# Patient Record
Sex: MALE | Race: WHITE | NOT HISPANIC OR LATINO | Employment: OTHER | URBAN - METROPOLITAN AREA
[De-identification: names, ages, dates, MRNs, and addresses within clinical notes are randomized per-mention and may not be internally consistent; named-entity substitution may affect disease eponyms.]

---

## 2023-01-28 PROBLEM — I49.9 IRREGULAR HEARTBEAT: Status: ACTIVE | Noted: 2023-01-28

## 2023-01-28 PROBLEM — Z86.0100 HISTORY OF COLONIC POLYPS: Status: ACTIVE | Noted: 2023-01-28

## 2023-01-28 PROBLEM — K59.09 CHRONIC CONSTIPATION: Status: ACTIVE | Noted: 2023-01-28

## 2023-01-28 PROBLEM — J18.9 LEFT LOWER LOBE PNEUMONIA: Status: ACTIVE | Noted: 2023-01-28

## 2023-01-28 PROBLEM — I10 ESSENTIAL HYPERTENSION: Status: ACTIVE | Noted: 2023-01-28

## 2023-01-28 PROBLEM — R53.1 WEAKNESS: Status: ACTIVE | Noted: 2023-01-28

## 2023-01-28 PROBLEM — M79.18 MYOFASCIAL PAIN: Status: ACTIVE | Noted: 2023-01-28

## 2023-01-28 PROBLEM — G47.33 OBSTRUCTIVE SLEEP APNEA: Status: ACTIVE | Noted: 2023-01-28

## 2023-01-28 PROBLEM — G89.29 CHRONIC BACK PAIN: Status: ACTIVE | Noted: 2023-01-28

## 2023-01-28 PROBLEM — R91.1 LUNG NODULE: Status: ACTIVE | Noted: 2023-01-28

## 2023-01-28 PROBLEM — M54.14 THORACIC NEURITIS: Status: ACTIVE | Noted: 2023-01-28

## 2023-01-28 PROBLEM — M54.9 CHRONIC BACK PAIN: Status: ACTIVE | Noted: 2023-01-28

## 2023-01-28 PROBLEM — J30.2 SEASONAL ALLERGIES: Status: ACTIVE | Noted: 2023-01-28

## 2023-01-28 PROBLEM — J45.909 ASTHMA (HHS-HCC): Status: ACTIVE | Noted: 2023-01-28

## 2023-01-28 PROBLEM — T17.500A MUCOID IMPACTION OF BRONCHI: Status: ACTIVE | Noted: 2023-01-28

## 2023-01-28 PROBLEM — R73.03 PREDIABETES: Status: ACTIVE | Noted: 2023-01-28

## 2023-01-28 PROBLEM — M47.814 THORACIC SPONDYLOSIS: Status: ACTIVE | Noted: 2023-01-28

## 2023-01-28 PROBLEM — R32 URINARY LEAKAGE: Status: ACTIVE | Noted: 2023-01-28

## 2023-01-28 PROBLEM — M47.814 SPONDYLOSIS, THORACIC: Status: ACTIVE | Noted: 2023-01-28

## 2023-01-28 PROBLEM — J43.2 CENTRILOBULAR EMPHYSEMA (MULTI): Status: ACTIVE | Noted: 2023-01-28

## 2023-01-28 PROBLEM — J43.8 PARASEPTAL EMPHYSEMA (MULTI): Status: ACTIVE | Noted: 2023-01-28

## 2023-01-28 PROBLEM — J44.9 COPD (CHRONIC OBSTRUCTIVE PULMONARY DISEASE) (MULTI): Status: ACTIVE | Noted: 2023-01-28

## 2023-01-28 PROBLEM — M51.24 THORACIC DISC HERNIATION: Status: ACTIVE | Noted: 2023-01-28

## 2023-01-28 PROBLEM — K57.32 DIVERTICULITIS, COLON: Status: ACTIVE | Noted: 2023-01-28

## 2023-01-28 PROBLEM — E66.3 OVERWEIGHT (BMI 25.0-29.9): Status: ACTIVE | Noted: 2023-01-28

## 2023-01-28 PROBLEM — F17.200 CURRENT SMOKER: Status: ACTIVE | Noted: 2023-01-28

## 2023-01-28 PROBLEM — Z85.46 HISTORY OF PROSTATE CANCER: Status: ACTIVE | Noted: 2023-01-28

## 2023-01-28 PROBLEM — I51.7 LEFT VENTRICULAR HYPERTROPHY: Status: ACTIVE | Noted: 2023-01-28

## 2023-01-28 PROBLEM — K42.9 UMBILICAL HERNIA: Status: ACTIVE | Noted: 2023-01-28

## 2023-01-28 PROBLEM — R91.8 MULTIPLE LUNG NODULES ON CT: Status: ACTIVE | Noted: 2023-01-28

## 2023-01-28 PROBLEM — K63.5 SESSILE COLONIC POLYP: Status: ACTIVE | Noted: 2023-01-28

## 2023-01-28 PROBLEM — M25.562 LEFT KNEE PAIN: Status: ACTIVE | Noted: 2023-01-28

## 2023-01-28 PROBLEM — H93.19 RINGING IN EAR: Status: ACTIVE | Noted: 2023-01-28

## 2023-01-28 PROBLEM — E55.9 VITAMIN D DEFICIENCY: Status: ACTIVE | Noted: 2023-01-28

## 2023-01-28 PROBLEM — M62.830 MUSCLE SPASM OF BACK: Status: ACTIVE | Noted: 2023-01-28

## 2023-01-28 PROBLEM — F17.210 CIGARETTE NICOTINE DEPENDENCE WITHOUT COMPLICATION: Status: ACTIVE | Noted: 2023-01-28

## 2023-01-28 PROBLEM — D17.1 LIPOMA OF BACK: Status: ACTIVE | Noted: 2023-01-28

## 2023-01-28 PROBLEM — H91.93 BILATERAL HEARING LOSS: Status: ACTIVE | Noted: 2023-01-28

## 2023-01-28 PROBLEM — K64.8 INTERNAL HEMORRHOID: Status: ACTIVE | Noted: 2023-01-28

## 2023-01-28 PROBLEM — Z86.010 HISTORY OF COLONIC POLYPS: Status: ACTIVE | Noted: 2023-01-28

## 2023-01-28 PROBLEM — E78.5 HYPERLIPIDEMIA: Status: ACTIVE | Noted: 2023-01-28

## 2023-01-28 PROBLEM — M75.50 SCAPULOTHORACIC BURSITIS: Status: ACTIVE | Noted: 2023-01-28

## 2023-01-28 PROBLEM — J40 BRONCHITIS: Status: ACTIVE | Noted: 2023-01-28

## 2023-01-28 PROBLEM — I77.810 ASCENDING AORTA DILATION (CMS-HCC): Status: ACTIVE | Noted: 2023-01-28

## 2023-01-28 PROBLEM — M54.6 THORACIC BACK PAIN: Status: ACTIVE | Noted: 2023-01-28

## 2023-01-28 PROBLEM — B37.0 ORAL THRUSH: Status: ACTIVE | Noted: 2023-01-28

## 2023-01-28 PROBLEM — R31.9 HEMATURIA: Status: ACTIVE | Noted: 2023-01-28

## 2023-01-28 PROBLEM — R32 ABSENCE OF BLADDER CONTINENCE: Status: ACTIVE | Noted: 2023-01-28

## 2023-01-28 RX ORDER — BUDESONIDE AND FORMOTEROL FUMARATE DIHYDRATE 160; 4.5 UG/1; UG/1
2 AEROSOL RESPIRATORY (INHALATION)
COMMUNITY
Start: 2015-11-03 | End: 2024-04-09 | Stop reason: SDUPTHER

## 2023-01-28 RX ORDER — DOCUSATE SODIUM 100 MG/1
100 CAPSULE, LIQUID FILLED ORAL 2 TIMES DAILY
COMMUNITY
Start: 2021-01-25 | End: 2023-04-14 | Stop reason: SDUPTHER

## 2023-01-28 RX ORDER — FLUTICASONE PROPIONATE 50 MCG
2 SPRAY, SUSPENSION (ML) NASAL DAILY
COMMUNITY
Start: 2022-03-07 | End: 2023-03-13 | Stop reason: SDUPTHER

## 2023-01-28 RX ORDER — ASPIRIN 81 MG/1
1 TABLET ORAL DAILY
Status: ON HOLD | COMMUNITY
Start: 2018-10-03 | End: 2023-10-06 | Stop reason: ALTCHOICE

## 2023-01-28 RX ORDER — HYDROCODONE BITARTRATE AND ACETAMINOPHEN 5; 325 MG/1; MG/1
1 TABLET ORAL EVERY 8 HOURS
COMMUNITY
Start: 2021-07-02 | End: 2023-03-13 | Stop reason: SDUPTHER

## 2023-01-28 RX ORDER — ATORVASTATIN CALCIUM 20 MG/1
1 TABLET, FILM COATED ORAL NIGHTLY
COMMUNITY
Start: 2018-10-03 | End: 2023-08-02 | Stop reason: SDUPTHER

## 2023-01-28 RX ORDER — OXYBUTYNIN CHLORIDE 5 MG/1
1 TABLET, EXTENDED RELEASE ORAL DAILY
Status: ON HOLD | COMMUNITY
Start: 2022-03-11 | End: 2023-11-09 | Stop reason: ENTERED-IN-ERROR

## 2023-01-28 RX ORDER — LORATADINE 10 MG/1
1 TABLET ORAL DAILY
COMMUNITY
Start: 2022-05-25 | End: 2023-11-09 | Stop reason: ENTERED-IN-ERROR

## 2023-01-28 RX ORDER — PREGABALIN 50 MG/1
CAPSULE ORAL
COMMUNITY
Start: 2022-03-22 | End: 2023-05-22 | Stop reason: WASHOUT

## 2023-01-28 RX ORDER — LOSARTAN POTASSIUM 100 MG/1
1 TABLET ORAL DAILY
COMMUNITY
Start: 2015-12-31 | End: 2023-03-13 | Stop reason: SDUPTHER

## 2023-01-28 RX ORDER — ALBUTEROL SULFATE 90 UG/1
2 AEROSOL, METERED RESPIRATORY (INHALATION) EVERY 4 HOURS PRN
Status: ON HOLD | COMMUNITY
Start: 2018-04-01 | End: 2023-10-06 | Stop reason: ALTCHOICE

## 2023-03-13 ENCOUNTER — OFFICE VISIT (OUTPATIENT)
Dept: PRIMARY CARE | Facility: CLINIC | Age: 83
End: 2023-03-13
Payer: MEDICARE

## 2023-03-13 VITALS
SYSTOLIC BLOOD PRESSURE: 144 MMHG | WEIGHT: 185.4 LBS | HEART RATE: 80 BPM | DIASTOLIC BLOOD PRESSURE: 69 MMHG | OXYGEN SATURATION: 93 % | TEMPERATURE: 97.9 F | RESPIRATION RATE: 14 BRPM | HEIGHT: 69 IN | BODY MASS INDEX: 27.46 KG/M2

## 2023-03-13 DIAGNOSIS — I10 ESSENTIAL HYPERTENSION: ICD-10-CM

## 2023-03-13 DIAGNOSIS — J30.2 SEASONAL ALLERGIES: ICD-10-CM

## 2023-03-13 DIAGNOSIS — G47.33 OBSTRUCTIVE SLEEP APNEA: ICD-10-CM

## 2023-03-13 DIAGNOSIS — F17.210 CIGARETTE NICOTINE DEPENDENCE WITHOUT COMPLICATION: ICD-10-CM

## 2023-03-13 DIAGNOSIS — E78.5 HYPERLIPIDEMIA, UNSPECIFIED HYPERLIPIDEMIA TYPE: ICD-10-CM

## 2023-03-13 DIAGNOSIS — M54.9 CHRONIC BACK PAIN, UNSPECIFIED BACK LOCATION, UNSPECIFIED BACK PAIN LATERALITY: Primary | ICD-10-CM

## 2023-03-13 DIAGNOSIS — G89.29 CHRONIC BACK PAIN, UNSPECIFIED BACK LOCATION, UNSPECIFIED BACK PAIN LATERALITY: Primary | ICD-10-CM

## 2023-03-13 DIAGNOSIS — J44.9 CHRONIC OBSTRUCTIVE PULMONARY DISEASE, UNSPECIFIED COPD TYPE (MULTI): ICD-10-CM

## 2023-03-13 DIAGNOSIS — R91.8 MULTIPLE LUNG NODULES ON CT: ICD-10-CM

## 2023-03-13 DIAGNOSIS — E66.3 OVERWEIGHT (BMI 25.0-29.9): ICD-10-CM

## 2023-03-13 DIAGNOSIS — Z00.00 MEDICARE ANNUAL WELLNESS VISIT, SUBSEQUENT: ICD-10-CM

## 2023-03-13 PROBLEM — D09.9 SQUAMOUS CELL CARCINOMA IN SITU: Status: ACTIVE | Noted: 2023-03-13

## 2023-03-13 PROBLEM — R09.89 RESPIRATORY CRACKLES: Status: RESOLVED | Noted: 2023-03-13 | Resolved: 2023-03-13

## 2023-03-13 PROCEDURE — 3077F SYST BP >= 140 MM HG: CPT | Performed by: NURSE PRACTITIONER

## 2023-03-13 PROCEDURE — 3078F DIAST BP <80 MM HG: CPT | Performed by: NURSE PRACTITIONER

## 2023-03-13 PROCEDURE — 99214 OFFICE O/P EST MOD 30 MIN: CPT | Performed by: NURSE PRACTITIONER

## 2023-03-13 PROCEDURE — 1159F MED LIST DOCD IN RCRD: CPT | Performed by: NURSE PRACTITIONER

## 2023-03-13 PROCEDURE — 1170F FXNL STATUS ASSESSED: CPT | Performed by: NURSE PRACTITIONER

## 2023-03-13 PROCEDURE — 1160F RVW MEDS BY RX/DR IN RCRD: CPT | Performed by: NURSE PRACTITIONER

## 2023-03-13 PROCEDURE — G0439 PPPS, SUBSEQ VISIT: HCPCS | Performed by: NURSE PRACTITIONER

## 2023-03-13 RX ORDER — FLUTICASONE PROPIONATE 50 MCG
2 SPRAY, SUSPENSION (ML) NASAL DAILY
Qty: 16 G | Refills: 11 | Status: SHIPPED | OUTPATIENT
Start: 2023-03-13 | End: 2023-04-06

## 2023-03-13 RX ORDER — LOSARTAN POTASSIUM 100 MG/1
100 TABLET ORAL DAILY
Qty: 90 TABLET | Refills: 3 | Status: SHIPPED | OUTPATIENT
Start: 2023-03-13 | End: 2023-09-11 | Stop reason: SDUPTHER

## 2023-03-13 RX ORDER — HYDROCODONE BITARTRATE AND ACETAMINOPHEN 5; 300 MG/1; MG/1
1 TABLET ORAL EVERY 6 HOURS PRN
Qty: 90 TABLET | Refills: 0 | Status: SHIPPED | OUTPATIENT
Start: 2023-03-13 | End: 2023-03-20 | Stop reason: WASHOUT

## 2023-03-13 RX ORDER — HYDROCODONE BITARTRATE AND ACETAMINOPHEN 5; 325 MG/1; MG/1
1 TABLET ORAL EVERY 8 HOURS
Qty: 90 TABLET | Refills: 0 | Status: SHIPPED | OUTPATIENT
Start: 2023-03-13 | End: 2023-03-13 | Stop reason: CLARIF

## 2023-03-13 ASSESSMENT — ENCOUNTER SYMPTOMS
CHILLS: 0
POLYDIPSIA: 0
BACK PAIN: 1
HEMATURIA: 0
FATIGUE: 0
DEPRESSION: 0
EYE REDNESS: 0
EYE PAIN: 0
DYSURIA: 0
ABDOMINAL PAIN: 0
LOSS OF SENSATION IN FEET: 1
OCCASIONAL FEELINGS OF UNSTEADINESS: 0
SORE THROAT: 0
BLOOD IN STOOL: 0
BRUISES/BLEEDS EASILY: 0
ADENOPATHY: 0
FEVER: 0
RHINORRHEA: 0
COUGH: 0
HEADACHES: 0
WEAKNESS: 0
PALPITATIONS: 0
HALLUCINATIONS: 0
SHORTNESS OF BREATH: 0
NECK STIFFNESS: 0
WOUND: 0

## 2023-03-13 ASSESSMENT — ACTIVITIES OF DAILY LIVING (ADL)
GROCERY_SHOPPING: INDEPENDENT
DRESSING: INDEPENDENT
TAKING_MEDICATION: INDEPENDENT
DOING_HOUSEWORK: INDEPENDENT
BATHING: INDEPENDENT
MANAGING_FINANCES: INDEPENDENT

## 2023-03-13 ASSESSMENT — PATIENT HEALTH QUESTIONNAIRE - PHQ9
2. FEELING DOWN, DEPRESSED OR HOPELESS: NOT AT ALL
SUM OF ALL RESPONSES TO PHQ9 QUESTIONS 1 AND 2: 0
1. LITTLE INTEREST OR PLEASURE IN DOING THINGS: NOT AT ALL

## 2023-03-13 NOTE — ASSESSMENT & PLAN NOTE
OARRS report reviewed on this date, UDS/contract up-to-date, continue current dose of Norco --refill Rx provided

## 2023-03-13 NOTE — PROGRESS NOTES
Subjective   Reason for Visit: Shiva Hardy is an 82 y.o. male here for a Medicare Wellness visit.     Past Medical, Surgical, and Family History reviewed and updated in chart.    Reviewed all medications by prescribing practitioner or clinical pharmacist (such as prescriptions, OTCs, herbal therapies and supplements) and documented in the medical record.    HPI 82-year-old male here for Medicare wellness visit.  He reports that he has been out of his blood pressure medication for 3 days.  He denies any acute issues or concerns at this time.      OARRS report reviewed.  Patient is up-to-date on UDS/contract.  He reports at its worst his chronic pain is a 7 out of 10, improved to 2 out of 10 with medications.  He reports he is taking 3 Norco daily, this controls approximately 75% of his pain.  No concerns for abuse.    Patient Self Assessment of Health Status  Patient Self Assessment: Good    Nutrition and Exercise  Current Diet: Unhealthy Diet  Adequate Fluid Intake: Yes  Caffeine: Yes  Exercise Frequency: No Exercise    Functional Ability/Level of Safety  Cognitive Impairment Observed: Cognitive impairment observed  Cognitive Impairment Reported: No cognitive impairment reported by patient or family    Home Safety Risk Factors: None    Patient Care Team:  CHILANGO Keane-CNP as PCP - General     Review of Systems   Constitutional:  Negative for chills, fatigue and fever.   HENT:  Negative for rhinorrhea and sore throat.    Eyes:  Negative for pain and redness.   Respiratory:  Negative for cough and shortness of breath.    Cardiovascular:  Negative for chest pain and palpitations.   Gastrointestinal:  Negative for abdominal pain and blood in stool.   Endocrine: Negative for polydipsia and polyuria.   Genitourinary:  Negative for dysuria and hematuria.   Musculoskeletal:  Positive for back pain. Negative for neck stiffness.   Skin:  Negative for rash and wound.   Allergic/Immunologic: Negative for environmental  "allergies and food allergies.   Neurological:  Negative for weakness and headaches.   Hematological:  Negative for adenopathy. Does not bruise/bleed easily.   Psychiatric/Behavioral:  Negative for hallucinations and suicidal ideas.        Objective   Vitals:  /69 (BP Location: Left arm, Patient Position: Sitting, BP Cuff Size: Large adult)   Pulse 80   Temp 36.6 °C (97.9 °F) (Temporal)   Resp 14   Ht 1.753 m (5' 9\")   Wt 84.1 kg (185 lb 6.4 oz)   SpO2 93%   BMI 27.38 kg/m²       Physical Exam  Vitals reviewed.   Constitutional:       General: He is not in acute distress.     Appearance: He is not ill-appearing.   HENT:      Head: Normocephalic and atraumatic.      Right Ear: Tympanic membrane normal.      Left Ear: Tympanic membrane normal.      Nose: No congestion or rhinorrhea.      Mouth/Throat:      Pharynx: No oropharyngeal exudate or posterior oropharyngeal erythema.   Eyes:      Extraocular Movements: Extraocular movements intact.      Conjunctiva/sclera: Conjunctivae normal.      Pupils: Pupils are equal, round, and reactive to light.   Cardiovascular:      Rate and Rhythm: Normal rate and regular rhythm.      Heart sounds: No murmur heard.     No friction rub. No gallop.   Pulmonary:      Effort: Pulmonary effort is normal.      Breath sounds: Normal breath sounds. No wheezing, rhonchi or rales.   Abdominal:      General: There is no distension.      Palpations: Abdomen is soft.      Tenderness: There is no abdominal tenderness. There is no guarding or rebound.   Musculoskeletal:         General: No swelling or deformity.      Cervical back: Normal range of motion and neck supple.      Right lower leg: No edema.      Left lower leg: No edema.   Skin:     Capillary Refill: Capillary refill takes less than 2 seconds.      Coloration: Skin is not jaundiced.      Findings: No rash.   Neurological:      General: No focal deficit present.      Mental Status: He is alert.      Motor: No weakness. "   Psychiatric:         Mood and Affect: Mood normal.         Behavior: Behavior normal.         Assessment/Plan   Problem List Items Addressed This Visit          Nervous    Obstructive sleep apnea       Respiratory    COPD (chronic obstructive pulmonary disease) (CMS/[facility]ly [facility])    Current Assessment & Plan     Respiratory status stable on Symbicort and as needed albuterol            Circulatory    Essential hypertension    Current Assessment & Plan     Pressure elevated in the office today, patient asymptomatic, patient has been out of his medication for 3 days--resume losartan monitor BP at home twice daily for the next week and provide log to the office            Endocrine/Metabolic    Overweight (BMI 25.0-29.9)    Current Assessment & Plan     Discussed the importance of a healthy and well-rounded diet, encouraged patient to implement regular activity with a goal of 30 minutes daily x5 days a week            Other    Cigarette nicotine dependence without complication    Current Assessment & Plan     Patient reports he has been cutting back on smoking, down to 1/2 PPD, declines any intervention from this provider at this time         Hyperlipidemia    Seasonal allergies    Chronic back pain - Primary    Current Assessment & Plan     OARRS report reviewed on this date, UDS/contract up-to-date, continue current dose of Norco --refill Rx provided         Medicare annual wellness visit, subsequent    Current Assessment & Plan     Completed 3/13/2023, exam benign with BMI 27, up-to-date on vaccinations, lives with his significant other, no recent falls, no loss of ADLs, no depression, patient given requisition for blood work

## 2023-03-13 NOTE — ASSESSMENT & PLAN NOTE
Chart review indicates no changes in >2 years, considered benign, follow-up with pulmonology as scheduled

## 2023-03-13 NOTE — ASSESSMENT & PLAN NOTE
Pressure elevated in the office today, patient asymptomatic, patient has been out of his medication for 3 days--resume losartan monitor BP at home twice daily for the next week and provide log to the office

## 2023-03-13 NOTE — ASSESSMENT & PLAN NOTE
Respiratory status stable on Symbicort and as needed albuterol, follow-up with pulmonology as scheduled   25-May-2021 13:14

## 2023-03-13 NOTE — ASSESSMENT & PLAN NOTE
Patient reports he has been cutting back on smoking, down to 1/2 PPD, declines any intervention from this provider at this time

## 2023-03-13 NOTE — ASSESSMENT & PLAN NOTE
Discussed the importance of a healthy and well-rounded diet, encouraged patient to implement regular activity with a goal of 30 minutes daily x5 days a week

## 2023-03-13 NOTE — ASSESSMENT & PLAN NOTE
Completed 3/13/2023, exam benign with BMI 27, up-to-date on vaccinations, lives with his significant other, no recent falls, no loss of ADLs, no depression, reviewed blood work from October

## 2023-04-04 DIAGNOSIS — J30.2 SEASONAL ALLERGIES: ICD-10-CM

## 2023-04-06 RX ORDER — FLUTICASONE PROPIONATE 50 MCG
2 SPRAY, SUSPENSION (ML) NASAL DAILY
Qty: 16 ML | Refills: 11 | Status: SHIPPED | OUTPATIENT
Start: 2023-04-06 | End: 2023-11-13 | Stop reason: HOSPADM

## 2023-04-14 DIAGNOSIS — G89.29 CHRONIC BILATERAL BACK PAIN, UNSPECIFIED BACK LOCATION: ICD-10-CM

## 2023-04-14 DIAGNOSIS — K59.09 CHRONIC CONSTIPATION: ICD-10-CM

## 2023-04-14 DIAGNOSIS — M54.9 CHRONIC BILATERAL BACK PAIN, UNSPECIFIED BACK LOCATION: ICD-10-CM

## 2023-04-14 RX ORDER — AMLODIPINE BESYLATE 5 MG/1
1 TABLET ORAL DAILY
COMMUNITY
Start: 2023-03-30 | End: 2023-05-22 | Stop reason: SDUPTHER

## 2023-04-14 RX ORDER — DOCUSATE SODIUM 100 MG/1
100 CAPSULE, LIQUID FILLED ORAL 2 TIMES DAILY
Qty: 60 CAPSULE | Refills: 2 | Status: SHIPPED | OUTPATIENT
Start: 2023-04-14 | End: 2024-01-02 | Stop reason: ALTCHOICE

## 2023-04-14 RX ORDER — HYDROCODONE BITARTRATE AND ACETAMINOPHEN 5; 325 MG/1; MG/1
1 TABLET ORAL EVERY 8 HOURS
Qty: 90 TABLET | Refills: 0 | Status: SHIPPED | OUTPATIENT
Start: 2023-04-14 | End: 2023-05-11 | Stop reason: SDUPTHER

## 2023-04-14 RX ORDER — HYDROCODONE BITARTRATE AND ACETAMINOPHEN 5; 325 MG/1; MG/1
1 TABLET ORAL EVERY 8 HOURS
COMMUNITY
End: 2023-04-14 | Stop reason: SDUPTHER

## 2023-05-11 DIAGNOSIS — G89.29 CHRONIC BILATERAL BACK PAIN, UNSPECIFIED BACK LOCATION: ICD-10-CM

## 2023-05-11 DIAGNOSIS — M54.9 CHRONIC BILATERAL BACK PAIN, UNSPECIFIED BACK LOCATION: ICD-10-CM

## 2023-05-11 RX ORDER — HYDROCODONE BITARTRATE AND ACETAMINOPHEN 5; 325 MG/1; MG/1
1 TABLET ORAL EVERY 8 HOURS
Qty: 90 TABLET | Refills: 0 | Status: SHIPPED | OUTPATIENT
Start: 2023-05-11 | End: 2023-06-22 | Stop reason: SDUPTHER

## 2023-05-11 NOTE — TELEPHONE ENCOUNTER
pt.    LOV: 03/13/2023  NOV: 06/08/2023- After ST leaves so will need new PCP appt. For next fill.

## 2023-05-15 DIAGNOSIS — G89.29 CHRONIC BILATERAL BACK PAIN, UNSPECIFIED BACK LOCATION: ICD-10-CM

## 2023-05-15 DIAGNOSIS — M54.9 CHRONIC BILATERAL BACK PAIN, UNSPECIFIED BACK LOCATION: ICD-10-CM

## 2023-05-19 RX ORDER — HYDROCODONE BITARTRATE AND ACETAMINOPHEN 5; 325 MG/1; MG/1
1 TABLET ORAL EVERY 8 HOURS
Qty: 90 TABLET | Refills: 0 | OUTPATIENT
Start: 2023-05-19 | End: 2023-06-18

## 2023-05-22 ENCOUNTER — LAB (OUTPATIENT)
Dept: LAB | Facility: LAB | Age: 83
End: 2023-05-22
Payer: MEDICARE

## 2023-05-22 ENCOUNTER — OFFICE VISIT (OUTPATIENT)
Dept: PRIMARY CARE | Facility: CLINIC | Age: 83
End: 2023-05-22
Payer: MEDICARE

## 2023-05-22 VITALS
TEMPERATURE: 97.3 F | DIASTOLIC BLOOD PRESSURE: 89 MMHG | BODY MASS INDEX: 27.25 KG/M2 | WEIGHT: 184 LBS | OXYGEN SATURATION: 95 % | SYSTOLIC BLOOD PRESSURE: 158 MMHG | HEIGHT: 69 IN | HEART RATE: 73 BPM

## 2023-05-22 DIAGNOSIS — R32 URINARY INCONTINENCE, UNSPECIFIED TYPE: ICD-10-CM

## 2023-05-22 DIAGNOSIS — I25.118 ATHEROSCLEROSIS OF NATIVE CORONARY ARTERY OF NATIVE HEART WITH OTHER FORM OF ANGINA PECTORIS (CMS-HCC): ICD-10-CM

## 2023-05-22 DIAGNOSIS — I10 ESSENTIAL HYPERTENSION: Primary | ICD-10-CM

## 2023-05-22 DIAGNOSIS — J43.9 PULMONARY EMPHYSEMA, UNSPECIFIED EMPHYSEMA TYPE (MULTI): ICD-10-CM

## 2023-05-22 DIAGNOSIS — G47.33 OBSTRUCTIVE SLEEP APNEA: ICD-10-CM

## 2023-05-22 DIAGNOSIS — E55.9 VITAMIN D DEFICIENCY: ICD-10-CM

## 2023-05-22 DIAGNOSIS — I77.810 ASCENDING AORTA DILATION (CMS-HCC): ICD-10-CM

## 2023-05-22 DIAGNOSIS — J43.2 CENTRILOBULAR EMPHYSEMA (MULTI): ICD-10-CM

## 2023-05-22 DIAGNOSIS — M62.830 MUSCLE SPASM OF BACK: ICD-10-CM

## 2023-05-22 DIAGNOSIS — Z86.010 HISTORY OF COLONIC POLYPS: ICD-10-CM

## 2023-05-22 DIAGNOSIS — R91.1 LUNG NODULE: ICD-10-CM

## 2023-05-22 DIAGNOSIS — M47.814 SPONDYLOSIS, THORACIC: ICD-10-CM

## 2023-05-22 DIAGNOSIS — R73.03 PREDIABETES: ICD-10-CM

## 2023-05-22 DIAGNOSIS — K59.09 CHRONIC CONSTIPATION: ICD-10-CM

## 2023-05-22 LAB
APPEARANCE, URINE: ABNORMAL
BILIRUBIN, URINE: NEGATIVE
BLOOD, URINE: ABNORMAL
BUDDING YEAST, URINE: PRESENT /HPF
COLOR, URINE: YELLOW
GLUCOSE, URINE: NEGATIVE MG/DL
KETONES, URINE: NEGATIVE MG/DL
LEUKOCYTE ESTERASE, URINE: NEGATIVE
MUCUS, URINE: ABNORMAL /LPF
NITRITE, URINE: NEGATIVE
PH, URINE: 6 (ref 5–8)
PROTEIN, URINE: ABNORMAL MG/DL
RBC, URINE: >182 /HPF (ref 0–5)
SPECIFIC GRAVITY, URINE: 1.02 (ref 1–1.03)
SQUAMOUS EPITHELIAL CELLS, URINE: <1 /HPF
UROBILINOGEN, URINE: <2 MG/DL (ref 0–1.9)
WBC, URINE: 7 /HPF (ref 0–5)

## 2023-05-22 PROCEDURE — 3079F DIAST BP 80-89 MM HG: CPT | Performed by: INTERNAL MEDICINE

## 2023-05-22 PROCEDURE — 99214 OFFICE O/P EST MOD 30 MIN: CPT | Performed by: INTERNAL MEDICINE

## 2023-05-22 PROCEDURE — 1159F MED LIST DOCD IN RCRD: CPT | Performed by: INTERNAL MEDICINE

## 2023-05-22 PROCEDURE — 1160F RVW MEDS BY RX/DR IN RCRD: CPT | Performed by: INTERNAL MEDICINE

## 2023-05-22 PROCEDURE — 81001 URINALYSIS AUTO W/SCOPE: CPT

## 2023-05-22 PROCEDURE — 3077F SYST BP >= 140 MM HG: CPT | Performed by: INTERNAL MEDICINE

## 2023-05-22 RX ORDER — GABAPENTIN 300 MG/1
300 CAPSULE ORAL 3 TIMES DAILY
Qty: 45 CAPSULE | Refills: 0 | Status: SHIPPED | OUTPATIENT
Start: 2023-05-22 | End: 2023-05-30 | Stop reason: SDUPTHER

## 2023-05-22 RX ORDER — POLYETHYLENE GLYCOL 3350 17 G/17G
17 POWDER, FOR SOLUTION ORAL DAILY
Qty: 30 PACKET | Refills: 1
Start: 2023-05-22 | End: 2023-06-21

## 2023-05-22 RX ORDER — AMLODIPINE BESYLATE 5 MG/1
5 TABLET ORAL DAILY
Qty: 30 TABLET | Refills: 1 | Status: SHIPPED | OUTPATIENT
Start: 2023-05-22 | End: 2023-08-25 | Stop reason: SDUPTHER

## 2023-05-22 RX ORDER — MONTELUKAST SODIUM 10 MG/1
10 TABLET ORAL NIGHTLY
Qty: 30 TABLET | Refills: 5 | Status: SHIPPED | OUTPATIENT
Start: 2023-05-22 | End: 2023-10-09 | Stop reason: SDUPTHER

## 2023-05-22 RX ORDER — METHOCARBAMOL 500 MG/1
500 TABLET, FILM COATED ORAL 2 TIMES DAILY
Qty: 30 TABLET | Refills: 1 | Status: SHIPPED | OUTPATIENT
Start: 2023-05-22 | End: 2023-05-30 | Stop reason: SDUPTHER

## 2023-05-22 ASSESSMENT — PATIENT HEALTH QUESTIONNAIRE - PHQ9
SUM OF ALL RESPONSES TO PHQ9 QUESTIONS 1 AND 2: 0
1. LITTLE INTEREST OR PLEASURE IN DOING THINGS: NOT AT ALL
2. FEELING DOWN, DEPRESSED OR HOPELESS: NOT AT ALL

## 2023-05-22 ASSESSMENT — LIFESTYLE VARIABLES
AUDIT-C TOTAL SCORE: 0
HOW OFTEN DO YOU HAVE SIX OR MORE DRINKS ON ONE OCCASION: NEVER
HOW OFTEN DO YOU HAVE A DRINK CONTAINING ALCOHOL: NEVER
SKIP TO QUESTIONS 9-10: 1
HOW MANY STANDARD DRINKS CONTAINING ALCOHOL DO YOU HAVE ON A TYPICAL DAY: PATIENT DOES NOT DRINK

## 2023-05-22 ASSESSMENT — COLUMBIA-SUICIDE SEVERITY RATING SCALE - C-SSRS: 1. IN THE PAST MONTH, HAVE YOU WISHED YOU WERE DEAD OR WISHED YOU COULD GO TO SLEEP AND NOT WAKE UP?: NO

## 2023-05-22 NOTE — PROGRESS NOTES
"Subjective   Patient ID: Shiva Hardy is a 82 y.o. male who presents for New Patient Visit.    HPI patient presents to clinic complaining of urinary frequency and chronic upper back pain for the past several months.  He also wants to get established with a office.  He had been a former patient of Dr. Angel .he denies any dysuria, nausea vomiting fever chills swelling of legs and diaphoresis.  EKG done in the office shows sinus rhythm at 59 bpm with normal axis, normal interval poor R wave progression without any new ischemic changes.  c/ourinary frequency,upper back pain  Past medical history significant for ascending dilated aorta, COPD, severe obstructive sleep apnea, nicotine dependence, multiple lung nodules, status post diverticulitis,  hard of hearing bilaterally, hyperlipidemia, hypertension, coronary atherosclerosis, sessile colonic polyp, spondylosis of thoracic pain spine, thoracic disc herniation, vitamin D deficiency, prediabetes, prostate cancer, colonic polyp and s/p radiation treatment for prostate cancer  Past surgical history notable for cataract surgery, umbilical hernia repair, back  surgery prostate surgery squamous cell skin cancer removal, umbilical hernia repair, colonoscopy in 2017 revealed diverticulosis, polyp removed from transverse colon and skin biopsy.  Review of Systems   Constitutional: Negative.    HENT: Negative.     Eyes: Negative.    Respiratory: Negative.     Cardiovascular: Negative.    Gastrointestinal: Negative.    Endocrine: Negative.    Genitourinary: Negative.    Musculoskeletal: Negative.    Skin: Negative.    Allergic/Immunologic: Negative.    Neurological: Negative.    Hematological: Negative.    Psychiatric/Behavioral: Negative.         Objective   /89   Pulse 73   Temp 36.3 °C (97.3 °F)   Ht 1.753 m (5' 9\")   Wt 83.5 kg (184 lb)   SpO2 95%   BMI 27.17 kg/m²     Physical Exam  Constitutional:       Appearance: Normal appearance. He is normal weight. "   HENT:      Right Ear: Tympanic membrane normal.      Left Ear: Tympanic membrane and ear canal normal.      Nose: Nose normal.   Neck:      Vascular: No carotid bruit.   Cardiovascular:      Rate and Rhythm: Normal rate.   Pulmonary:      Effort: No respiratory distress.      Breath sounds: No stridor. No wheezing.   Abdominal:      General: There is no distension.      Palpations: Abdomen is soft.      Tenderness: There is no guarding or rebound.   Skin:     Coloration: Skin is not jaundiced.   Neurological:      General: No focal deficit present.      Mental Status: He is alert and oriented to person, place, and time.   Psychiatric:         Mood and Affect: Mood normal.         Assessment/Plan    patient will be scheduled for routine blood work, he is given trial with gabapentin and methocarbamol for chronic back pain.  He is advised to taper hydrocodone.  He will be scheduled for CT cardiac scoring regarding history of coronary atherosclerosis.  He is encouraged to cut down on tobacco use.  He will be started on loaded pain 5 mg daily for uncontrolled blood pressure.  He will be referred to pain management regarding history of chronic back pain.  He will return to clinic in 1 week for follow-up visit.

## 2023-05-22 NOTE — PROGRESS NOTES
"Subjective   Patient ID: Shiva Hardy is a 82 y.o. male who presents for New Patient Visit.    HPI     Review of Systems    Objective   /89   Pulse 73   Temp 36.3 °C (97.3 °F)   Ht 1.753 m (5' 9\")   Wt 83.5 kg (184 lb)   SpO2 95%   BMI 27.17 kg/m²     Physical Exam    Assessment/Plan          "

## 2023-05-24 ENCOUNTER — APPOINTMENT (OUTPATIENT)
Dept: PRIMARY CARE | Facility: CLINIC | Age: 83
End: 2023-05-24
Payer: MEDICARE

## 2023-05-24 DIAGNOSIS — R31.9 HEMATURIA, UNSPECIFIED TYPE: Primary | ICD-10-CM

## 2023-05-25 ASSESSMENT — ENCOUNTER SYMPTOMS
RESPIRATORY NEGATIVE: 1
CARDIOVASCULAR NEGATIVE: 1
MUSCULOSKELETAL NEGATIVE: 1
NEUROLOGICAL NEGATIVE: 1
EYES NEGATIVE: 1
CONSTITUTIONAL NEGATIVE: 1
HEMATOLOGIC/LYMPHATIC NEGATIVE: 1
ALLERGIC/IMMUNOLOGIC NEGATIVE: 1
ENDOCRINE NEGATIVE: 1
PSYCHIATRIC NEGATIVE: 1
GASTROINTESTINAL NEGATIVE: 1

## 2023-05-30 ENCOUNTER — OFFICE VISIT (OUTPATIENT)
Dept: PRIMARY CARE | Facility: CLINIC | Age: 83
End: 2023-05-30
Payer: MEDICARE

## 2023-05-30 VITALS
WEIGHT: 184 LBS | HEIGHT: 69 IN | OXYGEN SATURATION: 91 % | DIASTOLIC BLOOD PRESSURE: 80 MMHG | BODY MASS INDEX: 27.25 KG/M2 | HEART RATE: 73 BPM | TEMPERATURE: 98.2 F | SYSTOLIC BLOOD PRESSURE: 160 MMHG

## 2023-05-30 DIAGNOSIS — N30.90 CYSTITIS: ICD-10-CM

## 2023-05-30 DIAGNOSIS — M54.9 CHRONIC BILATERAL BACK PAIN, UNSPECIFIED BACK LOCATION: ICD-10-CM

## 2023-05-30 DIAGNOSIS — R31.9 HEMATURIA, UNSPECIFIED TYPE: Primary | ICD-10-CM

## 2023-05-30 DIAGNOSIS — G89.29 CHRONIC BILATERAL BACK PAIN, UNSPECIFIED BACK LOCATION: ICD-10-CM

## 2023-05-30 DIAGNOSIS — M47.814 SPONDYLOSIS, THORACIC: ICD-10-CM

## 2023-05-30 PROCEDURE — 1160F RVW MEDS BY RX/DR IN RCRD: CPT | Performed by: INTERNAL MEDICINE

## 2023-05-30 PROCEDURE — 3077F SYST BP >= 140 MM HG: CPT | Performed by: INTERNAL MEDICINE

## 2023-05-30 PROCEDURE — 1159F MED LIST DOCD IN RCRD: CPT | Performed by: INTERNAL MEDICINE

## 2023-05-30 PROCEDURE — 99213 OFFICE O/P EST LOW 20 MIN: CPT | Performed by: INTERNAL MEDICINE

## 2023-05-30 PROCEDURE — 3079F DIAST BP 80-89 MM HG: CPT | Performed by: INTERNAL MEDICINE

## 2023-05-30 RX ORDER — GABAPENTIN 300 MG/1
300 CAPSULE ORAL 3 TIMES DAILY
Qty: 90 CAPSULE | Refills: 1 | Status: SHIPPED | OUTPATIENT
Start: 2023-05-30 | End: 2023-08-17 | Stop reason: SDUPTHER

## 2023-05-30 RX ORDER — ERGOCALCIFEROL 1.25 MG/1
1.25 CAPSULE ORAL
COMMUNITY
End: 2023-08-02 | Stop reason: SDUPTHER

## 2023-05-30 RX ORDER — CIPROFLOXACIN 500 MG/1
500 TABLET ORAL 2 TIMES DAILY
Qty: 14 TABLET | Refills: 0 | Status: SHIPPED | OUTPATIENT
Start: 2023-05-30 | End: 2023-06-06

## 2023-05-30 RX ORDER — METHOCARBAMOL 500 MG/1
500 TABLET, FILM COATED ORAL 2 TIMES DAILY
Qty: 60 TABLET | Refills: 1 | Status: SHIPPED | OUTPATIENT
Start: 2023-05-30 | End: 2023-09-02 | Stop reason: SDUPTHER

## 2023-05-30 ASSESSMENT — PATIENT HEALTH QUESTIONNAIRE - PHQ9
1. LITTLE INTEREST OR PLEASURE IN DOING THINGS: NOT AT ALL
2. FEELING DOWN, DEPRESSED OR HOPELESS: NOT AT ALL
SUM OF ALL RESPONSES TO PHQ9 QUESTIONS 1 AND 2: 0

## 2023-05-30 ASSESSMENT — PAIN SCALES - GENERAL: PAINLEVEL: 0-NO PAIN

## 2023-05-30 ASSESSMENT — COLUMBIA-SUICIDE SEVERITY RATING SCALE - C-SSRS: 1. IN THE PAST MONTH, HAVE YOU WISHED YOU WERE DEAD OR WISHED YOU COULD GO TO SLEEP AND NOT WAKE UP?: NO

## 2023-05-30 NOTE — PROGRESS NOTES
"Subjective   Patient ID: Shiva Hardy is a 82 y.o. male who presents for Follow-up.    HPI patient presents to clinic to review his laboratory studies.  He continues to have burning on urination.  Urinalysis done shows large amount of blood, 7 WBCs more than 182 RBCs some yeast and CT of the abdomen pelvis done without contrast revealed nonobstructing 3 mm stone in the midpole of the right kidney without any evidence of hydronephrosis, bladder showed some mild surrounding inflammatory stranding, questionable for cystitis and also showed some scattered colonic diverticulosis.  He has history of  ascending dilated aorta, COPD, severe obstructive sleep apnea, nicotine dependence, multiple lung nodules, status post diverticulitis, hard of hearing bilaterally, hyperlipidemia, hypertension, coronary atherosclerosis, sessile colonic polyp, spondylosis of thoracic pain spine, thoracic disc herniation, vitamin D deficiency, prediabetes, prostate cancer, colonic polyp and s/p radiation treatment for prostate cancer     Review of Systems   Constitutional: Negative.    HENT: Negative.     Eyes: Negative.    Respiratory: Negative.     Cardiovascular: Negative.    Gastrointestinal: Negative.    Endocrine: Negative.    Genitourinary:  Positive for dysuria.   Musculoskeletal: Negative.    Skin: Negative.    Allergic/Immunologic: Negative.    Neurological: Negative.    Hematological: Negative.    Psychiatric/Behavioral: Negative.         Objective   /80   Pulse 73   Temp 36.8 °C (98.2 °F)   Ht 1.753 m (5' 9\")   Wt 83.5 kg (184 lb)   SpO2 91%   BMI 27.17 kg/m²     Physical Exam  Constitutional:       Appearance: Normal appearance. He is normal weight.   HENT:      Right Ear: Tympanic membrane normal.      Left Ear: Tympanic membrane and ear canal normal.      Nose: Nose normal.   Neck:      Vascular: No carotid bruit.   Cardiovascular:      Rate and Rhythm: Normal rate.   Pulmonary:      Effort: No respiratory " distress.      Breath sounds: No stridor. No wheezing.   Abdominal:      Palpations: Abdomen is soft.      Tenderness: There is no guarding or rebound.   Skin:     Coloration: Skin is not jaundiced.   Neurological:      General: No focal deficit present.      Mental Status: He is alert and oriented to person, place, and time.   Psychiatric:         Mood and Affect: Mood normal.         Assessment/Plan    patient is advised to increase amlodipine to 10 mg daily.  He is prophylactically started on Cipro for possible cystitis in view of his symptoms of lower abdominal discomfort and burning on urination.  He is advised to wean from hydrocodone and take gabapentin from back pain.He will be also referred to urology clinic regarding hematuria.  He will continue other medications and will return to clinic in 3 months for follow-up visit.  Increase amolodipine to 10mg

## 2023-05-30 NOTE — PROGRESS NOTES
"Subjective   Patient ID: Shiva Hardy is a 82 y.o. male who presents for Follow-up.    HPI     Review of Systems    Objective   /80   Pulse 73   Temp 36.8 °C (98.2 °F)   Ht 1.753 m (5' 9\")   Wt 83.5 kg (184 lb)   SpO2 91%   BMI 27.17 kg/m²     Physical Exam    Assessment/Plan          "

## 2023-05-31 ASSESSMENT — ENCOUNTER SYMPTOMS
CARDIOVASCULAR NEGATIVE: 1
GASTROINTESTINAL NEGATIVE: 1
PSYCHIATRIC NEGATIVE: 1
NEUROLOGICAL NEGATIVE: 1
CONSTITUTIONAL NEGATIVE: 1
DYSURIA: 1
EYES NEGATIVE: 1
MUSCULOSKELETAL NEGATIVE: 1
RESPIRATORY NEGATIVE: 1
ALLERGIC/IMMUNOLOGIC NEGATIVE: 1
ENDOCRINE NEGATIVE: 1
HEMATOLOGIC/LYMPHATIC NEGATIVE: 1

## 2023-06-22 ENCOUNTER — TELEMEDICINE (OUTPATIENT)
Dept: PRIMARY CARE | Facility: CLINIC | Age: 83
End: 2023-06-22
Payer: MEDICARE

## 2023-06-22 DIAGNOSIS — M54.9 CHRONIC BILATERAL BACK PAIN, UNSPECIFIED BACK LOCATION: ICD-10-CM

## 2023-06-22 DIAGNOSIS — G89.29 CHRONIC BILATERAL BACK PAIN, UNSPECIFIED BACK LOCATION: ICD-10-CM

## 2023-06-22 PROCEDURE — 99442 PR PHYS/QHP TELEPHONE EVALUATION 11-20 MIN: CPT | Performed by: INTERNAL MEDICINE

## 2023-06-22 RX ORDER — HYDROCODONE BITARTRATE AND ACETAMINOPHEN 5; 325 MG/1; MG/1
1 TABLET ORAL EVERY 6 HOURS PRN
Qty: 12 TABLET | Refills: 0 | Status: SHIPPED | OUTPATIENT
Start: 2023-06-22 | End: 2023-07-08 | Stop reason: SDUPTHER

## 2023-06-22 NOTE — PROGRESS NOTES
Subjective   Patient ID: Shiva Hardy is a 82 y.o. male who presents for No chief complaint on file..    HPI patient is attended by phone visit because he is having intermittent chronic low back pain and is requesting some hydrocodone regarding his back pain.  He is awaiting to be seen by urology regarding hematuria and abnormal CT of the abdomen.  He denies any fever, chills, radiation of pain to lower extremity and bladder bowel incontinence.  He has history of ascending dilated aorta, COPD, severe obstructive sleep apnea, nicotine dependence, multiple lung nodules, status post diverticulitis, hard of hearing bilaterally, hyperlipidemia, hypertension, coronary atherosclerosis, sessile colonic polyp, hematuria,spondylosis of thoracic pain spine, thoracic disc herniation, vitamin D deficiency, prediabetes, prostate cancer, colonic polyp and s/p radiation treatment for prostate cancer     Review of Systems   Constitutional: Negative.    HENT: Negative.     Eyes: Negative.    Respiratory: Negative.     Cardiovascular: Negative.    Gastrointestinal: Negative.    Endocrine: Negative.    Genitourinary: Negative.    Musculoskeletal:  Positive for back pain.   Skin: Negative.    Allergic/Immunologic: Negative.    Neurological: Negative.    Hematological: Negative.    Psychiatric/Behavioral: Negative.         Objective   There were no vitals taken for this visit.    Physical Examnot done    Assessment/Plan    patient is given prescription for hydrocodone for 3 days regarding persistent low back pain.  He is encouraged to follow-up with neurology clinic regarding abnormal CT of the abdomen and hematuria.  He will continue other medications and will return to his scheduled appointment.

## 2023-06-24 ASSESSMENT — ENCOUNTER SYMPTOMS
GASTROINTESTINAL NEGATIVE: 1
ALLERGIC/IMMUNOLOGIC NEGATIVE: 1
RESPIRATORY NEGATIVE: 1
ENDOCRINE NEGATIVE: 1
EYES NEGATIVE: 1
CARDIOVASCULAR NEGATIVE: 1
NEUROLOGICAL NEGATIVE: 1
HEMATOLOGIC/LYMPHATIC NEGATIVE: 1
BACK PAIN: 1
PSYCHIATRIC NEGATIVE: 1
CONSTITUTIONAL NEGATIVE: 1

## 2023-06-29 LAB
RBC, URINE: <1 /HPF (ref 0–5)
WBC, URINE: NORMAL /HPF (ref 0–5)

## 2023-06-30 LAB — URINE CULTURE: NO GROWTH

## 2023-07-08 DIAGNOSIS — M54.9 CHRONIC BILATERAL BACK PAIN, UNSPECIFIED BACK LOCATION: ICD-10-CM

## 2023-07-08 DIAGNOSIS — G89.29 CHRONIC BILATERAL BACK PAIN, UNSPECIFIED BACK LOCATION: ICD-10-CM

## 2023-07-08 RX ORDER — HYDROCODONE BITARTRATE AND ACETAMINOPHEN 5; 325 MG/1; MG/1
1 TABLET ORAL EVERY 6 HOURS PRN
Qty: 12 TABLET | Refills: 0 | Status: SHIPPED | OUTPATIENT
Start: 2023-07-08 | End: 2023-11-13 | Stop reason: HOSPADM

## 2023-08-02 ENCOUNTER — TELEPHONE (OUTPATIENT)
Dept: PRIMARY CARE | Facility: CLINIC | Age: 83
End: 2023-08-02
Payer: MEDICARE

## 2023-08-02 DIAGNOSIS — E78.5 HYPERLIPIDEMIA, UNSPECIFIED HYPERLIPIDEMIA TYPE: ICD-10-CM

## 2023-08-02 DIAGNOSIS — E55.9 VITAMIN D DEFICIENCY: Primary | ICD-10-CM

## 2023-08-02 RX ORDER — ERGOCALCIFEROL 1.25 MG/1
1.25 CAPSULE ORAL
Qty: 90 CAPSULE | Refills: 1 | Status: SHIPPED | OUTPATIENT
Start: 2023-08-02 | End: 2024-04-09 | Stop reason: SDUPTHER

## 2023-08-02 RX ORDER — ATORVASTATIN CALCIUM 20 MG/1
20 TABLET, FILM COATED ORAL NIGHTLY
Qty: 90 TABLET | Refills: 1 | Status: SHIPPED | OUTPATIENT
Start: 2023-08-02 | End: 2024-02-15

## 2023-08-17 DIAGNOSIS — M47.814 SPONDYLOSIS, THORACIC: ICD-10-CM

## 2023-08-17 RX ORDER — GABAPENTIN 300 MG/1
300 CAPSULE ORAL 3 TIMES DAILY
Qty: 90 CAPSULE | Refills: 0 | Status: SHIPPED | OUTPATIENT
Start: 2023-08-17 | End: 2023-10-24

## 2023-08-25 DIAGNOSIS — I10 ESSENTIAL HYPERTENSION: ICD-10-CM

## 2023-08-25 RX ORDER — AMLODIPINE BESYLATE 5 MG/1
10 TABLET ORAL DAILY
Qty: 30 TABLET | Refills: 1 | Status: ON HOLD | OUTPATIENT
Start: 2023-08-25 | End: 2023-10-07

## 2023-09-01 DIAGNOSIS — M47.814 SPONDYLOSIS, THORACIC: ICD-10-CM

## 2023-09-02 RX ORDER — METHOCARBAMOL 500 MG/1
500 TABLET, FILM COATED ORAL 2 TIMES DAILY
Qty: 120 TABLET | Refills: 0 | Status: SHIPPED | OUTPATIENT
Start: 2023-09-02 | End: 2023-09-05 | Stop reason: SDUPTHER

## 2023-09-05 DIAGNOSIS — M47.814 SPONDYLOSIS, THORACIC: ICD-10-CM

## 2023-09-05 RX ORDER — METHOCARBAMOL 500 MG/1
500 TABLET, FILM COATED ORAL 2 TIMES DAILY
Qty: 120 TABLET | Refills: 0 | Status: ON HOLD | OUTPATIENT
Start: 2023-09-05 | End: 2023-10-06 | Stop reason: ALTCHOICE

## 2023-09-07 ENCOUNTER — TELEPHONE (OUTPATIENT)
Dept: PRIMARY CARE | Facility: CLINIC | Age: 83
End: 2023-09-07
Payer: MEDICARE

## 2023-09-07 NOTE — TELEPHONE ENCOUNTER
Wife called and said  lesvia garcia had surgery on 8/29 with dr. Farr. Pt having extreme burning.  Wife stated that she contacted urology office and was told that dr farr was out of the clinic and there was not a covering physician.  I contacted dr cohen nurse and gave her the patients info and was told she would contact mrs Garcia to evaluate the patient.

## 2023-09-11 DIAGNOSIS — I10 ESSENTIAL HYPERTENSION: ICD-10-CM

## 2023-09-11 RX ORDER — LOSARTAN POTASSIUM 100 MG/1
100 TABLET ORAL DAILY
Qty: 90 TABLET | Refills: 2 | Status: ON HOLD | OUTPATIENT
Start: 2023-09-11 | End: 2023-10-07 | Stop reason: SDUPTHER

## 2023-09-18 LAB
BUDDING YEAST, URINE: PRESENT /HPF
HYALINE CASTS, URINE: ABNORMAL /LPF
MUCUS, URINE: ABNORMAL /LPF
RBC, URINE: 66 /HPF (ref 0–5)
TRANSITIONAL EPITHELIAL CELLS, URINE: <1 /HPF
WAXY CASTS, URINE: ABNORMAL /LPF
WBC, URINE: 47 /HPF (ref 0–5)

## 2023-09-19 LAB — URINE CULTURE: NORMAL

## 2023-10-01 RX ORDER — CEFAZOLIN SODIUM 2 G/50ML
2 SOLUTION INTRAVENOUS EVERY 8 HOURS
Status: CANCELLED | OUTPATIENT
Start: 2023-10-01

## 2023-10-02 ENCOUNTER — PRE-ADMISSION TESTING (OUTPATIENT)
Dept: PREADMISSION TESTING | Facility: HOSPITAL | Age: 83
End: 2023-10-02
Payer: MEDICARE

## 2023-10-02 ENCOUNTER — APPOINTMENT (OUTPATIENT)
Dept: LAB | Facility: LAB | Age: 83
End: 2023-10-02
Payer: MEDICARE

## 2023-10-02 ENCOUNTER — TELEPHONE (OUTPATIENT)
Dept: PRIMARY CARE | Facility: CLINIC | Age: 83
End: 2023-10-02

## 2023-10-02 ENCOUNTER — APPOINTMENT (OUTPATIENT)
Dept: PRIMARY CARE | Facility: CLINIC | Age: 83
End: 2023-10-02
Payer: MEDICARE

## 2023-10-02 VITALS
BODY MASS INDEX: 25.88 KG/M2 | WEIGHT: 180.78 LBS | TEMPERATURE: 97.2 F | HEART RATE: 82 BPM | RESPIRATION RATE: 18 BRPM | OXYGEN SATURATION: 95 % | HEIGHT: 70 IN

## 2023-10-02 PROBLEM — I71.9 AORTIC ANEURYSM (CMS-HCC): Status: ACTIVE | Noted: 2023-10-02

## 2023-10-02 LAB
ANION GAP SERPL CALC-SCNC: 9 MMOL/L (ref 10–20)
APPEARANCE UR: ABNORMAL
BACTERIA #/AREA URNS AUTO: ABNORMAL /HPF
BILIRUB UR STRIP.AUTO-MCNC: NEGATIVE MG/DL
BUN SERPL-MCNC: 26 MG/DL (ref 6–23)
CALCIUM SERPL-MCNC: 9.8 MG/DL (ref 8.6–10.3)
CHLORIDE SERPL-SCNC: 105 MMOL/L (ref 98–107)
CO2 SERPL-SCNC: 25 MMOL/L (ref 21–32)
COLOR UR: ABNORMAL
CREAT SERPL-MCNC: 0.89 MG/DL (ref 0.5–1.3)
ERYTHROCYTE [DISTWIDTH] IN BLOOD BY AUTOMATED COUNT: 12.7 % (ref 11.5–14.5)
GFR SERPL CREATININE-BSD FRML MDRD: 84 ML/MIN/1.73M*2
GLUCOSE SERPL-MCNC: 104 MG/DL (ref 74–99)
GLUCOSE UR STRIP.AUTO-MCNC: NEGATIVE MG/DL
HCT VFR BLD AUTO: 37.6 % (ref 41–52)
HGB BLD-MCNC: 12.2 G/DL (ref 13.5–17.5)
INR PPP: 1 (ref 0.9–1.1)
KETONES UR STRIP.AUTO-MCNC: NEGATIVE MG/DL
LEUKOCYTE ESTERASE UR QL STRIP.AUTO: ABNORMAL
MCH RBC QN AUTO: 32 PG (ref 26–34)
MCHC RBC AUTO-ENTMCNC: 32.4 G/DL (ref 32–36)
MCV RBC AUTO: 99 FL (ref 80–100)
NITRITE UR QL STRIP.AUTO: NEGATIVE
NRBC BLD-RTO: 0 /100 WBCS (ref 0–0)
PH UR STRIP.AUTO: 5 [PH]
PLATELET # BLD AUTO: 325 X10*3/UL (ref 150–450)
PMV BLD AUTO: 9.1 FL (ref 7.5–11.5)
POTASSIUM SERPL-SCNC: 4.3 MMOL/L (ref 3.5–5.3)
PROT UR STRIP.AUTO-MCNC: ABNORMAL MG/DL
PROTHROMBIN TIME: 11.5 SECONDS (ref 9.8–12.8)
RBC # BLD AUTO: 3.81 X10*6/UL (ref 4.5–5.9)
RBC # UR STRIP.AUTO: ABNORMAL /UL
RBC #/AREA URNS AUTO: >20 /HPF
SODIUM SERPL-SCNC: 135 MMOL/L (ref 136–145)
SP GR UR STRIP.AUTO: 1.02
UROBILINOGEN UR STRIP.AUTO-MCNC: <2 MG/DL
WBC # BLD AUTO: 9.3 X10*3/UL (ref 4.4–11.3)
WBC #/AREA URNS AUTO: >50 /HPF
WBC CLUMPS #/AREA URNS AUTO: ABNORMAL /HPF

## 2023-10-02 PROCEDURE — 85610 PROTHROMBIN TIME: CPT

## 2023-10-02 PROCEDURE — 80048 BASIC METABOLIC PNL TOTAL CA: CPT

## 2023-10-02 PROCEDURE — 36415 COLL VENOUS BLD VENIPUNCTURE: CPT

## 2023-10-02 PROCEDURE — 81001 URINALYSIS AUTO W/SCOPE: CPT | Performed by: PHYSICIAN ASSISTANT

## 2023-10-02 PROCEDURE — 85027 COMPLETE CBC AUTOMATED: CPT

## 2023-10-02 ASSESSMENT — DUKE ACTIVITY SCORE INDEX (DASI)
CAN YOU DO HEAVY WORK AROUND THE HOUSE LIKE SCRUBBING FLOORS OR LIFTING AND MOVING HEAVY FURNITURE: NO
CAN YOU RUN A SHORT DISTANCE: NO
TOTAL_SCORE: 12.75
CAN YOU DO YARD WORK LIKE RAKING LEAVES, WEEDING OR PUSHING A MOWER: NO
CAN YOU TAKE CARE OF YOURSELF (EAT, DRESS, BATHE, OR USE TOILET): YES
CAN YOU DO LIGHT WORK AROUND THE HOUSE LIKE DUSTING OR WASHING DISHES: NO
CAN YOU WALK A BLOCK OR TWO ON LEVEL GROUND: YES
CAN YOU CLIMB A FLIGHT OF STAIRS OR WALK UP A HILL: YES
CAN YOU DO MODERATE WORK AROUND THE HOUSE LIKE VACUUMING, SWEEPING FLOORS OR CARRYING GROCERIES: NO
DASI METS SCORE: 4.3
CAN YOU PARTICIPATE IN MODERATE RECREATIONAL ACTIVITIES LIKE GOLF, BOWLING, DANCING, DOUBLES TENNIS OR THROWING A BASEBALL OR FOOTBALL: NO
CAN YOU HAVE SEXUAL RELATIONS: NO
CAN YOU WALK INDOORS, SUCH AS AROUND YOUR HOUSE: YES
CAN YOU PARTICIPATE IN STRENOUS SPORTS LIKE SWIMMING, SINGLES TENNIS, FOOTBALL, BASKETBALL, OR SKIING: NO

## 2023-10-02 NOTE — PREPROCEDURE INSTRUCTIONS
Medication List            Accurate as of October 2, 2023 10:35 AM. Always use your most recent med list.                albuterol 90 mcg/actuation inhaler     amLODIPine 5 mg tablet  Commonly known as: Norvasc  Take 2 tablets (10 mg) by mouth once daily.     aspirin 81 mg EC tablet     atorvastatin 20 mg tablet  Commonly known as: Lipitor  Take 1 tablet (20 mg) by mouth once daily at bedtime.     budesonide-formoteroL 160-4.5 mcg/actuation inhaler  Commonly known as: Symbicort     docusate sodium 100 mg capsule  Commonly known as: Colace  Take 1 capsule (100 mg) by mouth in the morning and 1 capsule (100 mg) before bedtime.     ergocalciferol 1.25 MG (52342 UT) capsule  Commonly known as: Vitamin D-2  Take 1 capsule (1,250 mcg) by mouth 1 (one) time per week.     fluticasone 50 mcg/actuation nasal spray  Commonly known as: Flonase  ADMINISTER 2 SPRAYS INTO EACH NOSTRIL ONCE DAILY.     gabapentin 300 mg capsule  Commonly known as: Neurontin  Take 1 capsule (300 mg) by mouth 3 times a day.     HYDROcodone-acetaminophen 5-325 mg tablet  Commonly known as: Norco  Take 1 tablet by mouth every 6 hours if needed for severe pain (7 - 10) for up to 3 days.     loratadine 10 mg tablet  Commonly known as: Claritin     losartan 100 mg tablet  Commonly known as: Cozaar  Take 1 tablet (100 mg) by mouth once daily.     methocarbamol 500 mg tablet  Commonly known as: Robaxin  Take 1 tablet (500 mg) by mouth 2 times a day.     montelukast 10 mg tablet  Commonly known as: Singulair  Take 1 tablet (10 mg) by mouth once daily at bedtime.     oxybutynin XL 5 mg 24 hr tablet  Commonly known as: Ditropan-XL                              NPO Instructions:    You may have clear liquids until TWO hours before surgery/procedure. This includes water, black tea/coffee, (no milk or cream) apple juice and electrolyte drinks (Gatorade).    Additional Instructions:     Review your medication instructions, take indicated medications  You may have  clear liquids until TWO hours before surgery/procedure.  This includes water, black tea/coffee, (no milk or cream) apple juice and electrolyte drinks (Gatorade)  Wear  comfortable loose fitting clothing  All jewelry and valuables should be left at home    Park in back by ER day of procedure. Go to Outpt dept on second floor to check in. No food after midnight. May have water , black coffee until 3 hours preop!  Call [phone] on Thurs Oct 5 for time.

## 2023-10-03 ENCOUNTER — HOSPITAL ENCOUNTER (OUTPATIENT)
Dept: CARDIOLOGY | Facility: HOSPITAL | Age: 83
Discharge: HOME | DRG: 670 | End: 2023-10-03
Payer: MEDICARE

## 2023-10-03 LAB
ATRIAL RATE: 78 BPM
P AXIS: -19 DEGREES
P OFFSET: 189 MS
P ONSET: 133 MS
PR INTERVAL: 174 MS
Q ONSET: 220 MS
QRS COUNT: 13 BEATS
QRS DURATION: 80 MS
QT INTERVAL: 370 MS
QTC CALCULATION(BAZETT): 421 MS
QTC FREDERICIA: 403 MS
R AXIS: -8 DEGREES
T AXIS: 86 DEGREES
T OFFSET: 405 MS
VENTRICULAR RATE: 78 BPM

## 2023-10-03 PROCEDURE — 93005 ELECTROCARDIOGRAM TRACING: CPT

## 2023-10-03 PROCEDURE — 93010 ELECTROCARDIOGRAM REPORT: CPT | Performed by: INTERNAL MEDICINE

## 2023-10-04 NOTE — H&P (VIEW-ONLY)
Diagnoses/Problems  Assessed    · Bladder cancer (188.9) (C67.9)    Patient Discussion/Summary    83-year-old male s/p TURBT 8/29/23. Recovering well overall. Mild burning with urination.     I discussed the patient pathology, reviewed the guidelines of low-grade versus high-grade, and explained that due to high grade Ta, without muscle in the specimen, we have to proceed with another TURBT for proper staging and to reduce the chance of under staging, as which is known in the literature following a single TURBT to be 30-40%.   I explained that the procedure is exactly the same as the first 1, including obtaining deeper tissue. This might need a longer catheter.    I briefly reviewed that if the repeat TURBT confirms pathology or shows no residual tumor, the treatment of choice would be intravesical BCG. We will discuss BCG treatment with risks and benefits in detail    Patient agreed to proceed with procedure  After procedure.     Plan:  UA, U culture.   Re-TURBT in 4-6 weeks from initial TURBT.     Scribed for Dr. Irwin Kelsey by Jacquie Fields, medical scribe, on 9/18/23 at 9:15 am EST. I, Dr. Irwin Kelsey, have personally reviewed and agree with the information entered by the scribe.      Chief Complaint    s/p TURBT 8/29/23.      History of Present [id number] y/o male s/p TURBT 8/29/23.     He is recovering well overall. Still has burning with urination. He stopped Pyridium since he thought this was causing the burning.     FINAL DIAGNOSIS  A. URINARY BLADDER, LESION, BIOPSY:  -- NON-INVASIVE, PAPILLARY UROTHELIAL CARCINOMA, HIGH-GRADE  -- MUSCULARIS PROPRIA (DETRUSOR MUSCLE) IS NOT PRESENT FOR EVALUATION       Active Problems  Problems    · Ascending aorta dilation (447.71) (I77.810)   · Asthma (493.90) (J45.909)   · Atherosclerotic cardiovascular disease 10 year risk greater than 30% using 2013  ACC/AHA calculator (V49.89) (Z91.89)   · Body mass index 27.0-27.9, adult (V85.23) (Z68.27)   · Bronchitis  (490) (J40)   · Centrilobular emphysema (492.8) (J43.2)   · Chronic back pain (724.5,338.29) (M54.9,G89.29)   · Chronic constipation (564.00) (K59.09)   · Cigarette nicotine dependence without complication (305.1) (F17.210)   · COPD (chronic obstructive pulmonary disease) (496) (J44.9)   · Current smoker (305.1) (F17.200)   · Declined smoking cessation (V69.8) (Z72.0)   · Diverticulitis, colon (562.11) (K57.32)   · Dysuria (788.1) (R30.0)   · Encounter for annual wellness exam in Medicare patient (V70.0) (Z00.00)   · Encounter for medication management (V58.69) (Z79.899)   · Encounter for prostate cancer screening (V76.44) (Z12.5)   · Encounter for tobacco use screening (V72.85) (Z01.89)   · Essential hypertension (401.9) (I10)   · Hard of hearing, bilateral (389.9) (H91.93)   · Hematuria (599.70) (R31.9)   · History of colonic polyps (V12.72) (Z86.010)   · History of prostate cancer (V10.46) (Z85.46)   · Hyperlipidemia (272.4) (E78.5)   · Internal hemorrhoid (455.0) (K64.8)   · Irregular heartbeat (427.9) (I49.9)   · Kidney stone on left side (592.0) (N20.0)   · Left knee pain (719.46) (M25.562)   · Left lower lobe pneumonia (486) (J18.9)   · Left ventricular hypertrophy (429.3) (I51.7)   · Lipoma of back (214.8) (D17.1)   · Lung crackles (786.7) (R09.89)   · Lung nodule (793.11) (R91.1)   · Medicare annual wellness visit, subsequent (V70.0) (Z00.00)   · Mucoid impaction of bronchi (519.19) (T17.500A)   · Multiple lung nodules on CT (793.19) (R91.8)   · Muscle spasm of back (724.8) (M62.830)   · Myofascial pain (729.1) (M79.18)   · Need for tetanus booster (V03.7) (Z23)   · Obstructive sleep apnea (327.23) (G47.33)   · Oral thrush (112.0) (B37.0)   · Other urinary incontinence (788.39) (N39.498)   · Overweight (BMI 25.0-29.9) (278.02) (E66.3)   · Paraseptal emphysema (492.8) (J43.8)   · Prediabetes (790.29) (R73.03)   · Pre-operative exam (V72.84) (Z01.818)   · Respiratory crackles at right lung base (786.7)  (R09.89)   · Right kidney stone (592.0) (N20.0)   · Ringing in ear (388.30) (H93.19)   · Scapulothoracic bursitis (726.10) (M75.50)   · Screening for abdominal aortic aneurysm (V81.2) (Z13.6)   · Seasonal allergies (477.9) (J30.2)   · Sessile colonic polyp (211.3) (K63.5)   · Spondylosis, thoracic (721.2) (M47.814)   · Squamous cell carcinoma   · Thoracic back pain (724.1) (M54.6)   · Thoracic disc herniation (722.11) (M51.24)   · Thoracic neuritis (724.4) (M54.14)   · Thoracic spondylosis (721.2) (M47.814)   · Umbilical hernia (553.1) (K42.9)   · Urinary leakage (788.30) (R32)   · Vitamin D deficiency (268.9) (E55.9)   · Weakness (780.79) (R53.1)    Past Medical History  Problems    · History of Acute exacerbation of chronic low back pain (724.2,338.19,338.29)  (M54.50,G89.29)   · Resolved Date: 26 Aug 2019   · History of Acute exacerbation of chronic obstructive pulmonary disease (COPD) (491.21)  (J44.1)   · Resolved Date: 27 Jan 2020   · History of Acute maxillary sinusitis (461.0) (J01.00)   · Resolved Date: 04 Oct 2021   · History of Acute recurrent maxillary sinusitis (461.0) (J01.01)   · Resolved Date: 02 May 2017   · History of Acute URI (465.9) (J06.9)   · Resolved Date: 07 Nov 2018   · History of Bilateral inguinal hernia (550.92) (K40.20)   · Resolved Date: 16 Dec 2016   · History of Excessive cerumen in right ear canal (380.4) (H61.21)   · Resolved Date: 02 May 2017   · History of hyperkalemia (V12.29) (Z86.39)   · Resolved Date: 02 May 2017   · History of malignant neoplasm of prostate (V10.46) (Z85.46)   · History of parotitis (V12.79) (Z87.19)   · Resolved Date: 02 Jul 2021   · History of pulmonary emphysema (492.8) (J43.9)   · History of Intermittent chest pain (786.50) (R07.9)   · Resolved Date: 02 May 2017   · History of Parotid swelling (782.3) (R60.9)   · Resolved Date: 02 Jul 2021   · Personal history of malignant neoplasm (V10.90) (Z85.9)   · History of Skin cancer of face (173.30) (C44.300)    · History of UTI symptoms (788.99) (R39.9)   · Resolved Date: 11 Mar 2022   · History of Viral URI with cough (465.9) (J06.9)   · Resolved Date: 11 Mar 2022    Surgical History  Problems    · History of Cataract Surgery   · History of Destruction Of Malignant Lesion Face   · History of Hernia Repair   · History of Lower back surgery   · History of Prostate Surgery   · History of Skin biopsy   · History of Spinal surgery   · History of Umbilical Hernia Repair    Family History  Mother    · Family history of arthritis (V17.7) (Z82.61)   · Family history of hypertension (V17.49) (Z82.49)   · Family history of Vision loss  Father    · Family history of arthritis (V17.7) (Z82.61)   · Family history of cardiac disorder (V17.49) (Z82.49)   · Family history of malignant neoplasm of prostate (V16.42) (Z80.42)  Brother    · Family history of Colon cancer   · Family history of arthritis (V17.7) (Z82.61)   · Family history of hypertension (V17.49) (Z82.49)   · Family history of liver cancer (V16.0) (Z80.0)   · Family history of lung cancer (V16.1) (Z80.1)   · Family history of Vision loss  Multiple Family Members    · Family history of malignant neoplasm (V16.9) (Z80.9)    Social History  Problems    · Caffeine use (V49.89) (Z78.9)   · Current smoker (305.1) (F17.200)   · No alcohol use    Allergies  Medication    · Percocet TABS   Nausea; Updated By: Krupa Lauren; 12/16/2016 11:00:29 AM    Current Meds    Medication Name Instruction   Aspirin 81 MG Oral Tablet Delayed Release take 1 tablet by mouth once daily   Atorvastatin Calcium 20 MG Oral Tablet TAKE 1 TABLET BY MOUTH EVERYDAY AT BEDTIME   Chlorhexidine Gluconate 0.12 % Mouth/Throat Solution RINSE MOUTH WITH 15ML (1 CAPFUL) FOR 30 SECONDS AM AND PM AFTER TOOTHBRUSHING. EXPECTORATE AFTER RINSING, DO NOT SWALLOW. Use night before surgery and morning of surgery   [employer] Sodium Chloride 5 % Ophthalmic Solution INSTILL 1 DROP INTO BOTH EYES TWICE A DAY   [employer] Stool Softener 100 MG  Oral Capsule TAKE 1 CAPSULE TWICE DAILY AS NEEDED.   Doxycycline Hyclate 100 MG Oral Tablet TAKE 1 TABLET TWICE DAILY UNTIL GONE.   EQ Loratadine 10 MG Oral Tablet TAKE 1 TABLET DAILY.   HYDROcodone-Acetaminophen 5-300 MG Oral Tablet TAKE 1 TABLET BY MOUTH EVERY 6 HOURS IF NEEDED FOR SEVERE PAIN (7 - 10) FOR UP TO 7 DAYS.   HYDROcodone-Acetaminophen 5-325 MG Oral Tablet Take 1 tablet every 8 hours as needed for chronic back pain   Losartan Potassium 100 MG Oral Tablet TAKE 1 TABLET BY MOUTH EVERY DAY   Methocarbamol 500 MG Oral Tablet TAKE 1 TABLET BY MOUTH TWICE DAILY   Montelukast Sodium 10 MG Oral Tablet    Myrbetriq 50 MG Oral Tablet Extended Release 24 Hour Take 1 tablet daily   oxyBUTYnin Chloride ER 5 MG Oral Tablet Extended Release 24 Hour Take 1 tablet daily   Pyridium 200 MG Oral Tablet TAKE 1 TABLET 3 times daily   Spacer for Metered Dose Inhaler Please dispense spacer device with MDI. Please instruct patient in use of MDI with spacer device   Symbicort 160-4.5 MCG/ACT Inhalation Aerosol INHALE 2 PUFFS TWICE DAILY. RINSE MOUTH AFTER USE.   Ventolin  (90 Base) MCG/ACT Inhalation Aerosol Solution TAKE 2 PUFFS BY MOUTH EVERY 4 HOURS AS NEEDED   Vitamin D2 10 MCG (400 UNIT) Oral Tablet      *Results/Data   29 Aug 2023 4:55 PM    Surgical Pathology        Case Surgical Pathology     Signatures   Electronically signed by : Irwin Kelsey MD; Sep 19 2023  7:43AM EST (Author)

## 2023-10-05 ENCOUNTER — ANESTHESIA EVENT (OUTPATIENT)
Dept: OPERATING ROOM | Facility: HOSPITAL | Age: 83
DRG: 670 | End: 2023-10-05
Payer: MEDICARE

## 2023-10-06 ENCOUNTER — HOSPITAL ENCOUNTER (INPATIENT)
Facility: HOSPITAL | Age: 83
LOS: 1 days | Discharge: HOME | DRG: 670 | End: 2023-10-07
Attending: STUDENT IN AN ORGANIZED HEALTH CARE EDUCATION/TRAINING PROGRAM | Admitting: INTERNAL MEDICINE
Payer: MEDICARE

## 2023-10-06 ENCOUNTER — ANESTHESIA (OUTPATIENT)
Dept: OPERATING ROOM | Facility: HOSPITAL | Age: 83
DRG: 670 | End: 2023-10-06
Payer: MEDICARE

## 2023-10-06 DIAGNOSIS — I10 ESSENTIAL HYPERTENSION: ICD-10-CM

## 2023-10-06 DIAGNOSIS — N32.9 LESION OF BLADDER: Primary | ICD-10-CM

## 2023-10-06 DIAGNOSIS — C67.9 MALIGNANT NEOPLASM OF BLADDER, UNSPECIFIED (MULTI): ICD-10-CM

## 2023-10-06 PROCEDURE — 2500000001 HC RX 250 WO HCPCS SELF ADMINISTERED DRUGS (ALT 637 FOR MEDICARE OP): Performed by: REGISTERED NURSE

## 2023-10-06 PROCEDURE — 51702 INSERT TEMP BLADDER CATH: CPT

## 2023-10-06 PROCEDURE — 2500000004 HC RX 250 GENERAL PHARMACY W/ HCPCS (ALT 636 FOR OP/ED): Performed by: STUDENT IN AN ORGANIZED HEALTH CARE EDUCATION/TRAINING PROGRAM

## 2023-10-06 PROCEDURE — 3600000008 HC OR TIME - EACH INCREMENTAL 1 MINUTE - PROCEDURE LEVEL THREE: Performed by: STUDENT IN AN ORGANIZED HEALTH CARE EDUCATION/TRAINING PROGRAM

## 2023-10-06 PROCEDURE — 7100000002 HC RECOVERY ROOM TIME - EACH INCREMENTAL 1 MINUTE: Performed by: STUDENT IN AN ORGANIZED HEALTH CARE EDUCATION/TRAINING PROGRAM

## 2023-10-06 PROCEDURE — 99223 1ST HOSP IP/OBS HIGH 75: CPT

## 2023-10-06 PROCEDURE — 88307 TISSUE EXAM BY PATHOLOGIST: CPT | Mod: TC,SUR,GENLAB | Performed by: STUDENT IN AN ORGANIZED HEALTH CARE EDUCATION/TRAINING PROGRAM

## 2023-10-06 PROCEDURE — 88307 TISSUE EXAM BY PATHOLOGIST: CPT | Performed by: STUDENT IN AN ORGANIZED HEALTH CARE EDUCATION/TRAINING PROGRAM

## 2023-10-06 PROCEDURE — 3600000003 HC OR TIME - INITIAL BASE CHARGE - PROCEDURE LEVEL THREE: Performed by: STUDENT IN AN ORGANIZED HEALTH CARE EDUCATION/TRAINING PROGRAM

## 2023-10-06 PROCEDURE — 96372 THER/PROPH/DIAG INJ SC/IM: CPT | Performed by: REGISTERED NURSE

## 2023-10-06 PROCEDURE — 1200000002 HC GENERAL ROOM WITH TELEMETRY DAILY

## 2023-10-06 PROCEDURE — 3700000002 HC GENERAL ANESTHESIA TIME - EACH INCREMENTAL 1 MINUTE: Performed by: STUDENT IN AN ORGANIZED HEALTH CARE EDUCATION/TRAINING PROGRAM

## 2023-10-06 PROCEDURE — 7100000001 HC RECOVERY ROOM TIME - INITIAL BASE CHARGE: Performed by: STUDENT IN AN ORGANIZED HEALTH CARE EDUCATION/TRAINING PROGRAM

## 2023-10-06 PROCEDURE — 3700000001 HC GENERAL ANESTHESIA TIME - INITIAL BASE CHARGE: Performed by: STUDENT IN AN ORGANIZED HEALTH CARE EDUCATION/TRAINING PROGRAM

## 2023-10-06 PROCEDURE — 2720000007 HC OR 272 NO HCPCS: Performed by: STUDENT IN AN ORGANIZED HEALTH CARE EDUCATION/TRAINING PROGRAM

## 2023-10-06 PROCEDURE — 2500000004 HC RX 250 GENERAL PHARMACY W/ HCPCS (ALT 636 FOR OP/ED): Performed by: PHYSICIAN ASSISTANT

## 2023-10-06 PROCEDURE — 0TBB8ZZ EXCISION OF BLADDER, VIA NATURAL OR ARTIFICIAL OPENING ENDOSCOPIC: ICD-10-PCS | Performed by: STUDENT IN AN ORGANIZED HEALTH CARE EDUCATION/TRAINING PROGRAM

## 2023-10-06 PROCEDURE — 2500000004 HC RX 250 GENERAL PHARMACY W/ HCPCS (ALT 636 FOR OP/ED): Performed by: REGISTERED NURSE

## 2023-10-06 PROCEDURE — 2500000001 HC RX 250 WO HCPCS SELF ADMINISTERED DRUGS (ALT 637 FOR MEDICARE OP)

## 2023-10-06 PROCEDURE — 2500000004 HC RX 250 GENERAL PHARMACY W/ HCPCS (ALT 636 FOR OP/ED): Performed by: NURSE ANESTHETIST, CERTIFIED REGISTERED

## 2023-10-06 PROCEDURE — 2580000001 HC RX 258 IV SOLUTIONS: Performed by: NURSE ANESTHETIST, CERTIFIED REGISTERED

## 2023-10-06 PROCEDURE — 2500000005 HC RX 250 GENERAL PHARMACY W/O HCPCS: Performed by: NURSE ANESTHETIST, CERTIFIED REGISTERED

## 2023-10-06 PROCEDURE — 52204 CYSTOSCOPY W/BIOPSY(S): CPT | Performed by: STUDENT IN AN ORGANIZED HEALTH CARE EDUCATION/TRAINING PROGRAM

## 2023-10-06 PROCEDURE — A4217 STERILE WATER/SALINE, 500 ML: HCPCS | Performed by: STUDENT IN AN ORGANIZED HEALTH CARE EDUCATION/TRAINING PROGRAM

## 2023-10-06 RX ORDER — CEFAZOLIN SODIUM 2 G/50ML
SOLUTION INTRAVENOUS
Status: COMPLETED
Start: 2023-10-06 | End: 2023-10-06

## 2023-10-06 RX ORDER — OXYCODONE HYDROCHLORIDE 5 MG/1
5 TABLET ORAL EVERY 4 HOURS PRN
Status: DISCONTINUED | OUTPATIENT
Start: 2023-10-06 | End: 2023-10-06

## 2023-10-06 RX ORDER — ENOXAPARIN SODIUM 100 MG/ML
40 INJECTION SUBCUTANEOUS EVERY 24 HOURS
Status: DISCONTINUED | OUTPATIENT
Start: 2023-10-06 | End: 2023-10-07 | Stop reason: HOSPADM

## 2023-10-06 RX ORDER — ASPIRIN 81 MG/1
81 TABLET ORAL DAILY
Status: DISCONTINUED | OUTPATIENT
Start: 2023-10-06 | End: 2023-10-07 | Stop reason: HOSPADM

## 2023-10-06 RX ORDER — CEFAZOLIN SODIUM 2 G/50ML
2 SOLUTION INTRAVENOUS EVERY 8 HOURS
Status: DISCONTINUED | OUTPATIENT
Start: 2023-10-06 | End: 2023-10-06 | Stop reason: HOSPADM

## 2023-10-06 RX ORDER — ASPIRIN 81 MG/1
81 TABLET ORAL EVERY MORNING
COMMUNITY

## 2023-10-06 RX ORDER — LOSARTAN POTASSIUM 50 MG/1
100 TABLET ORAL DAILY
Status: DISCONTINUED | OUTPATIENT
Start: 2023-10-06 | End: 2023-10-07 | Stop reason: HOSPADM

## 2023-10-06 RX ORDER — PHENAZOPYRIDINE HYDROCHLORIDE 200 MG/1
200 TABLET, FILM COATED ORAL 3 TIMES DAILY PRN
Qty: 21 TABLET | Refills: 0 | Status: SHIPPED | OUTPATIENT
Start: 2023-10-06 | End: 2023-10-13

## 2023-10-06 RX ORDER — PREGABALIN 100 MG/1
100 CAPSULE ORAL 3 TIMES DAILY
Status: DISCONTINUED | OUTPATIENT
Start: 2023-10-06 | End: 2023-10-07 | Stop reason: HOSPADM

## 2023-10-06 RX ORDER — MORPHINE SULFATE 4 MG/ML
4 INJECTION INTRAVENOUS EVERY 4 HOURS PRN
Status: DISCONTINUED | OUTPATIENT
Start: 2023-10-06 | End: 2023-10-06

## 2023-10-06 RX ORDER — LABETALOL HYDROCHLORIDE 5 MG/ML
INJECTION, SOLUTION INTRAVENOUS AS NEEDED
Status: DISCONTINUED | OUTPATIENT
Start: 2023-10-06 | End: 2023-10-06

## 2023-10-06 RX ORDER — DOCUSATE SODIUM 100 MG/1
100 CAPSULE, LIQUID FILLED ORAL 2 TIMES DAILY
Status: DISCONTINUED | OUTPATIENT
Start: 2023-10-06 | End: 2023-10-07 | Stop reason: HOSPADM

## 2023-10-06 RX ORDER — FLUTICASONE FUROATE AND VILANTEROL 200; 25 UG/1; UG/1
1 POWDER RESPIRATORY (INHALATION) DAILY
Status: DISCONTINUED | OUTPATIENT
Start: 2023-10-06 | End: 2023-10-07 | Stop reason: HOSPADM

## 2023-10-06 RX ORDER — LIDOCAINE HYDROCHLORIDE 20 MG/ML
INJECTION, SOLUTION EPIDURAL; INFILTRATION; INTRACAUDAL; PERINEURAL AS NEEDED
Status: DISCONTINUED | OUTPATIENT
Start: 2023-10-06 | End: 2023-10-06

## 2023-10-06 RX ORDER — PHENAZOPYRIDINE HYDROCHLORIDE 100 MG/1
95 TABLET, FILM COATED ORAL
Status: DISCONTINUED | OUTPATIENT
Start: 2023-10-06 | End: 2023-10-07 | Stop reason: HOSPADM

## 2023-10-06 RX ORDER — WATER 1 ML/ML
IRRIGANT IRRIGATION AS NEEDED
Status: DISCONTINUED | OUTPATIENT
Start: 2023-10-06 | End: 2023-10-06 | Stop reason: HOSPADM

## 2023-10-06 RX ORDER — ONDANSETRON HYDROCHLORIDE 2 MG/ML
4 INJECTION, SOLUTION INTRAVENOUS ONCE AS NEEDED
Status: COMPLETED | OUTPATIENT
Start: 2023-10-06 | End: 2023-10-06

## 2023-10-06 RX ORDER — SODIUM CHLORIDE 0.9 G/100ML
IRRIGANT IRRIGATION AS NEEDED
Status: DISCONTINUED | OUTPATIENT
Start: 2023-10-06 | End: 2023-10-06 | Stop reason: HOSPADM

## 2023-10-06 RX ORDER — PROPOFOL 10 MG/ML
INJECTION, EMULSION INTRAVENOUS AS NEEDED
Status: DISCONTINUED | OUTPATIENT
Start: 2023-10-06 | End: 2023-10-06

## 2023-10-06 RX ORDER — ATORVASTATIN CALCIUM 10 MG/1
20 TABLET, FILM COATED ORAL NIGHTLY
Status: DISCONTINUED | OUTPATIENT
Start: 2023-10-06 | End: 2023-10-07 | Stop reason: HOSPADM

## 2023-10-06 RX ORDER — ROCURONIUM BROMIDE 10 MG/ML
INJECTION, SOLUTION INTRAVENOUS AS NEEDED
Status: DISCONTINUED | OUTPATIENT
Start: 2023-10-06 | End: 2023-10-06

## 2023-10-06 RX ORDER — ONDANSETRON HYDROCHLORIDE 2 MG/ML
INJECTION, SOLUTION INTRAVENOUS AS NEEDED
Status: DISCONTINUED | OUTPATIENT
Start: 2023-10-06 | End: 2023-10-06

## 2023-10-06 RX ORDER — ACETAMINOPHEN 325 MG/1
975 TABLET ORAL EVERY 8 HOURS
Status: DISCONTINUED | OUTPATIENT
Start: 2023-10-06 | End: 2023-10-07 | Stop reason: HOSPADM

## 2023-10-06 RX ORDER — MORPHINE SULFATE 2 MG/ML
2 INJECTION, SOLUTION INTRAMUSCULAR; INTRAVENOUS EVERY 4 HOURS PRN
Status: DISCONTINUED | OUTPATIENT
Start: 2023-10-06 | End: 2023-10-07 | Stop reason: HOSPADM

## 2023-10-06 RX ORDER — OXYBUTYNIN CHLORIDE 5 MG/1
5 TABLET ORAL DAILY
Status: DISCONTINUED | OUTPATIENT
Start: 2023-10-06 | End: 2023-10-07 | Stop reason: HOSPADM

## 2023-10-06 RX ORDER — PREGABALIN 100 MG/1
100 CAPSULE ORAL 3 TIMES DAILY
COMMUNITY
End: 2023-11-09 | Stop reason: ENTERED-IN-ERROR

## 2023-10-06 RX ORDER — SODIUM CHLORIDE, SODIUM LACTATE, POTASSIUM CHLORIDE, CALCIUM CHLORIDE 600; 310; 30; 20 MG/100ML; MG/100ML; MG/100ML; MG/100ML
30 INJECTION, SOLUTION INTRAVENOUS CONTINUOUS
Status: DISCONTINUED | OUTPATIENT
Start: 2023-10-06 | End: 2023-10-06

## 2023-10-06 RX ORDER — ATROPINE SULFATE 0.1 MG/ML
0.4 INJECTION INTRAVENOUS ONCE
Status: COMPLETED | OUTPATIENT
Start: 2023-10-06 | End: 2023-10-06

## 2023-10-06 RX ORDER — FENTANYL CITRATE 50 UG/ML
25 INJECTION, SOLUTION INTRAMUSCULAR; INTRAVENOUS EVERY 5 MIN PRN
Status: DISCONTINUED | OUTPATIENT
Start: 2023-10-06 | End: 2023-10-06

## 2023-10-06 RX ORDER — KETOROLAC TROMETHAMINE 30 MG/ML
15 INJECTION, SOLUTION INTRAMUSCULAR; INTRAVENOUS ONCE
Status: COMPLETED | OUTPATIENT
Start: 2023-10-06 | End: 2023-10-06

## 2023-10-06 RX ORDER — AMLODIPINE BESYLATE 10 MG/1
10 TABLET ORAL DAILY
Status: DISCONTINUED | OUTPATIENT
Start: 2023-10-06 | End: 2023-10-07 | Stop reason: HOSPADM

## 2023-10-06 RX ORDER — FENTANYL CITRATE 50 UG/ML
INJECTION, SOLUTION INTRAMUSCULAR; INTRAVENOUS AS NEEDED
Status: DISCONTINUED | OUTPATIENT
Start: 2023-10-06 | End: 2023-10-06

## 2023-10-06 RX ORDER — MONTELUKAST SODIUM 10 MG/1
10 TABLET ORAL NIGHTLY
Status: DISCONTINUED | OUTPATIENT
Start: 2023-10-06 | End: 2023-10-07 | Stop reason: HOSPADM

## 2023-10-06 RX ORDER — LORATADINE 10 MG/1
10 TABLET ORAL DAILY
Status: DISCONTINUED | OUTPATIENT
Start: 2023-10-06 | End: 2023-10-07 | Stop reason: HOSPADM

## 2023-10-06 RX ORDER — HYDROCODONE BITARTRATE AND ACETAMINOPHEN 5; 325 MG/1; MG/1
1 TABLET ORAL EVERY 6 HOURS PRN
Status: DISCONTINUED | OUTPATIENT
Start: 2023-10-06 | End: 2023-10-07 | Stop reason: HOSPADM

## 2023-10-06 RX ADMIN — PROPOFOL 100 MG: 10 INJECTION, EMULSION INTRAVENOUS at 09:35

## 2023-10-06 RX ADMIN — LIDOCAINE HYDROCHLORIDE 40 MG: 20 INJECTION, SOLUTION EPIDURAL; INFILTRATION; INTRACAUDAL; PERINEURAL at 09:35

## 2023-10-06 RX ADMIN — FENTANYL CITRATE 25 MCG: 0.05 INJECTION, SOLUTION INTRAMUSCULAR; INTRAVENOUS at 10:41

## 2023-10-06 RX ADMIN — ONDANSETRON 4 MG: 2 INJECTION INTRAMUSCULAR; INTRAVENOUS at 10:05

## 2023-10-06 RX ADMIN — HYDROCODONE BITARTRATE AND ACETAMINOPHEN 1 TABLET: 5; 325 TABLET ORAL at 17:42

## 2023-10-06 RX ADMIN — PREGABALIN 100 MG: 100 CAPSULE ORAL at 20:41

## 2023-10-06 RX ADMIN — GENTAMICIN SULFATE 80 MG: 40 INJECTION, SOLUTION INTRAMUSCULAR; INTRAVENOUS at 12:39

## 2023-10-06 RX ADMIN — ACETAMINOPHEN 975 MG: 325 TABLET ORAL at 11:39

## 2023-10-06 RX ADMIN — ATROPINE SULFATE 0.4 MG: 0.1 INJECTION INTRAVENOUS at 11:56

## 2023-10-06 RX ADMIN — PREGABALIN 100 MG: 100 CAPSULE ORAL at 17:38

## 2023-10-06 RX ADMIN — CEFAZOLIN SODIUM 2 G: 2 SOLUTION INTRAVENOUS at 09:31

## 2023-10-06 RX ADMIN — ATORVASTATIN CALCIUM 20 MG: 10 TABLET, FILM COATED ORAL at 20:40

## 2023-10-06 RX ADMIN — ROCURONIUM BROMIDE 50 MG: 10 INJECTION, SOLUTION INTRAVENOUS at 09:35

## 2023-10-06 RX ADMIN — ACETAMINOPHEN 975 MG: 325 TABLET ORAL at 19:43

## 2023-10-06 RX ADMIN — SUGAMMADEX 200 MG: 100 INJECTION, SOLUTION INTRAVENOUS at 10:19

## 2023-10-06 RX ADMIN — LABETALOL HYDROCHLORIDE 5 MG: 5 INJECTION, SOLUTION INTRAVENOUS at 10:04

## 2023-10-06 RX ADMIN — MONTELUKAST SODIUM 10 MG: 10 TABLET, COATED ORAL at 20:41

## 2023-10-06 RX ADMIN — SODIUM CHLORIDE, POTASSIUM CHLORIDE, SODIUM LACTATE AND CALCIUM CHLORIDE 30 ML/HR: 600; 310; 30; 20 INJECTION, SOLUTION INTRAVENOUS at 08:19

## 2023-10-06 RX ADMIN — KETOROLAC TROMETHAMINE 15 MG: 30 INJECTION, SOLUTION INTRAMUSCULAR; INTRAVENOUS at 11:36

## 2023-10-06 RX ADMIN — PROPOFOL 20 MG: 10 INJECTION, EMULSION INTRAVENOUS at 10:16

## 2023-10-06 RX ADMIN — ONDANSETRON 4 MG: 2 INJECTION INTRAMUSCULAR; INTRAVENOUS at 11:48

## 2023-10-06 RX ADMIN — ENOXAPARIN SODIUM 40 MG: 40 INJECTION SUBCUTANEOUS at 17:37

## 2023-10-06 RX ADMIN — PHENAZOPYRIDINE 100 MG: 100 TABLET ORAL at 17:38

## 2023-10-06 RX ADMIN — OXYBUTYNIN CHLORIDE 5 MG: 5 TABLET ORAL at 17:38

## 2023-10-06 RX ADMIN — DOCUSATE SODIUM 100 MG: 100 CAPSULE, LIQUID FILLED ORAL at 20:41

## 2023-10-06 RX ADMIN — FENTANYL CITRATE 50 MCG: 50 INJECTION, SOLUTION INTRAMUSCULAR; INTRAVENOUS at 09:40

## 2023-10-06 RX ADMIN — ASPIRIN 81 MG: 81 TABLET, COATED ORAL at 17:50

## 2023-10-06 SDOH — HEALTH STABILITY: MENTAL HEALTH: HOW OFTEN DO YOU HAVE 6 OR MORE DRINKS ON ONE OCCASION?: NEVER

## 2023-10-06 SDOH — ECONOMIC STABILITY: INCOME INSECURITY: IN THE LAST 12 MONTHS, WAS THERE A TIME WHEN YOU WERE NOT ABLE TO PAY THE MORTGAGE OR RENT ON TIME?: NO

## 2023-10-06 SDOH — ECONOMIC STABILITY: INCOME INSECURITY: HOW HARD IS IT FOR YOU TO PAY FOR THE VERY BASICS LIKE FOOD, HOUSING, MEDICAL CARE, AND HEATING?: NOT HARD AT ALL

## 2023-10-06 SDOH — ECONOMIC STABILITY: TRANSPORTATION INSECURITY
IN THE PAST 12 MONTHS, HAS LACK OF TRANSPORTATION KEPT YOU FROM MEETINGS, WORK, OR FROM GETTING THINGS NEEDED FOR DAILY LIVING?: NO

## 2023-10-06 SDOH — HEALTH STABILITY: MENTAL HEALTH: HOW MANY STANDARD DRINKS CONTAINING ALCOHOL DO YOU HAVE ON A TYPICAL DAY?: PATIENT DOES NOT DRINK

## 2023-10-06 SDOH — SOCIAL STABILITY: SOCIAL INSECURITY: DO YOU FEEL UNSAFE GOING BACK TO THE PLACE WHERE YOU ARE LIVING?: NO

## 2023-10-06 SDOH — HEALTH STABILITY: MENTAL HEALTH: HOW OFTEN DO YOU HAVE A DRINK CONTAINING ALCOHOL?: NEVER

## 2023-10-06 SDOH — ECONOMIC STABILITY: HOUSING INSECURITY
IN THE LAST 12 MONTHS, WAS THERE A TIME WHEN YOU DID NOT HAVE A STEADY PLACE TO SLEEP OR SLEPT IN A SHELTER (INCLUDING NOW)?: NO

## 2023-10-06 SDOH — SOCIAL STABILITY: SOCIAL INSECURITY: DOES ANYONE TRY TO KEEP YOU FROM HAVING/CONTACTING OTHER FRIENDS OR DOING THINGS OUTSIDE YOUR HOME?: NO

## 2023-10-06 SDOH — ECONOMIC STABILITY: INCOME INSECURITY: IN THE PAST 12 MONTHS, HAS THE ELECTRIC, GAS, OIL, OR WATER COMPANY THREATENED TO SHUT OFF SERVICE IN YOUR HOME?: NO

## 2023-10-06 SDOH — ECONOMIC STABILITY: HOUSING INSECURITY: IN THE LAST 12 MONTHS, HOW MANY PLACES HAVE YOU LIVED?: 1

## 2023-10-06 SDOH — SOCIAL STABILITY: SOCIAL INSECURITY: HAS ANYONE EVER THREATENED TO HURT YOUR FAMILY OR YOUR PETS?: NO

## 2023-10-06 SDOH — SOCIAL STABILITY: SOCIAL INSECURITY: ARE YOU OR HAVE YOU BEEN THREATENED OR ABUSED PHYSICALLY, EMOTIONALLY, OR SEXUALLY BY ANYONE?: NO

## 2023-10-06 SDOH — SOCIAL STABILITY: SOCIAL INSECURITY: ARE THERE ANY APPARENT SIGNS OF INJURIES/BEHAVIORS THAT COULD BE RELATED TO ABUSE/NEGLECT?: NO

## 2023-10-06 SDOH — SOCIAL STABILITY: SOCIAL INSECURITY: WERE YOU ABLE TO COMPLETE ALL THE BEHAVIORAL HEALTH SCREENINGS?: YES

## 2023-10-06 SDOH — ECONOMIC STABILITY: TRANSPORTATION INSECURITY
IN THE PAST 12 MONTHS, HAS THE LACK OF TRANSPORTATION KEPT YOU FROM MEDICAL APPOINTMENTS OR FROM GETTING MEDICATIONS?: NO

## 2023-10-06 SDOH — SOCIAL STABILITY: SOCIAL INSECURITY: DO YOU FEEL ANYONE HAS EXPLOITED OR TAKEN ADVANTAGE OF YOU FINANCIALLY OR OF YOUR PERSONAL PROPERTY?: NO

## 2023-10-06 SDOH — SOCIAL STABILITY: SOCIAL INSECURITY: ABUSE: ADULT

## 2023-10-06 SDOH — SOCIAL STABILITY: SOCIAL INSECURITY: HAVE YOU HAD THOUGHTS OF HARMING ANYONE ELSE?: NO

## 2023-10-06 SDOH — HEALTH STABILITY: MENTAL HEALTH: CURRENT SMOKER: 0

## 2023-10-06 ASSESSMENT — COGNITIVE AND FUNCTIONAL STATUS - GENERAL
WALKING IN HOSPITAL ROOM: A LITTLE
TOILETING: A LITTLE
DAILY ACTIVITIY SCORE: 24
STANDING UP FROM CHAIR USING ARMS: A LITTLE
DRESSING REGULAR LOWER BODY CLOTHING: A LITTLE
PATIENT BASELINE BEDBOUND: NO
MOBILITY SCORE: 20
HELP NEEDED FOR BATHING: A LITTLE
PERSONAL GROOMING: A LITTLE
PATIENT BASELINE BEDBOUND: NO
MOVING TO AND FROM BED TO CHAIR: A LITTLE
MOBILITY SCORE: 24
DAILY ACTIVITIY SCORE: 20
CLIMB 3 TO 5 STEPS WITH RAILING: A LITTLE

## 2023-10-06 ASSESSMENT — PAIN SCALES - GENERAL
PAINLEVEL_OUTOF10: 10 - WORST POSSIBLE PAIN
PAINLEVEL_OUTOF10: 8
PAINLEVEL_OUTOF10: 10 - WORST POSSIBLE PAIN
PAIN_LEVEL: 2
PAINLEVEL_OUTOF10: 10 - WORST POSSIBLE PAIN
PAINLEVEL_OUTOF10: 10 - WORST POSSIBLE PAIN
PAINLEVEL_OUTOF10: 6
PAINLEVEL_OUTOF10: 10 - WORST POSSIBLE PAIN
PAINLEVEL_OUTOF10: 3
PAINLEVEL_OUTOF10: 10 - WORST POSSIBLE PAIN

## 2023-10-06 ASSESSMENT — LIFESTYLE VARIABLES
AUDIT-C TOTAL SCORE: 0
SKIP TO QUESTIONS 9-10: 1
HOW MANY STANDARD DRINKS CONTAINING ALCOHOL DO YOU HAVE ON A TYPICAL DAY: PATIENT DOES NOT DRINK
AUDIT-C TOTAL SCORE: 0
SKIP TO QUESTIONS 9-10: 1
AUDIT-C TOTAL SCORE: 0
HOW OFTEN DO YOU HAVE A DRINK CONTAINING ALCOHOL: NEVER
HOW OFTEN DO YOU HAVE 6 OR MORE DRINKS ON ONE OCCASION: NEVER

## 2023-10-06 ASSESSMENT — PAIN - FUNCTIONAL ASSESSMENT
PAIN_FUNCTIONAL_ASSESSMENT: 0-10
PAIN_FUNCTIONAL_ASSESSMENT: UNABLE TO SELF-REPORT
PAIN_FUNCTIONAL_ASSESSMENT: 0-10

## 2023-10-06 ASSESSMENT — ENCOUNTER SYMPTOMS
HEMATURIA: 1
FATIGUE: 1

## 2023-10-06 ASSESSMENT — ACTIVITIES OF DAILY LIVING (ADL)
PATIENT'S MEMORY ADEQUATE TO SAFELY COMPLETE DAILY ACTIVITIES?: YES
ASSISTIVE_DEVICE: EYEGLASSES;CANE
JUDGMENT_ADEQUATE_SAFELY_COMPLETE_DAILY_ACTIVITIES: YES
ADEQUATE_TO_COMPLETE_ADL: YES
FEEDING YOURSELF: INDEPENDENT
BATHING: NEEDS ASSISTANCE
DRESSING YOURSELF: NEEDS ASSISTANCE
TOILETING: NEEDS ASSISTANCE
WALKS IN HOME: INDEPENDENT
GROOMING: INDEPENDENT

## 2023-10-06 ASSESSMENT — COLUMBIA-SUICIDE SEVERITY RATING SCALE - C-SSRS
6. HAVE YOU EVER DONE ANYTHING, STARTED TO DO ANYTHING, OR PREPARED TO DO ANYTHING TO END YOUR LIFE?: NO
1. IN THE PAST MONTH, HAVE YOU WISHED YOU WERE DEAD OR WISHED YOU COULD GO TO SLEEP AND NOT WAKE UP?: NO
2. HAVE YOU ACTUALLY HAD ANY THOUGHTS OF KILLING YOURSELF?: NO

## 2023-10-06 ASSESSMENT — PATIENT HEALTH QUESTIONNAIRE - PHQ9
SUM OF ALL RESPONSES TO PHQ9 QUESTIONS 1 & 2: 0
2. FEELING DOWN, DEPRESSED OR HOPELESS: NOT AT ALL
1. LITTLE INTEREST OR PLEASURE IN DOING THINGS: NOT AT ALL

## 2023-10-06 NOTE — H&P
History Of Present Illness  Shiva Hardy is a 83 y.o. male presenting with hypotension and bradycardia post-operatively. Patient underwent TURBT with Dr. Kelsey today for history of bladder cancer. He tolerated the procedure well but while in the PACU he became hypotensive with BP 80s/50s and HR down to the 40s. He was given atropine 0.4 mg IVP x 1 dose with improvement in HR to 60s. Case was discussed with Tamir Browne PA-C who is working with Dr. Kelsey today. Patient will be admitted to med/surg telemetry for monitoring overnight with likely discharge home tomorrow if vitals remain stable. On arrival to the floor, patient is resting comfortably on 2L O2. He does have a history of YVETTE and wears a CPAP at bedtime per family at the bedside; no additional home O2. He denies any chest pain, shortness of breath, dizziness, or palpitations at this time. Talbert catheter is in place with bloody urine draining to gravity. Vitals have improved since PACU, /60 and HR 60 bpm. Plan of care discussed with patient and family, all questions answered at this time.      Past Medical History  Past Medical History:   Diagnosis Date    Acute maxillary sinusitis, unspecified 12/19/2019    Acute maxillary sinusitis    Acute recurrent maxillary sinusitis 12/28/2016    Acute recurrent maxillary sinusitis    Acute upper respiratory infection, unspecified 03/13/2017    Acute URI    Acute upper respiratory infection, unspecified 12/30/2020    Viral URI with cough    Arthritis     Bilateral inguinal hernia, without obstruction or gangrene, not specified as recurrent 03/04/2014    Bilateral inguinal hernia    BPH (benign prostatic hyperplasia)     Chest pain, unspecified 12/16/2016    Intermittent chest pain    Chronic obstructive pulmonary disease with (acute) exacerbation (CMS/[facility]) 12/19/2019    Acute exacerbation of chronic obstructive pulmonary disease (COPD)    Chronic pain disorder     back pain- chronic    Edema,  unspecified 05/27/2021    Parotid swelling    Hyperlipidemia     Hypertension     Impacted cerumen, right ear 01/06/2017    Excessive cerumen in right ear canal    Low back pain, unspecified 07/25/2019    Acute exacerbation of chronic low back pain    Personal history of malignant neoplasm of prostate     History of malignant neoplasm of prostate    Personal history of malignant neoplasm, unspecified     Personal history of malignant neoplasm    Personal history of other diseases of the digestive system 05/27/2021    History of parotitis    Personal history of other diseases of the respiratory system 02/22/2017    History of pulmonary emphysema    Personal history of other endocrine, nutritional and metabolic disease 04/20/2017    History of hyperkalemia    Prostate cancer (CMS/HCC)     Sleep apnea     uses CPAP    Unspecified malignant neoplasm of skin of unspecified part of face 12/16/2016    Skin cancer of face    Unspecified symptoms and signs involving the genitourinary system 12/23/2021    UTI symptoms       Surgical History  Past Surgical History:   Procedure Laterality Date    CATARACT EXTRACTION  10/01/2014    Cataract Surgery    HERNIA REPAIR  11/03/2015    Hernia Repair    OTHER SURGICAL HISTORY  01/25/2021    Spinal surgery    OTHER SURGICAL HISTORY  01/25/2021    Lower back surgery    OTHER SURGICAL HISTORY  12/16/2016    Destruction Of Malignant Lesion Face    OTHER SURGICAL HISTORY  05/08/2019    Skin biopsy    PROSTATE SURGERY  10/01/2014    Prostate Surgery    UMBILICAL HERNIA REPAIR  12/16/2016    Umbilical Hernia Repair        Social History  He reports that he has been smoking cigarettes. He has a 20.00 pack-year smoking history. He has never used smokeless tobacco. He reports that he does not currently use alcohol. He reports that he does not use drugs.    Family History  Family History   Problem Relation Name Age of Onset    Arthritis Mother      Hypertension Mother      Vision loss Mother       "Arthritis Father      Other (CARDIAC DISORDER) Father      Prostate cancer Father      Colon cancer Brother      Arthritis Brother      Hypertension Brother      Liver cancer Brother      Lung cancer Brother      Vision loss Brother      Cancer Other MULTIPLE FAMILY MEMBERS         Allergies  Percocet [oxycodone-acetaminophen]    Review of Systems   Constitutional:  Positive for fatigue.   Genitourinary:  Positive for hematuria.   All other systems reviewed and are negative.       Physical Exam     Constitutional: Elderly male lying in bed, in no acute distress. Family at the bedside.   Head/Neck: NC/AT   EENT: Clear sclera.   Respiratory/Thorax: Clear to auscultation bilaterally; no wheezing, rhonchi, or rales. No accessory muscle use or conversational dyspnea. SpO2 > 92% on 2L O2 via NC   CV: Bradycardic, regular rate and rhythm, no murmurs. Normal S1 and S2.   GI: Soft, non-distended, non-tender abdomen. No masses or organomegaly. + BS   : Talbert catheter in place with bloody urine draining to gravity   MSK/Extremities: Grossly normal extremities, no edema   Neuro: Alert and oriented x3  Derm:  Warm and dry, no lesions or rashes  Psych: Appropriate mood and behavior      Last Recorded Vitals  Blood pressure 126/63, pulse 57, temperature 36 °C (96.8 °F), resp. rate 16, height 1.778 m (5' 10\"), weight 82.6 kg (182 lb), SpO2 94 %.    Relevant Results      Scheduled medications  acetaminophen, 975 mg, oral, q8h  amLODIPine, 10 mg, oral, Daily  aspirin, 81 mg, oral, Daily  atorvastatin, 20 mg, oral, Nightly  docusate sodium, 100 mg, oral, BID  enoxaparin, 40 mg, subcutaneous, q24h  fluticasone furoate-vilanteroL, 1 puff, inhalation, Daily  loratadine, 10 mg, oral, Daily  losartan, 100 mg, oral, Daily  montelukast, 10 mg, oral, Nightly  oxybutynin XL, 5 mg, oral, Daily  phenazopyridine, 100 mg, oral, TID with meals  pregabalin, 100 mg, oral, TID      Continuous medications     PRN medications  PRN medications: " HYDROcodone-acetaminophen, morphine      Assessment/Plan   Principal Problem:    Lesion of bladder      #Post-operative bradycardia and hypotension (improving)  #H/o HTN   - BP 80s/50s, HR 40s post-op- likely due to anesthesia, BP meds, narcotics   - Patient received atropine 0.4 mg IVP x 1 dose with HR improved to 60s  - Continuous telemetry monitoring  - Hold home BP meds: amlodipine and losartan  - Monitor HR and BP; last recorded HR 60 bpm, /60- patient denies dizziness, chest pain, or palpitations at this time      #Bladder cancer s/p TURBT   - Talbert catheter in place, maintain x 3 days per urology   - Start pyridium three times a day per urology  - Continue home oxybutynin and pregabalin   - Pain control: Tylenol q8h scheduled, Norco q6h prn for moderate pain 4-6, morphine IVP q4h prn for severe pain 7-10   - Zofran prn for nausea/vomiting  - Colace bid to prevent constipation while taking narcotics   - Diet as tolerated  - Lovenox ordered for DVT ppx   - Received gentamicin IV pre-operatively   - Outpatient follow up with urology     #COPD, not in acute exacerbation  #YVETTE  #Allergic rhinitis   - Continue 2L O2 while asleep- patient wears CPAP at home; no daytime O2 per family   - Continue montelukast, loratadine, and Breo   - Monitor SpO2 continuously    #HLD  #CAD  - Continue atorvastatin and aspirin     DVT PPx: Lovenox   Diet: Regular as tolerated   Code Status: Full code     Disposition: Patient requires inpatient management at this time. Likely discharge home tomorrow morning if vitals remain stable.     Total accumulated time spent face to face and not face to face preparing to see the patient, obtaining and reviewing separately obtained history; performing a medically appropriate examination and/or evaluation; counseling and educating the patient, family; ordering medications, tests, or procedures; referring and communicating with other health care professionals; documenting clinical information  in the patient's medical record; independently interpreting results and communicating the results to the patient, family; and care coordination was 45 minutes.       Indu Dodd PA-C

## 2023-10-06 NOTE — INTERVAL H&P NOTE
H&P reviewed. The patient was examined and there are no changes to the H&P.  Review of Systems  A complete review of systems was conducted, pertinent only to the HPI noted above   Constitutional: None  Eyes: No additions to above history   Ears, Nose, Throat: No additions to above history  Cardiovascular: No additions to above history   Respiratory: No additions to above history  GI: No additions to above history  : No additions to above history  Skin/Neuro: No additions to above history  Endocrine/Heme/Lymph: No additions to above history  Immunologic: No additions to above history  Psychiatric: No additions to above history  Musculoskeletal: no changes    Exam  General: Well developed, awake/alert/oriented x3, no distress, alert and cooperative.  Skin: Warm and dry, no rashes  Eyes: EOMI  Head/neck: Full ROM with no apparent injury  Cardiac: Palpable pulses   Respiratory: BBS clear throughout.  Normal rise and fall of chest  Abdomen: soft, nondistended  GI: No obvious abdominal distension, no tenderness with palpation  Extremities: No edema or deformity  Neuro: AxOx3  Psych: Appropriate mood  Focused Musculoskeletal: EULOGIO

## 2023-10-06 NOTE — ANESTHESIA PROCEDURE NOTES
Airway  Date/Time: 10/6/2023 9:39 AM  Urgency: elective    Airway not difficult    Staffing  Performed: CRNA   Authorized by: MARIA C Grissom    Performed by: MARIA C Grissom  Patient location during procedure: OR    Indications and Patient Condition  Indications for airway management: anesthesia  Spontaneous Ventilation: absent  Sedation level: deep  Preoxygenated: yes  Patient position: sniffing  MILS maintained throughout  Mask difficulty assessment: 1 - vent by mask    Final Airway Details  Final airway type: endotracheal airway      Successful airway: ETT  Cuffed: yes   Successful intubation technique: video laryngoscopy  Facilitating devices/methods: intubating stylet  Blade: Dot  Blade size: #4  ETT size (mm): 7.5  Cormack-Lehane Classification: grade IIa - partial view of glottis  Placement verified by: chest auscultation, capnometry and palpation of cuff   Cuff volume (mL): 6  Measured from: gums  ETT to gums (cm): 23  Number of attempts at approach: 1

## 2023-10-06 NOTE — OP NOTE
TURBT Operative Note     Date: 10/6/2023  OR Location: [facility] OR    Name: Shiva Hardy, : 1940, Age: 83 y.o., MRN: [id number], Sex: male    Diagnosis  Pre-op Diagnosis     * Malignant neoplasm of bladder, unspecified (CMS/HCC) [C67.9] Post-op Diagnosis     * Malignant neoplasm of bladder, unspecified (CMS/HCC) [C67.9]     Procedures  TURBT  18616 - MS CYSTOURETHROSCOPY W/DEST &/RMVL MED BLADDER CARROLL      Surgeons      * Irwin Kelsey - Primary    Resident/Fellow/Other Assistant:  No surgical staff documented.    Procedure Summary  Anesthesia: General  ASA: III  Anesthesia Staff: CRNA: CHILANGO Grissom-CRNA  Estimated Blood Loss: 5mL  Intra-op Medications:   Medication Name Total Dose   sodium chloride 0.9 % irrigation solution 3,000 mL   sterile water irrigation solution 1,000 mL   ceFAZolin (Ancef) 2 g IV in dextrose 5% 50 mL 2 g   ceFAZolin (Ancef) 2 gram/50 mL IV in dextrose 5%  - Omnicell Override Pull Cannot be calculated              Anesthesia Record               Intraprocedure I/O Totals       None           Specimen: No specimens collected     Staff:   Circulator: Naty Cole RN; Breann Diop RN  Scrub Person: Meryl Ocasio MA; Odessa Maria         Drains and/or Catheters:   Urethral Catheter Other (Comment) 20 Fr. (Active)   Collection Container Standard drainage bag 10/06/23 1018         Findings: inflamed spots on left lateral wall, site of prior resection on posterior wall    Indications: Shiva Hardy is an 83 y.o. male who is having surgery for history of high grade superficial bladder tumor Ta    The patient was seen in the preoperative area. The risks, benefits, complications, treatment options, non-operative alternatives, expected recovery and outcomes were discussed with the patient. The possibilities of reaction to medication, pulmonary aspiration, injury to surrounding structures, bleeding, recurrent infection, the need for additional procedures, failure to diagnose a  condition, and creating a complication requiring transfusion or operation were discussed with the patient. The patient concurred with the proposed plan, giving informed consent.  The site of surgery was properly noted/marked if necessary per policy. The patient has been actively warmed in preoperative area. Preoperative antibiotics have been ordered and given within 1 hours of incision. Venous thrombosis prophylaxis are not indicated.    Procedure Details: Patient was consented and antibiotics were started on-call to the OR.  In the operating room, under general anesthesia, patient placed in dorsolithotomy position, genitalia was prepped and draped in usual manner.  No 26 resectoscope could not be inserted so serial dilatation with urethral sounds was performed, then the resectoscope was inserted down the urethra into the prostate and bladder, and cystoscopy revealed the area of prior resection on the left lateral wall and posterior wall, with significant bladder inflammation surrounding those spots.  Using the loop on the working element, the area of granulation with underlying erythema and inflammation was resected down to the muscle and fat layer.  This was done the whole surface of the prior resected tumor.  Extensive hemostasis was performed for the underlying base of the tumor as well as the surrounding mucosa.  All resected chips were removed and sent for pathological examination.   No 20 French 3-way Talbert catheter was inserted and irrigation port was plugged.  Patient was awakened and transferred to PACU in stable condition.    Complications:  None; patient tolerated the procedure well.    Disposition: PACU - hemodynamically stable.  Condition: stable         Additional Details: Talbert will be kept for 3 days, will return to ED if significant hematuria, fever or chills    Attending Attestation: I performed the procedure.    Irwin Kelsey  Phone Number: [phone]

## 2023-10-06 NOTE — ANESTHESIA PREPROCEDURE EVALUATION
Patient: Shiva Hardy    Procedure Information       Date/Time: 10/06/23 0930    Procedure: TURBT    Location: [facility] 01 / [facility]    Surgeons: Irwin Kelsey MD            Relevant Problems   Cardiovascular   (+) Aortic aneurysm (CMS/HCC)   (+) Essential hypertension   (+) Hyperlipidemia   (+) Thoracic back pain      Neuro/Psych   (+) Thoracic neuritis      Pulmonary   (+) Asthma   (+) COPD (chronic obstructive pulmonary disease) (CMS/HCC)   (+) Centrilobular emphysema (CMS/HCC)   (+) Left lower lobe pneumonia   (+) Multiple lung nodules on CT   (+) Obstructive sleep apnea   (+) Paraseptal emphysema (CMS/HCC)      Musculoskeletal   (+) Spondylosis, thoracic   (+) Thoracic spondylosis      Eyes, Ears, Nose, and Throat   (+) Bilateral hearing loss      Infectious Disease   (+) Oral thrush       Clinical information reviewed:    Allergies  Meds               NPO Detail:  NPO/Void Status  Date of Last Liquid: 10/06/23  Time of Last Liquid: 0530  Date of Last Solid: 10/05/23  Time of Last Solid: 2000  Time of Last Void: 0745         Physical Exam    Airway  Mallampati: II  TM distance: >3 FB  Neck ROM: full     Cardiovascular - normal exam     Dental    Pulmonary - normal exam     Abdominal - normal exam             Anesthesia Plan    ASA 3     general     The patient is not a current smoker.  Patient did not smoke on day of procedure.  Education provided regarding risk of obstructive sleep apnea.  intravenous induction   Postoperative administration of opioids is intended.  Anesthetic plan and risks discussed with patient.

## 2023-10-06 NOTE — ANESTHESIA POSTPROCEDURE EVALUATION
Patient: Shiva Hardy    Procedure Summary       Date: 10/06/23 Room / Location: [facility] 01 / [facility]    Anesthesia Start: 0931 Anesthesia Stop: 1034    Procedure: TURBT Diagnosis:       Malignant neoplasm of bladder, unspecified (CMS/HCC)      (c67.9)    Surgeons: Irwin Kelsey MD Responsible Provider: MARIA C Grissom    Anesthesia Type: general ASA Status: 3            Anesthesia Type: general    Vitals Value Taken Time   /85 10/06/23 1035   Temp 36 10/06/23 1035   Pulse 62 10/06/23 1035   Resp 18 10/06/23 1035   SpO2 97 10/06/23 1035       Anesthesia Post Evaluation    Patient location during evaluation: bedside  Patient participation: complete - patient participated  Level of consciousness: awake  Pain score: 2  Pain management: adequate  Multimodal analgesia pain management approach  Airway patency: patent  Two or more strategies used to mitigate risk of obstructive sleep apnea  Cardiovascular status: acceptable and stable  Respiratory status: face mask  Hydration status: acceptable        No notable events documented.

## 2023-10-07 VITALS
HEART RATE: 89 BPM | WEIGHT: 183.86 LBS | OXYGEN SATURATION: 92 % | BODY MASS INDEX: 26.32 KG/M2 | DIASTOLIC BLOOD PRESSURE: 60 MMHG | RESPIRATION RATE: 18 BRPM | TEMPERATURE: 97.5 F | HEIGHT: 70 IN | SYSTOLIC BLOOD PRESSURE: 98 MMHG

## 2023-10-07 PROBLEM — Z90.79 S/P TURP: Status: ACTIVE | Noted: 2023-10-07

## 2023-10-07 LAB
ANION GAP SERPL CALC-SCNC: 11 MMOL/L (ref 10–20)
BUN SERPL-MCNC: 19 MG/DL (ref 6–23)
CALCIUM SERPL-MCNC: 8.8 MG/DL (ref 8.6–10.3)
CHLORIDE SERPL-SCNC: 104 MMOL/L (ref 98–107)
CO2 SERPL-SCNC: 21 MMOL/L (ref 21–32)
CREAT SERPL-MCNC: 0.91 MG/DL (ref 0.5–1.3)
ERYTHROCYTE [DISTWIDTH] IN BLOOD BY AUTOMATED COUNT: 13 % (ref 11.5–14.5)
GFR SERPL CREATININE-BSD FRML MDRD: 83 ML/MIN/1.73M*2
GLUCOSE SERPL-MCNC: 98 MG/DL (ref 74–99)
HCT VFR BLD AUTO: 35.3 % (ref 41–52)
HGB BLD-MCNC: 10.8 G/DL (ref 13.5–17.5)
MCH RBC QN AUTO: 31.7 PG (ref 26–34)
MCHC RBC AUTO-ENTMCNC: 30.6 G/DL (ref 32–36)
MCV RBC AUTO: 104 FL (ref 80–100)
NRBC BLD-RTO: 0 /100 WBCS (ref 0–0)
PLATELET # BLD AUTO: 279 X10*3/UL (ref 150–450)
PMV BLD AUTO: 9 FL (ref 7.5–11.5)
POTASSIUM SERPL-SCNC: 4.4 MMOL/L (ref 3.5–5.3)
RBC # BLD AUTO: 3.41 X10*6/UL (ref 4.5–5.9)
SODIUM SERPL-SCNC: 132 MMOL/L (ref 136–145)
WBC # BLD AUTO: 11.1 X10*3/UL (ref 4.4–11.3)

## 2023-10-07 PROCEDURE — 94664 DEMO&/EVAL PT USE INHALER: CPT

## 2023-10-07 PROCEDURE — 2500000001 HC RX 250 WO HCPCS SELF ADMINISTERED DRUGS (ALT 637 FOR MEDICARE OP): Performed by: REGISTERED NURSE

## 2023-10-07 PROCEDURE — 2500000002 HC RX 250 W HCPCS SELF ADMINISTERED DRUGS (ALT 637 FOR MEDICARE OP, ALT 636 FOR OP/ED): Performed by: REGISTERED NURSE

## 2023-10-07 PROCEDURE — 94640 AIRWAY INHALATION TREATMENT: CPT

## 2023-10-07 PROCEDURE — 36415 COLL VENOUS BLD VENIPUNCTURE: CPT

## 2023-10-07 PROCEDURE — 82374 ASSAY BLOOD CARBON DIOXIDE: CPT

## 2023-10-07 PROCEDURE — 99239 HOSP IP/OBS DSCHRG MGMT >30: CPT | Performed by: NURSE PRACTITIONER

## 2023-10-07 PROCEDURE — 85027 COMPLETE CBC AUTOMATED: CPT

## 2023-10-07 PROCEDURE — 3490 HC RX 250 GENERAL PHARMACY W/ HCPCS (ALT 636 FOR OP/ED): Performed by: REGISTERED NURSE

## 2023-10-07 PROCEDURE — 2500000004 HC RX 250 GENERAL PHARMACY W/ HCPCS (ALT 636 FOR OP/ED): Performed by: REGISTERED NURSE

## 2023-10-07 PROCEDURE — 2500000001 HC RX 250 WO HCPCS SELF ADMINISTERED DRUGS (ALT 637 FOR MEDICARE OP)

## 2023-10-07 RX ORDER — LOSARTAN POTASSIUM 100 MG/1
100 TABLET ORAL DAILY
Start: 2023-10-07 | End: 2023-11-13 | Stop reason: HOSPADM

## 2023-10-07 RX ORDER — AMLODIPINE BESYLATE 5 MG/1
10 TABLET ORAL DAILY
Start: 2023-10-07 | End: 2023-11-08 | Stop reason: SDUPTHER

## 2023-10-07 RX ADMIN — OXYBUTYNIN CHLORIDE 5 MG: 5 TABLET ORAL at 09:10

## 2023-10-07 RX ADMIN — PHENAZOPYRIDINE 100 MG: 100 TABLET ORAL at 12:07

## 2023-10-07 RX ADMIN — FLUTICASONE FUROATE AND VILANTEROL TRIFENATATE 1 PUFF: 200; 25 POWDER RESPIRATORY (INHALATION) at 09:39

## 2023-10-07 RX ADMIN — ACETAMINOPHEN 975 MG: 325 TABLET ORAL at 12:07

## 2023-10-07 RX ADMIN — ASPIRIN 81 MG: 81 TABLET, COATED ORAL at 09:10

## 2023-10-07 RX ADMIN — PREGABALIN 100 MG: 100 CAPSULE ORAL at 09:10

## 2023-10-07 RX ADMIN — LORATADINE 10 MG: 10 TABLET ORAL at 09:10

## 2023-10-07 RX ADMIN — DOCUSATE SODIUM 100 MG: 100 CAPSULE, LIQUID FILLED ORAL at 09:10

## 2023-10-07 RX ADMIN — ACETAMINOPHEN 975 MG: 325 TABLET ORAL at 03:04

## 2023-10-07 RX ADMIN — PHENAZOPYRIDINE 100 MG: 100 TABLET ORAL at 09:10

## 2023-10-07 RX ADMIN — HYDROCODONE BITARTRATE AND ACETAMINOPHEN 1 TABLET: 5; 325 TABLET ORAL at 13:16

## 2023-10-07 ASSESSMENT — COGNITIVE AND FUNCTIONAL STATUS - GENERAL
MOBILITY SCORE: 22
DAILY ACTIVITIY SCORE: 24
WALKING IN HOSPITAL ROOM: A LITTLE
CLIMB 3 TO 5 STEPS WITH RAILING: A LITTLE

## 2023-10-07 ASSESSMENT — ENCOUNTER SYMPTOMS
CHEST TIGHTNESS: 0
STRIDOR: 0
PALPITATIONS: 0
JOINT SWELLING: 0
SHORTNESS OF BREATH: 0
BACK PAIN: 0
APPETITE CHANGE: 0
WHEEZING: 0
COUGH: 0
ABDOMINAL PAIN: 0
APNEA: 0
FATIGUE: 0
LIGHT-HEADEDNESS: 0
POLYPHAGIA: 0
FREQUENCY: 0
BLOOD IN STOOL: 0
DIZZINESS: 0
ARTHRALGIAS: 0
NAUSEA: 0
COLOR CHANGE: 0
NECK STIFFNESS: 0
NECK PAIN: 0
CHILLS: 0
DIARRHEA: 0
POLYDIPSIA: 0
ACTIVITY CHANGE: 0
FEVER: 0
VOMITING: 0
ABDOMINAL DISTENTION: 0
MYALGIAS: 0

## 2023-10-07 ASSESSMENT — ACTIVITIES OF DAILY LIVING (ADL)
ADEQUATE_TO_COMPLETE_ADL: YES
ASSISTIVE_DEVICE: EYEGLASSES;CANE
HEARING - LEFT EAR: HEARING AID
PATIENT'S MEMORY ADEQUATE TO SAFELY COMPLETE DAILY ACTIVITIES?: YES
WALKS IN HOME: INDEPENDENT
FEEDING YOURSELF: INDEPENDENT
GROOMING: INDEPENDENT
BATHING: NEEDS ASSISTANCE
DRESSING YOURSELF: NEEDS ASSISTANCE
JUDGMENT_ADEQUATE_SAFELY_COMPLETE_DAILY_ACTIVITIES: YES
HEARING - RIGHT EAR: HEARING AID
TOILETING: NEEDS ASSISTANCE

## 2023-10-07 ASSESSMENT — PAIN SCALES - GENERAL
PAINLEVEL_OUTOF10: 0 - NO PAIN
PAINLEVEL_OUTOF10: 8
PAINLEVEL_OUTOF10: 3
PAINLEVEL_OUTOF10: 1

## 2023-10-07 ASSESSMENT — PAIN - FUNCTIONAL ASSESSMENT
PAIN_FUNCTIONAL_ASSESSMENT: 0-10
PAIN_FUNCTIONAL_ASSESSMENT: 0-10

## 2023-10-07 NOTE — PROGRESS NOTES
Today:  pt is resting in his bed, states he is still having 4/10 pain to the penis, denies any pain to lower abdomen or flank area.  Pt had an uneventful night, I spoke with Amy Chao NP and she states pt was seen by Dr. Cloud, cardiology yesterday and he is holding his 2 antihypertensive medications and will receive a call on Monday from his office to set up a follow up appointment.  Gorman catheter is patent and draining light pink tinged urine, no blood clots noted.  Abdomen is soft and non-tender.  I spoke with pt concerning removing his gorman on Monday, went over the process of removal and to call doctors office or return to the ED if he develops severe pain, is not able to urinate, or develops a fever, pt verbalizes understanding and has no further questions at this time.  Pt is requesting pain medication Rx for home going.   Message sent to Dr. Kelsey concerning the pain meds.

## 2023-10-07 NOTE — CARE PLAN
The clinical goals for the shift include Talbert catheter s/p TURP to remain patent and drain urine freely throughout this shift  Talbert cathter patent, draining tea colored urine with small amount of bloody sediment.   Reports Tylenol controlled pain well overnight.   Made needs known.

## 2023-10-07 NOTE — DISCHARGE SUMMARY
Discharge Diagnosis  Lesion of bladder  S/P TURBT    Issues Requiring Follow-Up      Discharge Meds     Your medication list        START taking these medications        Instructions Last Dose Given Next Dose Due   phenazopyridine 200 mg tablet  Commonly known as: Pyridium      Take 1 tablet (200 mg) by mouth 3 times a day as needed for bladder spasms for up to 7 days.              CHANGE how you take these medications        Instructions Last Dose Given Next Dose Due   amLODIPine 5 mg tablet  Commonly known as: Norvasc  What changed: additional instructions      Take 2 tablets (10 mg) by mouth once daily. Do not take until you follow up with cardiology, Dr. Cloud.       losartan 100 mg tablet  Commonly known as: Cozaar  What changed: additional instructions      Take 1 tablet (100 mg) by mouth once daily. Do not take until you follow up with cardiology, Dr. Cloud              CONTINUE taking these medications        Instructions Last Dose Given Next Dose Due   aspirin 81 mg EC tablet           atorvastatin 20 mg tablet  Commonly known as: Lipitor      Take 1 tablet (20 mg) by mouth once daily at bedtime.       budesonide-formoteroL 160-4.5 mcg/actuation inhaler  Commonly known as: Symbicort           docusate sodium 100 mg capsule  Commonly known as: Colace      Take 1 capsule (100 mg) by mouth in the morning and 1 capsule (100 mg) before bedtime.       ergocalciferol 1.25 MG (85811 UT) capsule  Commonly known as: Vitamin D-2      Take 1 capsule (1,250 mcg) by mouth 1 (one) time per week.       gabapentin 300 mg capsule  Commonly known as: Neurontin      Take 1 capsule (300 mg) by mouth 3 times a day.       HYDROcodone-acetaminophen 5-325 mg tablet  Commonly known as: Norco      Take 1 tablet by mouth every 6 hours if needed for severe pain (7 - 10) for up to 3 days.       loratadine 10 mg tablet  Commonly known as: Claritin           oxybutynin XL 5 mg 24 hr tablet  Commonly known as: Ditropan-XL            pregabalin 100 mg capsule  Commonly known as: Lyrica                  ASK your doctor about these medications        Instructions Last Dose Given Next Dose Due   fluticasone 50 mcg/actuation nasal spray  Commonly known as: Flonase      ADMINISTER 2 SPRAYS INTO EACH NOSTRIL ONCE DAILY.       montelukast 10 mg tablet  Commonly known as: Singulair      Take 1 tablet (10 mg) by mouth once daily at bedtime.                 Where to Get Your Medications        These medications were sent to [facility] [address] [place] [address] - [address] [facility]  [address] [facility] [address]      Phone: [phone]   phenazopyridine 200 mg tablet       Information about where to get these medications is not yet available    Ask your nurse or doctor about these medications  amLODIPine 5 mg tablet  losartan 100 mg tablet         Test Results Pending At Discharge  Pending Labs       Order Current Status    Surgical Pathology Exam In process          83 y.o. male presenting with hypotension and bradycardia post-operatively. Patient underwent TURBT with Dr. Kelsey today for history of bladder cancer. He tolerated the procedure well but while in the PACU he became hypotensive with BP 80s/50s and HR down to the 40s. He was given atropine 0.4 mg IVP x 1 dose with improvement in HR to 60s. Case was discussed with Tamir Browne PA-C who is working with Dr. Kelsey today. Patient will be admitted to med/surg telemetry for monitoring overnight with likely discharge home tomorrow if vitals remain stable. On arrival to the floor, patient is resting comfortably on 2L O2. He does have a history of YVETTE and wears a CPAP at bedtime per family at the bedside; no additional home O2. He denies any chest pain, shortness of breath, dizziness, or palpitations at this time. Talbert catheter is in place with bloody urine draining to gravity. Vitals have improved since PACU, /60 and HR 60 bpm. Plan of care discussed with patient and  family, all questions answered at this time.      Hospital Course  #Post-operative bradycardia and hypotension (improving)  #H/o HTN   - BP 80s/50s, HR 40s post-op- likely due to anesthesia, BP meds, narcotics   - Patient received atropine 0.4 mg IVP x 1 dose with HR improved to 60s  - Continuous telemetry monitoring  - Hold home BP meds: amlodipine and losartan  - Monitor HR and BP; last recorded HR 60 bpm, /60- patient denies dizziness, chest pain, or palpitations at this time         #Bladder cancer s/p TURBT   - Talbert catheter in place, maintain x 3 days per urology   - Start pyridium three times a day per urology  - Continue home oxybutynin and pregabalin   - Pain control: Tylenol q8h scheduled, Norco q6h prn for moderate pain 4-6, morphine IVP q4h prn for severe pain 7-10   - Zofran prn for nausea/vomiting  - Colace bid to prevent constipation while taking narcotics   - Diet as tolerated  - Lovenox ordered for DVT ppx   - Received gentamicin IV pre-operatively   - Outpatient follow up with urology      #COPD, not in acute exacerbation  #YVETTE  #Allergic rhinitis   - Continue 2L O2 while asleep- patient wears CPAP at home; no daytime O2 per family   - Continue montelukast, loratadine, and Breo   - Monitor SpO2 continuously     #HLD  #CAD  - Continue atorvastatin and aspirin      DVT PPx: Lovenox   Diet: Regular as tolerated   Code Status: Full code      Disposition: Patient was stable to be discharged to home. He was to hold his amolodipine and losartan until he follow up with cardiology. He will follow up with urology as scheduled.    Total cumulative time spent in preparation of this discharge including documentation review, coordination of care with the medical team including PT/SW/care coordinators and treating consultants, discussion with patient and pertinent family members and finalization of prescriptions, follow-up appointments, and this discharge summary was approximately 45 minutes.     Pertinent  Physical Exam At Time of Discharge  Physical Exam  Constitutional:       Appearance: Normal appearance.   HENT:      Head: Normocephalic and atraumatic.      Nose: Nose normal.   Eyes:      Conjunctiva/sclera: Conjunctivae normal.      Pupils: Pupils are equal, round, and reactive to light.   Cardiovascular:      Rate and Rhythm: Regular rhythm. Bradycardia present.   Pulmonary:      Effort: Pulmonary effort is normal.      Breath sounds: Normal breath sounds.   Abdominal:      General: Abdomen is flat.      Palpations: Abdomen is soft.   Genitourinary:     Comments: Talbert catheter draining clear yellow urine  Musculoskeletal:         General: Normal range of motion.      Cervical back: Normal range of motion and neck supple.   Skin:     General: Skin is warm and dry.   Neurological:      General: No focal deficit present.      Mental Status: He is alert.   Psychiatric:         Mood and Affect: Mood normal.         Behavior: Behavior normal.         Outpatient Follow-Up  Future Appointments   Date Time Provider Department Center   10/27/2023 10:00 AM Irwin Kelsey MD [id number] [facility]   1/8/2024 11:00 AM Manny Beckett APRN-CNP [id number] [facility]         Amy Chao APRN-CNP

## 2023-10-07 NOTE — CONSULTS
Reason For Consult  Post op bradycardia    History Of Present Illness  Shiva Hardy is a 83 y.o. male presenting with hypotension and bradycardia. Shiva had a TURP procedure performed yesterday by Dr. Gates.  He was given labetalol for elevated blood pressure.  After the procedure his heart rate dropped down to 41, and blood pressure dropped to 83/50. His Losartan and amlodipine were held for hypotension. He currently denies having any chest pain, shortness of breath, dizziness, lightheadedness, or palpitations.     Past Medical History  He has a past medical history of Acute maxillary sinusitis, unspecified (12/19/2019), Acute recurrent maxillary sinusitis (12/28/2016), Acute upper respiratory infection, unspecified (03/13/2017), Acute upper respiratory infection, unspecified (12/30/2020), Arthritis, Bilateral inguinal hernia, without obstruction or gangrene, not specified as recurrent (03/04/2014), BPH (benign prostatic hyperplasia), Chest pain, unspecified (12/16/2016), Chronic obstructive pulmonary disease with (acute) exacerbation (CMS/[facility]) (12/19/2019), Chronic pain disorder, Edema, unspecified (05/27/2021), Hyperlipidemia, Hypertension, Impacted cerumen, right ear (01/06/2017), Low back pain, unspecified (07/25/2019), Personal history of malignant neoplasm of prostate, Personal history of malignant neoplasm, unspecified, Personal history of other diseases of the digestive system (05/27/2021), Personal history of other diseases of the respiratory system (02/22/2017), Personal history of other endocrine, nutritional and metabolic disease (04/20/2017), Prostate cancer (CMS/[facility]), Sleep apnea, Unspecified malignant neoplasm of skin of unspecified part of face (12/16/2016), and Unspecified symptoms and signs involving the genitourinary system (12/23/2021).    Surgical History  He has a past surgical history that includes Hernia repair (11/03/2015); Other surgical history (01/25/2021); Other surgical history  (01/25/2021); Umbilical hernia repair (12/16/2016); Other surgical history (12/16/2016); Other surgical history (05/08/2019); Cataract extraction (10/01/2014); and Prostate surgery (10/01/2014).     Social History  He reports that he has been smoking cigarettes. He has a 20.00 pack-year smoking history. He has never used smokeless tobacco. He reports that he does not currently use alcohol. He reports that he does not use drugs.    Family History  Family History   Problem Relation Name Age of Onset    Arthritis Mother      Hypertension Mother      Vision loss Mother      Arthritis Father      Other (CARDIAC DISORDER) Father      Prostate cancer Father      Colon cancer Brother      Arthritis Brother      Hypertension Brother      Liver cancer Brother      Lung cancer Brother      Vision loss Brother      Cancer Other MULTIPLE FAMILY MEMBERS         Allergies  Percocet [oxycodone-acetaminophen]    Review of Systems  Review of Systems   Constitutional:  Negative for activity change, appetite change, chills, fatigue and fever.   HENT:  Negative for congestion.    Respiratory:  Negative for apnea, cough, chest tightness, shortness of breath, wheezing and stridor.    Cardiovascular:  Negative for chest pain, palpitations and leg swelling.   Gastrointestinal:  Negative for abdominal distention, abdominal pain, blood in stool, diarrhea, nausea and vomiting.   Endocrine: Negative for cold intolerance, heat intolerance, polydipsia, polyphagia and polyuria.   Genitourinary:  Negative for frequency and urgency.   Musculoskeletal:  Negative for arthralgias, back pain, gait problem, joint swelling, myalgias, neck pain and neck stiffness.   Skin:  Negative for color change and pallor.   Neurological:  Negative for dizziness and light-headedness.   Psychiatric/Behavioral:  Negative for behavioral problems.          Physical Exam  Physical Exam  Constitutional:       Appearance: Normal appearance.   HENT:      Head: Normocephalic  "and atraumatic.      Nose: Nose normal. No congestion.   Eyes:      Extraocular Movements: Extraocular movements intact.      Pupils: Pupils are equal, round, and reactive to light.   Cardiovascular:      Rate and Rhythm: Normal rate and regular rhythm.      Pulses: Normal pulses.      Heart sounds: Normal heart sounds. No murmur heard.     No friction rub. No gallop.   Pulmonary:      Effort: Pulmonary effort is normal. No tachypnea, bradypnea, accessory muscle usage or respiratory distress.      Breath sounds: Normal breath sounds. No stridor. No wheezing, rhonchi or rales.   Chest:      Chest wall: No tenderness.   Abdominal:      General: Bowel sounds are normal. There is no distension.      Palpations: Abdomen is soft.      Tenderness: There is no abdominal tenderness.   Musculoskeletal:         General: Normal range of motion.      Cervical back: Normal range of motion and neck supple.   Skin:     General: Skin is warm and dry.      Capillary Refill: Capillary refill takes less than 2 seconds.   Neurological:      General: No focal deficit present.      Mental Status: He is alert and oriented to person, place, and time. Mental status is at baseline.   Psychiatric:         Mood and Affect: Mood normal.         Behavior: Behavior normal.           Last Recorded Vitals  Blood pressure 108/66, pulse 57, temperature 36.5 °C (97.7 °F), temperature source Temporal, resp. rate 18, height 1.778 m (5' 10\"), weight 83.4 kg (183 lb 13.8 oz), SpO2 93 %.    Relevant Results  Results for orders placed or performed during the hospital encounter of 10/06/23 (from the past 24 hour(s))   CBC   Result Value Ref Range    WBC 11.1 4.4 - 11.3 x10*3/uL    nRBC 0.0 0.0 - 0.0 /100 WBCs    RBC 3.41 (L) 4.50 - 5.90 x10*6/uL    Hemoglobin 10.8 (L) 13.5 - 17.5 g/dL    Hematocrit 35.3 (L) 41.0 - 52.0 %     (H) 80 - 100 fL    MCH 31.7 26.0 - 34.0 pg    MCHC 30.6 (L) 32.0 - 36.0 g/dL    RDW 13.0 11.5 - 14.5 %    Platelets 279 150 - 450 " x10*3/uL    MPV 9.0 7.5 - 11.5 fL   Basic Metabolic Panel   Result Value Ref Range    Glucose 98 74 - 99 mg/dL    Sodium 132 (L) 136 - 145 mmol/L    Potassium 4.4 3.5 - 5.3 mmol/L    Chloride 104 98 - 107 mmol/L    Bicarbonate 21 21 - 32 mmol/L    Anion Gap 11 10 - 20 mmol/L    Urea Nitrogen 19 6 - 23 mg/dL    Creatinine 0.91 0.50 - 1.30 mg/dL    eGFR 83 >60 mL/min/1.73m*2    Calcium 8.8 8.6 - 10.3 mg/dL            Assessment/Plan     Post-operative bradycardia and hypotension  - blood pressure currently 108/66  - post op HR was 41, blood pressure 80/80's  - Hold Losartan and amlodipine at this time  - Discussed with patient, he will hold his blood pressure medications going home, and monitor his bp at home. I will call the patient on Monday and follow up with his BP and HR and reevaluate starting meds. He will follow up in the office on Friday.   - Ok to be discharged home today    HLD/CAD  - Continue atorvastatin and aspirin    I spent 30 minutes in the professional and overall care of this patient.      Yoli Ponce, APRN-CNP

## 2023-10-07 NOTE — CARE PLAN
The patient's goals for the shift include having controlled pain.     The clinical goals for the shift include Talbert catheter s/p TURP to remain patent and drain urine freely throughout this shift  Pt discharging home.

## 2023-10-09 DIAGNOSIS — J43.2 CENTRILOBULAR EMPHYSEMA (MULTI): ICD-10-CM

## 2023-10-09 RX ORDER — MONTELUKAST SODIUM 10 MG/1
10 TABLET ORAL NIGHTLY
Qty: 90 TABLET | Refills: 0 | Status: SHIPPED | OUTPATIENT
Start: 2023-10-09 | End: 2024-04-09

## 2023-10-18 LAB
LABORATORY COMMENT REPORT: NORMAL
PATH REPORT.FINAL DX SPEC: NORMAL
PATH REPORT.GROSS SPEC: NORMAL
PATH REPORT.RELEVANT HX SPEC: NORMAL
PATH REPORT.TOTAL CANCER: NORMAL

## 2023-10-24 DIAGNOSIS — M47.814 SPONDYLOSIS, THORACIC: ICD-10-CM

## 2023-10-24 RX ORDER — GABAPENTIN 300 MG/1
300 CAPSULE ORAL 3 TIMES DAILY
Qty: 90 CAPSULE | Refills: 0 | Status: SHIPPED | OUTPATIENT
Start: 2023-10-24 | End: 2024-04-09 | Stop reason: SDUPTHER

## 2023-10-27 ENCOUNTER — OFFICE VISIT (OUTPATIENT)
Dept: UROLOGY | Facility: CLINIC | Age: 83
End: 2023-10-27
Payer: MEDICARE

## 2023-10-27 DIAGNOSIS — C67.9 MALIGNANT NEOPLASM OF URINARY BLADDER, UNSPECIFIED SITE (MULTI): ICD-10-CM

## 2023-10-27 DIAGNOSIS — R35.1 NOCTURIA: Primary | ICD-10-CM

## 2023-10-27 PROCEDURE — 51798 US URINE CAPACITY MEASURE: CPT | Performed by: STUDENT IN AN ORGANIZED HEALTH CARE EDUCATION/TRAINING PROGRAM

## 2023-10-27 PROCEDURE — 99214 OFFICE O/P EST MOD 30 MIN: CPT | Performed by: STUDENT IN AN ORGANIZED HEALTH CARE EDUCATION/TRAINING PROGRAM

## 2023-10-27 PROCEDURE — 1159F MED LIST DOCD IN RCRD: CPT | Performed by: STUDENT IN AN ORGANIZED HEALTH CARE EDUCATION/TRAINING PROGRAM

## 2023-10-27 PROCEDURE — 1126F AMNT PAIN NOTED NONE PRSNT: CPT | Performed by: STUDENT IN AN ORGANIZED HEALTH CARE EDUCATION/TRAINING PROGRAM

## 2023-10-27 PROCEDURE — 1111F DSCHRG MED/CURRENT MED MERGE: CPT | Performed by: STUDENT IN AN ORGANIZED HEALTH CARE EDUCATION/TRAINING PROGRAM

## 2023-10-27 PROCEDURE — 1160F RVW MEDS BY RX/DR IN RCRD: CPT | Performed by: STUDENT IN AN ORGANIZED HEALTH CARE EDUCATION/TRAINING PROGRAM

## 2023-10-27 RX ORDER — MIRABEGRON 25 MG/1
25 TABLET, FILM COATED, EXTENDED RELEASE ORAL DAILY
Qty: 30 TABLET | Refills: 0 | COMMUNITY
Start: 2023-10-27 | End: 2023-11-26

## 2023-10-27 NOTE — PROGRESS NOTES
Subjective   Patient ID: Shiva Hardy is a 83 y.o. male.    HPI  83 y.o. male s/p reTURBT 10/6/23. No major postoperative issues.     He is constantly urinating. He has improved urinary flow. He reports he felt better after second surgery. He continues Pyridium. Has nocturia 7 times a night.       FINAL DIAGNOSIS  A. URINARY BLADDER, TUMOR, RESECTION:   -- Noninvasive papillary urothelial carcinoma, high grade.  -- Detrusor muscle (muscularis propria) is present and is negative for malignancy.  -- Prior resection site changes present.        Review of Systems   All other systems reviewed and are negative.      Objective   Physical Exam  Vitals reviewed.         Assessment/Plan   83 y.o. male s/p reTURBT 10/6/23. No major postoperative issues. Nocturia 7 times a night.     I personally reviewed his surgical pathology, this showed noninvasive papillary urothelial carcinoma, high grade.    I reviewed with the patient his pathology and the stage and grade of his cancer. Considering that he has high-grade non-muscle invasive bladder cancer, the gold standard treatment is intravesical BCG. This is an attenuated form of the Mycobacterium tuberculosis strain, which when given inside the bladder, results in a local inflammatory reaction with the flow of cytokines and inflammatory cells, resulting in a response protective against the recurrence of bladder cancer. Without BCG, the risk of recurrence of HG NMIBC is 70-80%, whereas with BCG the remission is 50-70%.     BCG treatment can result in worse urinary symptoms, hematuria, fever/chills, and generalized fatigue,  and these symptoms can range from mild self-resolving in 1-2 days, to severe and intolerable. Typically, symptoms get worse after treatments. The extreme would be systemic tuberculosis infection which is extremely rare.     Schedule will consist of 6 weekly instillations for the induction course, and if remission is documented then we move to 3-weekly  instillations at 3-6-12-18-24-30-36 months.     The treatment is administered through a urethral indwelling catheter, kept in the bladder for 2 hours, after which the patient will be asked to urinate to clear the BCG.     He expressed understanding and agrees to proceed with BCG.     PVR today 3ml. Will start Myrbetriq low dose.      Plan:  Start low dose Myrbetriq 25mg daily.   Will plan for intravesical BCG induction course (x6).     Diagnoses and all orders for this visit:  Nocturia  -     mirabegron (Myrbetriq) 25 mg tablet extended release 24 hr 24 hr tablet; Take 1 tablet (25 mg) by mouth once daily. LOT: [id number]  EXP: 02/2024  Malignant neoplasm of urinary bladder, unspecified site (CMS/HCC)

## 2023-11-08 DIAGNOSIS — I10 ESSENTIAL HYPERTENSION: ICD-10-CM

## 2023-11-08 RX ORDER — AMLODIPINE BESYLATE 5 MG/1
10 TABLET ORAL DAILY
Qty: 180 TABLET | Refills: 1 | Status: ON HOLD | OUTPATIENT
Start: 2023-11-08 | End: 2023-11-13 | Stop reason: SDUPTHER

## 2023-11-09 ENCOUNTER — APPOINTMENT (OUTPATIENT)
Dept: RADIOLOGY | Facility: HOSPITAL | Age: 83
DRG: 909 | End: 2023-11-09
Payer: MEDICARE

## 2023-11-09 ENCOUNTER — HOSPITAL ENCOUNTER (INPATIENT)
Facility: HOSPITAL | Age: 83
LOS: 3 days | Discharge: HOME | DRG: 909 | End: 2023-11-13
Attending: STUDENT IN AN ORGANIZED HEALTH CARE EDUCATION/TRAINING PROGRAM | Admitting: FAMILY MEDICINE
Payer: MEDICARE

## 2023-11-09 DIAGNOSIS — M47.814 SPONDYLOSIS, THORACIC: ICD-10-CM

## 2023-11-09 DIAGNOSIS — N32.89 BLADDER HEMORRHAGE: ICD-10-CM

## 2023-11-09 DIAGNOSIS — I10 ESSENTIAL HYPERTENSION: ICD-10-CM

## 2023-11-09 DIAGNOSIS — R33.9 URINARY RETENTION: Primary | ICD-10-CM

## 2023-11-09 LAB
ALBUMIN SERPL BCP-MCNC: 3.9 G/DL (ref 3.4–5)
ALP SERPL-CCNC: 62 U/L (ref 33–136)
ALT SERPL W P-5'-P-CCNC: 20 U/L (ref 10–52)
ANION GAP SERPL CALC-SCNC: 13 MMOL/L (ref 10–20)
APPEARANCE UR: ABNORMAL
APTT PPP: 28 SECONDS (ref 27–38)
AST SERPL W P-5'-P-CCNC: 21 U/L (ref 9–39)
BASOPHILS # BLD AUTO: 0.02 X10*3/UL (ref 0–0.1)
BASOPHILS NFR BLD AUTO: 0.1 %
BILIRUB SERPL-MCNC: 0.4 MG/DL (ref 0–1.2)
BILIRUB UR STRIP.AUTO-MCNC: NEGATIVE MG/DL
BUN SERPL-MCNC: 22 MG/DL (ref 6–23)
CALCIUM SERPL-MCNC: 9.1 MG/DL (ref 8.6–10.3)
CHLORIDE SERPL-SCNC: 101 MMOL/L (ref 98–107)
CO2 SERPL-SCNC: 24 MMOL/L (ref 21–32)
COLOR UR: ABNORMAL
CREAT SERPL-MCNC: 0.78 MG/DL (ref 0.5–1.3)
EOSINOPHIL # BLD AUTO: 0 X10*3/UL (ref 0–0.4)
EOSINOPHIL NFR BLD AUTO: 0 %
ERYTHROCYTE [DISTWIDTH] IN BLOOD BY AUTOMATED COUNT: 12.8 % (ref 11.5–14.5)
ERYTHROCYTE [DISTWIDTH] IN BLOOD BY AUTOMATED COUNT: 13.2 % (ref 11.5–14.5)
GFR SERPL CREATININE-BSD FRML MDRD: 87 ML/MIN/1.73M*2
GLUCOSE SERPL-MCNC: 129 MG/DL (ref 74–99)
GLUCOSE UR STRIP.AUTO-MCNC: ABNORMAL MG/DL
HCT VFR BLD AUTO: 33.5 % (ref 41–52)
HCT VFR BLD AUTO: 35.8 % (ref 41–52)
HGB BLD-MCNC: 10.8 G/DL (ref 13.5–17.5)
HGB BLD-MCNC: 11.6 G/DL (ref 13.5–17.5)
HOLD SPECIMEN: NORMAL
IMM GRANULOCYTES # BLD AUTO: 0.06 X10*3/UL (ref 0–0.5)
IMM GRANULOCYTES NFR BLD AUTO: 0.4 % (ref 0–0.9)
INR PPP: 1 (ref 0.9–1.1)
KETONES UR STRIP.AUTO-MCNC: ABNORMAL MG/DL
LEUKOCYTE ESTERASE UR QL STRIP.AUTO: NEGATIVE
LYMPHOCYTES # BLD AUTO: 1.34 X10*3/UL (ref 0.8–3)
LYMPHOCYTES NFR BLD AUTO: 8.1 %
MCH RBC QN AUTO: 31 PG (ref 26–34)
MCH RBC QN AUTO: 31.7 PG (ref 26–34)
MCHC RBC AUTO-ENTMCNC: 32.2 G/DL (ref 32–36)
MCHC RBC AUTO-ENTMCNC: 32.4 G/DL (ref 32–36)
MCV RBC AUTO: 96 FL (ref 80–100)
MCV RBC AUTO: 98 FL (ref 80–100)
MONOCYTES # BLD AUTO: 0.88 X10*3/UL (ref 0.05–0.8)
MONOCYTES NFR BLD AUTO: 5.3 %
NEUTROPHILS # BLD AUTO: 14.16 X10*3/UL (ref 1.6–5.5)
NEUTROPHILS NFR BLD AUTO: 86.1 %
NITRITE UR QL STRIP.AUTO: NEGATIVE
NRBC BLD-RTO: 0 /100 WBCS (ref 0–0)
NRBC BLD-RTO: 0 /100 WBCS (ref 0–0)
PH UR STRIP.AUTO: 6 [PH]
PLATELET # BLD AUTO: 290 X10*3/UL (ref 150–450)
PLATELET # BLD AUTO: 294 X10*3/UL (ref 150–450)
POTASSIUM SERPL-SCNC: 4.2 MMOL/L (ref 3.5–5.3)
PROT SERPL-MCNC: 6.7 G/DL (ref 6.4–8.2)
PROT UR STRIP.AUTO-MCNC: ABNORMAL MG/DL
PROTHROMBIN TIME: 11.4 SECONDS (ref 9.8–12.8)
RBC # BLD AUTO: 3.41 X10*6/UL (ref 4.5–5.9)
RBC # BLD AUTO: 3.74 X10*6/UL (ref 4.5–5.9)
RBC # UR STRIP.AUTO: ABNORMAL /UL
RBC #/AREA URNS AUTO: >20 /HPF
SODIUM SERPL-SCNC: 134 MMOL/L (ref 136–145)
SP GR UR STRIP.AUTO: 1.02
UROBILINOGEN UR STRIP.AUTO-MCNC: <2 MG/DL
WBC # BLD AUTO: 12 X10*3/UL (ref 4.4–11.3)
WBC # BLD AUTO: 16.5 X10*3/UL (ref 4.4–11.3)
WBC #/AREA URNS AUTO: >50 /HPF
WBC CLUMPS #/AREA URNS AUTO: ABNORMAL /HPF

## 2023-11-09 PROCEDURE — 74176 CT ABD & PELVIS W/O CONTRAST: CPT | Performed by: RADIOLOGY

## 2023-11-09 PROCEDURE — 36415 COLL VENOUS BLD VENIPUNCTURE: CPT | Performed by: STUDENT IN AN ORGANIZED HEALTH CARE EDUCATION/TRAINING PROGRAM

## 2023-11-09 PROCEDURE — 2500000005 HC RX 250 GENERAL PHARMACY W/O HCPCS: Performed by: PHYSICIAN ASSISTANT

## 2023-11-09 PROCEDURE — 2500000004 HC RX 250 GENERAL PHARMACY W/ HCPCS (ALT 636 FOR OP/ED)

## 2023-11-09 PROCEDURE — 2500000004 HC RX 250 GENERAL PHARMACY W/ HCPCS (ALT 636 FOR OP/ED): Performed by: STUDENT IN AN ORGANIZED HEALTH CARE EDUCATION/TRAINING PROGRAM

## 2023-11-09 PROCEDURE — 99223 1ST HOSP IP/OBS HIGH 75: CPT | Performed by: PHYSICIAN ASSISTANT

## 2023-11-09 PROCEDURE — 99285 EMERGENCY DEPT VISIT HI MDM: CPT | Mod: 25 | Performed by: STUDENT IN AN ORGANIZED HEALTH CARE EDUCATION/TRAINING PROGRAM

## 2023-11-09 PROCEDURE — 81001 URINALYSIS AUTO W/SCOPE: CPT | Performed by: STUDENT IN AN ORGANIZED HEALTH CARE EDUCATION/TRAINING PROGRAM

## 2023-11-09 PROCEDURE — 96374 THER/PROPH/DIAG INJ IV PUSH: CPT

## 2023-11-09 PROCEDURE — 2500000001 HC RX 250 WO HCPCS SELF ADMINISTERED DRUGS (ALT 637 FOR MEDICARE OP)

## 2023-11-09 PROCEDURE — 2500000002 HC RX 250 W HCPCS SELF ADMINISTERED DRUGS (ALT 637 FOR MEDICARE OP, ALT 636 FOR OP/ED): Performed by: PHYSICIAN ASSISTANT

## 2023-11-09 PROCEDURE — 85027 COMPLETE CBC AUTOMATED: CPT | Performed by: STUDENT IN AN ORGANIZED HEALTH CARE EDUCATION/TRAINING PROGRAM

## 2023-11-09 PROCEDURE — 2500000004 HC RX 250 GENERAL PHARMACY W/ HCPCS (ALT 636 FOR OP/ED): Performed by: PHYSICIAN ASSISTANT

## 2023-11-09 PROCEDURE — 99222 1ST HOSP IP/OBS MODERATE 55: CPT | Performed by: NURSE PRACTITIONER

## 2023-11-09 PROCEDURE — 87086 URINE CULTURE/COLONY COUNT: CPT | Mod: GEALAB | Performed by: STUDENT IN AN ORGANIZED HEALTH CARE EDUCATION/TRAINING PROGRAM

## 2023-11-09 PROCEDURE — 74176 CT ABD & PELVIS W/O CONTRAST: CPT

## 2023-11-09 PROCEDURE — G0378 HOSPITAL OBSERVATION PER HR: HCPCS

## 2023-11-09 PROCEDURE — 2500000001 HC RX 250 WO HCPCS SELF ADMINISTERED DRUGS (ALT 637 FOR MEDICARE OP): Performed by: STUDENT IN AN ORGANIZED HEALTH CARE EDUCATION/TRAINING PROGRAM

## 2023-11-09 PROCEDURE — 2500000001 HC RX 250 WO HCPCS SELF ADMINISTERED DRUGS (ALT 637 FOR MEDICARE OP): Performed by: PHYSICIAN ASSISTANT

## 2023-11-09 PROCEDURE — 94640 AIRWAY INHALATION TREATMENT: CPT

## 2023-11-09 PROCEDURE — 85730 THROMBOPLASTIN TIME PARTIAL: CPT | Performed by: STUDENT IN AN ORGANIZED HEALTH CARE EDUCATION/TRAINING PROGRAM

## 2023-11-09 PROCEDURE — 71046 X-RAY EXAM CHEST 2 VIEWS: CPT | Performed by: RADIOLOGY

## 2023-11-09 PROCEDURE — 80053 COMPREHEN METABOLIC PANEL: CPT | Performed by: STUDENT IN AN ORGANIZED HEALTH CARE EDUCATION/TRAINING PROGRAM

## 2023-11-09 PROCEDURE — 85025 COMPLETE CBC W/AUTO DIFF WBC: CPT | Performed by: STUDENT IN AN ORGANIZED HEALTH CARE EDUCATION/TRAINING PROGRAM

## 2023-11-09 PROCEDURE — 85610 PROTHROMBIN TIME: CPT | Performed by: STUDENT IN AN ORGANIZED HEALTH CARE EDUCATION/TRAINING PROGRAM

## 2023-11-09 PROCEDURE — 71046 X-RAY EXAM CHEST 2 VIEWS: CPT

## 2023-11-09 RX ORDER — ONDANSETRON HYDROCHLORIDE 2 MG/ML
4 INJECTION, SOLUTION INTRAVENOUS EVERY 8 HOURS PRN
Status: DISCONTINUED | OUTPATIENT
Start: 2023-11-09 | End: 2023-11-13 | Stop reason: HOSPADM

## 2023-11-09 RX ORDER — POLYETHYLENE GLYCOL 3350 17 G/17G
17 POWDER, FOR SOLUTION ORAL DAILY
Status: DISCONTINUED | OUTPATIENT
Start: 2023-11-09 | End: 2023-11-13 | Stop reason: HOSPADM

## 2023-11-09 RX ORDER — PANTOPRAZOLE SODIUM 40 MG/1
40 TABLET, DELAYED RELEASE ORAL
Status: DISCONTINUED | OUTPATIENT
Start: 2023-11-10 | End: 2023-11-13 | Stop reason: HOSPADM

## 2023-11-09 RX ORDER — DOCUSATE SODIUM 100 MG/1
100 CAPSULE, LIQUID FILLED ORAL 2 TIMES DAILY
Status: DISCONTINUED | OUTPATIENT
Start: 2023-11-09 | End: 2023-11-13 | Stop reason: HOSPADM

## 2023-11-09 RX ORDER — PHENAZOPYRIDINE HYDROCHLORIDE 100 MG/1
95 TABLET, FILM COATED ORAL 3 TIMES DAILY PRN
Status: DISCONTINUED | OUTPATIENT
Start: 2023-11-09 | End: 2023-11-13 | Stop reason: HOSPADM

## 2023-11-09 RX ORDER — ATORVASTATIN CALCIUM 20 MG/1
TABLET, FILM COATED ORAL
Status: COMPLETED
Start: 2023-11-09 | End: 2023-11-09

## 2023-11-09 RX ORDER — TALC
3 POWDER (GRAM) TOPICAL DAILY
Status: DISCONTINUED | OUTPATIENT
Start: 2023-11-09 | End: 2023-11-13 | Stop reason: HOSPADM

## 2023-11-09 RX ORDER — ONDANSETRON HYDROCHLORIDE 2 MG/ML
4 INJECTION, SOLUTION INTRAVENOUS ONCE
Status: COMPLETED | OUTPATIENT
Start: 2023-11-09 | End: 2023-11-09

## 2023-11-09 RX ORDER — ONDANSETRON 4 MG/1
4 TABLET, FILM COATED ORAL EVERY 8 HOURS PRN
Status: DISCONTINUED | OUTPATIENT
Start: 2023-11-09 | End: 2023-11-13 | Stop reason: HOSPADM

## 2023-11-09 RX ORDER — MONTELUKAST SODIUM 10 MG/1
TABLET ORAL
Status: COMPLETED
Start: 2023-11-09 | End: 2023-11-09

## 2023-11-09 RX ORDER — IBUPROFEN 200 MG
1 TABLET ORAL DAILY
Status: DISCONTINUED | OUTPATIENT
Start: 2023-11-09 | End: 2023-11-13 | Stop reason: HOSPADM

## 2023-11-09 RX ORDER — GUAIFENESIN 600 MG/1
1200 TABLET, EXTENDED RELEASE ORAL 2 TIMES DAILY
Status: DISCONTINUED | OUTPATIENT
Start: 2023-11-09 | End: 2023-11-13 | Stop reason: HOSPADM

## 2023-11-09 RX ORDER — ACETAMINOPHEN 650 MG/1
650 SUPPOSITORY RECTAL EVERY 4 HOURS PRN
Status: DISCONTINUED | OUTPATIENT
Start: 2023-11-09 | End: 2023-11-13 | Stop reason: HOSPADM

## 2023-11-09 RX ORDER — ACETAMINOPHEN 160 MG/5ML
650 SOLUTION ORAL EVERY 4 HOURS PRN
Status: DISCONTINUED | OUTPATIENT
Start: 2023-11-09 | End: 2023-11-13 | Stop reason: HOSPADM

## 2023-11-09 RX ORDER — AMLODIPINE BESYLATE 5 MG/1
5 TABLET ORAL EVERY MORNING
Status: DISCONTINUED | OUTPATIENT
Start: 2023-11-10 | End: 2023-11-13 | Stop reason: HOSPADM

## 2023-11-09 RX ORDER — GUAIFENESIN 600 MG/1
TABLET, EXTENDED RELEASE ORAL
Status: COMPLETED
Start: 2023-11-09 | End: 2023-11-09

## 2023-11-09 RX ORDER — PHENAZOPYRIDINE HYDROCHLORIDE 100 MG/1
100 TABLET, FILM COATED ORAL ONCE
Status: COMPLETED | OUTPATIENT
Start: 2023-11-09 | End: 2023-11-09

## 2023-11-09 RX ORDER — GABAPENTIN 300 MG/1
300 CAPSULE ORAL 3 TIMES DAILY
Status: DISCONTINUED | OUTPATIENT
Start: 2023-11-09 | End: 2023-11-13 | Stop reason: HOSPADM

## 2023-11-09 RX ORDER — MONTELUKAST SODIUM 10 MG/1
10 TABLET ORAL NIGHTLY
Status: DISCONTINUED | OUTPATIENT
Start: 2023-11-09 | End: 2023-11-13 | Stop reason: HOSPADM

## 2023-11-09 RX ORDER — PANTOPRAZOLE SODIUM 40 MG/10ML
40 INJECTION, POWDER, LYOPHILIZED, FOR SOLUTION INTRAVENOUS
Status: DISCONTINUED | OUTPATIENT
Start: 2023-11-10 | End: 2023-11-13 | Stop reason: HOSPADM

## 2023-11-09 RX ORDER — ATORVASTATIN CALCIUM 20 MG/1
20 TABLET, FILM COATED ORAL NIGHTLY
Status: DISCONTINUED | OUTPATIENT
Start: 2023-11-09 | End: 2023-11-13 | Stop reason: HOSPADM

## 2023-11-09 RX ORDER — PREGABALIN 50 MG/1
100 CAPSULE ORAL 3 TIMES DAILY
Status: DISCONTINUED | OUTPATIENT
Start: 2023-11-09 | End: 2023-11-09

## 2023-11-09 RX ORDER — ACETAMINOPHEN 325 MG/1
650 TABLET ORAL EVERY 4 HOURS PRN
Status: DISCONTINUED | OUTPATIENT
Start: 2023-11-09 | End: 2023-11-13 | Stop reason: HOSPADM

## 2023-11-09 RX ORDER — LIDOCAINE 560 MG/1
1 PATCH PERCUTANEOUS; TOPICAL; TRANSDERMAL DAILY PRN
Status: DISCONTINUED | OUTPATIENT
Start: 2023-11-09 | End: 2023-11-13 | Stop reason: HOSPADM

## 2023-11-09 RX ORDER — FLUTICASONE FUROATE AND VILANTEROL 200; 25 UG/1; UG/1
1 POWDER RESPIRATORY (INHALATION)
Status: DISCONTINUED | OUTPATIENT
Start: 2023-11-09 | End: 2023-11-13 | Stop reason: HOSPADM

## 2023-11-09 RX ORDER — SODIUM CHLORIDE 9 MG/ML
100 INJECTION, SOLUTION INTRAVENOUS CONTINUOUS
Status: DISCONTINUED | OUTPATIENT
Start: 2023-11-10 | End: 2023-11-13 | Stop reason: HOSPADM

## 2023-11-09 RX ORDER — ACETAMINOPHEN 500 MG
500 TABLET ORAL EVERY 6 HOURS PRN
COMMUNITY

## 2023-11-09 RX ORDER — ASPIRIN 81 MG/1
81 TABLET ORAL EVERY MORNING
Status: DISCONTINUED | OUTPATIENT
Start: 2023-11-10 | End: 2023-11-13 | Stop reason: HOSPADM

## 2023-11-09 RX ORDER — LORATADINE 10 MG/1
10 TABLET ORAL DAILY
Status: DISCONTINUED | OUTPATIENT
Start: 2023-11-10 | End: 2023-11-09

## 2023-11-09 RX ADMIN — Medication 3 MG: at 19:47

## 2023-11-09 RX ADMIN — GABAPENTIN 300 MG: 300 CAPSULE ORAL at 15:51

## 2023-11-09 RX ADMIN — MONTELUKAST 10 MG: 10 TABLET, FILM COATED ORAL at 19:50

## 2023-11-09 RX ADMIN — ATORVASTATIN CALCIUM 20 MG: 20 TABLET, FILM COATED ORAL at 19:49

## 2023-11-09 RX ADMIN — ACETAMINOPHEN 650 MG: 325 TABLET ORAL at 17:45

## 2023-11-09 RX ADMIN — PHENAZOPYRIDINE 100 MG: 100 TABLET ORAL at 07:55

## 2023-11-09 RX ADMIN — FLUTICASONE FUROATE AND VILANTEROL TRIFENATATE 1 PUFF: 200; 25 POWDER RESPIRATORY (INHALATION) at 19:47

## 2023-11-09 RX ADMIN — POLYETHYLENE GLYCOL 3350 17 G: 17 POWDER, FOR SOLUTION ORAL at 15:51

## 2023-11-09 RX ADMIN — LIDOCAINE 1 PATCH: 4 PATCH TOPICAL at 17:44

## 2023-11-09 RX ADMIN — GUAIFENESIN 1200 MG: 600 TABLET ORAL at 19:50

## 2023-11-09 RX ADMIN — ONDANSETRON 4 MG: 2 INJECTION INTRAMUSCULAR; INTRAVENOUS at 07:54

## 2023-11-09 RX ADMIN — GABAPENTIN 300 MG: 300 CAPSULE ORAL at 19:47

## 2023-11-09 SDOH — SOCIAL STABILITY: SOCIAL INSECURITY: ARE YOU OR HAVE YOU BEEN THREATENED OR ABUSED PHYSICALLY, EMOTIONALLY, OR SEXUALLY BY ANYONE?: NO

## 2023-11-09 SDOH — SOCIAL STABILITY: SOCIAL INSECURITY: WERE YOU ABLE TO COMPLETE ALL THE BEHAVIORAL HEALTH SCREENINGS?: YES

## 2023-11-09 SDOH — SOCIAL STABILITY: SOCIAL INSECURITY: DOES ANYONE TRY TO KEEP YOU FROM HAVING/CONTACTING OTHER FRIENDS OR DOING THINGS OUTSIDE YOUR HOME?: NO

## 2023-11-09 SDOH — SOCIAL STABILITY: SOCIAL INSECURITY: DO YOU FEEL UNSAFE GOING BACK TO THE PLACE WHERE YOU ARE LIVING?: NO

## 2023-11-09 SDOH — SOCIAL STABILITY: SOCIAL INSECURITY: DO YOU FEEL ANYONE HAS EXPLOITED OR TAKEN ADVANTAGE OF YOU FINANCIALLY OR OF YOUR PERSONAL PROPERTY?: NO

## 2023-11-09 SDOH — SOCIAL STABILITY: SOCIAL INSECURITY: ABUSE: ADULT

## 2023-11-09 SDOH — SOCIAL STABILITY: SOCIAL INSECURITY: HAS ANYONE EVER THREATENED TO HURT YOUR FAMILY OR YOUR PETS?: NO

## 2023-11-09 SDOH — SOCIAL STABILITY: SOCIAL INSECURITY: ARE THERE ANY APPARENT SIGNS OF INJURIES/BEHAVIORS THAT COULD BE RELATED TO ABUSE/NEGLECT?: NO

## 2023-11-09 SDOH — SOCIAL STABILITY: SOCIAL INSECURITY: HAVE YOU HAD THOUGHTS OF HARMING ANYONE ELSE?: NO

## 2023-11-09 ASSESSMENT — COGNITIVE AND FUNCTIONAL STATUS - GENERAL
PATIENT BASELINE BEDBOUND: NO
MOBILITY SCORE: 24
TOILETING: A LITTLE
WALKING IN HOSPITAL ROOM: A LITTLE
CLIMB 3 TO 5 STEPS WITH RAILING: A LITTLE
DAILY ACTIVITIY SCORE: 24
DAILY ACTIVITIY SCORE: 23
MOBILITY SCORE: 22

## 2023-11-09 ASSESSMENT — ENCOUNTER SYMPTOMS
SLEEP DISTURBANCE: 0
UNEXPECTED WEIGHT CHANGE: 0
COUGH: 1
NUMBNESS: 0
FREQUENCY: 0
TROUBLE SWALLOWING: 0
SEIZURES: 0
ABDOMINAL PAIN: 1
HEADACHES: 0
HALLUCINATIONS: 0
PHOTOPHOBIA: 0
BACK PAIN: 1
WEAKNESS: 0
DIZZINESS: 0
DIFFICULTY URINATING: 1
DIARRHEA: 0
CONSTIPATION: 0
SHORTNESS OF BREATH: 1
WHEEZING: 0
MYALGIAS: 0
NAUSEA: 0
DECREASED CONCENTRATION: 0
FACIAL ASYMMETRY: 0
HEMATURIA: 1
EYE REDNESS: 0
CONFUSION: 0
PALPITATIONS: 0
RHINORRHEA: 1
WOUND: 0
DIAPHORESIS: 0
DYSURIA: 0
VOMITING: 0
CHEST TIGHTNESS: 0
SORE THROAT: 0
FLANK PAIN: 0
FEVER: 0
LIGHT-HEADEDNESS: 0

## 2023-11-09 ASSESSMENT — PATIENT HEALTH QUESTIONNAIRE - PHQ9
1. LITTLE INTEREST OR PLEASURE IN DOING THINGS: NOT AT ALL
SUM OF ALL RESPONSES TO PHQ9 QUESTIONS 1 & 2: 0
2. FEELING DOWN, DEPRESSED OR HOPELESS: NOT AT ALL

## 2023-11-09 ASSESSMENT — LIFESTYLE VARIABLES
HAVE YOU EVER FELT YOU SHOULD CUT DOWN ON YOUR DRINKING: NO
HOW MANY STANDARD DRINKS CONTAINING ALCOHOL DO YOU HAVE ON A TYPICAL DAY: PATIENT DOES NOT DRINK
HAVE PEOPLE ANNOYED YOU BY CRITICIZING YOUR DRINKING: NO
REASON UNABLE TO ASSESS: NO
HOW OFTEN DO YOU HAVE 6 OR MORE DRINKS ON ONE OCCASION: NEVER
AUDIT-C TOTAL SCORE: 0
SKIP TO QUESTIONS 9-10: 1
EVER FELT BAD OR GUILTY ABOUT YOUR DRINKING: NO
AUDIT-C TOTAL SCORE: 0
EVER HAD A DRINK FIRST THING IN THE MORNING TO STEADY YOUR NERVES TO GET RID OF A HANGOVER: NO
HOW OFTEN DO YOU HAVE A DRINK CONTAINING ALCOHOL: NEVER

## 2023-11-09 ASSESSMENT — ACTIVITIES OF DAILY LIVING (ADL)
FEEDING YOURSELF: INDEPENDENT
PATIENT'S MEMORY ADEQUATE TO SAFELY COMPLETE DAILY ACTIVITIES?: YES
ADEQUATE_TO_COMPLETE_ADL: YES
WALKS IN HOME: INDEPENDENT
TOILETING: INDEPENDENT
DRESSING YOURSELF: INDEPENDENT
JUDGMENT_ADEQUATE_SAFELY_COMPLETE_DAILY_ACTIVITIES: YES
BATHING: INDEPENDENT
GROOMING: INDEPENDENT
HEARING - LEFT EAR: FUNCTIONAL
HEARING - RIGHT EAR: FUNCTIONAL

## 2023-11-09 ASSESSMENT — PAIN SCALES - GENERAL
PAINLEVEL_OUTOF10: 0 - NO PAIN
PAINLEVEL_OUTOF10: 0 - NO PAIN

## 2023-11-09 ASSESSMENT — PAIN - FUNCTIONAL ASSESSMENT
PAIN_FUNCTIONAL_ASSESSMENT: 0-10
PAIN_FUNCTIONAL_ASSESSMENT: 0-10

## 2023-11-09 NOTE — ED PROVIDER NOTES
History/Exam limitations: none  HPI was provided by patient    HPI:    Chief Complaint   Patient presents with    Urinary Retention        Shiva Hardy is a 83 y.o. male presents with chief complaint of frequency of been unable to urinate.  He was out in the yard working when he came inside and had noticed that he had some clots and blood in his urine and peeing more often than over the night he could not pee at all.  Denies any abdominal distention or pain presently.  States he has significant history of a month ago had a bladder mass removed.  He is not on any blood thinning medications.         ROS:All other review of systems are negative except as noted above and HPI or ROS.   CONSTITUTIONAL: fever, chills  ENT: sore throat, congestion, rhinorrhea  CARDIOVASCULAR: chest pain, palpitations, swelling  RESPIRATORY: cough, wheeze, shortness of breath  GI: nausea, vomiting, diarrhea, abdominal pain  GENITOURINARY: dysuria, hematuria, frequency  MUSCULOSKELETAL: deformity, neck pain  SKIN: rash, lesion  NEUROLOGIC: headache, numbness, focal weakness  NOTES: All systems reviewed, negative except as described above       Physical Exam:  GENERAL: Alert, oriented , cooperative,  in no acute distress.    HEAD: normocephalic, atraumatic    SKIN:  dry skin, no lesions.    EYES: PERRL, EOMs intact,  Conjunctiva pink with no erythema or exudates. No scleral icterus.     ENT: No external deformities. Nares patent, mucus membranes moist.  Pharynx clear, uvula midline.     NECK: Supple, without meningismus. Trachea at midline. No lymphadenopathy.    PULMONARY: Clear bilaterally. No crackles, rhonchi, wheezing.  No respiratory distress.  No work of breathing.    CARDIAC: Regular rate and regular rhythm.  Pulses 2+ in radials and dorsal pedal pulses bilaterally.  No murmur, rub, gallop.  No edema.    ABDOMEN: Soft, nontender, active bowel sounds.  No palpable organomegaly.  No rebound or guarding.  No CVA tenderness.  No  pulsatile masses.    :Talbert catheter in place.  No drainage appreciated around penis or erythema.         MDM/ED COURSE:      The patient presented for evaluation of urinary retention.  Bladder scan performed and had over 400 cc of fluid.  Plan will be for nursing staff to place a three-way Talbert and to run fluid to flush out any clots.  We will also get a UA.  Pending reevaluation patient was signed out to oncoming physician at shift change.         Note: This note was dictated by speech recognition. Minor errors in transcription may be present.     Past Medical History:   Diagnosis Date    Acute maxillary sinusitis, unspecified 12/19/2019    Acute maxillary sinusitis    Acute recurrent maxillary sinusitis 12/28/2016    Acute recurrent maxillary sinusitis    Acute upper respiratory infection, unspecified 03/13/2017    Acute URI    Acute upper respiratory infection, unspecified 12/30/2020    Viral URI with cough    Arthritis     Bilateral inguinal hernia, without obstruction or gangrene, not specified as recurrent 03/04/2014    Bilateral inguinal hernia    BPH (benign prostatic hyperplasia)     Chest pain, unspecified 12/16/2016    Intermittent chest pain    Chronic obstructive pulmonary disease with (acute) exacerbation (CMS/[facility]) 12/19/2019    Acute exacerbation of chronic obstructive pulmonary disease (COPD)    Chronic pain disorder     back pain- chronic    Edema, unspecified 05/27/2021    Parotid swelling    Hyperlipidemia     Hypertension     Impacted cerumen, right ear 01/06/2017    Excessive cerumen in right ear canal    Low back pain, unspecified 07/25/2019    Acute exacerbation of chronic low back pain    Personal history of malignant neoplasm of prostate     History of malignant neoplasm of prostate    Personal history of malignant neoplasm, unspecified     Personal history of malignant neoplasm    Personal history of other diseases of the digestive system 05/27/2021    History of parotitis    Personal  history of other diseases of the respiratory system 02/22/2017    History of pulmonary emphysema    Personal history of other endocrine, nutritional and metabolic disease 04/20/2017    History of hyperkalemia    Prostate cancer (CMS/HCC)     Sleep apnea     uses CPAP    Unspecified malignant neoplasm of skin of unspecified part of face 12/16/2016    Skin cancer of face    Unspecified symptoms and signs involving the genitourinary system 12/23/2021    UTI symptoms      Social History     Socioeconomic History    Marital status: Single     Spouse name: Not on file    Number of children: Not on file    Years of education: Not on file    Highest education level: Not on file   Occupational History    Not on file   Tobacco Use    Smoking status: Every Day     Packs/day: 0.50     Years: 40.00     Additional pack years: 0.00     Total pack years: 20.00     Types: Cigarettes    Smokeless tobacco: Never   Vaping Use    Vaping Use: Never used   Substance and Sexual Activity    Alcohol use: Not Currently    Drug use: Never    Sexual activity: Not on file   Other Topics Concern    Not on file   Social History Narrative    Not on file     Social Determinants of Health     Financial Resource Strain: Low Risk  (10/6/2023)    Overall Financial Resource Strain (CARDIA)     Difficulty of Paying Living Expenses: Not hard at all   Food Insecurity: Not on file   Transportation Needs: No Transportation Needs (10/6/2023)    PRAPARE - Transportation     Lack of Transportation (Medical): No     Lack of Transportation (Non-Medical): No   Physical Activity: Not on file   Stress: Not on file   Social Connections: Not on file   Intimate Partner Violence: Not on file   Housing Stability: Low Risk  (10/6/2023)    Housing Stability Vital Sign     Unable to Pay for Housing in the Last Year: No     Number of Places Lived in the Last Year: 1     Unstable Housing in the Last Year: No     Current Outpatient Medications   Medication Instructions     amLODIPine (NORVASC) 10 mg, oral, Daily, Do not take until you follow up with cardiology, Dr. Cloud.    aspirin 81 mg, oral, Daily    atorvastatin (LIPITOR) 20 mg, oral, Nightly    budesonide-formoteroL (Symbicort) 160-4.5 mcg/actuation inhaler inhalation, 2 times daily, RINSE MOUTH AFTER USE.    docusate sodium (COLACE) 100 mg, oral, 2 times daily    ergocalciferol (VITAMIN D-2) 1,250 mcg, oral, Weekly    fluticasone (Flonase) 50 mcg/actuation nasal spray 2 sprays, Each Nostril, Daily    gabapentin (NEURONTIN) 300 mg, oral, 3 times daily    HYDROcodone-acetaminophen (Norco) 5-325 mg tablet 1 tablet, oral, Every 6 hours PRN    loratadine (Claritin) 10 mg tablet 1 tablet, oral, Daily    losartan (COZAAR) 100 mg, oral, Daily, Do not take until you follow up with cardiology, Dr. Cloud    mirabegron (MYRBETRIQ) 25 mg, oral, Daily, LOT: [id number]<BR>EXP: 02/2024    montelukast (SINGULAIR) 10 mg, oral, Nightly    oxybutynin XL (Ditropan-XL) 5 mg 24 hr tablet 1 tablet, oral, Daily    pregabalin (LYRICA) 100 mg, oral, 3 times daily     Allergies   Allergen Reactions    Percocet [Oxycodone-Acetaminophen] Nausea Only             ED Triage Vitals   Temp Pulse Resp BP   -- -- -- --      SpO2 Temp src Heart Rate Source Patient Position   -- -- -- --      BP Location FiO2 (%)     -- --               Labs and Imaging  No orders to display     Labs Reviewed   URINALYSIS WITH REFLEX MICROSCOPIC             Diagnoses as of 11/09/23 0082   Urinary retention         No orders to display     Labs Reviewed   URINALYSIS WITH REFLEX MICROSCOPIC           Procedure  Procedures       Note: This note was dictated by speech recognition. Minor errors in transcription may be present.           Cory Barrett, DO  11/09/23 0512

## 2023-11-09 NOTE — H&P (VIEW-ONLY)
Reason For Consult  Hematuria, urinary retention     History Of Present Illness  Shiva Hardy is a 83 y.o. male with a past medical history of bladder cancer SP TURBT x 2 with the most recent being 10/6/23, presenting with gross hematuria and urinary retention.  Patient was to start BCG treatments in near future.  Patient states he was doing well up until yesterday when he was using a leaf blower and then was unable to urinate.  He states he was experiencing significant suprapubic pain and distension.  He presented to the ER overnight.  A 3 way gorman was placed and continuous bladder irrigation was started.  There were multiple large blood clots that passed and urine was irrigated to clear.  He also received a dose or Pyridium which did help his bladder spasms.  Currently he states he is feeling better in regards to bladder fullness and pain.  He denies fever, chills.  He was nauseous prior to gorman being placed but that has since resolved.  Urology was consulted for management of hematuria.       Past Medical History  He has a past medical history of Acute maxillary sinusitis, unspecified (12/19/2019), Acute recurrent maxillary sinusitis (12/28/2016), Acute upper respiratory infection, unspecified (03/13/2017), Acute upper respiratory infection, unspecified (12/30/2020), Arthritis, Bilateral inguinal hernia, without obstruction or gangrene, not specified as recurrent (03/04/2014), BPH (benign prostatic hyperplasia), Chest pain, unspecified (12/16/2016), Chronic obstructive pulmonary disease with (acute) exacerbation (CMS/[facility]) (12/19/2019), Chronic pain disorder, Edema, unspecified (05/27/2021), Hyperlipidemia, Hypertension, Impacted cerumen, right ear (01/06/2017), Low back pain, unspecified (07/25/2019), Personal history of malignant neoplasm of prostate, Personal history of malignant neoplasm, unspecified, Personal history of other diseases of the digestive system (05/27/2021), Personal history of other  diseases of the respiratory system (02/22/2017), Personal history of other endocrine, nutritional and metabolic disease (04/20/2017), Prostate cancer (CMS/[facility]), Sleep apnea, Unspecified malignant neoplasm of skin of unspecified part of face (12/16/2016), and Unspecified symptoms and signs involving the genitourinary system (12/23/2021).    Surgical History  He has a past surgical history that includes Hernia repair (11/03/2015); Other surgical history (01/25/2021); Other surgical history (01/25/2021); Umbilical hernia repair (12/16/2016); Other surgical history (12/16/2016); Other surgical history (05/08/2019); Cataract extraction (10/01/2014); and Prostate surgery (10/01/2014).     Social History  He reports that he has been smoking cigarettes. He has a 20.00 pack-year smoking history. He has never used smokeless tobacco. He reports that he does not currently use alcohol. He reports that he does not use drugs.    Family History  Family History   Problem Relation Name Age of Onset    Arthritis Mother      Hypertension Mother      Vision loss Mother      Arthritis Father      Other (CARDIAC DISORDER) Father      Prostate cancer Father      Colon cancer Brother      Arthritis Brother      Hypertension Brother      Liver cancer Brother      Lung cancer Brother      Vision loss Brother      Cancer Other MULTIPLE FAMILY MEMBERS         Allergies  Percocet [oxycodone-acetaminophen]    Review of Systems  12 point ROS completed and no additional findings noted aside from what is listed in the HPI.     Physical Exam  Physical Exam  HENT:      Head: Normocephalic and atraumatic.   Eyes:      Extraocular Movements: Extraocular movements intact.      Conjunctiva/sclera: Conjunctivae normal.      Pupils: Pupils are equal, round, and reactive to light.   Cardiovascular:      Rate and Rhythm: Normal rate and regular rhythm.      Pulses: Normal pulses.   Pulmonary:      Breath sounds: Wheezing present.      Comments: On  "supplemental O2  Abdominal:      General: Bowel sounds are normal.      Palpations: Abdomen is soft.   Genitourinary:     Comments: Talbert in place, continuous bladder irrigation, urine orange tinge due to Pyridium, no significant clots at this time, no suprapubic tenderness  Skin:     General: Skin is warm and dry.      Capillary Refill: Capillary refill takes less than 2 seconds.      Coloration: Skin is pale.   Neurological:      General: No focal deficit present.      Mental Status: He is alert and oriented to person, place, and time.       Last Recorded Vitals  Blood pressure 123/80, pulse 75, temperature 36.6 °C (97.9 °F), resp. rate 20, height 1.778 m (5' 10\"), weight 81.6 kg (180 lb), SpO2 94 %.    Relevant Results      Scheduled medications    Continuous medications    PRN medications    Results for orders placed or performed during the hospital encounter of 11/09/23 (from the past 24 hour(s))   Urinalysis with Reflex Microscopic and Culture   Result Value Ref Range    Color, Urine Red (N) Straw, Yellow    Appearance, Urine Hazy (N) Clear    Specific Gravity, Urine 1.023 1.005 - 1.035    pH, Urine 6.0 5.0, 5.5, 6.0, 6.5, 7.0, 7.5, 8.0    Protein, Urine 100 (2+) (N) NEGATIVE mg/dL    Glucose, Urine 50 (1+) (A) NEGATIVE mg/dL    Blood, Urine LARGE (3+) (A) NEGATIVE    Ketones, Urine 5 (TRACE) (A) NEGATIVE mg/dL    Bilirubin, Urine NEGATIVE NEGATIVE    Urobilinogen, Urine <2.0 <2.0 mg/dL    Nitrite, Urine NEGATIVE NEGATIVE    Leukocyte Esterase, Urine NEGATIVE NEGATIVE   Extra Urine Gray Tube   Result Value Ref Range    Extra Tube Hold for add-ons.    Urinalysis Microscopic   Result Value Ref Range    WBC, Urine >50 (A) 1-5, NONE /HPF    WBC Clumps, Urine MANY Reference range not established. /HPF    RBC, Urine >20 (A) NONE, 1-2, 3-5 /HPF   CBC and Auto Differential   Result Value Ref Range    WBC 16.5 (H) 4.4 - 11.3 x10*3/uL    nRBC 0.0 0.0 - 0.0 /100 WBCs    RBC 3.74 (L) 4.50 - 5.90 x10*6/uL    Hemoglobin " 11.6 (L) 13.5 - 17.5 g/dL    Hematocrit 35.8 (L) 41.0 - 52.0 %    MCV 96 80 - 100 fL    MCH 31.0 26.0 - 34.0 pg    MCHC 32.4 32.0 - 36.0 g/dL    RDW 12.8 11.5 - 14.5 %    Platelets 294 150 - 450 x10*3/uL    Neutrophils % 86.1 40.0 - 80.0 %    Immature Granulocytes %, Automated 0.4 0.0 - 0.9 %    Lymphocytes % 8.1 13.0 - 44.0 %    Monocytes % 5.3 2.0 - 10.0 %    Eosinophils % 0.0 0.0 - 6.0 %    Basophils % 0.1 0.0 - 2.0 %    Neutrophils Absolute 14.16 (H) 1.60 - 5.50 x10*3/uL    Immature Granulocytes Absolute, Automated 0.06 0.00 - 0.50 x10*3/uL    Lymphocytes Absolute 1.34 0.80 - 3.00 x10*3/uL    Monocytes Absolute 0.88 (H) 0.05 - 0.80 x10*3/uL    Eosinophils Absolute 0.00 0.00 - 0.40 x10*3/uL    Basophils Absolute 0.02 0.00 - 0.10 x10*3/uL   Comprehensive metabolic panel   Result Value Ref Range    Glucose 129 (H) 74 - 99 mg/dL    Sodium 134 (L) 136 - 145 mmol/L    Potassium 4.2 3.5 - 5.3 mmol/L    Chloride 101 98 - 107 mmol/L    Bicarbonate 24 21 - 32 mmol/L    Anion Gap 13 10 - 20 mmol/L    Urea Nitrogen 22 6 - 23 mg/dL    Creatinine 0.78 0.50 - 1.30 mg/dL    eGFR 87 >60 mL/min/1.73m*2    Calcium 9.1 8.6 - 10.3 mg/dL    Albumin 3.9 3.4 - 5.0 g/dL    Alkaline Phosphatase 62 33 - 136 U/L    Total Protein 6.7 6.4 - 8.2 g/dL    AST 21 9 - 39 U/L    Bilirubin, Total 0.4 0.0 - 1.2 mg/dL    ALT 20 10 - 52 U/L   aPTT   Result Value Ref Range    aPTT 28 27 - 38 seconds   Protime-INR   Result Value Ref Range    Protime 11.4 9.8 - 12.8 seconds    INR 1.0 0.9 - 1.1        Assessment/Plan   83 year old male, SP TURBP x 2 presenting with hematuria and urinary retention    - NPO at midnight for OR tomorrow with Dr. Kelsey for cystoscopy with clot evacuation  - hold DVT proph for OR tomorrow  - MIVF while NPO  - continue CBI for now  - Pyridium 100mg PO TID PRN bladder spasms  - case added to the OR board for tomorrow    Discussed with Dr. Laure Pacheco, APRN-CNP

## 2023-11-09 NOTE — PROGRESS NOTES
I received signout this patient.  The patient was pending reassessment post irrigation of three-way Talbert.  The patient ultimately got total of 6 L irrigation and still had bloody output.  Not on anticoagulation.  Laboratory values show no signs of severe anemia or creatinine dysfunction.  The patient had consultation by urology who recommended CT imaging and a hospitalization for further irrigation and potential takeback surgery as needed.  I think his urinalysis is more suggestive of blood and not infectious at this time.

## 2023-11-09 NOTE — CONSULTS
Reason For Consult  Hematuria, urinary retention     History Of Present Illness  Shiva Hardy is a 83 y.o. male with a past medical history of bladder cancer SP TURBT x 2 with the most recent being 10/6/23, presenting with gross hematuria and urinary retention.  Patient was to start BCG treatments in near future.  Patient states he was doing well up until yesterday when he was using a leaf blower and then was unable to urinate.  He states he was experiencing significant suprapubic pain and distension.  He presented to the ER overnight.  A 3 way gorman was placed and continuous bladder irrigation was started.  There were multiple large blood clots that passed and urine was irrigated to clear.  He also received a dose or Pyridium which did help his bladder spasms.  Currently he states he is feeling better in regards to bladder fullness and pain.  He denies fever, chills.  He was nauseous prior to gorman being placed but that has since resolved.  Urology was consulted for management of hematuria.       Past Medical History  He has a past medical history of Acute maxillary sinusitis, unspecified (12/19/2019), Acute recurrent maxillary sinusitis (12/28/2016), Acute upper respiratory infection, unspecified (03/13/2017), Acute upper respiratory infection, unspecified (12/30/2020), Arthritis, Bilateral inguinal hernia, without obstruction or gangrene, not specified as recurrent (03/04/2014), BPH (benign prostatic hyperplasia), Chest pain, unspecified (12/16/2016), Chronic obstructive pulmonary disease with (acute) exacerbation (CMS/[facility]) (12/19/2019), Chronic pain disorder, Edema, unspecified (05/27/2021), Hyperlipidemia, Hypertension, Impacted cerumen, right ear (01/06/2017), Low back pain, unspecified (07/25/2019), Personal history of malignant neoplasm of prostate, Personal history of malignant neoplasm, unspecified, Personal history of other diseases of the digestive system (05/27/2021), Personal history of other  diseases of the respiratory system (02/22/2017), Personal history of other endocrine, nutritional and metabolic disease (04/20/2017), Prostate cancer (CMS/[facility]), Sleep apnea, Unspecified malignant neoplasm of skin of unspecified part of face (12/16/2016), and Unspecified symptoms and signs involving the genitourinary system (12/23/2021).    Surgical History  He has a past surgical history that includes Hernia repair (11/03/2015); Other surgical history (01/25/2021); Other surgical history (01/25/2021); Umbilical hernia repair (12/16/2016); Other surgical history (12/16/2016); Other surgical history (05/08/2019); Cataract extraction (10/01/2014); and Prostate surgery (10/01/2014).     Social History  He reports that he has been smoking cigarettes. He has a 20.00 pack-year smoking history. He has never used smokeless tobacco. He reports that he does not currently use alcohol. He reports that he does not use drugs.    Family History  Family History   Problem Relation Name Age of Onset    Arthritis Mother      Hypertension Mother      Vision loss Mother      Arthritis Father      Other (CARDIAC DISORDER) Father      Prostate cancer Father      Colon cancer Brother      Arthritis Brother      Hypertension Brother      Liver cancer Brother      Lung cancer Brother      Vision loss Brother      Cancer Other MULTIPLE FAMILY MEMBERS         Allergies  Percocet [oxycodone-acetaminophen]    Review of Systems  12 point ROS completed and no additional findings noted aside from what is listed in the HPI.     Physical Exam  Physical Exam  HENT:      Head: Normocephalic and atraumatic.   Eyes:      Extraocular Movements: Extraocular movements intact.      Conjunctiva/sclera: Conjunctivae normal.      Pupils: Pupils are equal, round, and reactive to light.   Cardiovascular:      Rate and Rhythm: Normal rate and regular rhythm.      Pulses: Normal pulses.   Pulmonary:      Breath sounds: Wheezing present.      Comments: On  "supplemental O2  Abdominal:      General: Bowel sounds are normal.      Palpations: Abdomen is soft.   Genitourinary:     Comments: Talbert in place, continuous bladder irrigation, urine orange tinge due to Pyridium, no significant clots at this time, no suprapubic tenderness  Skin:     General: Skin is warm and dry.      Capillary Refill: Capillary refill takes less than 2 seconds.      Coloration: Skin is pale.   Neurological:      General: No focal deficit present.      Mental Status: He is alert and oriented to person, place, and time.       Last Recorded Vitals  Blood pressure 123/80, pulse 75, temperature 36.6 °C (97.9 °F), resp. rate 20, height 1.778 m (5' 10\"), weight 81.6 kg (180 lb), SpO2 94 %.    Relevant Results      Scheduled medications    Continuous medications    PRN medications    Results for orders placed or performed during the hospital encounter of 11/09/23 (from the past 24 hour(s))   Urinalysis with Reflex Microscopic and Culture   Result Value Ref Range    Color, Urine Red (N) Straw, Yellow    Appearance, Urine Hazy (N) Clear    Specific Gravity, Urine 1.023 1.005 - 1.035    pH, Urine 6.0 5.0, 5.5, 6.0, 6.5, 7.0, 7.5, 8.0    Protein, Urine 100 (2+) (N) NEGATIVE mg/dL    Glucose, Urine 50 (1+) (A) NEGATIVE mg/dL    Blood, Urine LARGE (3+) (A) NEGATIVE    Ketones, Urine 5 (TRACE) (A) NEGATIVE mg/dL    Bilirubin, Urine NEGATIVE NEGATIVE    Urobilinogen, Urine <2.0 <2.0 mg/dL    Nitrite, Urine NEGATIVE NEGATIVE    Leukocyte Esterase, Urine NEGATIVE NEGATIVE   Extra Urine Gray Tube   Result Value Ref Range    Extra Tube Hold for add-ons.    Urinalysis Microscopic   Result Value Ref Range    WBC, Urine >50 (A) 1-5, NONE /HPF    WBC Clumps, Urine MANY Reference range not established. /HPF    RBC, Urine >20 (A) NONE, 1-2, 3-5 /HPF   CBC and Auto Differential   Result Value Ref Range    WBC 16.5 (H) 4.4 - 11.3 x10*3/uL    nRBC 0.0 0.0 - 0.0 /100 WBCs    RBC 3.74 (L) 4.50 - 5.90 x10*6/uL    Hemoglobin " 11.6 (L) 13.5 - 17.5 g/dL    Hematocrit 35.8 (L) 41.0 - 52.0 %    MCV 96 80 - 100 fL    MCH 31.0 26.0 - 34.0 pg    MCHC 32.4 32.0 - 36.0 g/dL    RDW 12.8 11.5 - 14.5 %    Platelets 294 150 - 450 x10*3/uL    Neutrophils % 86.1 40.0 - 80.0 %    Immature Granulocytes %, Automated 0.4 0.0 - 0.9 %    Lymphocytes % 8.1 13.0 - 44.0 %    Monocytes % 5.3 2.0 - 10.0 %    Eosinophils % 0.0 0.0 - 6.0 %    Basophils % 0.1 0.0 - 2.0 %    Neutrophils Absolute 14.16 (H) 1.60 - 5.50 x10*3/uL    Immature Granulocytes Absolute, Automated 0.06 0.00 - 0.50 x10*3/uL    Lymphocytes Absolute 1.34 0.80 - 3.00 x10*3/uL    Monocytes Absolute 0.88 (H) 0.05 - 0.80 x10*3/uL    Eosinophils Absolute 0.00 0.00 - 0.40 x10*3/uL    Basophils Absolute 0.02 0.00 - 0.10 x10*3/uL   Comprehensive metabolic panel   Result Value Ref Range    Glucose 129 (H) 74 - 99 mg/dL    Sodium 134 (L) 136 - 145 mmol/L    Potassium 4.2 3.5 - 5.3 mmol/L    Chloride 101 98 - 107 mmol/L    Bicarbonate 24 21 - 32 mmol/L    Anion Gap 13 10 - 20 mmol/L    Urea Nitrogen 22 6 - 23 mg/dL    Creatinine 0.78 0.50 - 1.30 mg/dL    eGFR 87 >60 mL/min/1.73m*2    Calcium 9.1 8.6 - 10.3 mg/dL    Albumin 3.9 3.4 - 5.0 g/dL    Alkaline Phosphatase 62 33 - 136 U/L    Total Protein 6.7 6.4 - 8.2 g/dL    AST 21 9 - 39 U/L    Bilirubin, Total 0.4 0.0 - 1.2 mg/dL    ALT 20 10 - 52 U/L   aPTT   Result Value Ref Range    aPTT 28 27 - 38 seconds   Protime-INR   Result Value Ref Range    Protime 11.4 9.8 - 12.8 seconds    INR 1.0 0.9 - 1.1        Assessment/Plan   83 year old male, SP TURBP x 2 presenting with hematuria and urinary retention    - NPO at midnight for OR tomorrow with Dr. Kelsey for cystoscopy with clot evacuation  - hold DVT proph for OR tomorrow  - MIVF while NPO  - continue CBI for now  - Pyridium 100mg PO TID PRN bladder spasms  - case added to the OR board for tomorrow    Discussed with Dr. Laure Pacheco, APRN-CNP

## 2023-11-09 NOTE — H&P
History Of Present Illness  Shiva Hardy is a 83 y.o. male with h/o bladder cancer s/p TURBT x 2 presenting with hematuria and urinary retention that began yesterday afternoon while he was doing yard work.  Also having bladder spasms.  He is on ASA 81 mg for history of CAD. Bladder scan in ED showed over 400 ml, Talbert was placed and he was started on 3 way irrigation and after 6 L of fluids, continued to have hematuria.  Urology was consulted in ED and recommended OBS admission for add on cystoscopy tomorrow.  He was treated with zofran and pyridium.  Denies dysuria, CP, SOB.  Endorses chronic cough and SOB with long history of tobacco use.  Denies n/v/d/constipation.    In the ED, VSS, labs s/f glucose 129, Na 134, WBCs 16.5, Hgb 11.6.  UA large blood and culture was sent.        Past Medical History  Past Medical History:   Diagnosis Date    Acute maxillary sinusitis, unspecified 12/19/2019    Acute maxillary sinusitis    Acute recurrent maxillary sinusitis 12/28/2016    Acute recurrent maxillary sinusitis    Acute upper respiratory infection, unspecified 03/13/2017    Acute URI    Acute upper respiratory infection, unspecified 12/30/2020    Viral URI with cough    Arthritis     Bilateral inguinal hernia, without obstruction or gangrene, not specified as recurrent 03/04/2014    Bilateral inguinal hernia    BPH (benign prostatic hyperplasia)     Chest pain, unspecified 12/16/2016    Intermittent chest pain    Chronic obstructive pulmonary disease with (acute) exacerbation (CMS/[facility]) 12/19/2019    Acute exacerbation of chronic obstructive pulmonary disease (COPD)    Chronic pain disorder     back pain- chronic    Edema, unspecified 05/27/2021    Parotid swelling    Hyperlipidemia     Hypertension     Impacted cerumen, right ear 01/06/2017    Excessive cerumen in right ear canal    Low back pain, unspecified 07/25/2019    Acute exacerbation of chronic low back pain    Personal history of malignant neoplasm of  prostate     History of malignant neoplasm of prostate    Personal history of malignant neoplasm, unspecified     Personal history of malignant neoplasm    Personal history of other diseases of the digestive system 05/27/2021    History of parotitis    Personal history of other diseases of the respiratory system 02/22/2017    History of pulmonary emphysema    Personal history of other endocrine, nutritional and metabolic disease 04/20/2017    History of hyperkalemia    Prostate cancer (CMS/HCC)     Sleep apnea     uses CPAP    Unspecified malignant neoplasm of skin of unspecified part of face 12/16/2016    Skin cancer of face    Unspecified symptoms and signs involving the genitourinary system 12/23/2021    UTI symptoms       Surgical History  Past Surgical History:   Procedure Laterality Date    CATARACT EXTRACTION  10/01/2014    Cataract Surgery    HERNIA REPAIR  11/03/2015    Hernia Repair    OTHER SURGICAL HISTORY  01/25/2021    Spinal surgery    OTHER SURGICAL HISTORY  01/25/2021    Lower back surgery    OTHER SURGICAL HISTORY  12/16/2016    Destruction Of Malignant Lesion Face    OTHER SURGICAL HISTORY  05/08/2019    Skin biopsy    PROSTATE SURGERY  10/01/2014    Prostate Surgery    UMBILICAL HERNIA REPAIR  12/16/2016    Umbilical Hernia Repair        Social History  He reports that he has been smoking cigarettes. He has a 20.00 pack-year smoking history. He has never used smokeless tobacco. He reports that he does not currently use alcohol. He reports that he does not use drugs.    Family History  Family History   Problem Relation Name Age of Onset    Arthritis Mother      Hypertension Mother      Vision loss Mother      Arthritis Father      Other (CARDIAC DISORDER) Father      Prostate cancer Father      Colon cancer Brother      Arthritis Brother      Hypertension Brother      Liver cancer Brother      Lung cancer Brother      Vision loss Brother      Cancer Other MULTIPLE FAMILY MEMBERS        "  Allergies  Percocet [oxycodone-acetaminophen]    Review of Systems   Constitutional:  Negative for diaphoresis, fever and unexpected weight change.   HENT:  Positive for rhinorrhea. Negative for sore throat and trouble swallowing.    Eyes:  Negative for photophobia, redness and visual disturbance.   Respiratory:  Positive for cough (chronic) and shortness of breath (chronic). Negative for chest tightness and wheezing.    Cardiovascular:  Negative for chest pain, palpitations and leg swelling.   Gastrointestinal:  Positive for abdominal pain (bladder spasms). Negative for constipation, diarrhea, nausea and vomiting.   Endocrine: Negative for polyuria.   Genitourinary:  Positive for difficulty urinating and hematuria. Negative for dysuria, flank pain, frequency and urgency.   Musculoskeletal:  Positive for back pain (chronic). Negative for gait problem and myalgias.   Skin:  Negative for rash and wound.   Allergic/Immunologic: Negative for immunocompromised state.   Neurological:  Negative for dizziness, seizures, syncope, facial asymmetry, weakness, light-headedness, numbness and headaches.   Psychiatric/Behavioral:  Negative for confusion, decreased concentration, hallucinations and sleep disturbance.           Physical Exam   Physical Exam  Gen: NAD  Eyes:  EOM intact  ENT: MMM  Neck: No JVD  Respiratory: Positive for rhonchi with productive cough  Cardiac: RRR, no murmurs rubs or gallops  Abdomen: soft, NT, +BS  Extremities: no edema or cyanosis  Neuro: No focal deficits, alert and oriented x 3  Psych:  appropriate mood and behavior   : Talbert in place with gross hematuria, 3 way irrigation    Last Recorded Vitals  Blood pressure 142/89, pulse 72, temperature 36.6 °C (97.9 °F), resp. rate 18, height 1.778 m (5' 10\"), weight 81.6 kg (180 lb), SpO2 93 %.    Relevant Results  Scheduled medications  [START ON 11/10/2023] amLODIPine, 5 mg, oral, q AM  [START ON 11/10/2023] aspirin, 81 mg, oral, q AM  atorvastatin, " 20 mg, oral, Nightly  docusate sodium, 100 mg, oral, BID  fluticasone furoate-vilanteroL, 1 puff, inhalation, Daily  gabapentin, 300 mg, oral, TID  [START ON 11/10/2023] loratadine, 10 mg, oral, Daily  melatonin, 3 mg, oral, Daily  montelukast, 10 mg, oral, Nightly  [START ON 11/10/2023] pantoprazole, 40 mg, oral, Daily before breakfast   Or  [START ON 11/10/2023] pantoprazole, 40 mg, intravenous, Daily before breakfast  polyethylene glycol, 17 g, oral, Daily  pregabalin, 100 mg, oral, TID      Continuous medications  [START ON 11/10/2023] sodium chloride 0.9%, 100 mL/hr      PRN medications  PRN medications: acetaminophen **OR** acetaminophen **OR** acetaminophen, ondansetron **OR** ondansetron, phenazopyridine       Results for orders placed or performed during the hospital encounter of 11/09/23 (from the past 24 hour(s))   Urinalysis with Reflex Microscopic and Culture   Result Value Ref Range    Color, Urine Red (N) Straw, Yellow    Appearance, Urine Hazy (N) Clear    Specific Gravity, Urine 1.023 1.005 - 1.035    pH, Urine 6.0 5.0, 5.5, 6.0, 6.5, 7.0, 7.5, 8.0    Protein, Urine 100 (2+) (N) NEGATIVE mg/dL    Glucose, Urine 50 (1+) (A) NEGATIVE mg/dL    Blood, Urine LARGE (3+) (A) NEGATIVE    Ketones, Urine 5 (TRACE) (A) NEGATIVE mg/dL    Bilirubin, Urine NEGATIVE NEGATIVE    Urobilinogen, Urine <2.0 <2.0 mg/dL    Nitrite, Urine NEGATIVE NEGATIVE    Leukocyte Esterase, Urine NEGATIVE NEGATIVE   Extra Urine Gray Tube   Result Value Ref Range    Extra Tube Hold for add-ons.    Urinalysis Microscopic   Result Value Ref Range    WBC, Urine >50 (A) 1-5, NONE /HPF    WBC Clumps, Urine MANY Reference range not established. /HPF    RBC, Urine >20 (A) NONE, 1-2, 3-5 /HPF   CBC and Auto Differential   Result Value Ref Range    WBC 16.5 (H) 4.4 - 11.3 x10*3/uL    nRBC 0.0 0.0 - 0.0 /100 WBCs    RBC 3.74 (L) 4.50 - 5.90 x10*6/uL    Hemoglobin 11.6 (L) 13.5 - 17.5 g/dL    Hematocrit 35.8 (L) 41.0 - 52.0 %    MCV 96 80 - 100  fL    MCH 31.0 26.0 - 34.0 pg    MCHC 32.4 32.0 - 36.0 g/dL    RDW 12.8 11.5 - 14.5 %    Platelets 294 150 - 450 x10*3/uL    Neutrophils % 86.1 40.0 - 80.0 %    Immature Granulocytes %, Automated 0.4 0.0 - 0.9 %    Lymphocytes % 8.1 13.0 - 44.0 %    Monocytes % 5.3 2.0 - 10.0 %    Eosinophils % 0.0 0.0 - 6.0 %    Basophils % 0.1 0.0 - 2.0 %    Neutrophils Absolute 14.16 (H) 1.60 - 5.50 x10*3/uL    Immature Granulocytes Absolute, Automated 0.06 0.00 - 0.50 x10*3/uL    Lymphocytes Absolute 1.34 0.80 - 3.00 x10*3/uL    Monocytes Absolute 0.88 (H) 0.05 - 0.80 x10*3/uL    Eosinophils Absolute 0.00 0.00 - 0.40 x10*3/uL    Basophils Absolute 0.02 0.00 - 0.10 x10*3/uL   Comprehensive metabolic panel   Result Value Ref Range    Glucose 129 (H) 74 - 99 mg/dL    Sodium 134 (L) 136 - 145 mmol/L    Potassium 4.2 3.5 - 5.3 mmol/L    Chloride 101 98 - 107 mmol/L    Bicarbonate 24 21 - 32 mmol/L    Anion Gap 13 10 - 20 mmol/L    Urea Nitrogen 22 6 - 23 mg/dL    Creatinine 0.78 0.50 - 1.30 mg/dL    eGFR 87 >60 mL/min/1.73m*2    Calcium 9.1 8.6 - 10.3 mg/dL    Albumin 3.9 3.4 - 5.0 g/dL    Alkaline Phosphatase 62 33 - 136 U/L    Total Protein 6.7 6.4 - 8.2 g/dL    AST 21 9 - 39 U/L    Bilirubin, Total 0.4 0.0 - 1.2 mg/dL    ALT 20 10 - 52 U/L   aPTT   Result Value Ref Range    aPTT 28 27 - 38 seconds   Protime-INR   Result Value Ref Range    Protime 11.4 9.8 - 12.8 seconds    INR 1.0 0.9 - 1.1      CT abdomen pelvis wo IV contrast    Result Date: 11/9/2023  Interpreted By:  Fabrice Mazariegos, STUDY: CT ABDOMEN PELVIS WO IV CONTRAST; 11/9/2023 1:59 pm   INDICATION: Signs/Symptoms:evaluate for renal obstruction, clots.   COMPARISON: CT of the abdomen and pelvis dated 05/25/2023   ACCESSION NUMBER(S): [id number]   ORDERING CLINICIAN: IWONA WESTBROOK   TECHNIQUE: Contiguous axial images were obtained at 3mm slice thickness through the abdomen and pelvis without intravenous contrast administration. Coronal and sagittal reconstructions at 3 mm  slice thickness were performed. Intravenous contrast was not given per the referring physician's request.   FINDINGS: The study is limited by the lack of intravenous contrast.   LOWER CHEST: Evaluation of the visualized lung bases demonstrate scarring and/or atelectasis at the lung bases bilaterally. The heart is within normal limits for size.   ABDOMEN:   LIVER: Small rounded hypodensities are seen throughout the liver, measuring at up to 1.5 cm in diameter, similar to the prior study, most consistent with cysts.   BILE DUCTS: No definite intra or extrahepatic biliary dilatation is identified.   GALLBLADDER: The gallbladder is nondilated. No definite calcified gallstones are seen.   PANCREAS: The pancreas is within normal limits for appearance, without evidence of focal masses.   SPLEEN: The spleen is within normal limits for size. No focal splenic mass is seen.   ADRENAL GLANDS: No definite adrenal nodules or masses are seen bilaterally.   KIDNEYS AND URETERS: Mild bilateral hydronephrosis and hydroureter is seen, with the ureters dilated to the urinary bladder. A rounded hypodensity is seen from the posterior midpole of the left kidney, similar to the prior study, consistent with a cyst. No definite new or enlarging renal mass is seen.   PELVIS:   BLADDER: A Talbert catheter is seen within the urinary bladder. There is heterogeneous hyperdensity identified dependently within the urinary bladder, new from the prior study, and may represent hemorrhage.   REPRODUCTIVE ORGANS: The prostate is within normal limits for appearance.   BOWEL: Multiple diverticuli are seen throughout the sigmoid colon. The colon and small bowel are within normal limits for course, caliber and appearance, without evidence of wall thickening or obstruction. The appendix is not visualized. No CT evidence of acute diverticulitis or appendicitis is seen.   VESSELS: Moderate to severe atherosclerotic calcifications are seen throughout the  infrarenal abdominal aorta and iliac arteries. The abdominal aorta is within normal limits for course, caliber and appearance, without evidence of aneurysm.   PERITONEUM/RETROPERITONEUM/LYMPH NODES: There is no free intraperitoneal air or free fluid identified. No gross mesenteric or retroperitoneal lymphadenopathy is identified.   BONE AND SOFT TISSUE: There is no evidence of acute fracture identified. No evidence of abdominal wall mass or hernia is identified.       1. Bilateral hydronephrosis and hydroureter, as above. 2. No renal or ureteral calculus identified. 3. Hyperdensity dependently within the urinary bladder, most consistent with hemorrhage. 4. Colonic diverticulosis, without evidence of acute diverticulitis.   MACRO: None   Signed by: Fabrice Mazariegos 11/9/2023 2:32 PM Dictation workstation:   [id number]           Assessment/Plan   Hematuria with urinary retention  -in the setting of bladder cancer and recent TURPT  -Three way gorman placed, continuous bladder irrigation  -RBC: 3.74, Hemoglobin: 11.6, Hematocrit: 35.8, monitor  -aPTT: 28, Protime: 11.4, INR: 1.0   -UA: Trace ketones, WBC>50, RBC>20, 2+ protein, 1+ glucose, 3+ blood, neg for nitrites/leukocyte esterase   -Pyridium PRN  -CT abdomen and pelvis: Bilateral hydronephrosis and hydroureter, hyperdensity within bladder consistent with hemorrhage  -monitor CBC and creatinine  -NPO at MN, cystoscopy with Dr. Kelsey tomorrow as add-on  -start mIVF at 6 am on 11/10    COPD/Tobacco abuse  -Breo Ellipta inhaler  -Singulair  -nicotine replacement    Congestion, cough  -Mucinex  -CXR ordered    Chronic low back pain  -Gabapentin     Constipation, nausea  -Colace  -Zofran    HTN  -amlodipine    HLD  -Atorvastatin    Code status  -FULL CODE    Dispo:  OBS for hematuria, cysto planned tomorrow    CHAVEZ MELTONS    I was present with the PA student who participated in the documentation of this note. I personally saw and evaluated the patient and  performed my own history and examination.  I discussed the case with the PA student. I have reviewed, verified, and revised the note as necessary and agree with the content and plan as written by the PA student.  CHAVEZ HsiehC  D/w Dr. Albright

## 2023-11-09 NOTE — PROGRESS NOTES
Pharmacy Medication History Review    Shiva Hardy is a 83 y.o. male admitted for Hematuria. Pharmacy reviewed the patient's [id number] medications and allergies for accuracy.    The list below reflectives the updated PTA list. Please review each medication in order reconciliation for additional clarification and justification.  Medications Prior to Admission   Medication Sig Dispense Refill Last Dose    acetaminophen (Tylenol) 500 mg tablet Take 1 tablet (500 mg) by mouth every 6 hours if needed for mild pain (1 - 3).       amLODIPine (Norvasc) 5 mg tablet Take 2 tablets (10 mg) by mouth once daily. Do not take until you follow up with cardiology, Dr. Cloud. (Patient taking differently: Take 1 tablet (5 mg) by mouth once daily in the morning. Do not take until you follow up with cardiology, Dr. Cloud.) 180 tablet 1 11/8/2023 at am    aspirin 81 mg EC tablet Take 1 tablet (81 mg) by mouth once daily in the morning.   11/8/2023 at am    atorvastatin (Lipitor) 20 mg tablet Take 1 tablet (20 mg) by mouth once daily at bedtime. 90 tablet 1 11/7/2023    budesonide-formoteroL (Symbicort) 160-4.5 mcg/actuation inhaler Inhale 2 puffs 2 times a day. RINSE MOUTH AFTER USE.   11/9/2023 at pm    docusate sodium (Colace) 100 mg capsule Take 1 capsule (100 mg) by mouth in the morning and 1 capsule (100 mg) before bedtime. (Patient not taking: Reported on 11/9/2023) 60 capsule 2 Not Taking    ergocalciferol (Vitamin D-2) 1.25 MG (60671 UT) capsule Take 1 capsule (1,250 mcg) by mouth 1 (one) time per week. 90 capsule 1     fluticasone (Flonase) 50 mcg/actuation nasal spray ADMINISTER 2 SPRAYS INTO EACH NOSTRIL ONCE DAILY. (Patient not taking: Reported on 10/2/2023) 16 mL 11     gabapentin (Neurontin) 300 mg capsule TAKE 1 CAPSULE BY MOUTH THREE TIMES DAILY (Patient taking differently: Take 1 capsule (300 mg) by mouth 3 times a day. Last OARRS fill: 10/24/23 #90 for 30 days) 90 capsule 0      HYDROcodone-acetaminophen (Norco) 5-325 mg tablet Take 1 tablet by mouth every 6 hours if needed for severe pain (7 - 10) for up to 3 days. (Patient not taking: Reported on 11/9/2023) 12 tablet 0 Not Taking    losartan (Cozaar) 100 mg tablet Take 1 tablet (100 mg) by mouth once daily. Do not take until you follow up with cardiology, Dr. Cloud (Patient not taking: Reported on 11/9/2023)   Not Taking    mirabegron (Myrbetriq) 25 mg tablet extended release 24 hr 24 hr tablet Take 1 tablet (25 mg) by mouth once daily. LOT: [id number]  EXP: 02/2024 30 tablet 0     montelukast (Singulair) 10 mg tablet Take 1 tablet (10 mg) by mouth once daily at bedtime. 90 tablet 0 11/7/2023        The list below reflectives the updated allergy list. Please review each documented allergy for additional clarification and justification.  Allergies  Reviewed by Adry Grover RN on 11/9/2023        Severity Reactions Comments    Percocet [oxycodone-acetaminophen] Low Nausea Only             Below are additional concerns with the patient's PTA list.      Kenzie Snyder CPhT

## 2023-11-10 ENCOUNTER — ANESTHESIA (OUTPATIENT)
Dept: OPERATING ROOM | Facility: HOSPITAL | Age: 83
DRG: 909 | End: 2023-11-10
Payer: MEDICARE

## 2023-11-10 ENCOUNTER — ANESTHESIA EVENT (OUTPATIENT)
Dept: OPERATING ROOM | Facility: HOSPITAL | Age: 83
DRG: 909 | End: 2023-11-10
Payer: MEDICARE

## 2023-11-10 PROBLEM — R33.9 URINARY RETENTION: Status: ACTIVE | Noted: 2023-11-10

## 2023-11-10 LAB
ANION GAP SERPL CALC-SCNC: 11 MMOL/L (ref 10–20)
BUN SERPL-MCNC: 16 MG/DL (ref 6–23)
CALCIUM SERPL-MCNC: 9 MG/DL (ref 8.6–10.3)
CHLORIDE SERPL-SCNC: 104 MMOL/L (ref 98–107)
CO2 SERPL-SCNC: 28 MMOL/L (ref 21–32)
CREAT SERPL-MCNC: 0.71 MG/DL (ref 0.5–1.3)
ERYTHROCYTE [DISTWIDTH] IN BLOOD BY AUTOMATED COUNT: 13.1 % (ref 11.5–14.5)
GFR SERPL CREATININE-BSD FRML MDRD: 90 ML/MIN/1.73M*2
GLUCOSE SERPL-MCNC: 111 MG/DL (ref 74–99)
HCT VFR BLD AUTO: 35.4 % (ref 41–52)
HGB BLD-MCNC: 11.2 G/DL (ref 13.5–17.5)
MCH RBC QN AUTO: 31.1 PG (ref 26–34)
MCHC RBC AUTO-ENTMCNC: 31.6 G/DL (ref 32–36)
MCV RBC AUTO: 98 FL (ref 80–100)
NRBC BLD-RTO: 0 /100 WBCS (ref 0–0)
PLATELET # BLD AUTO: 297 X10*3/UL (ref 150–450)
POTASSIUM SERPL-SCNC: 3.7 MMOL/L (ref 3.5–5.3)
RBC # BLD AUTO: 3.6 X10*6/UL (ref 4.5–5.9)
SODIUM SERPL-SCNC: 139 MMOL/L (ref 136–145)
WBC # BLD AUTO: 13.3 X10*3/UL (ref 4.4–11.3)

## 2023-11-10 PROCEDURE — 85027 COMPLETE CBC AUTOMATED: CPT | Performed by: PHYSICIAN ASSISTANT

## 2023-11-10 PROCEDURE — 2500000005 HC RX 250 GENERAL PHARMACY W/O HCPCS: Performed by: NURSE ANESTHETIST, CERTIFIED REGISTERED

## 2023-11-10 PROCEDURE — 7100000002 HC RECOVERY ROOM TIME - EACH INCREMENTAL 1 MINUTE: Performed by: STUDENT IN AN ORGANIZED HEALTH CARE EDUCATION/TRAINING PROGRAM

## 2023-11-10 PROCEDURE — 3600000008 HC OR TIME - EACH INCREMENTAL 1 MINUTE - PROCEDURE LEVEL THREE: Performed by: STUDENT IN AN ORGANIZED HEALTH CARE EDUCATION/TRAINING PROGRAM

## 2023-11-10 PROCEDURE — 99232 SBSQ HOSP IP/OBS MODERATE 35: CPT | Performed by: NURSE PRACTITIONER

## 2023-11-10 PROCEDURE — 2500000002 HC RX 250 W HCPCS SELF ADMINISTERED DRUGS (ALT 637 FOR MEDICARE OP, ALT 636 FOR OP/ED): Performed by: PHYSICIAN ASSISTANT

## 2023-11-10 PROCEDURE — 2500000004 HC RX 250 GENERAL PHARMACY W/ HCPCS (ALT 636 FOR OP/ED): Performed by: NURSE ANESTHETIST, CERTIFIED REGISTERED

## 2023-11-10 PROCEDURE — 3600000003 HC OR TIME - INITIAL BASE CHARGE - PROCEDURE LEVEL THREE: Performed by: STUDENT IN AN ORGANIZED HEALTH CARE EDUCATION/TRAINING PROGRAM

## 2023-11-10 PROCEDURE — 2720000007 HC OR 272 NO HCPCS: Performed by: STUDENT IN AN ORGANIZED HEALTH CARE EDUCATION/TRAINING PROGRAM

## 2023-11-10 PROCEDURE — 2500000005 HC RX 250 GENERAL PHARMACY W/O HCPCS: Performed by: PHYSICIAN ASSISTANT

## 2023-11-10 PROCEDURE — 99232 SBSQ HOSP IP/OBS MODERATE 35: CPT | Performed by: FAMILY MEDICINE

## 2023-11-10 PROCEDURE — S4991 NICOTINE PATCH NONLEGEND: HCPCS | Performed by: PHYSICIAN ASSISTANT

## 2023-11-10 PROCEDURE — 52214 CYSTOSCOPY AND TREATMENT: CPT | Performed by: STUDENT IN AN ORGANIZED HEALTH CARE EDUCATION/TRAINING PROGRAM

## 2023-11-10 PROCEDURE — 2500000004 HC RX 250 GENERAL PHARMACY W/ HCPCS (ALT 636 FOR OP/ED): Performed by: STUDENT IN AN ORGANIZED HEALTH CARE EDUCATION/TRAINING PROGRAM

## 2023-11-10 PROCEDURE — 0W3R8ZZ CONTROL BLEEDING IN GENITOURINARY TRACT, VIA NATURAL OR ARTIFICIAL OPENING ENDOSCOPIC: ICD-10-PCS | Performed by: STUDENT IN AN ORGANIZED HEALTH CARE EDUCATION/TRAINING PROGRAM

## 2023-11-10 PROCEDURE — 80048 BASIC METABOLIC PNL TOTAL CA: CPT | Performed by: PHYSICIAN ASSISTANT

## 2023-11-10 PROCEDURE — 0TCB8ZZ EXTIRPATION OF MATTER FROM BLADDER, VIA NATURAL OR ARTIFICIAL OPENING ENDOSCOPIC: ICD-10-PCS | Performed by: STUDENT IN AN ORGANIZED HEALTH CARE EDUCATION/TRAINING PROGRAM

## 2023-11-10 PROCEDURE — 36415 COLL VENOUS BLD VENIPUNCTURE: CPT | Performed by: PHYSICIAN ASSISTANT

## 2023-11-10 PROCEDURE — 2500000004 HC RX 250 GENERAL PHARMACY W/ HCPCS (ALT 636 FOR OP/ED): Performed by: PHYSICIAN ASSISTANT

## 2023-11-10 PROCEDURE — A52001 PR CYSTOURETHROSCOPY W/IRRIG AND EVAC CLOTS: Performed by: NURSE ANESTHETIST, CERTIFIED REGISTERED

## 2023-11-10 PROCEDURE — 3700000001 HC GENERAL ANESTHESIA TIME - INITIAL BASE CHARGE: Performed by: STUDENT IN AN ORGANIZED HEALTH CARE EDUCATION/TRAINING PROGRAM

## 2023-11-10 PROCEDURE — 7100000001 HC RECOVERY ROOM TIME - INITIAL BASE CHARGE: Performed by: STUDENT IN AN ORGANIZED HEALTH CARE EDUCATION/TRAINING PROGRAM

## 2023-11-10 PROCEDURE — 1100000001 HC PRIVATE ROOM DAILY

## 2023-11-10 PROCEDURE — 2500000001 HC RX 250 WO HCPCS SELF ADMINISTERED DRUGS (ALT 637 FOR MEDICARE OP): Performed by: PHYSICIAN ASSISTANT

## 2023-11-10 PROCEDURE — 3700000002 HC GENERAL ANESTHESIA TIME - EACH INCREMENTAL 1 MINUTE: Performed by: STUDENT IN AN ORGANIZED HEALTH CARE EDUCATION/TRAINING PROGRAM

## 2023-11-10 RX ORDER — SODIUM CHLORIDE, SODIUM LACTATE, POTASSIUM CHLORIDE, CALCIUM CHLORIDE 600; 310; 30; 20 MG/100ML; MG/100ML; MG/100ML; MG/100ML
100 INJECTION, SOLUTION INTRAVENOUS CONTINUOUS
Status: DISCONTINUED | OUTPATIENT
Start: 2023-11-10 | End: 2023-11-10 | Stop reason: HOSPADM

## 2023-11-10 RX ORDER — CEFAZOLIN 1 G/1
INJECTION, POWDER, FOR SOLUTION INTRAVENOUS AS NEEDED
Status: DISCONTINUED | OUTPATIENT
Start: 2023-11-10 | End: 2023-11-10

## 2023-11-10 RX ORDER — LABETALOL HYDROCHLORIDE 5 MG/ML
5 INJECTION, SOLUTION INTRAVENOUS ONCE AS NEEDED
Status: DISCONTINUED | OUTPATIENT
Start: 2023-11-10 | End: 2023-11-10 | Stop reason: HOSPADM

## 2023-11-10 RX ORDER — FENTANYL CITRATE 50 UG/ML
INJECTION, SOLUTION INTRAMUSCULAR; INTRAVENOUS AS NEEDED
Status: DISCONTINUED | OUTPATIENT
Start: 2023-11-10 | End: 2023-11-10

## 2023-11-10 RX ORDER — ACETAMINOPHEN 325 MG/1
650 TABLET ORAL EVERY 4 HOURS PRN
Status: DISCONTINUED | OUTPATIENT
Start: 2023-11-10 | End: 2023-11-10 | Stop reason: HOSPADM

## 2023-11-10 RX ORDER — LIDOCAINE HCL/PF 100 MG/5ML
SYRINGE (ML) INTRAVENOUS AS NEEDED
Status: DISCONTINUED | OUTPATIENT
Start: 2023-11-10 | End: 2023-11-10

## 2023-11-10 RX ORDER — LIDOCAINE HYDROCHLORIDE 10 MG/ML
0.1 INJECTION, SOLUTION EPIDURAL; INFILTRATION; INTRACAUDAL; PERINEURAL ONCE
Status: DISCONTINUED | OUTPATIENT
Start: 2023-11-10 | End: 2023-11-10 | Stop reason: HOSPADM

## 2023-11-10 RX ORDER — OXYCODONE HYDROCHLORIDE 5 MG/1
10 TABLET ORAL EVERY 4 HOURS PRN
Status: DISCONTINUED | OUTPATIENT
Start: 2023-11-10 | End: 2023-11-10 | Stop reason: HOSPADM

## 2023-11-10 RX ORDER — PROPOFOL 10 MG/ML
INJECTION, EMULSION INTRAVENOUS AS NEEDED
Status: DISCONTINUED | OUTPATIENT
Start: 2023-11-10 | End: 2023-11-10

## 2023-11-10 RX ADMIN — FLUTICASONE FUROATE AND VILANTEROL TRIFENATATE 1 PUFF: 200; 25 POWDER RESPIRATORY (INHALATION) at 11:05

## 2023-11-10 RX ADMIN — AMLODIPINE BESYLATE 5 MG: 5 TABLET ORAL at 08:53

## 2023-11-10 RX ADMIN — PROPOFOL 50 MG: 10 INJECTION, EMULSION INTRAVENOUS at 15:46

## 2023-11-10 RX ADMIN — FENTANYL CITRATE 25 MCG: 50 INJECTION, SOLUTION INTRAMUSCULAR; INTRAVENOUS at 15:43

## 2023-11-10 RX ADMIN — PROPOFOL 100 MG: 10 INJECTION, EMULSION INTRAVENOUS at 15:41

## 2023-11-10 RX ADMIN — CEFAZOLIN 2 G: 1 INJECTION, POWDER, FOR SOLUTION INTRAMUSCULAR; INTRAVENOUS at 15:44

## 2023-11-10 RX ADMIN — FENTANYL CITRATE 50 MCG: 50 INJECTION, SOLUTION INTRAMUSCULAR; INTRAVENOUS at 15:48

## 2023-11-10 RX ADMIN — LIDOCAINE 1 PATCH: 4 PATCH TOPICAL at 20:42

## 2023-11-10 RX ADMIN — GUAIFENESIN 1200 MG: 600 TABLET ORAL at 20:35

## 2023-11-10 RX ADMIN — SODIUM CHLORIDE, POTASSIUM CHLORIDE, SODIUM LACTATE AND CALCIUM CHLORIDE 100 ML/HR: 600; 310; 30; 20 INJECTION, SOLUTION INTRAVENOUS at 15:54

## 2023-11-10 RX ADMIN — GABAPENTIN 300 MG: 300 CAPSULE ORAL at 08:53

## 2023-11-10 RX ADMIN — MONTELUKAST 10 MG: 10 TABLET, FILM COATED ORAL at 20:34

## 2023-11-10 RX ADMIN — PROPOFOL 20 MG: 10 INJECTION, EMULSION INTRAVENOUS at 15:42

## 2023-11-10 RX ADMIN — GUAIFENESIN 1200 MG: 600 TABLET ORAL at 08:53

## 2023-11-10 RX ADMIN — GABAPENTIN 300 MG: 300 CAPSULE ORAL at 20:36

## 2023-11-10 RX ADMIN — SODIUM CHLORIDE 100 ML/HR: 9 INJECTION, SOLUTION INTRAVENOUS at 06:00

## 2023-11-10 RX ADMIN — ASPIRIN 81 MG: 81 TABLET, COATED ORAL at 08:53

## 2023-11-10 RX ADMIN — HYDROMORPHONE HYDROCHLORIDE 0.5 MG: 1 INJECTION, SOLUTION INTRAMUSCULAR; INTRAVENOUS; SUBCUTANEOUS at 16:31

## 2023-11-10 RX ADMIN — ATORVASTATIN CALCIUM 20 MG: 20 TABLET, FILM COATED ORAL at 20:35

## 2023-11-10 RX ADMIN — DOCUSATE SODIUM 100 MG: 100 CAPSULE, LIQUID FILLED ORAL at 20:35

## 2023-11-10 RX ADMIN — LIDOCAINE HYDROCHLORIDE 80 MG: 20 INJECTION INTRAVENOUS at 15:40

## 2023-11-10 RX ADMIN — ACETAMINOPHEN 650 MG: 325 TABLET ORAL at 20:39

## 2023-11-10 RX ADMIN — FENTANYL CITRATE 25 MCG: 50 INJECTION, SOLUTION INTRAMUSCULAR; INTRAVENOUS at 15:41

## 2023-11-10 RX ADMIN — NICOTINE 1 PATCH: 14 PATCH, EXTENDED RELEASE TRANSDERMAL at 08:53

## 2023-11-10 RX ADMIN — Medication 3 MG: at 20:35

## 2023-11-10 ASSESSMENT — COGNITIVE AND FUNCTIONAL STATUS - GENERAL
MOBILITY SCORE: 22
CLIMB 3 TO 5 STEPS WITH RAILING: A LITTLE
WALKING IN HOSPITAL ROOM: A LITTLE
DAILY ACTIVITIY SCORE: 23
TOILETING: A LITTLE

## 2023-11-10 ASSESSMENT — PAIN - FUNCTIONAL ASSESSMENT
PAIN_FUNCTIONAL_ASSESSMENT: 0-10

## 2023-11-10 ASSESSMENT — PAIN SCALES - GENERAL
PAINLEVEL_OUTOF10: 3
PAINLEVEL_OUTOF10: 0 - NO PAIN
PAINLEVEL_OUTOF10: 0 - NO PAIN
PAINLEVEL_OUTOF10: 3
PAINLEVEL_OUTOF10: 9
PAINLEVEL_OUTOF10: 3
PAIN_LEVEL: 4
PAINLEVEL_OUTOF10: 2

## 2023-11-10 ASSESSMENT — ACTIVITIES OF DAILY LIVING (ADL): LACK_OF_TRANSPORTATION: NO

## 2023-11-10 NOTE — ANESTHESIA PROCEDURE NOTES
Airway  Date/Time: 11/10/2023 4:07 PM  Urgency: elective    Airway not difficult    Staffing  Performed: CRNA   Authorized by: CHILANGO Urbina-CRNA    Performed by: Michael Sainz DO  Patient location during procedure: OR    Final Airway Details  Final airway type: endotracheal airway

## 2023-11-10 NOTE — ANESTHESIA POSTPROCEDURE EVALUATION
Patient: Shiva Hardy    Procedure Summary       Date: 11/10/23 Room / Location: [facility] OR 08 / [facility] OR    Anesthesia Start: 1533 Anesthesia Stop: 1612    Procedure: Cystoscopy with Evacuation Clots Diagnosis:       Bladder hemorrhage      (hematuria)    Surgeons: Irwin Kelsey MD Responsible Provider: MARIA C Urbina    Anesthesia Type: general ASA Status: 4 - Emergent            Anesthesia Type: general    Vitals Value Taken Time   /77 11/10/23 1620   Temp 36 11/10/23 1624   Pulse 70 11/10/23 1622   Resp 16 11/10/23 1624   SpO2 94 % 11/10/23 1622   Vitals shown include unvalidated device data.    Anesthesia Post Evaluation    Patient location during evaluation: PACU  Patient participation: complete - patient participated  Level of consciousness: awake and alert  Pain score: 4  Pain management: adequate  Airway patency: patent  Cardiovascular status: acceptable  Respiratory status: acceptable  Hydration status: acceptable        No notable events documented.

## 2023-11-10 NOTE — ANESTHESIA PREPROCEDURE EVALUATION
Patient: Shiva Hardy    Procedure Information       Date/Time: 11/10/23 1445    Procedure: Cystoscopy with Evacuation Clots    Location: [facility] OR 08 / [facility] OR    Surgeons: Irwin Kelsey MD            Relevant Problems   Cardiovascular   (+) Aortic aneurysm (CMS/HCC)   (+) Essential hypertension   (+) Hyperlipidemia   (+) Thoracic back pain      Neuro/Psych   (+) Thoracic neuritis      Pulmonary   (+) Asthma   (+) COPD (chronic obstructive pulmonary disease) (CMS/HCC)   (+) Centrilobular emphysema (CMS/HCC)   (+) Left lower lobe pneumonia   (+) Multiple lung nodules on CT   (+) Obstructive sleep apnea   (+) Paraseptal emphysema (CMS/HCC)      Musculoskeletal   (+) Spondylosis, thoracic   (+) Thoracic spondylosis      Eyes, Ears, Nose, and Throat   (+) Bilateral hearing loss      Infectious Disease   (+) Oral thrush       Clinical information reviewed:   Tobacco  Allergies  Meds  Problems  Med Hx  Surg Hx   Fam Hx  Soc   Hx        NPO Detail:  NPO/Void Status  Date of Last Liquid: 11/10/23  Time of Last Liquid: 0000  Date of Last Solid: 11/10/23  Time of Last Solid: 0000         Physical Exam    Airway  Mallampati: III  TM distance: >3 FB  Neck ROM: full     Cardiovascular - normal exam     Dental    Pulmonary - normal exam     Abdominal - normal exam           Anesthesia Plan    ASA 4 - emergent     general   (GA lma)  intravenous induction   Anesthetic plan and risks discussed with patient.  Use of blood products discussed with patient who.    Plan discussed with CRNA and attending.

## 2023-11-10 NOTE — PROGRESS NOTES
11/10/23 1413   Discharge Planning   Living Arrangements Spouse/significant other   Support Systems Spouse/significant other;Children   Assistance Needed Alert and oriented x 3, Independent with ADL's,   Type of Residence Private residence   Number of Stairs to Enter Residence 3   Number of Stairs Within Residence 0   Do you have animals or pets at home? No   Who is requesting discharge planning? Provider   Home or Post Acute Services None   Patient expects to be discharged to: Home with significant other and no other discharge needs anticipated   Does the patient need discharge transport arranged? No   Financial Resource Strain   How hard is it for you to pay for the very basics like food, housing, medical care, and heating? Not hard   Housing Stability   In the last 12 months, was there a time when you were not able to pay the mortgage or rent on time? N   In the last 12 months, how many places have you lived? 1   In the last 12 months, was there a time when you did not have a steady place to sleep or slept in a shelter (including now)? N   Transportation Needs   In the past 12 months, has lack of transportation kept you from medical appointments or from getting medications? no   In the past 12 months, has lack of transportation kept you from meetings, work, or from getting things needed for daily living? No

## 2023-11-10 NOTE — PROGRESS NOTES
Shiva Hardy is a 83 y.o. male on day 0 of admission presenting with Hematuria.      Subjective   Patient in bed in obvious discomfort since gorman clamped, reports significant suprapubic pain. Gorman full of bright red blood again.       Objective     Last Recorded Vitals  /67   Pulse 68   Temp 36 °C (96.8 °F)   Resp 17   Wt 81.6 kg (180 lb)   SpO2 94%   Intake/Output last 3 Shifts:    Intake/Output Summary (Last 24 hours) at 11/10/2023 1533  Last data filed at 11/10/2023 1345  Gross per 24 hour   Intake 1336.67 ml   Output 9035 ml   Net -7698.33 ml       Admission Weight  Weight: 81.6 kg (180 lb) (11/09/23 0535)    Daily Weight  11/09/23 : 81.6 kg (180 lb)      Physical Exam  Constitutional: alert and oriented x 3, awake, cooperative, no acute distress  Skin: warm and dry  Head/Neck: Normocephalic, atraumatic  Eyes: clear sclera  ENMT: mucous membranes moist  Cardio: Regular rate and rhythm  Resp: CTA bilaterally, good respiratory effort  Gastrointestinal: Soft, nontender, nondistended  Genitourinary: gorman in place with bright red blood clots and red urine  Musculoskeletal: ROM intact, no joint swelling  Extremities: No edema, cyanosis, or clubbing  Neuro: alert and oriented x 3  Psychological: Appropriate mood and behavior    Relevant Results  Scheduled medications  amLODIPine, 5 mg, oral, q AM  aspirin, 81 mg, oral, q AM  atorvastatin, 20 mg, oral, Nightly  docusate sodium, 100 mg, oral, BID  fluticasone furoate-vilanteroL, 1 puff, inhalation, Daily  gabapentin, 300 mg, oral, TID  guaiFENesin, 1,200 mg, oral, BID  melatonin, 3 mg, oral, Daily  montelukast, 10 mg, oral, Nightly  nicotine, 1 patch, transdermal, Daily  pantoprazole, 40 mg, oral, Daily before breakfast   Or  pantoprazole, 40 mg, intravenous, Daily before breakfast  polyethylene glycol, 17 g, oral, Daily      Continuous medications  sodium chloride 0.9%, 100 mL/hr, Last Rate: 100 mL/hr (11/10/23 1322)      PRN medications  PRN  medications: acetaminophen **OR** acetaminophen **OR** acetaminophen, lidocaine, ondansetron **OR** ondansetron, oxygen, phenazopyridine     Results for orders placed or performed during the hospital encounter of 11/09/23 (from the past 24 hour(s))   CBC   Result Value Ref Range    WBC 12.0 (H) 4.4 - 11.3 x10*3/uL    nRBC 0.0 0.0 - 0.0 /100 WBCs    RBC 3.41 (L) 4.50 - 5.90 x10*6/uL    Hemoglobin 10.8 (L) 13.5 - 17.5 g/dL    Hematocrit 33.5 (L) 41.0 - 52.0 %    MCV 98 80 - 100 fL    MCH 31.7 26.0 - 34.0 pg    MCHC 32.2 32.0 - 36.0 g/dL    RDW 13.2 11.5 - 14.5 %    Platelets 290 150 - 450 x10*3/uL   CBC   Result Value Ref Range    WBC 13.3 (H) 4.4 - 11.3 x10*3/uL    nRBC 0.0 0.0 - 0.0 /100 WBCs    RBC 3.60 (L) 4.50 - 5.90 x10*6/uL    Hemoglobin 11.2 (L) 13.5 - 17.5 g/dL    Hematocrit 35.4 (L) 41.0 - 52.0 %    MCV 98 80 - 100 fL    MCH 31.1 26.0 - 34.0 pg    MCHC 31.6 (L) 32.0 - 36.0 g/dL    RDW 13.1 11.5 - 14.5 %    Platelets 297 150 - 450 x10*3/uL   Basic metabolic panel   Result Value Ref Range    Glucose 111 (H) 74 - 99 mg/dL    Sodium 139 136 - 145 mmol/L    Potassium 3.7 3.5 - 5.3 mmol/L    Chloride 104 98 - 107 mmol/L    Bicarbonate 28 21 - 32 mmol/L    Anion Gap 11 10 - 20 mmol/L    Urea Nitrogen 16 6 - 23 mg/dL    Creatinine 0.71 0.50 - 1.30 mg/dL    eGFR 90 >60 mL/min/1.73m*2    Calcium 9.0 8.6 - 10.3 mg/dL         Assessment/Plan   82yo CM with PMH of CAD, COPD, and bladder cancer s/p TURBT that presented to the ED with hematuria and was admitted for the same reason.     Gross hematuria  - s/p TURBT x2 for bladder cancer  - 3way gorman in place with irrigation, currently clamped for trial   - urology following, plan to take to OR later today for cystoscopy  - H/H remains stable, monitor closely    Chronic low back pain  - continue home gabapentin    COPD, tobacco abuse  - continue home breo ellipta inhaler  - continue home singulair    Hypertension, CAD  - continue home amlodipine    Hyperlipidemia  -  continue home statin    DVT Proph: SCDs only    Dispo: Patient requires close inpatient monitoring in setting of persistent hematuria requiring urological evaluation, hospital stay likely >2midnights.     Principal Problem:    Hematuria  Active Problems:    Urinary retention           Whitney Albright DO

## 2023-11-10 NOTE — PROGRESS NOTES
"Shiva Hardy is a 83 y.o. male on day 0 of admission presenting with Hematuria.    Subjective   No acute overnight events.  States he is feeling a lot better.  Urine is clear with CBI.  No clots.  Currently NPO for the OR but will stop CBI and reassess, if urine is still clear we will cancel OR today.       Objective     Physical Exam  Vitals reviewed.   HENT:      Head: Normocephalic and atraumatic.   Eyes:      Extraocular Movements: Extraocular movements intact.      Conjunctiva/sclera: Conjunctivae normal.      Pupils: Pupils are equal, round, and reactive to light.   Cardiovascular:      Rate and Rhythm: Normal rate and regular rhythm.      Pulses: Normal pulses.   Pulmonary:      Effort: Pulmonary effort is normal.      Comments: On supplemental O2  Abdominal:      General: Bowel sounds are normal.      Palpations: Abdomen is soft.   Genitourinary:     Comments: Talbert in place, CBI in place, urine clear, no clots  Skin:     General: Skin is warm and dry.      Capillary Refill: Capillary refill takes less than 2 seconds.   Neurological:      General: No focal deficit present.      Mental Status: He is alert and oriented to person, place, and time.         Last Recorded Vitals  Blood pressure 141/73, pulse 70, temperature 36.2 °C (97.2 °F), temperature source Temporal, resp. rate 17, height 1.778 m (5' 10\"), weight 81.6 kg (180 lb), SpO2 95 %.  Intake/Output last 3 Shifts:  I/O last 3 completed shifts:  In: 600 (7.3 mL/kg) [P.O.:600]  Out: 7625 (93.4 mL/kg) [Urine:7625 (2.6 mL/kg/hr)]  Weight: 81.6 kg     Relevant Results      Scheduled medications  amLODIPine, 5 mg, oral, q AM  aspirin, 81 mg, oral, q AM  atorvastatin, 20 mg, oral, Nightly  docusate sodium, 100 mg, oral, BID  fluticasone furoate-vilanteroL, 1 puff, inhalation, Daily  gabapentin, 300 mg, oral, TID  guaiFENesin, 1,200 mg, oral, BID  melatonin, 3 mg, oral, Daily  montelukast, 10 mg, oral, Nightly  nicotine, 1 patch, transdermal, " Daily  pantoprazole, 40 mg, oral, Daily before breakfast   Or  pantoprazole, 40 mg, intravenous, Daily before breakfast  polyethylene glycol, 17 g, oral, Daily      Continuous medications  sodium chloride 0.9%, 100 mL/hr, Last Rate: 100 mL/hr (11/10/23 0600)      PRN medications  PRN medications: acetaminophen **OR** acetaminophen **OR** acetaminophen, lidocaine, ondansetron **OR** ondansetron, oxygen, phenazopyridine  Results for orders placed or performed during the hospital encounter of 11/09/23 (from the past 24 hour(s))   CBC and Auto Differential   Result Value Ref Range    WBC 16.5 (H) 4.4 - 11.3 x10*3/uL    nRBC 0.0 0.0 - 0.0 /100 WBCs    RBC 3.74 (L) 4.50 - 5.90 x10*6/uL    Hemoglobin 11.6 (L) 13.5 - 17.5 g/dL    Hematocrit 35.8 (L) 41.0 - 52.0 %    MCV 96 80 - 100 fL    MCH 31.0 26.0 - 34.0 pg    MCHC 32.4 32.0 - 36.0 g/dL    RDW 12.8 11.5 - 14.5 %    Platelets 294 150 - 450 x10*3/uL    Neutrophils % 86.1 40.0 - 80.0 %    Immature Granulocytes %, Automated 0.4 0.0 - 0.9 %    Lymphocytes % 8.1 13.0 - 44.0 %    Monocytes % 5.3 2.0 - 10.0 %    Eosinophils % 0.0 0.0 - 6.0 %    Basophils % 0.1 0.0 - 2.0 %    Neutrophils Absolute 14.16 (H) 1.60 - 5.50 x10*3/uL    Immature Granulocytes Absolute, Automated 0.06 0.00 - 0.50 x10*3/uL    Lymphocytes Absolute 1.34 0.80 - 3.00 x10*3/uL    Monocytes Absolute 0.88 (H) 0.05 - 0.80 x10*3/uL    Eosinophils Absolute 0.00 0.00 - 0.40 x10*3/uL    Basophils Absolute 0.02 0.00 - 0.10 x10*3/uL   Comprehensive metabolic panel   Result Value Ref Range    Glucose 129 (H) 74 - 99 mg/dL    Sodium 134 (L) 136 - 145 mmol/L    Potassium 4.2 3.5 - 5.3 mmol/L    Chloride 101 98 - 107 mmol/L    Bicarbonate 24 21 - 32 mmol/L    Anion Gap 13 10 - 20 mmol/L    Urea Nitrogen 22 6 - 23 mg/dL    Creatinine 0.78 0.50 - 1.30 mg/dL    eGFR 87 >60 mL/min/1.73m*2    Calcium 9.1 8.6 - 10.3 mg/dL    Albumin 3.9 3.4 - 5.0 g/dL    Alkaline Phosphatase 62 33 - 136 U/L    Total Protein 6.7 6.4 - 8.2 g/dL     AST 21 9 - 39 U/L    Bilirubin, Total 0.4 0.0 - 1.2 mg/dL    ALT 20 10 - 52 U/L   aPTT   Result Value Ref Range    aPTT 28 27 - 38 seconds   Protime-INR   Result Value Ref Range    Protime 11.4 9.8 - 12.8 seconds    INR 1.0 0.9 - 1.1   CBC   Result Value Ref Range    WBC 12.0 (H) 4.4 - 11.3 x10*3/uL    nRBC 0.0 0.0 - 0.0 /100 WBCs    RBC 3.41 (L) 4.50 - 5.90 x10*6/uL    Hemoglobin 10.8 (L) 13.5 - 17.5 g/dL    Hematocrit 33.5 (L) 41.0 - 52.0 %    MCV 98 80 - 100 fL    MCH 31.7 26.0 - 34.0 pg    MCHC 32.2 32.0 - 36.0 g/dL    RDW 13.2 11.5 - 14.5 %    Platelets 290 150 - 450 x10*3/uL       Assessment/Plan   Principal Problem:    Hematuria    83 year old male, SP TURBP x 2 presenting with hematuria and urinary retention     - NPO currently  - urine clear, will clamp CBI and reassess  - if urine remains clear, will remove gorman, and cancel OR today  - MIVF while NPO  - Pyridium 100mg PO TID PRN bladder spasms  - will continue to monitor    Discussed with Dr. Kelsey          I spent 25 minutes in the professional and overall care of this patient.    Tresa Pacheco, APRN-CNP

## 2023-11-10 NOTE — OP NOTE
Cystoscopy with Evacuation Clots Operative Note     Date: 2023 - 11/10/2023  OR Location: [facility] OR    Name: Shiva Hardy, : 1940, Age: 83 y.o., MRN: [id number], Sex: male    Diagnosis  Pre-op Diagnosis     * Persistent gross hematuria [R31.0] Post-op Diagnosis     * Bladder hemorrhage [N32.89]     Procedures  Cystoscopy with Evacuation Clots  94313 - [place] CYSTO W/IRRIG & EVAC MULTPLE OBSTRUCTING CLOTS  Fulguration of bleeding    Surgeons      * Irwin Kelsey - Primary    Resident/Fellow/Other Assistant:  Surgeon(s) and Role:    Procedure Summary  Anesthesia: * No anesthesia type entered *  ASA: IV  Anesthesia Staff: CRNA: CHILANGO Urbina-CRNA  Estimated Blood Loss: 0mL  Intra-op Medications: * No intraprocedure medications in log *           Anesthesia Record               Intraprocedure I/O Totals          Intake    Propofol Drip 0.00 mL    The total shown is the total volume documented since Anesthesia Start was filed.    Total Intake 0 mL          Specimen: No specimens collected     Staff:   Circulator: Mohan Nicholson RN  Scrub Person: Keerthi Chilel         Drains and/or Catheters:   Continuous Bladder Irrigation 20 Fr (Active)   Site Assessment Clean;Skin intact 23   Collection Container Standard drainage bag 23 232   Reason for Continuing Urinary Catheterization management of gross hematuria with blood clots in the urine 23   Patient Tolerance of Continuous Bladder Irrigation Tolerating well 23   Rate Moderate 23   Irrigant Sterile water 23 2321   CBI Irrigation Intake (mL) 6000 mL 11/10/23 0720   CBI Urethral Catheter Output (mL) 900 mL 11/10/23 0718   CBI Net Output (mL) 0 mL 23 0950       Tourniquet Times:         Implants:     Findings: site of prior resection fresh and erythematous    Indications: Shiva Hardy is an 83 y.o. male who is having surgery for hematuria. He had a TURBT 3 weeks ago for bladder cancer, had  provoked bleeding with clots.    The patient was seen in the preoperative area. The risks, benefits, complications, treatment options, non-operative alternatives, expected recovery and outcomes were discussed with the patient. The possibilities of reaction to medication, pulmonary aspiration, injury to surrounding structures, bleeding, recurrent infection, the need for additional procedures, failure to diagnose a condition, and creating a complication requiring transfusion or operation were discussed with the patient. The patient concurred with the proposed plan, giving informed consent.  The site of surgery was properly noted/marked if necessary per policy. The patient has been actively warmed in preoperative area. Preoperative antibiotics have been ordered and given within 1 hours of incision. Venous thrombosis prophylaxis have been ordered including bilateral sequential compression devices    Procedure Details: Patient was consented and antibiotics were started on-call to the OR.  In the operating room, under general anesthesia, patient placed in dorsolithotomy position, genitalia was prepped and draped in usual manner.  No 26 resectoscope was inserted down the urethra into the prostate and bladder, and cystoscopy revealed the area of prior resection on the posterior wall to be slightly erythematous with minimal surrounding clots.  Using the loop on the working element, the area of granulation with underlying erythema and inflammation was fulgurated.  This was done the whole surface of the prior resected tumor.  Extensive hemostasis was performed for the underlying base of the tumor as well as the surrounding mucosa.  On the way out, the bladder neck had a minor bleeder which was fulgurated too.   No 22 French Talbert catheter was inserted and the irrigation port was plugged. Drained urine was clear.  Patient was awakened and transferred to PACU in stable condition.    Complications:  None; patient tolerated the  procedure well.    Disposition: PACU - hemodynamically stable.  Condition: stable         Additional Details: Talbert to be removed tomorrow if bleeding does not recur    Attending Attestation: I performed the procedure.    Irwin Kelsey  Phone Number: [phone]

## 2023-11-11 LAB
ANION GAP SERPL CALC-SCNC: 12 MMOL/L (ref 10–20)
BUN SERPL-MCNC: 12 MG/DL (ref 6–23)
CALCIUM SERPL-MCNC: 8.6 MG/DL (ref 8.6–10.3)
CHLORIDE SERPL-SCNC: 104 MMOL/L (ref 98–107)
CO2 SERPL-SCNC: 25 MMOL/L (ref 21–32)
CREAT SERPL-MCNC: 0.65 MG/DL (ref 0.5–1.3)
ERYTHROCYTE [DISTWIDTH] IN BLOOD BY AUTOMATED COUNT: 13 % (ref 11.5–14.5)
GFR SERPL CREATININE-BSD FRML MDRD: >90 ML/MIN/1.73M*2
GLUCOSE SERPL-MCNC: 99 MG/DL (ref 74–99)
HCT VFR BLD AUTO: 33.9 % (ref 41–52)
HGB BLD-MCNC: 10.6 G/DL (ref 13.5–17.5)
MCH RBC QN AUTO: 30.8 PG (ref 26–34)
MCHC RBC AUTO-ENTMCNC: 31.3 G/DL (ref 32–36)
MCV RBC AUTO: 99 FL (ref 80–100)
NRBC BLD-RTO: 0 /100 WBCS (ref 0–0)
PLATELET # BLD AUTO: 279 X10*3/UL (ref 150–450)
POTASSIUM SERPL-SCNC: 4 MMOL/L (ref 3.5–5.3)
RBC # BLD AUTO: 3.44 X10*6/UL (ref 4.5–5.9)
SODIUM SERPL-SCNC: 137 MMOL/L (ref 136–145)
WBC # BLD AUTO: 12.8 X10*3/UL (ref 4.4–11.3)

## 2023-11-11 PROCEDURE — 2500000001 HC RX 250 WO HCPCS SELF ADMINISTERED DRUGS (ALT 637 FOR MEDICARE OP): Performed by: PHYSICIAN ASSISTANT

## 2023-11-11 PROCEDURE — 2500000005 HC RX 250 GENERAL PHARMACY W/O HCPCS: Performed by: INTERNAL MEDICINE

## 2023-11-11 PROCEDURE — 2500000004 HC RX 250 GENERAL PHARMACY W/ HCPCS (ALT 636 FOR OP/ED): Performed by: PHYSICIAN ASSISTANT

## 2023-11-11 PROCEDURE — 2500000005 HC RX 250 GENERAL PHARMACY W/O HCPCS: Performed by: PHYSICIAN ASSISTANT

## 2023-11-11 PROCEDURE — 85027 COMPLETE CBC AUTOMATED: CPT | Performed by: FAMILY MEDICINE

## 2023-11-11 PROCEDURE — 1100000001 HC PRIVATE ROOM DAILY

## 2023-11-11 PROCEDURE — 36415 COLL VENOUS BLD VENIPUNCTURE: CPT | Performed by: FAMILY MEDICINE

## 2023-11-11 PROCEDURE — 80048 BASIC METABOLIC PNL TOTAL CA: CPT | Performed by: FAMILY MEDICINE

## 2023-11-11 PROCEDURE — 99232 SBSQ HOSP IP/OBS MODERATE 35: CPT | Performed by: INTERNAL MEDICINE

## 2023-11-11 RX ADMIN — DOCUSATE SODIUM 100 MG: 100 CAPSULE, LIQUID FILLED ORAL at 10:56

## 2023-11-11 RX ADMIN — ATORVASTATIN CALCIUM 20 MG: 20 TABLET, FILM COATED ORAL at 20:33

## 2023-11-11 RX ADMIN — POLYETHYLENE GLYCOL 3350 17 G: 17 POWDER, FOR SOLUTION ORAL at 10:57

## 2023-11-11 RX ADMIN — MONTELUKAST 10 MG: 10 TABLET, FILM COATED ORAL at 20:33

## 2023-11-11 RX ADMIN — GABAPENTIN 300 MG: 300 CAPSULE ORAL at 15:11

## 2023-11-11 RX ADMIN — GUAIFENESIN 1200 MG: 600 TABLET ORAL at 20:33

## 2023-11-11 RX ADMIN — ASPIRIN 81 MG: 81 TABLET, COATED ORAL at 10:56

## 2023-11-11 RX ADMIN — FLUTICASONE FUROATE AND VILANTEROL TRIFENATATE 1 PUFF: 200; 25 POWDER RESPIRATORY (INHALATION) at 10:53

## 2023-11-11 RX ADMIN — DOCUSATE SODIUM 100 MG: 100 CAPSULE, LIQUID FILLED ORAL at 20:33

## 2023-11-11 RX ADMIN — Medication 3 MG: at 20:33

## 2023-11-11 RX ADMIN — GUAIFENESIN 1200 MG: 600 TABLET ORAL at 10:56

## 2023-11-11 RX ADMIN — ACETAMINOPHEN 650 MG: 325 TABLET ORAL at 10:58

## 2023-11-11 RX ADMIN — ACETAMINOPHEN 650 MG: 325 TABLET ORAL at 18:12

## 2023-11-11 RX ADMIN — ACETAMINOPHEN 650 MG: 325 TABLET ORAL at 04:32

## 2023-11-11 RX ADMIN — GABAPENTIN 300 MG: 300 CAPSULE ORAL at 20:33

## 2023-11-11 RX ADMIN — SODIUM CHLORIDE 100 ML/HR: 9 INJECTION, SOLUTION INTRAVENOUS at 03:57

## 2023-11-11 RX ADMIN — SODIUM CHLORIDE 100 ML/HR: 9 INJECTION, SOLUTION INTRAVENOUS at 18:14

## 2023-11-11 RX ADMIN — AMLODIPINE BESYLATE 5 MG: 5 TABLET ORAL at 10:56

## 2023-11-11 RX ADMIN — ACETAMINOPHEN 650 MG: 325 TABLET ORAL at 23:57

## 2023-11-11 RX ADMIN — Medication 2 L/MIN: at 08:43

## 2023-11-11 RX ADMIN — PANTOPRAZOLE SODIUM 40 MG: 40 TABLET, DELAYED RELEASE ORAL at 06:24

## 2023-11-11 RX ADMIN — LIDOCAINE 1 PATCH: 4 PATCH TOPICAL at 18:12

## 2023-11-11 RX ADMIN — GABAPENTIN 300 MG: 300 CAPSULE ORAL at 10:56

## 2023-11-11 ASSESSMENT — PAIN SCALES - GENERAL
PAINLEVEL_OUTOF10: 1
PAINLEVEL_OUTOF10: 8
PAINLEVEL_OUTOF10: 6
PAINLEVEL_OUTOF10: 7
PAINLEVEL_OUTOF10: 3
PAINLEVEL_OUTOF10: 3

## 2023-11-11 ASSESSMENT — COGNITIVE AND FUNCTIONAL STATUS - GENERAL
WALKING IN HOSPITAL ROOM: A LITTLE
TOILETING: A LITTLE
CLIMB 3 TO 5 STEPS WITH RAILING: A LITTLE
MOBILITY SCORE: 22
MOBILITY SCORE: 22
DAILY ACTIVITIY SCORE: 23
DAILY ACTIVITIY SCORE: 23
TOILETING: A LITTLE
DAILY ACTIVITIY SCORE: 23
WALKING IN HOSPITAL ROOM: A LITTLE
MOBILITY SCORE: 22
CLIMB 3 TO 5 STEPS WITH RAILING: A LITTLE
WALKING IN HOSPITAL ROOM: A LITTLE
TOILETING: A LITTLE
CLIMB 3 TO 5 STEPS WITH RAILING: A LITTLE

## 2023-11-11 ASSESSMENT — PAIN - FUNCTIONAL ASSESSMENT
PAIN_FUNCTIONAL_ASSESSMENT: 0-10

## 2023-11-11 ASSESSMENT — PAIN DESCRIPTION - LOCATION
LOCATION: BACK

## 2023-11-11 NOTE — CARE PLAN
The patient's goals for the shift include  no pain    The clinical goals for the shift include pt will have clear urine without clots during shift    The patient is still having pain in the lower back. I gave the patient tylenol and a lidocaine patch during my shift. Offered to have the patient shift weight to see if that would help. Patient still had some sediments in their gorman during. The patient did not complain of any bladder plan during shift.

## 2023-11-11 NOTE — CARE PLAN
The patient's goals for the shift include  to have no pain    The clinical goals for the shift include pt will have clear urine without clots    Over the shift, the patient did not make progress toward the following goals. Barriers to progression include . Recommendations to address these barriers include.

## 2023-11-11 NOTE — PROGRESS NOTES
Subjective   Patient is sitting on the bed, alert and oriented x3 answering my questions appropriately.  Denies any abdominal pain nausea or vomitings   No fever chills or rigors  No other significant overnight issues.      Objective     Intake/Output last 3 shifts:  I/O last 3 completed shifts:  In: 2058.3 (25.2 mL/kg) [P.O.:120; I.V.:1938.3 (23.7 mL/kg)]  Out: 8635 (105.8 mL/kg) [Urine:8635 (2.9 mL/kg/hr)]  Weight: 81.6 kg     Intake/Output this shift:  No intake/output data recorded.    Recent Results (from the past 48 hour(s))   CBC    Collection Time: 11/09/23  9:08 PM   Result Value Ref Range    WBC 12.0 (H) 4.4 - 11.3 x10*3/uL    nRBC 0.0 0.0 - 0.0 /100 WBCs    RBC 3.41 (L) 4.50 - 5.90 x10*6/uL    Hemoglobin 10.8 (L) 13.5 - 17.5 g/dL    Hematocrit 33.5 (L) 41.0 - 52.0 %    MCV 98 80 - 100 fL    MCH 31.7 26.0 - 34.0 pg    MCHC 32.2 32.0 - 36.0 g/dL    RDW 13.2 11.5 - 14.5 %    Platelets 290 150 - 450 x10*3/uL   CBC    Collection Time: 11/10/23  6:25 AM   Result Value Ref Range    WBC 13.3 (H) 4.4 - 11.3 x10*3/uL    nRBC 0.0 0.0 - 0.0 /100 WBCs    RBC 3.60 (L) 4.50 - 5.90 x10*6/uL    Hemoglobin 11.2 (L) 13.5 - 17.5 g/dL    Hematocrit 35.4 (L) 41.0 - 52.0 %    MCV 98 80 - 100 fL    MCH 31.1 26.0 - 34.0 pg    MCHC 31.6 (L) 32.0 - 36.0 g/dL    RDW 13.1 11.5 - 14.5 %    Platelets 297 150 - 450 x10*3/uL   Basic metabolic panel    Collection Time: 11/10/23  6:25 AM   Result Value Ref Range    Glucose 111 (H) 74 - 99 mg/dL    Sodium 139 136 - 145 mmol/L    Potassium 3.7 3.5 - 5.3 mmol/L    Chloride 104 98 - 107 mmol/L    Bicarbonate 28 21 - 32 mmol/L    Anion Gap 11 10 - 20 mmol/L    Urea Nitrogen 16 6 - 23 mg/dL    Creatinine 0.71 0.50 - 1.30 mg/dL    eGFR 90 >60 mL/min/1.73m*2    Calcium 9.0 8.6 - 10.3 mg/dL   CBC    Collection Time: 11/11/23  6:00 AM   Result Value Ref Range    WBC 12.8 (H) 4.4 - 11.3 x10*3/uL    nRBC 0.0 0.0 - 0.0 /100 WBCs    RBC 3.44 (L) 4.50 - 5.90 x10*6/uL    Hemoglobin 10.6 (L) 13.5 - 17.5  g/dL    Hematocrit 33.9 (L) 41.0 - 52.0 %    MCV 99 80 - 100 fL    MCH 30.8 26.0 - 34.0 pg    MCHC 31.3 (L) 32.0 - 36.0 g/dL    RDW 13.0 11.5 - 14.5 %    Platelets 279 150 - 450 x10*3/uL   Basic Metabolic Panel    Collection Time: 11/11/23  6:00 AM   Result Value Ref Range    Glucose 99 74 - 99 mg/dL    Sodium 137 136 - 145 mmol/L    Potassium 4.0 3.5 - 5.3 mmol/L    Chloride 104 98 - 107 mmol/L    Bicarbonate 25 21 - 32 mmol/L    Anion Gap 12 10 - 20 mmol/L    Urea Nitrogen 12 6 - 23 mg/dL    Creatinine 0.65 0.50 - 1.30 mg/dL    eGFR >90 >60 mL/min/1.73m*2    Calcium 8.6 8.6 - 10.3 mg/dL        XR chest 2 views  Narrative: Interpreted By:  Amy Posey,   STUDY:  XR CHEST 2 VIEWS;  11/9/2023 4:58 pm      INDICATION:  Signs/Symptoms:cough.      COMPARISON:  8/23/2023      ACCESSION NUMBER(S):  [id number]      ORDERING CLINICIAN:  COLE INGRAM      FINDINGS:  Streaky left retrocardiac opacities. Streaky scarring left lower lung  zone and right midlung zone. No pleural effusion or pneumothorax.  Atherosclerosis. Stable borderline heart size. No acute osseous  abnormality.      Impression: Streaky left retrocardiac opacities favored to be due to atelectasis  rather than infection depending on the clinical setting.      Signed by: Amy Posey 11/9/2023 6:28 PM  Dictation workstation:   [id number]  CT abdomen pelvis wo IV contrast  Narrative: Interpreted By:  Fabrice Mazariegos,   STUDY:  CT ABDOMEN PELVIS WO IV CONTRAST; 11/9/2023 1:59 pm      INDICATION:  Signs/Symptoms:evaluate for renal obstruction, clots.      COMPARISON:  CT of the abdomen and pelvis dated 05/25/2023      ACCESSION NUMBER(S):  [id number]      ORDERING CLINICIAN:  IWONA WESTBROOK      TECHNIQUE:  Contiguous axial images were obtained at 3mm slice thickness through  the abdomen and pelvis without intravenous contrast administration.  Coronal and sagittal reconstructions at 3 mm slice thickness were  performed. Intravenous contrast was not  given per the referring  physician's request.      FINDINGS:  The study is limited by the lack of intravenous contrast.      LOWER CHEST:  Evaluation of the visualized lung bases demonstrate scarring and/or  atelectasis at the lung bases bilaterally. The heart is within normal  limits for size.      ABDOMEN:      LIVER:  Small rounded hypodensities are seen throughout the liver, measuring  at up to 1.5 cm in diameter, similar to the prior study, most  consistent with cysts.      BILE DUCTS:  No definite intra or extrahepatic biliary dilatation is identified.      GALLBLADDER:  The gallbladder is nondilated. No definite calcified gallstones are  seen.      PANCREAS:  The pancreas is within normal limits for appearance, without evidence  of focal masses.      SPLEEN:  The spleen is within normal limits for size. No focal splenic mass is  seen.      ADRENAL GLANDS:  No definite adrenal nodules or masses are seen bilaterally.      KIDNEYS AND URETERS:  Mild bilateral hydronephrosis and hydroureter is seen, with the  ureters dilated to the urinary bladder. A rounded hypodensity is seen  from the posterior midpole of the left kidney, similar to the prior  study, consistent with a cyst. No definite new or enlarging renal  mass is seen.      PELVIS:      BLADDER:  A Talbert catheter is seen within the urinary bladder. There is  heterogeneous hyperdensity identified dependently within the urinary  bladder, new from the prior study, and may represent hemorrhage.      REPRODUCTIVE ORGANS:  The prostate is within normal limits for appearance.      BOWEL:  Multiple diverticuli are seen throughout the sigmoid colon. The colon  and small bowel are within normal limits for course, caliber and  appearance, without evidence of wall thickening or obstruction. The  appendix is not visualized. No CT evidence of acute diverticulitis or  appendicitis is seen.      VESSELS:  Moderate to severe atherosclerotic calcifications are seen  throughout  the infrarenal abdominal aorta and iliac arteries. The abdominal  aorta is within normal limits for course, caliber and appearance,  without evidence of aneurysm.      PERITONEUM/RETROPERITONEUM/LYMPH NODES:  There is no free intraperitoneal air or free fluid identified. No  gross mesenteric or retroperitoneal lymphadenopathy is identified.      BONE AND SOFT TISSUE:  There is no evidence of acute fracture identified. No evidence of  abdominal wall mass or hernia is identified.      Impression: 1. Bilateral hydronephrosis and hydroureter, as above.  2. No renal or ureteral calculus identified.  3. Hyperdensity dependently within the urinary bladder, most  consistent with hemorrhage.  4. Colonic diverticulosis, without evidence of acute diverticulitis.      MACRO:  None      Signed by: Fabrice Mazariegos 11/9/2023 2:32 PM  Dictation workstation:   [id number]       Vital signs in last 24 hours:  Temp:  [36 °C (96.8 °F)-37.1 °C (98.8 °F)] 36.2 °C (97.1 °F)  Heart Rate:  [61-74] 68  Resp:  [14-18] 18  BP: (120-140)/(67-77) 130/69  FiO2 (%):  [96 %] 96 %    Physical Exam  Constitutional: alert and oriented x 3, awake, cooperative, no acute distress  Skin: warm and dry  Head/Neck: Normocephalic, atraumatic  Eyes: clear sclera  ENMT: mucous membranes moist  Cardio: Regular rate and rhythm  Resp: CTA bilaterally, good respiratory effort  Gastrointestinal: Soft, nontender, nondistended  Genitourinary: Three-way Talbert catheter is clamped, urine light pink in color  Musculoskeletal: ROM intact, no joint swelling  Extremities: No edema, cyanosis, or clubbing  Neuro: alert and oriented x 3    Assessment/Plan     Gross hematuria.  Bladder cancer s/p TURBT x2 -most recent was done on 10/6/2023-planning to start BCG treatments in the near future.  Blood loss anemia-hemoglobin close to baseline.  Leukocytosis.    History of:  Hypertension  Coronary artery disease.  Hyperlipidemia.  COPD-no signs of exacerbation  Chronic  lower back pain.    Plan:  Initially patient was started on the three-way Talbert catheter.  S/p cystoscopy with evacuation of the clots on 11/10.  Currently three-way Talbert catheter is clamped, monitoring urine output-Talbert bag shows light pink color urine continue to monitor.  Continue IV fluids.  Hemoglobin is stable-monitor with a CBC.  Leukocytosis mostly reactive no signs of obvious infection continue to monitor.  Symptomatic pain medications.    Home medications reviewed, resumed appropriately.    DVT prophylaxis:  On SCDs.    Disposition:  Awaiting urology clearance.  Possible discharge in next 1 to 2 days.               LOS: 1 day

## 2023-11-12 LAB
ANION GAP SERPL CALC-SCNC: 11 MMOL/L (ref 10–20)
BUN SERPL-MCNC: 17 MG/DL (ref 6–23)
CALCIUM SERPL-MCNC: 8.4 MG/DL (ref 8.6–10.3)
CHLORIDE SERPL-SCNC: 107 MMOL/L (ref 98–107)
CO2 SERPL-SCNC: 27 MMOL/L (ref 21–32)
CREAT SERPL-MCNC: 0.67 MG/DL (ref 0.5–1.3)
ERYTHROCYTE [DISTWIDTH] IN BLOOD BY AUTOMATED COUNT: 12.8 % (ref 11.5–14.5)
GFR SERPL CREATININE-BSD FRML MDRD: >90 ML/MIN/1.73M*2
GLUCOSE SERPL-MCNC: 100 MG/DL (ref 74–99)
HCT VFR BLD AUTO: 33.7 % (ref 41–52)
HGB BLD-MCNC: 10.6 G/DL (ref 13.5–17.5)
MCH RBC QN AUTO: 30.7 PG (ref 26–34)
MCHC RBC AUTO-ENTMCNC: 31.5 G/DL (ref 32–36)
MCV RBC AUTO: 98 FL (ref 80–100)
NRBC BLD-RTO: 0 /100 WBCS (ref 0–0)
PLATELET # BLD AUTO: 307 X10*3/UL (ref 150–450)
POTASSIUM SERPL-SCNC: 3.9 MMOL/L (ref 3.5–5.3)
RBC # BLD AUTO: 3.45 X10*6/UL (ref 4.5–5.9)
SODIUM SERPL-SCNC: 141 MMOL/L (ref 136–145)
WBC # BLD AUTO: 9.3 X10*3/UL (ref 4.4–11.3)

## 2023-11-12 PROCEDURE — 2500000001 HC RX 250 WO HCPCS SELF ADMINISTERED DRUGS (ALT 637 FOR MEDICARE OP): Performed by: PHYSICIAN ASSISTANT

## 2023-11-12 PROCEDURE — S4991 NICOTINE PATCH NONLEGEND: HCPCS | Performed by: PHYSICIAN ASSISTANT

## 2023-11-12 PROCEDURE — 80048 BASIC METABOLIC PNL TOTAL CA: CPT | Performed by: INTERNAL MEDICINE

## 2023-11-12 PROCEDURE — 2500000004 HC RX 250 GENERAL PHARMACY W/ HCPCS (ALT 636 FOR OP/ED): Performed by: PHYSICIAN ASSISTANT

## 2023-11-12 PROCEDURE — 1100000001 HC PRIVATE ROOM DAILY

## 2023-11-12 PROCEDURE — 85027 COMPLETE CBC AUTOMATED: CPT | Performed by: INTERNAL MEDICINE

## 2023-11-12 PROCEDURE — 99232 SBSQ HOSP IP/OBS MODERATE 35: CPT | Performed by: INTERNAL MEDICINE

## 2023-11-12 PROCEDURE — 36415 COLL VENOUS BLD VENIPUNCTURE: CPT | Performed by: INTERNAL MEDICINE

## 2023-11-12 PROCEDURE — 2500000002 HC RX 250 W HCPCS SELF ADMINISTERED DRUGS (ALT 637 FOR MEDICARE OP, ALT 636 FOR OP/ED): Performed by: PHYSICIAN ASSISTANT

## 2023-11-12 RX ADMIN — ACETAMINOPHEN 650 MG: 325 TABLET ORAL at 10:35

## 2023-11-12 RX ADMIN — ATORVASTATIN CALCIUM 20 MG: 20 TABLET, FILM COATED ORAL at 20:20

## 2023-11-12 RX ADMIN — ASPIRIN 81 MG: 81 TABLET, COATED ORAL at 10:30

## 2023-11-12 RX ADMIN — GUAIFENESIN 1200 MG: 600 TABLET ORAL at 20:21

## 2023-11-12 RX ADMIN — GABAPENTIN 300 MG: 300 CAPSULE ORAL at 20:20

## 2023-11-12 RX ADMIN — PANTOPRAZOLE SODIUM 40 MG: 40 TABLET, DELAYED RELEASE ORAL at 06:04

## 2023-11-12 RX ADMIN — SODIUM CHLORIDE 100 ML/HR: 9 INJECTION, SOLUTION INTRAVENOUS at 04:00

## 2023-11-12 RX ADMIN — GUAIFENESIN 1200 MG: 600 TABLET ORAL at 10:30

## 2023-11-12 RX ADMIN — GABAPENTIN 300 MG: 300 CAPSULE ORAL at 15:44

## 2023-11-12 RX ADMIN — DOCUSATE SODIUM 100 MG: 100 CAPSULE, LIQUID FILLED ORAL at 20:21

## 2023-11-12 RX ADMIN — ACETAMINOPHEN 650 MG: 325 TABLET ORAL at 03:59

## 2023-11-12 RX ADMIN — GABAPENTIN 300 MG: 300 CAPSULE ORAL at 10:30

## 2023-11-12 RX ADMIN — NICOTINE 1 PATCH: 14 PATCH, EXTENDED RELEASE TRANSDERMAL at 10:30

## 2023-11-12 RX ADMIN — SODIUM CHLORIDE 100 ML/HR: 9 INJECTION, SOLUTION INTRAVENOUS at 14:09

## 2023-11-12 RX ADMIN — Medication 3 MG: at 20:20

## 2023-11-12 RX ADMIN — FLUTICASONE FUROATE AND VILANTEROL TRIFENATATE 1 PUFF: 200; 25 POWDER RESPIRATORY (INHALATION) at 06:04

## 2023-11-12 RX ADMIN — AMLODIPINE BESYLATE 5 MG: 5 TABLET ORAL at 10:30

## 2023-11-12 RX ADMIN — MONTELUKAST 10 MG: 10 TABLET, FILM COATED ORAL at 20:20

## 2023-11-12 ASSESSMENT — PAIN SCALES - GENERAL
PAINLEVEL_OUTOF10: 8
PAINLEVEL_OUTOF10: 4
PAINLEVEL_OUTOF10: 1
PAINLEVEL_OUTOF10: 1
PAINLEVEL_OUTOF10: 3
PAINLEVEL_OUTOF10: 3

## 2023-11-12 ASSESSMENT — COGNITIVE AND FUNCTIONAL STATUS - GENERAL
DAILY ACTIVITIY SCORE: 20
WALKING IN HOSPITAL ROOM: A LITTLE
WALKING IN HOSPITAL ROOM: A LITTLE
DRESSING REGULAR LOWER BODY CLOTHING: A LITTLE
DAILY ACTIVITIY SCORE: 20
TOILETING: A LITTLE
STANDING UP FROM CHAIR USING ARMS: A LITTLE
STANDING UP FROM CHAIR USING ARMS: A LITTLE
MOBILITY SCORE: 19
DRESSING REGULAR UPPER BODY CLOTHING: A LITTLE
MOVING TO AND FROM BED TO CHAIR: A LITTLE
TOILETING: A LITTLE
CLIMB 3 TO 5 STEPS WITH RAILING: A LOT
MOVING TO AND FROM BED TO CHAIR: A LITTLE
CLIMB 3 TO 5 STEPS WITH RAILING: A LOT
HELP NEEDED FOR BATHING: A LITTLE
DRESSING REGULAR UPPER BODY CLOTHING: A LITTLE
DRESSING REGULAR LOWER BODY CLOTHING: A LITTLE
HELP NEEDED FOR BATHING: A LITTLE
MOBILITY SCORE: 19

## 2023-11-12 ASSESSMENT — PAIN DESCRIPTION - LOCATION: LOCATION: BACK

## 2023-11-12 ASSESSMENT — PAIN - FUNCTIONAL ASSESSMENT
PAIN_FUNCTIONAL_ASSESSMENT: 0-10

## 2023-11-12 NOTE — CARE PLAN
The patient's goals for the shift include  pain control and rest    The clinical goals for the shift include pt will have clear urine without clots during shift    Over the shift, the patient did not make progress toward the following goals. Barriers to progression include. Recommendations to address these barriers include.

## 2023-11-12 NOTE — PROGRESS NOTES
Subjective   Patient is sitting on the bedside chair, three-way Talbert catheter is clamped.  Having good urine output, light pink in color.  Denies any abdominal pain nausea or vomitings  Tolerating p.o. diet well.  Having good bowel movements.  No other significant overnight issues.    Objective     Intake/Output last 3 shifts:  I/O last 3 completed shifts:  In: 2598.3 (31.8 mL/kg) [I.V.:2598.3 (31.8 mL/kg)]  Out: 500 (6.1 mL/kg) [Urine:500 (0.2 mL/kg/hr)]  Weight: 81.6 kg     Intake/Output this shift:  I/O this shift:  In: 625 [I.V.:625]  Out: 1100 [Urine:1100]    Recent Results (from the past 48 hour(s))   CBC    Collection Time: 11/11/23  6:00 AM   Result Value Ref Range    WBC 12.8 (H) 4.4 - 11.3 x10*3/uL    nRBC 0.0 0.0 - 0.0 /100 WBCs    RBC 3.44 (L) 4.50 - 5.90 x10*6/uL    Hemoglobin 10.6 (L) 13.5 - 17.5 g/dL    Hematocrit 33.9 (L) 41.0 - 52.0 %    MCV 99 80 - 100 fL    MCH 30.8 26.0 - 34.0 pg    MCHC 31.3 (L) 32.0 - 36.0 g/dL    RDW 13.0 11.5 - 14.5 %    Platelets 279 150 - 450 x10*3/uL   Basic Metabolic Panel    Collection Time: 11/11/23  6:00 AM   Result Value Ref Range    Glucose 99 74 - 99 mg/dL    Sodium 137 136 - 145 mmol/L    Potassium 4.0 3.5 - 5.3 mmol/L    Chloride 104 98 - 107 mmol/L    Bicarbonate 25 21 - 32 mmol/L    Anion Gap 12 10 - 20 mmol/L    Urea Nitrogen 12 6 - 23 mg/dL    Creatinine 0.65 0.50 - 1.30 mg/dL    eGFR >90 >60 mL/min/1.73m*2    Calcium 8.6 8.6 - 10.3 mg/dL   CBC    Collection Time: 11/12/23  5:47 AM   Result Value Ref Range    WBC 9.3 4.4 - 11.3 x10*3/uL    nRBC 0.0 0.0 - 0.0 /100 WBCs    RBC 3.45 (L) 4.50 - 5.90 x10*6/uL    Hemoglobin 10.6 (L) 13.5 - 17.5 g/dL    Hematocrit 33.7 (L) 41.0 - 52.0 %    MCV 98 80 - 100 fL    MCH 30.7 26.0 - 34.0 pg    MCHC 31.5 (L) 32.0 - 36.0 g/dL    RDW 12.8 11.5 - 14.5 %    Platelets 307 150 - 450 x10*3/uL   Basic metabolic panel    Collection Time: 11/12/23  5:47 AM   Result Value Ref Range    Glucose 100 (H) 74 - 99 mg/dL    Sodium 141 136 -  145 mmol/L    Potassium 3.9 3.5 - 5.3 mmol/L    Chloride 107 98 - 107 mmol/L    Bicarbonate 27 21 - 32 mmol/L    Anion Gap 11 10 - 20 mmol/L    Urea Nitrogen 17 6 - 23 mg/dL    Creatinine 0.67 0.50 - 1.30 mg/dL    eGFR >90 >60 mL/min/1.73m*2    Calcium 8.4 (L) 8.6 - 10.3 mg/dL        XR chest 2 views  Narrative: Interpreted By:  Amy Posey,   STUDY:  XR CHEST 2 VIEWS;  11/9/2023 4:58 pm      INDICATION:  Signs/Symptoms:cough.      COMPARISON:  8/23/2023      ACCESSION NUMBER(S):  [id number]      ORDERING CLINICIAN:  COLE INGRAM      FINDINGS:  Streaky left retrocardiac opacities. Streaky scarring left lower lung  zone and right midlung zone. No pleural effusion or pneumothorax.  Atherosclerosis. Stable borderline heart size. No acute osseous  abnormality.      Impression: Streaky left retrocardiac opacities favored to be due to atelectasis  rather than infection depending on the clinical setting.      Signed by: Amy Posey 11/9/2023 6:28 PM  Dictation workstation:   [id number]  CT abdomen pelvis wo IV contrast  Narrative: Interpreted By:  Fabrice Mazariegos,   STUDY:  CT ABDOMEN PELVIS WO IV CONTRAST; 11/9/2023 1:59 pm      INDICATION:  Signs/Symptoms:evaluate for renal obstruction, clots.      COMPARISON:  CT of the abdomen and pelvis dated 05/25/2023      ACCESSION NUMBER(S):  [id number]      ORDERING CLINICIAN:  IWONA WESTBROOK      TECHNIQUE:  Contiguous axial images were obtained at 3mm slice thickness through  the abdomen and pelvis without intravenous contrast administration.  Coronal and sagittal reconstructions at 3 mm slice thickness were  performed. Intravenous contrast was not given per the referring  physician's request.      FINDINGS:  The study is limited by the lack of intravenous contrast.      LOWER CHEST:  Evaluation of the visualized lung bases demonstrate scarring and/or  atelectasis at the lung bases bilaterally. The heart is within normal  limits for size.      ABDOMEN:       LIVER:  Small rounded hypodensities are seen throughout the liver, measuring  at up to 1.5 cm in diameter, similar to the prior study, most  consistent with cysts.      BILE DUCTS:  No definite intra or extrahepatic biliary dilatation is identified.      GALLBLADDER:  The gallbladder is nondilated. No definite calcified gallstones are  seen.      PANCREAS:  The pancreas is within normal limits for appearance, without evidence  of focal masses.      SPLEEN:  The spleen is within normal limits for size. No focal splenic mass is  seen.      ADRENAL GLANDS:  No definite adrenal nodules or masses are seen bilaterally.      KIDNEYS AND URETERS:  Mild bilateral hydronephrosis and hydroureter is seen, with the  ureters dilated to the urinary bladder. A rounded hypodensity is seen  from the posterior midpole of the left kidney, similar to the prior  study, consistent with a cyst. No definite new or enlarging renal  mass is seen.      PELVIS:      BLADDER:  A Talbert catheter is seen within the urinary bladder. There is  heterogeneous hyperdensity identified dependently within the urinary  bladder, new from the prior study, and may represent hemorrhage.      REPRODUCTIVE ORGANS:  The prostate is within normal limits for appearance.      BOWEL:  Multiple diverticuli are seen throughout the sigmoid colon. The colon  and small bowel are within normal limits for course, caliber and  appearance, without evidence of wall thickening or obstruction. The  appendix is not visualized. No CT evidence of acute diverticulitis or  appendicitis is seen.      VESSELS:  Moderate to severe atherosclerotic calcifications are seen throughout  the infrarenal abdominal aorta and iliac arteries. The abdominal  aorta is within normal limits for course, caliber and appearance,  without evidence of aneurysm.      PERITONEUM/RETROPERITONEUM/LYMPH NODES:  There is no free intraperitoneal air or free fluid identified. No  gross mesenteric or  retroperitoneal lymphadenopathy is identified.      BONE AND SOFT TISSUE:  There is no evidence of acute fracture identified. No evidence of  abdominal wall mass or hernia is identified.      Impression: 1. Bilateral hydronephrosis and hydroureter, as above.  2. No renal or ureteral calculus identified.  3. Hyperdensity dependently within the urinary bladder, most  consistent with hemorrhage.  4. Colonic diverticulosis, without evidence of acute diverticulitis.      MACRO:  None      Signed by: Fabrice Mazariegos 11/9/2023 2:32 PM  Dictation workstation:   [id number]       Vital signs in last 24 hours:  Temp:  [36.2 °C (97.2 °F)-36.5 °C (97.7 °F)] 36.5 °C (97.7 °F)  Heart Rate:  [61-70] 61  Resp:  [18] 18  BP: ()/(57-74) 139/71    Physical Exam  Constitutional: alert and oriented x 3, awake, cooperative, no acute distress  Skin: warm and dry  Head/Neck: Normocephalic, atraumatic  Eyes: clear sclera  ENMT: mucous membranes moist  Cardio: Regular rate and rhythm  Resp: CTA bilaterally, good respiratory effort  Gastrointestinal: Soft, nontender, nondistended  Genitourinary: Three-way Talbert catheter is clamped, urine light pink in color  Musculoskeletal: ROM intact, no joint swelling  Extremities: No edema, cyanosis, or clubbing  Neuro: alert and oriented x 3    Assessment/Plan     Gross hematuria.  Bladder cancer s/p TURBT x2 -most recent was done on 10/6/2023-planning to start BCG treatments in the near future.  Blood loss anemia-hemoglobin close to baseline.  Leukocytosis.    History of:  Hypertension  Coronary artery disease.  Hyperlipidemia.  COPD-no signs of exacerbation  Chronic lower back pain.    Plan:  Initially patient was started on the three-way Talbert catheter.  S/p cystoscopy with evacuation of the clots on 11/10.  Currently three-way Talbert catheter is clamped, monitoring urine output-Talbert bag shows light pink color urine continue to monitor.  Continue IV fluids.  Hemoglobin is stable-monitor with a  CBC.  Leukocytosis mostly reactive no signs of obvious infection continue to monitor.  Symptomatic pain medications.    Home medications reviewed, resumed appropriately.    DVT prophylaxis:  On SCDs.    Disposition:  Awaiting urology clearance.  Possible discharge in next 1 to 2 days.               LOS: 2 days

## 2023-11-13 VITALS
OXYGEN SATURATION: 94 % | BODY MASS INDEX: 25.77 KG/M2 | HEART RATE: 69 BPM | WEIGHT: 180 LBS | SYSTOLIC BLOOD PRESSURE: 141 MMHG | RESPIRATION RATE: 18 BRPM | HEIGHT: 70 IN | DIASTOLIC BLOOD PRESSURE: 81 MMHG | TEMPERATURE: 97 F

## 2023-11-13 LAB
ANION GAP SERPL CALC-SCNC: 11 MMOL/L (ref 10–20)
BACTERIA UR CULT: ABNORMAL
BUN SERPL-MCNC: 11 MG/DL (ref 6–23)
CALCIUM SERPL-MCNC: 8.6 MG/DL (ref 8.6–10.3)
CHLORIDE SERPL-SCNC: 108 MMOL/L (ref 98–107)
CO2 SERPL-SCNC: 26 MMOL/L (ref 21–32)
CREAT SERPL-MCNC: 0.65 MG/DL (ref 0.5–1.3)
ERYTHROCYTE [DISTWIDTH] IN BLOOD BY AUTOMATED COUNT: 12.7 % (ref 11.5–14.5)
GFR SERPL CREATININE-BSD FRML MDRD: >90 ML/MIN/1.73M*2
GLUCOSE SERPL-MCNC: 117 MG/DL (ref 74–99)
HCT VFR BLD AUTO: 33.1 % (ref 41–52)
HGB BLD-MCNC: 10.5 G/DL (ref 13.5–17.5)
MCH RBC QN AUTO: 31.2 PG (ref 26–34)
MCHC RBC AUTO-ENTMCNC: 31.7 G/DL (ref 32–36)
MCV RBC AUTO: 98 FL (ref 80–100)
NRBC BLD-RTO: 0 /100 WBCS (ref 0–0)
PLATELET # BLD AUTO: 317 X10*3/UL (ref 150–450)
POTASSIUM SERPL-SCNC: 3.8 MMOL/L (ref 3.5–5.3)
RBC # BLD AUTO: 3.37 X10*6/UL (ref 4.5–5.9)
SODIUM SERPL-SCNC: 141 MMOL/L (ref 136–145)
WBC # BLD AUTO: 9.2 X10*3/UL (ref 4.4–11.3)

## 2023-11-13 PROCEDURE — 2500000004 HC RX 250 GENERAL PHARMACY W/ HCPCS (ALT 636 FOR OP/ED): Performed by: PHYSICIAN ASSISTANT

## 2023-11-13 PROCEDURE — 2500000001 HC RX 250 WO HCPCS SELF ADMINISTERED DRUGS (ALT 637 FOR MEDICARE OP): Performed by: PHYSICIAN ASSISTANT

## 2023-11-13 PROCEDURE — 80048 BASIC METABOLIC PNL TOTAL CA: CPT | Performed by: INTERNAL MEDICINE

## 2023-11-13 PROCEDURE — 2500000005 HC RX 250 GENERAL PHARMACY W/O HCPCS: Performed by: INTERNAL MEDICINE

## 2023-11-13 PROCEDURE — 85027 COMPLETE CBC AUTOMATED: CPT | Performed by: INTERNAL MEDICINE

## 2023-11-13 PROCEDURE — C9113 INJ PANTOPRAZOLE SODIUM, VIA: HCPCS | Performed by: PHYSICIAN ASSISTANT

## 2023-11-13 PROCEDURE — 99232 SBSQ HOSP IP/OBS MODERATE 35: CPT | Performed by: NURSE PRACTITIONER

## 2023-11-13 PROCEDURE — 99239 HOSP IP/OBS DSCHRG MGMT >30: CPT | Performed by: STUDENT IN AN ORGANIZED HEALTH CARE EDUCATION/TRAINING PROGRAM

## 2023-11-13 PROCEDURE — 2500000005 HC RX 250 GENERAL PHARMACY W/O HCPCS: Performed by: PHYSICIAN ASSISTANT

## 2023-11-13 PROCEDURE — 36415 COLL VENOUS BLD VENIPUNCTURE: CPT | Performed by: INTERNAL MEDICINE

## 2023-11-13 RX ORDER — AMLODIPINE BESYLATE 5 MG/1
5 TABLET ORAL EVERY MORNING
Start: 2023-11-13

## 2023-11-13 RX ADMIN — GUAIFENESIN 1200 MG: 600 TABLET ORAL at 09:29

## 2023-11-13 RX ADMIN — GABAPENTIN 300 MG: 300 CAPSULE ORAL at 09:29

## 2023-11-13 RX ADMIN — ACETAMINOPHEN 650 MG: 325 TABLET ORAL at 00:08

## 2023-11-13 RX ADMIN — AMLODIPINE BESYLATE 5 MG: 5 TABLET ORAL at 09:29

## 2023-11-13 RX ADMIN — FLUTICASONE FUROATE AND VILANTEROL TRIFENATATE 1 PUFF: 200; 25 POWDER RESPIRATORY (INHALATION) at 06:22

## 2023-11-13 RX ADMIN — ASPIRIN 81 MG: 81 TABLET, COATED ORAL at 09:29

## 2023-11-13 RX ADMIN — SODIUM CHLORIDE 100 ML/HR: 9 INJECTION, SOLUTION INTRAVENOUS at 00:26

## 2023-11-13 RX ADMIN — SODIUM CHLORIDE 100 ML/HR: 9 INJECTION, SOLUTION INTRAVENOUS at 11:47

## 2023-11-13 RX ADMIN — DOCUSATE SODIUM 100 MG: 100 CAPSULE, LIQUID FILLED ORAL at 09:29

## 2023-11-13 RX ADMIN — Medication 4 L/MIN: at 09:23

## 2023-11-13 RX ADMIN — PANTOPRAZOLE SODIUM 40 MG: 40 INJECTION, POWDER, FOR SOLUTION INTRAVENOUS at 06:22

## 2023-11-13 RX ADMIN — ACETAMINOPHEN 650 MG: 650 SOLUTION ORAL at 05:25

## 2023-11-13 ASSESSMENT — PAIN - FUNCTIONAL ASSESSMENT
PAIN_FUNCTIONAL_ASSESSMENT: 0-10

## 2023-11-13 ASSESSMENT — PAIN SCALES - GENERAL
PAINLEVEL_OUTOF10: 1
PAINLEVEL_OUTOF10: 0 - NO PAIN
PAINLEVEL_OUTOF10: 3
PAINLEVEL_OUTOF10: 2

## 2023-11-13 NOTE — DISCHARGE SUMMARY
Discharge Diagnosis  Hematuria    Issues Requiring Follow-Up  Urology follow up    Discharge Meds     Your medication list        CHANGE how you take these medications        Instructions Last Dose Given Next Dose Due   gabapentin 300 mg capsule  Commonly known as: Neurontin  What changed: additional instructions      TAKE 1 CAPSULE BY MOUTH THREE TIMES DAILY              CONTINUE taking these medications        Instructions Last Dose Given Next Dose Due   acetaminophen 500 mg tablet  Commonly known as: Tylenol           amLODIPine 5 mg tablet  Commonly known as: Norvasc      Take 1 tablet (5 mg) by mouth once daily in the morning. Do not take until you follow up with cardiology, Dr. Cloud.       aspirin 81 mg EC tablet           atorvastatin 20 mg tablet  Commonly known as: Lipitor      Take 1 tablet (20 mg) by mouth once daily at bedtime.       budesonide-formoteroL 160-4.5 mcg/actuation inhaler  Commonly known as: Symbicort           docusate sodium 100 mg capsule  Commonly known as: Colace      Take 1 capsule (100 mg) by mouth in the morning and 1 capsule (100 mg) before bedtime.       ergocalciferol 1.25 MG (19103 UT) capsule  Commonly known as: Vitamin D-2      Take 1 capsule (1,250 mcg) by mouth 1 (one) time per week.       mirabegron 25 mg tablet extended release 24 hr 24 hr tablet  Commonly known as: Myrbetriq      Take 1 tablet (25 mg) by mouth once daily. LOT: [id number]  EXP: 02/2024       montelukast 10 mg tablet  Commonly known as: Singulair      Take 1 tablet (10 mg) by mouth once daily at bedtime.              STOP taking these medications      fluticasone 50 mcg/actuation nasal spray  Commonly known as: Flonase        HYDROcodone-acetaminophen 5-325 mg tablet  Commonly known as: Norco        losartan 100 mg tablet  Commonly known as: Cozaar                  Where to Get Your Medications        Information about where to get these medications is not yet available    Ask your nurse or doctor about  these medications  amLODIPine 5 mg tablet         Test Results Pending At Discharge  Pending Labs       No current pending labs.            Hospital Course   Shiva Hardy is a 83 y.o. male with h/o bladder cancer s/p TURBT x 2 presented with urinary retention and hematuria. Urology was consulted. He underwent cystoscopy with evacuation of clots on 11/10. Post op, he was started on 3 way gorman catheter. He passed voiding trial. He has post op respiratory insufficiency. He is weaned to RA. He is hemodynamically stable for discharge at this time with close outpatient follow ups.     The patient was discharged in satisfactory condition.   Medications and side effect profile reviewed with patient.  More than 30 minutes were spent in coordinating patient discharge     Pertinent Physical Exam At Time of Discharge  Physical Exam  Vitals and nursing note reviewed.   Constitutional:       General: He is not in acute distress.     Appearance: Normal appearance. He is not ill-appearing or toxic-appearing.   HENT:      Head: Normocephalic and atraumatic.      Nose: Nose normal.      Mouth/Throat:      Mouth: Mucous membranes are moist.   Eyes:      Extraocular Movements: Extraocular movements intact.      Conjunctiva/sclera: Conjunctivae normal.      Pupils: Pupils are equal, round, and reactive to light.   Cardiovascular:      Rate and Rhythm: Normal rate and regular rhythm.      Heart sounds: No murmur heard.     No gallop.   Pulmonary:      Effort: Pulmonary effort is normal. No respiratory distress.      Breath sounds: Normal breath sounds. No wheezing, rhonchi or rales.   Abdominal:      General: Abdomen is flat. Bowel sounds are normal. There is no distension.      Palpations: Abdomen is soft. There is no mass.      Tenderness: There is no abdominal tenderness.   Musculoskeletal:         General: No swelling or tenderness. Normal range of motion.      Cervical back: Normal range of motion and neck supple.   Skin:      General: Skin is warm and dry.   Neurological:      General: No focal deficit present.      Mental Status: He is alert and oriented to person, place, and time. Mental status is at baseline.   Psychiatric:         Mood and Affect: Mood normal.         Behavior: Behavior normal.         Thought Content: Thought content normal.         Judgment: Judgment normal.         Outpatient Follow-Up  Future Appointments   Date Time Provider Department Center   1/8/2024 11:00 AM Manny Beckett, CHILANGO-CNP [id number]         Brooke Chilel MD  Hospitalist

## 2023-11-13 NOTE — PROGRESS NOTES
"Shiva Hardy is a 83 y.o. male on day 3 of admission presenting with Hematuria.    Subjective   No acute overnight events.  Pain controlled.  Tolerating PO intake.  No hematuria, urine clear and yellow.         Objective     Physical Exam  HENT:      Head: Normocephalic and atraumatic.   Eyes:      Extraocular Movements: Extraocular movements intact.      Conjunctiva/sclera: Conjunctivae normal.      Pupils: Pupils are equal, round, and reactive to light.   Cardiovascular:      Rate and Rhythm: Normal rate and regular rhythm.      Pulses: Normal pulses.   Pulmonary:      Breath sounds: Normal breath sounds.   Abdominal:      General: Bowel sounds are normal.      Palpations: Abdomen is soft.   Genitourinary:     Comments: Talbert in place with clear yellow urine   Skin:     General: Skin is warm and dry.      Capillary Refill: Capillary refill takes less than 2 seconds.   Neurological:      General: No focal deficit present.      Mental Status: He is alert and oriented to person, place, and time.       Last Recorded Vitals  Blood pressure 141/81, pulse 69, temperature 36.1 °C (97 °F), temperature source Temporal, resp. rate 18, height 1.778 m (5' 10\"), weight 81.6 kg (180 lb), SpO2 94 %.  Intake/Output last 3 Shifts:  I/O last 3 completed shifts:  In: 3573.3 (43.8 mL/kg) [I.V.:3573.3 (43.8 mL/kg)]  Out: 4000 (49 mL/kg) [Urine:4000 (1.4 mL/kg/hr)]  Weight: 81.6 kg     Relevant Results      Scheduled medications  amLODIPine, 5 mg, oral, q AM  aspirin, 81 mg, oral, q AM  atorvastatin, 20 mg, oral, Nightly  docusate sodium, 100 mg, oral, BID  fluticasone furoate-vilanteroL, 1 puff, inhalation, Daily  gabapentin, 300 mg, oral, TID  guaiFENesin, 1,200 mg, oral, BID  melatonin, 3 mg, oral, Daily  montelukast, 10 mg, oral, Nightly  nicotine, 1 patch, transdermal, Daily  pantoprazole, 40 mg, oral, Daily before breakfast   Or  pantoprazole, 40 mg, intravenous, Daily before breakfast  polyethylene glycol, 17 g, oral, " Daily      Continuous medications  sodium chloride 0.9%, 100 mL/hr, Last Rate: 100 mL/hr (11/13/23 1147)      PRN medications  PRN medications: acetaminophen **OR** acetaminophen **OR** acetaminophen, lidocaine, ondansetron **OR** ondansetron, oxygen, phenazopyridine  Results for orders placed or performed during the hospital encounter of 11/09/23 (from the past 24 hour(s))   CBC   Result Value Ref Range    WBC 9.2 4.4 - 11.3 x10*3/uL    nRBC 0.0 0.0 - 0.0 /100 WBCs    RBC 3.37 (L) 4.50 - 5.90 x10*6/uL    Hemoglobin 10.5 (L) 13.5 - 17.5 g/dL    Hematocrit 33.1 (L) 41.0 - 52.0 %    MCV 98 80 - 100 fL    MCH 31.2 26.0 - 34.0 pg    MCHC 31.7 (L) 32.0 - 36.0 g/dL    RDW 12.7 11.5 - 14.5 %    Platelets 317 150 - 450 x10*3/uL   Basic metabolic panel   Result Value Ref Range    Glucose 117 (H) 74 - 99 mg/dL    Sodium 141 136 - 145 mmol/L    Potassium 3.8 3.5 - 5.3 mmol/L    Chloride 108 (H) 98 - 107 mmol/L    Bicarbonate 26 21 - 32 mmol/L    Anion Gap 11 10 - 20 mmol/L    Urea Nitrogen 11 6 - 23 mg/dL    Creatinine 0.65 0.50 - 1.30 mg/dL    eGFR >90 >60 mL/min/1.73m*2    Calcium 8.6 8.6 - 10.3 mg/dL       Assessment/Plan   Principal Problem:    Hematuria  Active Problems:    Urinary retention    83 year old male, POD 3 from cystoscopy with evacuation of clots    - will trial gorman removal   - void check   - follow up with Dr. Kelsey in 2 weeks    Discussed with Dr. Kelsey          I spent 30 minutes in the professional and overall care of this patient.      Tresa Pacheco, APRN-CNP

## 2023-11-13 NOTE — PROGRESS NOTES
11/13/23 1012   Discharge Planning   Living Arrangements Spouse/significant other   Support Systems Spouse/significant other   Assistance Needed A&Ox3, independent   Type of Residence Private residence   Number of Stairs to Enter Residence 3   Number of Stairs Within Residence 0   Home or Post Acute Services None   Patient expects to be discharged to: Home no needs   Does the patient need discharge transport arranged? No     11/13/2023 1013: Possible dc today if can be weaned from oxygen and voiding trial.

## 2023-11-13 NOTE — CARE PLAN
The patient's goals for the shift include getting some rest.     The clinical goals for the shift include patient will use call light when in need of assistance

## 2023-11-15 NOTE — SIGNIFICANT EVENT
Follow Up Phone Call    Outgoing phone call    Spoke to: Shiva Hardy Relationship:self   Phone number: [phone]      Outcome: I left a message on answering machine   Chief Complaint   Patient presents with    Urinary Retention          Diagnosis:Not applicable

## 2023-11-15 NOTE — DOCUMENTATION CLARIFICATION NOTE
"    PATIENT:               ALY QUIROZ  ACCT #:                  [id number]  MRN:                       [id number]  :                       1940  ADMIT DATE:       2023 5:17 AM  DISCH DATE:        2023 2:52 PM  RESPONDING PROVIDER #:        [id number]          PROVIDER RESPONSE TEXT:    Hemorrhage was a delayed complication of the recent bladder tumor resection    CDI QUERY TEXT:    [facility]_Complication PostOp      Instruction:    Based on your assessment of the patient and the clinical information, please provide the requested documentation by clicking on the appropriate radio button and enter any additional information if prompted.    Question: Please further clarify the diagnosis noted in the OP report as    When answering this query, please exercise your independent professional judgment. The fact that a question is being asked, does not imply that any particular answer is desired or expected.    The patient's clinical indicators include:  Clinical Information: 84 y/o male with a BMI of 25.83 with a history of COPD, HLD, HTN, and chronic pain presented after having difficulty in urinating then followed by passing clots and urinating more often.    Clinical Indicators:    Per Op report Laure 11/10/23 \"inserted down the urethra into the prostate and bladder, and cystoscopy revealed the area of prior resection on the posterior wall to be slightly erythematous with minimal surrounding clots.  Using the loop on the working element, the area of granulation with underlying erythema and inflammation was fulgurated.  This was done the whole surface of the prior resected tumor.  Extensive hemostasis was performed for the underlying base of the tumor as well as the surrounding mucosa. \"    Treatment: Urology consult, Cystoscopy with clot evacuation and fulguration, and supportive care    Risk Factors: 84 y/o with recent tumor resection of prostate now with bleeding  Options provided:  -- Hemorrhage was a " complication of the procedure of prostate tumor resection  -- Hemorrhage was not a complication of the  recent prostate tumor resection  -- Other - I will add my own diagnosis  -- Refer to Clinical Documentation Reviewer    Query created by: Castillo Villafana on 11/13/2023 9:10 AM      Electronically signed by:  CHEVY JULIO MD 11/14/2023 10:42 PM

## 2023-11-16 NOTE — SIGNIFICANT EVENT
Follow Up Phone Call    Outgoing phone call    Spoke to: Shiva Hardy Relationship:self   Phone number: [phone]      Outcome: I left a message on answering machine   Chief Complaint   Patient presents with    Urinary Retention          Diagnosis:Not applicable

## 2023-12-01 DIAGNOSIS — J45.909 ASTHMA, UNSPECIFIED ASTHMA SEVERITY, UNSPECIFIED WHETHER COMPLICATED, UNSPECIFIED WHETHER PERSISTENT (HHS-HCC): Primary | ICD-10-CM

## 2023-12-01 DIAGNOSIS — J43.9 PULMONARY EMPHYSEMA, UNSPECIFIED EMPHYSEMA TYPE (MULTI): ICD-10-CM

## 2023-12-13 ENCOUNTER — OFFICE VISIT (OUTPATIENT)
Dept: UROLOGY | Facility: CLINIC | Age: 83
End: 2023-12-13
Payer: MEDICARE

## 2023-12-13 ENCOUNTER — PRE-ADMISSION TESTING (OUTPATIENT)
Dept: PREADMISSION TESTING | Facility: HOSPITAL | Age: 83
End: 2023-12-13
Payer: MEDICARE

## 2023-12-13 ENCOUNTER — LAB (OUTPATIENT)
Dept: LAB | Facility: LAB | Age: 83
End: 2023-12-13
Payer: MEDICARE

## 2023-12-13 ENCOUNTER — PREP FOR PROCEDURE (OUTPATIENT)
Dept: UROLOGY | Facility: CLINIC | Age: 83
End: 2023-12-13

## 2023-12-13 DIAGNOSIS — C67.9 MALIGNANT NEOPLASM OF URINARY BLADDER, UNSPECIFIED SITE (MULTI): Primary | ICD-10-CM

## 2023-12-13 DIAGNOSIS — R31.9 HEMATURIA, UNSPECIFIED TYPE: ICD-10-CM

## 2023-12-13 LAB
ANION GAP SERPL CALC-SCNC: 15 MMOL/L (ref 10–20)
APPEARANCE UR: ABNORMAL
BILIRUB UR STRIP.AUTO-MCNC: NEGATIVE MG/DL
BUN SERPL-MCNC: 23 MG/DL (ref 6–23)
CALCIUM SERPL-MCNC: 9.9 MG/DL (ref 8.6–10.3)
CHLORIDE SERPL-SCNC: 104 MMOL/L (ref 98–107)
CO2 SERPL-SCNC: 24 MMOL/L (ref 21–32)
COLOR UR: YELLOW
CREAT SERPL-MCNC: 0.81 MG/DL (ref 0.5–1.3)
ERYTHROCYTE [DISTWIDTH] IN BLOOD BY AUTOMATED COUNT: 13 % (ref 11.5–14.5)
GFR SERPL CREATININE-BSD FRML MDRD: 86 ML/MIN/1.73M*2
GLUCOSE SERPL-MCNC: 91 MG/DL (ref 74–99)
GLUCOSE UR STRIP.AUTO-MCNC: NEGATIVE MG/DL
HCT VFR BLD AUTO: 43 % (ref 41–52)
HGB BLD-MCNC: 13.3 G/DL (ref 13.5–17.5)
KETONES UR STRIP.AUTO-MCNC: NEGATIVE MG/DL
LEUKOCYTE ESTERASE UR QL STRIP.AUTO: ABNORMAL
MCH RBC QN AUTO: 30.6 PG (ref 26–34)
MCHC RBC AUTO-ENTMCNC: 30.9 G/DL (ref 32–36)
MCV RBC AUTO: 99 FL (ref 80–100)
NITRITE UR QL STRIP.AUTO: POSITIVE
NRBC BLD-RTO: 0 /100 WBCS (ref 0–0)
PH UR STRIP.AUTO: 5.5 [PH]
PLATELET # BLD AUTO: 417 X10*3/UL (ref 150–450)
POTASSIUM SERPL-SCNC: 4.6 MMOL/L (ref 3.5–5.3)
PROT UR STRIP.AUTO-MCNC: ABNORMAL MG/DL
RBC # BLD AUTO: 4.35 X10*6/UL (ref 4.5–5.9)
RBC # UR STRIP.AUTO: ABNORMAL /UL
SODIUM SERPL-SCNC: 138 MMOL/L (ref 136–145)
SP GR UR STRIP.AUTO: >=1.03
UROBILINOGEN UR STRIP.AUTO-MCNC: 0.2 MG/DL
WBC # BLD AUTO: 10.5 X10*3/UL (ref 4.4–11.3)

## 2023-12-13 PROCEDURE — 36415 COLL VENOUS BLD VENIPUNCTURE: CPT

## 2023-12-13 PROCEDURE — 1160F RVW MEDS BY RX/DR IN RCRD: CPT | Performed by: STUDENT IN AN ORGANIZED HEALTH CARE EDUCATION/TRAINING PROGRAM

## 2023-12-13 PROCEDURE — 99214 OFFICE O/P EST MOD 30 MIN: CPT | Performed by: STUDENT IN AN ORGANIZED HEALTH CARE EDUCATION/TRAINING PROGRAM

## 2023-12-13 PROCEDURE — 1111F DSCHRG MED/CURRENT MED MERGE: CPT | Performed by: STUDENT IN AN ORGANIZED HEALTH CARE EDUCATION/TRAINING PROGRAM

## 2023-12-13 PROCEDURE — 1126F AMNT PAIN NOTED NONE PRSNT: CPT | Performed by: STUDENT IN AN ORGANIZED HEALTH CARE EDUCATION/TRAINING PROGRAM

## 2023-12-13 PROCEDURE — 1159F MED LIST DOCD IN RCRD: CPT | Performed by: STUDENT IN AN ORGANIZED HEALTH CARE EDUCATION/TRAINING PROGRAM

## 2023-12-13 PROCEDURE — 81003 URINALYSIS AUTO W/O SCOPE: CPT

## 2023-12-13 PROCEDURE — 80048 BASIC METABOLIC PNL TOTAL CA: CPT

## 2023-12-13 PROCEDURE — 85027 COMPLETE CBC AUTOMATED: CPT

## 2023-12-13 NOTE — PROGRESS NOTES
Subjective   Patient ID: Shiva Hardy is a 83 y.o. male.    HPI  Shiva Hardy is a 83 y.o. male with h/o bladder cancer s/p reTURBT 10/6/23 presented to the hospital with urinary retention and hematuria.     He underwent cystoscopy with evacuation of clots on 11/10/23. Post op, he was started on 3 way gorman catheter. He passed voiding trial.     He had post op respiratory insufficiency. He was weaned to RA. He is slightly SOB. Improving.     Hemorrhage was a delayed complication of the recent bladder tumor resection. He still has hematuria. No burning with urination.     Review of Systems   All other systems reviewed and are negative.      Objective   Physical Exam  Vitals reviewed.         Assessment/Plan   Shiva Hardy is a 83 y.o. male with h/o bladder cancer s/p reTURBT 10/6/23 presented to the hospital with urinary retention and hematuria.     He underwent cystoscopy with evacuation of clots on 11/10/23. Post op, he was started on 3 way gorman catheter. He passed voiding trial.     He had post op respiratory insufficiency. He was weaned to RA. He is slightly SOB. Improving.     Hemorrhage was a delayed complication of the recent bladder tumor resection. He still has hematuria. No burning with urination.     We discussed cystoscopy with tissue ablation. We discussed pros, cons, alternatives and side effects and he agrees to proceed.     Plan:   UA, U culture.   CBC, BMP.   Cystoscopy with fulguration of bleeding.   Diagnoses and all orders for this visit:  Malignant neoplasm of urinary bladder, unspecified site (CMS/HCC)  Hematuria, unspecified type  -     CBC; Future  -     Basic metabolic panel; Future  -     Urine Culture; Future  -     Urinalysis with Reflex Microscopic; Future

## 2023-12-14 ENCOUNTER — PRE-ADMISSION TESTING (OUTPATIENT)
Dept: PREADMISSION TESTING | Facility: HOSPITAL | Age: 83
End: 2023-12-14
Payer: MEDICARE

## 2023-12-14 ENCOUNTER — ANESTHESIA EVENT (OUTPATIENT)
Dept: OPERATING ROOM | Facility: HOSPITAL | Age: 83
End: 2023-12-14
Payer: MEDICARE

## 2023-12-14 ENCOUNTER — LAB (OUTPATIENT)
Dept: LAB | Facility: LAB | Age: 83
End: 2023-12-14
Payer: MEDICARE

## 2023-12-14 VITALS — HEIGHT: 70 IN | BODY MASS INDEX: 25.77 KG/M2 | WEIGHT: 180 LBS

## 2023-12-14 DIAGNOSIS — N39.0 URINARY TRACT INFECTION WITH HEMATURIA, SITE UNSPECIFIED: Primary | ICD-10-CM

## 2023-12-14 DIAGNOSIS — R31.9 URINARY TRACT INFECTION WITH HEMATURIA, SITE UNSPECIFIED: Primary | ICD-10-CM

## 2023-12-14 DIAGNOSIS — N30.01 ACUTE CYSTITIS WITH HEMATURIA: Primary | ICD-10-CM

## 2023-12-14 DIAGNOSIS — R31.9 HEMATURIA, UNSPECIFIED TYPE: ICD-10-CM

## 2023-12-14 DIAGNOSIS — Z01.818 PRE-PROCEDURAL EXAMINATION: Primary | ICD-10-CM

## 2023-12-14 DIAGNOSIS — R32 URINARY INCONTINENCE, UNSPECIFIED TYPE: ICD-10-CM

## 2023-12-14 RX ORDER — LEVOFLOXACIN 500 MG/1
500 TABLET, FILM COATED ORAL DAILY
Qty: 10 TABLET | Refills: 0 | Status: SHIPPED | OUTPATIENT
Start: 2023-12-14 | End: 2023-12-24

## 2023-12-14 ASSESSMENT — DUKE ACTIVITY SCORE INDEX (DASI)
CAN YOU WALK A BLOCK OR TWO ON LEVEL GROUND: YES
CAN YOU DO YARD WORK LIKE RAKING LEAVES, WEEDING OR PUSHING A MOWER: YES
CAN YOU DO MODERATE WORK AROUND THE HOUSE LIKE VACUUMING, SWEEPING FLOORS OR CARRYING GROCERIES: NO
CAN YOU DO HEAVY WORK AROUND THE HOUSE LIKE SCRUBBING FLOORS OR LIFTING AND MOVING HEAVY FURNITURE: NO
CAN YOU TAKE CARE OF YOURSELF (EAT, DRESS, BATHE, OR USE TOILET): YES
CAN YOU WALK INDOORS, SUCH AS AROUND YOUR HOUSE: YES
CAN YOU DO LIGHT WORK AROUND THE HOUSE LIKE DUSTING OR WASHING DISHES: YES
CAN YOU CLIMB A FLIGHT OF STAIRS OR WALK UP A HILL: NO
CAN YOU RUN A SHORT DISTANCE: NO
CAN YOU PARTICIPATE IN MODERATE RECREATIONAL ACTIVITIES LIKE GOLF, BOWLING, DANCING, DOUBLES TENNIS OR THROWING A BASEBALL OR FOOTBALL: NO
CAN YOU PARTICIPATE IN STRENOUS SPORTS LIKE SWIMMING, SINGLES TENNIS, FOOTBALL, BASKETBALL, OR SKIING: NO

## 2023-12-14 NOTE — ANESTHESIA PREPROCEDURE EVALUATION
Patient: Shiva Hardy    Procedure Information       Date: 12/15/23    Procedure: Cystoscopy with Ablation Tissue    Location: [facility]    Surgeons: Irwin Kelsey MD            Relevant Problems   Cardiovascular   (+) Aortic aneurysm (CMS/HCC)   (+) Essential hypertension   (+) Hyperlipidemia   (+) Thoracic back pain      Neuro/Psych   (+) Thoracic neuritis      Pulmonary   (+) Asthma   (+) COPD (chronic obstructive pulmonary disease) (CMS/HCC)   (+) Centrilobular emphysema (CMS/HCC)   (+) Left lower lobe pneumonia   (+) Multiple lung nodules on CT   (+) Obstructive sleep apnea   (+) Paraseptal emphysema (CMS/HCC)      Musculoskeletal   (+) Spondylosis, thoracic   (+) Thoracic spondylosis      Eyes, Ears, Nose, and Throat   (+) Bilateral hearing loss      Infectious Disease   (+) Oral thrush       Clinical information reviewed:   Tobacco  Allergies  Meds   Med Hx  Surg Hx            NPO Detail:  No data recorded     PHYSICAL EXAM    Anesthesia Plan

## 2023-12-14 NOTE — PREPROCEDURE INSTRUCTIONS
Medication List            Accurate as of December 14, 2023  9:04 AM. Always use your most recent med list.                acetaminophen 500 mg tablet  Commonly known as: Tylenol  Medication Adjustments for Surgery: Continue until night before surgery     amLODIPine 5 mg tablet  Commonly known as: Norvasc  Take 1 tablet (5 mg) by mouth once daily in the morning. Do not take until you follow up with cardiology, Dr. Cloud.  Medication Adjustments for Surgery: Take morning of surgery with sip of water, no other fluids     aspirin 81 mg EC tablet  Medication Adjustments for Surgery: Continue until night before surgery     atorvastatin 20 mg tablet  Commonly known as: Lipitor  Take 1 tablet (20 mg) by mouth once daily at bedtime.  Medication Adjustments for Surgery: Continue until night before surgery     budesonide-formoteroL 160-4.5 mcg/actuation inhaler  Commonly known as: Symbicort  Medication Adjustments for Surgery: Continue until night before surgery     docusate sodium 100 mg capsule  Commonly known as: Colace  Take 1 capsule (100 mg) by mouth in the morning and 1 capsule (100 mg) before bedtime.  Medication Adjustments for Surgery: Continue until night before surgery     ergocalciferol 1.25 MG (70418 UT) capsule  Commonly known as: Vitamin D-2  Take 1 capsule (1,250 mcg) by mouth 1 (one) time per week.  Medication Adjustments for Surgery: Continue until night before surgery     gabapentin 300 mg capsule  Commonly known as: Neurontin  TAKE 1 CAPSULE BY MOUTH THREE TIMES DAILY  Medication Adjustments for Surgery: Continue until night before surgery     montelukast 10 mg tablet  Commonly known as: Singulair  Take 1 tablet (10 mg) by mouth once daily at bedtime.  Medication Adjustments for Surgery: Take morning of surgery with sip of water, no other fluids                              NPO Instructions:    Do not eat any food after midnight the night before your surgery/procedure.  May have clears up to 3 hours  preop. Water, Black coffee, tea, gatorade, and clear soda. Then nothing by mouth 3 hours prior to procedure. No gum, candy, mints or smoking.  (Up till 11am)    Additional Instructions:     Review your medication instructions, take indicated medications  Wear  comfortable loose fitting clothing  All jewelry and valuables should be left at home    Park in back of hospital by ER. Come up to Second floor-Outpt dept to check in.  Bring Photo ID and Insurance card,   You MUST have a  with you.      Call Outpatient dept at [phone] the night before your procedure (Friday for Monday procedure), between 1-3 pm.       Be here at 12:45pm- scheduled for 2pm.

## 2023-12-15 ENCOUNTER — LAB (OUTPATIENT)
Dept: LAB | Facility: LAB | Age: 83
End: 2023-12-15
Payer: MEDICARE

## 2023-12-15 DIAGNOSIS — N30.90 CYSTITIS: ICD-10-CM

## 2023-12-15 DIAGNOSIS — R31.9 HEMATURIA, UNSPECIFIED TYPE: ICD-10-CM

## 2023-12-15 LAB
APPEARANCE UR: ABNORMAL
BACTERIA #/AREA URNS AUTO: ABNORMAL /HPF
BILIRUB UR STRIP.AUTO-MCNC: NEGATIVE MG/DL
COLOR UR: ABNORMAL
GLUCOSE UR STRIP.AUTO-MCNC: NEGATIVE MG/DL
KETONES UR STRIP.AUTO-MCNC: ABNORMAL MG/DL
LEUKOCYTE ESTERASE UR QL STRIP.AUTO: ABNORMAL
MUCOUS THREADS #/AREA URNS AUTO: ABNORMAL /LPF
NITRITE UR QL STRIP.AUTO: NEGATIVE
PH UR STRIP.AUTO: 6 [PH]
PROT UR STRIP.AUTO-MCNC: ABNORMAL MG/DL
RBC # UR STRIP.AUTO: ABNORMAL /UL
RBC #/AREA URNS AUTO: >20 /HPF
SP GR UR STRIP.AUTO: 1.02
SQUAMOUS #/AREA URNS AUTO: ABNORMAL /HPF
UROBILINOGEN UR STRIP.AUTO-MCNC: <2 MG/DL
WBC #/AREA URNS AUTO: >50 /HPF

## 2023-12-15 PROCEDURE — 87086 URINE CULTURE/COLONY COUNT: CPT

## 2023-12-15 PROCEDURE — 87186 SC STD MICRODIL/AGAR DIL: CPT

## 2023-12-18 ENCOUNTER — DOCUMENTATION (OUTPATIENT)
Dept: PAIN MEDICINE | Facility: HOSPITAL | Age: 83
End: 2023-12-18
Payer: MEDICARE

## 2023-12-19 DIAGNOSIS — N30.01 ACUTE CYSTITIS WITH HEMATURIA: Primary | ICD-10-CM

## 2023-12-19 LAB — BACTERIA UR CULT: ABNORMAL

## 2023-12-19 RX ORDER — CIPROFLOXACIN 500 MG/1
500 TABLET ORAL 2 TIMES DAILY
Qty: 20 TABLET | Refills: 0 | Status: SHIPPED | OUTPATIENT
Start: 2023-12-19 | End: 2023-12-29

## 2023-12-28 DIAGNOSIS — Z01.818 PREOPERATIVE CLEARANCE: Primary | ICD-10-CM

## 2023-12-28 RX ORDER — PHENAZOPYRIDINE HYDROCHLORIDE 100 MG/1
200 TABLET, FILM COATED ORAL ONCE AS NEEDED
Status: CANCELLED | OUTPATIENT
Start: 2023-12-28 | End: 2023-12-30

## 2023-12-28 RX ORDER — ACETAMINOPHEN 325 MG/1
650 TABLET ORAL EVERY 4 HOURS PRN
Status: CANCELLED | OUTPATIENT
Start: 2023-12-28

## 2023-12-28 RX ORDER — ONDANSETRON HYDROCHLORIDE 2 MG/ML
4 INJECTION, SOLUTION INTRAVENOUS ONCE AS NEEDED
Status: CANCELLED | OUTPATIENT
Start: 2023-12-28

## 2023-12-28 RX ORDER — FENTANYL CITRATE 50 UG/ML
25 INJECTION, SOLUTION INTRAMUSCULAR; INTRAVENOUS EVERY 5 MIN PRN
Status: CANCELLED | OUTPATIENT
Start: 2023-12-28

## 2023-12-28 RX ORDER — HYDROCODONE BITARTRATE AND ACETAMINOPHEN 5; 325 MG/1; MG/1
1 TABLET ORAL EVERY 4 HOURS PRN
Status: CANCELLED | OUTPATIENT
Start: 2023-12-28

## 2023-12-28 RX ORDER — SODIUM CHLORIDE, SODIUM LACTATE, POTASSIUM CHLORIDE, CALCIUM CHLORIDE 600; 310; 30; 20 MG/100ML; MG/100ML; MG/100ML; MG/100ML
100 INJECTION, SOLUTION INTRAVENOUS CONTINUOUS
Status: CANCELLED | OUTPATIENT
Start: 2023-12-28

## 2024-01-02 ENCOUNTER — HOSPITAL ENCOUNTER (OUTPATIENT)
Dept: CARDIOLOGY | Facility: HOSPITAL | Age: 84
Discharge: HOME | End: 2024-01-02
Payer: MEDICARE

## 2024-01-02 ENCOUNTER — PRE-ADMISSION TESTING (OUTPATIENT)
Dept: PREADMISSION TESTING | Facility: HOSPITAL | Age: 84
End: 2024-01-02
Payer: MEDICARE

## 2024-01-02 ENCOUNTER — ANESTHESIA EVENT (OUTPATIENT)
Dept: ANESTHESIOLOGY | Facility: HOSPITAL | Age: 84
End: 2024-01-02

## 2024-01-02 VITALS
HEIGHT: 70 IN | BODY MASS INDEX: 25.12 KG/M2 | SYSTOLIC BLOOD PRESSURE: 118 MMHG | WEIGHT: 175.49 LBS | DIASTOLIC BLOOD PRESSURE: 74 MMHG | HEART RATE: 82 BPM | RESPIRATION RATE: 16 BRPM | OXYGEN SATURATION: 93 % | TEMPERATURE: 97.7 F

## 2024-01-02 DIAGNOSIS — Z01.818 PREOPERATIVE CLEARANCE: ICD-10-CM

## 2024-01-02 LAB
APPEARANCE UR: ABNORMAL
BILIRUB UR STRIP.AUTO-MCNC: NEGATIVE MG/DL
COLOR UR: ABNORMAL
GLUCOSE UR STRIP.AUTO-MCNC: NEGATIVE MG/DL
HYALINE CASTS #/AREA URNS AUTO: ABNORMAL /LPF
KETONES UR STRIP.AUTO-MCNC: NEGATIVE MG/DL
LEUKOCYTE ESTERASE UR QL STRIP.AUTO: ABNORMAL
MUCOUS THREADS #/AREA URNS AUTO: ABNORMAL /LPF
NITRITE UR QL STRIP.AUTO: NEGATIVE
PH UR STRIP.AUTO: 5 [PH]
PROT UR STRIP.AUTO-MCNC: ABNORMAL MG/DL
RBC # UR STRIP.AUTO: ABNORMAL /UL
RBC #/AREA URNS AUTO: >20 /HPF
SP GR UR STRIP.AUTO: 1.03
SQUAMOUS #/AREA URNS AUTO: ABNORMAL /HPF
UROBILINOGEN UR STRIP.AUTO-MCNC: 2 MG/DL
WBC #/AREA URNS AUTO: >50 /HPF
YEAST BUDDING #/AREA UR COMP ASSIST: PRESENT /HPF

## 2024-01-02 PROCEDURE — 81001 URINALYSIS AUTO W/SCOPE: CPT | Performed by: PHYSICIAN ASSISTANT

## 2024-01-02 PROCEDURE — 87086 URINE CULTURE/COLONY COUNT: CPT | Mod: GENLAB | Performed by: PHYSICIAN ASSISTANT

## 2024-01-02 PROCEDURE — 93010 ELECTROCARDIOGRAM REPORT: CPT | Performed by: INTERNAL MEDICINE

## 2024-01-02 RX ORDER — ALBUTEROL SULFATE 1.25 MG/3ML
1.25 SOLUTION RESPIRATORY (INHALATION) EVERY 6 HOURS PRN
COMMUNITY
End: 2024-04-24 | Stop reason: ALTCHOICE

## 2024-01-02 ASSESSMENT — DUKE ACTIVITY SCORE INDEX (DASI)
CAN YOU TAKE CARE OF YOURSELF (EAT, DRESS, BATHE, OR USE TOILET): YES
CAN YOU CLIMB A FLIGHT OF STAIRS OR WALK UP A HILL: NO
CAN YOU WALK A BLOCK OR TWO ON LEVEL GROUND: YES
CAN YOU PARTICIPATE IN MODERATE RECREATIONAL ACTIVITIES LIKE GOLF, BOWLING, DANCING, DOUBLES TENNIS OR THROWING A BASEBALL OR FOOTBALL: NO
CAN YOU RUN A SHORT DISTANCE: NO
CAN YOU WALK INDOORS, SUCH AS AROUND YOUR HOUSE: YES
CAN YOU DO MODERATE WORK AROUND THE HOUSE LIKE VACUUMING, SWEEPING FLOORS OR CARRYING GROCERIES: NO
CAN YOU DO LIGHT WORK AROUND THE HOUSE LIKE DUSTING OR WASHING DISHES: NO
CAN YOU PARTICIPATE IN STRENOUS SPORTS LIKE SWIMMING, SINGLES TENNIS, FOOTBALL, BASKETBALL, OR SKIING: NO
CAN YOU DO YARD WORK LIKE RAKING LEAVES, WEEDING OR PUSHING A MOWER: NO
CAN YOU DO HEAVY WORK AROUND THE HOUSE LIKE SCRUBBING FLOORS OR LIFTING AND MOVING HEAVY FURNITURE: NO

## 2024-01-02 NOTE — PREPROCEDURE INSTRUCTIONS
Medication List            Accurate as of January 2, 2024  2:34 PM. Always use your most recent med list.                acetaminophen 500 mg tablet  Commonly known as: Tylenol  Medication Adjustments for Surgery: Continue until night before surgery     albuterol 1.25 mg/3 mL nebulizer solution  Medication Adjustments for Surgery: Take morning of surgery with sip of water, no other fluids     amLODIPine 5 mg tablet  Commonly known as: Norvasc  Take 1 tablet (5 mg) by mouth once daily in the morning. Do not take until you follow up with cardiology, Dr. Cloud.  Medication Adjustments for Surgery: Take morning of surgery with sip of water, no other fluids     aspirin 81 mg EC tablet  Medication Adjustments for Surgery: Continue until night before surgery     atorvastatin 20 mg tablet  Commonly known as: Lipitor  Take 1 tablet (20 mg) by mouth once daily at bedtime.  Medication Adjustments for Surgery: Continue until night before surgery     budesonide-formoteroL 160-4.5 mcg/actuation inhaler  Commonly known as: Symbicort  Medication Adjustments for Surgery: Take morning of surgery with sip of water, no other fluids     ergocalciferol 1.25 MG (86369 UT) capsule  Commonly known as: Vitamin D-2  Take 1 capsule (1,250 mcg) by mouth 1 (one) time per week.  Medication Adjustments for Surgery: Continue until night before surgery     gabapentin 300 mg capsule  Commonly known as: Neurontin  TAKE 1 CAPSULE BY MOUTH THREE TIMES DAILY  Medication Adjustments for Surgery: Continue until night before surgery     montelukast 10 mg tablet  Commonly known as: Singulair  Take 1 tablet (10 mg) by mouth once daily at bedtime.  Medication Adjustments for Surgery: Take morning of surgery with sip of water, no other fluids                              NPO Instructions:    Do not eat any food after midnight the night before your surgery/procedure.  May have clears up to 3 hours preop. Water, Black coffee, tea, gatorade, and clear soda.  Then nothing by mouth 3 hours prior to procedure. No gum, candy, mints or smoking.      Additional Instructions:     Review your medication instructions, take indicated medications  Wear  comfortable loose fitting clothing  All jewelry and valuables should be left at home    Park in back of hospital by ER. Come up to Second floor-Outpt dept to check in.  Bring Photo ID and Insurance card,   You MUST have a  with you.  If you get ill at all before your procedure- CALL YOUR DOCTOR/SURGEON.    We want you in the best shape that is possible. Any sickness might lead to your procedure being delayed.      Call Outpatient dept at [phone] the night before your procedure (Friday for Monday procedure), between 1-3 pm.

## 2024-01-02 NOTE — ANESTHESIA PREPROCEDURE EVALUATION
Patient: Shiva Hardy    Procedure Information    Date: 01/02/24  Reason: PAT         Relevant Problems   Cardiovascular   (+) Aortic aneurysm (CMS/HCC)   (+) Essential hypertension   (+) Hyperlipidemia   (+) Thoracic back pain      Neuro/Psych   (+) Thoracic neuritis      Pulmonary   (+) Asthma   (+) COPD (chronic obstructive pulmonary disease) (CMS/HCC)   (+) Centrilobular emphysema (CMS/HCC)   (+) Left lower lobe pneumonia   (+) Multiple lung nodules on CT   (+) Obstructive sleep apnea   (+) Paraseptal emphysema (CMS/HCC)      Musculoskeletal   (+) Spondylosis, thoracic   (+) Thoracic spondylosis      Eyes, Ears, Nose, and Throat   (+) Bilateral hearing loss      Infectious Disease   (+) Oral thrush       Clinical information reviewed:               Chart reviewed. Multiple recent urology surgeries/procedures under [facility], no complications or issues.  No new clearances ordered.    NPO Detail:  No data recorded     PHYSICAL EXAM    Anesthesia Plan    There were no vitals filed for this visit.    Past Surgical History:   Procedure Laterality Date    CATARACT EXTRACTION  10/01/2014    Cataract Surgery    HERNIA REPAIR  11/03/2015    Hernia Repair    OTHER SURGICAL HISTORY  01/25/2021    Spinal surgery    OTHER SURGICAL HISTORY  01/25/2021    Lower back surgery    OTHER SURGICAL HISTORY  12/16/2016    Destruction Of Malignant Lesion Face    OTHER SURGICAL HISTORY  05/08/2019    Skin biopsy    PROSTATE SURGERY  10/01/2014    Prostate Surgery    UMBILICAL HERNIA REPAIR  12/16/2016    Umbilical Hernia Repair     Past Medical History:   Diagnosis Date    Acute maxillary sinusitis, unspecified 12/19/2019    Acute maxillary sinusitis    Acute recurrent maxillary sinusitis 12/28/2016    Acute recurrent maxillary sinusitis    Acute upper respiratory infection, unspecified 03/13/2017    Acute URI    Acute upper respiratory infection, unspecified 12/30/2020    Viral URI with cough    Arthritis     Bilateral inguinal hernia,  without obstruction or gangrene, not specified as recurrent 03/04/2014    Bilateral inguinal hernia    BPH (benign prostatic hyperplasia)     Chest pain, unspecified 12/16/2016    Intermittent chest pain    Chronic obstructive pulmonary disease with (acute) exacerbation (CMS/HCC) 12/19/2019    Acute exacerbation of chronic obstructive pulmonary disease (COPD)    Chronic pain disorder     back pain- chronic    Edema, unspecified 05/27/2021    Parotid swelling    Hyperlipidemia     Hypertension     Impacted cerumen, right ear 01/06/2017    Excessive cerumen in right ear canal    Low back pain, unspecified 07/25/2019    Acute exacerbation of chronic low back pain    Personal history of malignant neoplasm of prostate     History of malignant neoplasm of prostate    Personal history of malignant neoplasm, unspecified     Personal history of malignant neoplasm    Personal history of other diseases of the digestive system 05/27/2021    History of parotitis    Personal history of other diseases of the respiratory system 02/22/2017    History of pulmonary emphysema    Personal history of other endocrine, nutritional and metabolic disease 04/20/2017    History of hyperkalemia    Prostate cancer (CMS/[facility])     Sleep apnea     uses CPAP    Unspecified malignant neoplasm of skin of unspecified part of face 12/16/2016    Skin cancer of face    Unspecified symptoms and signs involving the genitourinary system 12/23/2021    UTI symptoms       Current Outpatient Medications:     acetaminophen (Tylenol) 500 mg tablet, Take 1 tablet (500 mg) by mouth every 6 hours if needed for mild pain (1 - 3)., Disp: , Rfl:     amLODIPine (Norvasc) 5 mg tablet, Take 1 tablet (5 mg) by mouth once daily in the morning. Do not take until you follow up with cardiology, Dr. Cloud., Disp: , Rfl:     aspirin 81 mg EC tablet, Take 1 tablet (81 mg) by mouth once daily in the morning., Disp: , Rfl:     atorvastatin (Lipitor) 20 mg tablet, Take 1 tablet (20  mg) by mouth once daily at bedtime., Disp: 90 tablet, Rfl: 1    budesonide-formoteroL (Symbicort) 160-4.5 mcg/actuation inhaler, Inhale 2 puffs 2 times a day. RINSE MOUTH AFTER USE., Disp: , Rfl:     docusate sodium (Colace) 100 mg capsule, Take 1 capsule (100 mg) by mouth in the morning and 1 capsule (100 mg) before bedtime., Disp: 60 capsule, Rfl: 2    ergocalciferol (Vitamin D-2) 1.25 MG (12711 UT) capsule, Take 1 capsule (1,250 mcg) by mouth 1 (one) time per week. (Patient taking differently: Take 1 capsule (1,250 mcg) by mouth every 30 (thirty) days.), Disp: 90 capsule, Rfl: 1    gabapentin (Neurontin) 300 mg capsule, TAKE 1 CAPSULE BY MOUTH THREE TIMES DAILY, Disp: 90 capsule, Rfl: 0    montelukast (Singulair) 10 mg tablet, Take 1 tablet (10 mg) by mouth once daily at bedtime., Disp: 90 tablet, Rfl: 0  Prior to Admission medications    Medication Sig Start Date End Date Taking? Authorizing Provider   acetaminophen (Tylenol) 500 mg tablet Take 1 tablet (500 mg) by mouth every 6 hours if needed for mild pain (1 - 3).    Historical Provider, MD   amLODIPine (Norvasc) 5 mg tablet Take 1 tablet (5 mg) by mouth once daily in the morning. Do not take until you follow up with cardiology, Dr. Cloud. 11/13/23   Brooke Chilel MD   aspirin 81 mg EC tablet Take 1 tablet (81 mg) by mouth once daily in the morning.    Historical Provider, MD   atorvastatin (Lipitor) 20 mg tablet Take 1 tablet (20 mg) by mouth once daily at bedtime. 8/2/23 8/1/24  Adria Galeano MD   budesonide-formoteroL (Symbicort) 160-4.5 mcg/actuation inhaler Inhale 2 puffs 2 times a day. RINSE MOUTH AFTER USE. 11/3/15   Historical Provider, MD   ciprofloxacin (Cipro) 500 mg tablet Take 1 tablet (500 mg) by mouth 2 times a day for 10 days. 12/19/23 12/29/23  Irwin Kelsey MD   docusate sodium (Colace) 100 mg capsule Take 1 capsule (100 mg) by mouth in the morning and 1 capsule (100 mg) before bedtime. 4/14/23   CHILANGO Keane-CNP    ergocalciferol (Vitamin D-2) 1.25 MG (48154 UT) capsule Take 1 capsule (1,250 mcg) by mouth 1 (one) time per week.  Patient taking differently: Take 1 capsule (1,250 mcg) by mouth every 30 (thirty) days. 8/2/23   Adria Galeano MD   gabapentin (Neurontin) 300 mg capsule TAKE 1 CAPSULE BY MOUTH THREE TIMES DAILY 10/24/23   Adria Galeano MD   montelukast (Singulair) 10 mg tablet Take 1 tablet (10 mg) by mouth once daily at bedtime. 10/9/23 4/6/24  Adria Galeano MD     Allergies   Allergen Reactions    Percocet [Oxycodone-Acetaminophen] Nausea Only     Social History     Tobacco Use    Smoking status: Every Day     Packs/day: 0.50     Years: 40.00     Additional pack years: 0.00     Total pack years: 20.00     Types: Cigarettes    Smokeless tobacco: Never   Substance Use Topics    Alcohol use: Not Currently         Chemistry    Lab Results   Component Value Date/Time     12/13/2023 1455    K 4.6 12/13/2023 1455     12/13/2023 1455    CO2 24 12/13/2023 1455    BUN 23 12/13/2023 1455    CREATININE 0.81 12/13/2023 1455    Lab Results   Component Value Date/Time    CALCIUM 9.9 12/13/2023 1455    ALKPHOS 62 11/09/2023 1045    AST 21 11/09/2023 1045    ALT 20 11/09/2023 1045    BILITOT 0.4 11/09/2023 1045          Lab Results   Component Value Date/Time    WBC 10.5 12/13/2023 1455    HGB 13.3 (L) 12/13/2023 1455    HCT 43.0 12/13/2023 1455     12/13/2023 1455     Lab Results   Component Value Date/Time    PROTIME 11.4 11/09/2023 1045    INR 1.0 11/09/2023 1045     Encounter Date: 10/02/23   ECG 12 lead   Result Value    Ventricular Rate 78    Atrial Rate 78    IN Interval 174    QRS Duration 80    QT Interval 370    QTC Calculation(Bazett) 421    P Axis -19    R Axis -8    T Axis 86    QRS Count 13    Q Onset 220    P Onset 133    P Offset 189    T Offset 405    QTC Fredericia 403    Narrative    Normal sinus rhythm  Possible Anterior infarct , age undetermined  Abnormal ECG  When compared with ECG of  29-MAR-2021 13:06,  No significant change was found  Confirmed by Veronica Mcwilliams ([id number]) on 10/3/2023 8:21:41 AM

## 2024-01-03 ENCOUNTER — CLINICAL SUPPORT (OUTPATIENT)
Dept: PREADMISSION TESTING | Facility: HOSPITAL | Age: 84
End: 2024-01-03
Payer: MEDICARE

## 2024-01-03 DIAGNOSIS — B37.49 CANDIDAL URINARY TRACT INFECTION: ICD-10-CM

## 2024-01-03 DIAGNOSIS — B49 FUNGUS PRESENT IN URINE: ICD-10-CM

## 2024-01-03 LAB
ATRIAL RATE: 73 BPM
HOLD SPECIMEN: NORMAL
P AXIS: 68 DEGREES
P OFFSET: 197 MS
P ONSET: 134 MS
PR INTERVAL: 170 MS
Q ONSET: 219 MS
QRS COUNT: 12 BEATS
QRS DURATION: 82 MS
QT INTERVAL: 386 MS
QTC CALCULATION(BAZETT): 425 MS
QTC FREDERICIA: 412 MS
R AXIS: 42 DEGREES
T AXIS: 87 DEGREES
T OFFSET: 412 MS
VENTRICULAR RATE: 73 BPM

## 2024-01-03 PROCEDURE — 93005 ELECTROCARDIOGRAM TRACING: CPT

## 2024-01-03 RX ORDER — FLUCONAZOLE 200 MG/1
200 TABLET ORAL DAILY
Qty: 14 TABLET | Refills: 0 | Status: SHIPPED | OUTPATIENT
Start: 2024-01-03 | End: 2024-01-17

## 2024-01-03 RX ORDER — FLUCONAZOLE 200 MG/1
200 TABLET ORAL DAILY
Qty: 14 TABLET | Refills: 0 | Status: SHIPPED | OUTPATIENT
Start: 2024-01-03 | End: 2024-01-03 | Stop reason: SDUPTHER

## 2024-01-04 LAB — BACTERIA UR CULT: NORMAL

## 2024-01-05 ENCOUNTER — ANESTHESIA (OUTPATIENT)
Dept: OPERATING ROOM | Facility: HOSPITAL | Age: 84
End: 2024-01-05
Payer: MEDICARE

## 2024-01-05 ENCOUNTER — HOSPITAL ENCOUNTER (OUTPATIENT)
Facility: HOSPITAL | Age: 84
Setting detail: OUTPATIENT SURGERY
Discharge: HOME | End: 2024-01-05
Attending: STUDENT IN AN ORGANIZED HEALTH CARE EDUCATION/TRAINING PROGRAM | Admitting: STUDENT IN AN ORGANIZED HEALTH CARE EDUCATION/TRAINING PROGRAM
Payer: MEDICARE

## 2024-01-05 RX ORDER — SODIUM CHLORIDE, SODIUM LACTATE, POTASSIUM CHLORIDE, CALCIUM CHLORIDE 600; 310; 30; 20 MG/100ML; MG/100ML; MG/100ML; MG/100ML
100 INJECTION, SOLUTION INTRAVENOUS CONTINUOUS
Status: DISCONTINUED | OUTPATIENT
Start: 2024-01-05 | End: 2024-02-08 | Stop reason: HOSPADM

## 2024-01-05 RX ORDER — CEFAZOLIN SODIUM 2 G/50ML
2 SOLUTION INTRAVENOUS ONCE
Status: DISCONTINUED | OUTPATIENT
Start: 2024-01-05 | End: 2024-02-08 | Stop reason: HOSPADM

## 2024-01-05 ASSESSMENT — COLUMBIA-SUICIDE SEVERITY RATING SCALE - C-SSRS
6. HAVE YOU EVER DONE ANYTHING, STARTED TO DO ANYTHING, OR PREPARED TO DO ANYTHING TO END YOUR LIFE?: NO
2. HAVE YOU ACTUALLY HAD ANY THOUGHTS OF KILLING YOURSELF?: NO
1. IN THE PAST MONTH, HAVE YOU WISHED YOU WERE DEAD OR WISHED YOU COULD GO TO SLEEP AND NOT WAKE UP?: NO

## 2024-01-08 ENCOUNTER — APPOINTMENT (OUTPATIENT)
Dept: DERMATOLOGY | Facility: CLINIC | Age: 84
End: 2024-01-08
Payer: MEDICARE

## 2024-01-17 ENCOUNTER — PREP FOR PROCEDURE (OUTPATIENT)
Dept: UROLOGY | Facility: CLINIC | Age: 84
End: 2024-01-17
Payer: MEDICARE

## 2024-01-18 DIAGNOSIS — C67.9 MALIGNANT NEOPLASM OF URINARY BLADDER, UNSPECIFIED SITE (MULTI): ICD-10-CM

## 2024-01-23 ENCOUNTER — CLINICAL SUPPORT (OUTPATIENT)
Dept: UROLOGY | Facility: CLINIC | Age: 84
End: 2024-01-23
Payer: MEDICARE

## 2024-01-23 DIAGNOSIS — C67.9 MALIGNANT NEOPLASM OF URINARY BLADDER, UNSPECIFIED SITE (MULTI): ICD-10-CM

## 2024-01-23 DIAGNOSIS — R35.1 NOCTURIA: ICD-10-CM

## 2024-01-23 LAB
POC APPEARANCE, URINE: CLEAR
POC BILIRUBIN, URINE: ABNORMAL
POC BLOOD, URINE: ABNORMAL
POC COLOR, URINE: YELLOW
POC GLUCOSE, URINE: NEGATIVE MG/DL
POC KETONES, URINE: ABNORMAL MG/DL
POC LEUKOCYTES, URINE: ABNORMAL
POC NITRITE,URINE: NEGATIVE
POC PH, URINE: 5.5 PH
POC PROTEIN, URINE: ABNORMAL MG/DL
POC SPECIFIC GRAVITY, URINE: >=1.03
POC UROBILINOGEN, URINE: 0.2 EU/DL

## 2024-01-23 PROCEDURE — 51720 TREATMENT OF BLADDER LESION: CPT | Performed by: PHYSICIAN ASSISTANT

## 2024-01-23 PROCEDURE — 81002 URINALYSIS NONAUTO W/O SCOPE: CPT | Performed by: PHYSICIAN ASSISTANT

## 2024-01-23 NOTE — PROGRESS NOTES
Patient here for BCG 1/6 Induction. Patient reports episode of gross hematuria 5 days ago. His urine dip today showed trace microscopic hematuria and trace leuks. Pt said he finished antibx a few days ago for a UTI. Nurse discussed with Dr. Kelsey pt's episode of gross hematuria 5 days ago, trace leuks and microscopic hematuria on UA today, and recent UTI and Dr. Kelsey said ok to proceed with BCG. Pt baseline has urinary incontinence. Per ELENA Taylor nurse to cap gorman catheter and leave in for 2 hours for patients treatment.   Urine dipped I/O prior to administration of BCG.  Patient denies being on antibiotics (recenetly finished antibx 3-4 days ago) or steroids.  Informed consent completed by provider prior to start of tx series.  Written education on BCG given to patient, along with nurse callback number.  Educated patient on 2 hour dwell time, and bleach protocol in toilet x 6 hours.  14F coude catheter inserted by sterile technique. Urine drained yellow about <15cc.  50mg BCG reconstituted in 50 mL of normal saline administered intravesically through catheter. Catheter capped. When pt stood up he had urinary incontinence around the gorman catheter. Nurse instructed pt he will have to lay in the bed and rotate every 15 minutes. Patient requested catheter to be removed after 1.5hr dwell time as he was uncomfortable. Urine drained around 100cc. Catheter removed after administration of BCG.  Patient tolerated procedure well.  NS  NDC: [id number]  Lot: [id number]  Expires: 4/1/24

## 2024-01-30 ENCOUNTER — CLINICAL SUPPORT (OUTPATIENT)
Dept: UROLOGY | Facility: CLINIC | Age: 84
End: 2024-01-30
Payer: MEDICARE

## 2024-01-30 DIAGNOSIS — C67.9 MALIGNANT NEOPLASM OF URINARY BLADDER, UNSPECIFIED SITE (MULTI): ICD-10-CM

## 2024-01-30 LAB
POC APPEARANCE, URINE: CLEAR
POC BILIRUBIN, URINE: NEGATIVE
POC BLOOD, URINE: ABNORMAL
POC COLOR, URINE: YELLOW
POC GLUCOSE, URINE: NEGATIVE MG/DL
POC KETONES, URINE: NEGATIVE MG/DL
POC LEUKOCYTES, URINE: NEGATIVE
POC NITRITE,URINE: NEGATIVE
POC PH, URINE: 5.5 PH
POC PROTEIN, URINE: ABNORMAL MG/DL
POC SPECIFIC GRAVITY, URINE: >=1.03
POC UROBILINOGEN, URINE: 0.2 EU/DL

## 2024-01-30 PROCEDURE — 51720 TREATMENT OF BLADDER LESION: CPT | Performed by: PHYSICIAN ASSISTANT

## 2024-01-30 PROCEDURE — 81002 URINALYSIS NONAUTO W/O SCOPE: CPT | Performed by: PHYSICIAN ASSISTANT

## 2024-01-30 NOTE — PROGRESS NOTES
Patient here for BCG 2/6 Induction.  Patient denies UTI s/s.  Urine dipped I/O prior to administration of BCG.  Informed consent completed by provider prior to start of tx series. 14F coude catheter inserted by sterile technique, urine not draining. Catheter flushed with 30cc sterile water, catheter then drained clear fluid about 25cc without issues. 50 mg of BCG reconstituted in 50 mL of normal saline administered intravesically through catheter. Catheter capped for 2 hours. Patient had a moderate to large amount of urinary leakage around the catheter. Catheter drained about 100-125cc, catheter then removed after administration of BCG.  Carla-care performed d/t the leakage. Patient tolerated procedure well. Nurse emailed Dr. Kelsey that pt had a moderate to large amount of leakage during dwell time of BCG today.   NS  NDC: [id number]  Lot: [id number]  Expires: 4/1/24

## 2024-02-06 ENCOUNTER — CLINICAL SUPPORT (OUTPATIENT)
Dept: UROLOGY | Facility: CLINIC | Age: 84
End: 2024-02-06
Payer: MEDICARE

## 2024-02-06 DIAGNOSIS — C67.9 MALIGNANT NEOPLASM OF URINARY BLADDER, UNSPECIFIED SITE (MULTI): ICD-10-CM

## 2024-02-06 LAB
POC APPEARANCE, URINE: CLEAR
POC BILIRUBIN, URINE: NEGATIVE
POC BLOOD, URINE: ABNORMAL
POC COLOR, URINE: YELLOW
POC GLUCOSE, URINE: NEGATIVE MG/DL
POC KETONES, URINE: NEGATIVE MG/DL
POC LEUKOCYTES, URINE: NEGATIVE
POC NITRITE,URINE: NEGATIVE
POC PH, URINE: 6 PH
POC PROTEIN, URINE: ABNORMAL MG/DL
POC SPECIFIC GRAVITY, URINE: 1.02
POC UROBILINOGEN, URINE: 0.2 EU/DL

## 2024-02-06 PROCEDURE — 81002 URINALYSIS NONAUTO W/O SCOPE: CPT | Performed by: PHYSICIAN ASSISTANT

## 2024-02-06 PROCEDURE — 51720 TREATMENT OF BLADDER LESION: CPT | Performed by: PHYSICIAN ASSISTANT

## 2024-02-06 NOTE — PROGRESS NOTES
Patient here for BCG 3/6 Induction.  Patient reports increased frequency since starting OAB rx last week but denies other UTI s/s.  Urine dipped I/O prior to administration of BCG.  Informed consent completed by provider prior to start of tx series. 14F coude catheter inserted by sterile technique, urine not draining. Catheter flushed with 30cc sterile water, catheter then drained clear fluid about 25-40cc without issues. 50 mg of BCG reconstituted in 50 mL of normal saline administered intravesically through catheter. Catheter capped for 1.5 hours (pt was very uncomfortable and wanted bladder drained/catheter removed). Patient had a moderate amount of urinary leakage around the catheter. Catheter drained about 150cc, catheter then removed after administration of BCG.  Carla-care performed d/t the leakage. Patient tolerated procedure well.   NS  NDC: [id number]  Lot: [id number]  Expires: 7/1/24

## 2024-02-08 ENCOUNTER — HOSPITAL ENCOUNTER (OUTPATIENT)
Dept: RADIOLOGY | Facility: CLINIC | Age: 84
Discharge: HOME | End: 2024-02-08
Payer: MEDICARE

## 2024-02-08 ENCOUNTER — OFFICE VISIT (OUTPATIENT)
Dept: PAIN MEDICINE | Facility: CLINIC | Age: 84
End: 2024-02-08
Payer: MEDICARE

## 2024-02-08 VITALS
RESPIRATION RATE: 16 BRPM | HEART RATE: 93 BPM | DIASTOLIC BLOOD PRESSURE: 79 MMHG | SYSTOLIC BLOOD PRESSURE: 110 MMHG | WEIGHT: 180 LBS | HEIGHT: 70 IN | BODY MASS INDEX: 25.77 KG/M2

## 2024-02-08 DIAGNOSIS — G89.29 CHRONIC MIDLINE THORACIC BACK PAIN: ICD-10-CM

## 2024-02-08 DIAGNOSIS — M79.18 MYOFASCIAL PAIN: ICD-10-CM

## 2024-02-08 DIAGNOSIS — M54.6 CHRONIC MIDLINE THORACIC BACK PAIN: Primary | ICD-10-CM

## 2024-02-08 DIAGNOSIS — M54.6 CHRONIC MIDLINE THORACIC BACK PAIN: ICD-10-CM

## 2024-02-08 DIAGNOSIS — G89.29 CHRONIC MIDLINE THORACIC BACK PAIN: Primary | ICD-10-CM

## 2024-02-08 PROCEDURE — 3078F DIAST BP <80 MM HG: CPT | Performed by: PHYSICAL MEDICINE & REHABILITATION

## 2024-02-08 PROCEDURE — 72072 X-RAY EXAM THORAC SPINE 3VWS: CPT | Performed by: RADIOLOGY

## 2024-02-08 PROCEDURE — 72072 X-RAY EXAM THORAC SPINE 3VWS: CPT

## 2024-02-08 PROCEDURE — 3074F SYST BP LT 130 MM HG: CPT | Performed by: PHYSICAL MEDICINE & REHABILITATION

## 2024-02-08 PROCEDURE — 1159F MED LIST DOCD IN RCRD: CPT | Performed by: PHYSICAL MEDICINE & REHABILITATION

## 2024-02-08 PROCEDURE — 99214 OFFICE O/P EST MOD 30 MIN: CPT | Performed by: PHYSICAL MEDICINE & REHABILITATION

## 2024-02-08 PROCEDURE — 1125F AMNT PAIN NOTED PAIN PRSNT: CPT | Performed by: PHYSICAL MEDICINE & REHABILITATION

## 2024-02-08 RX ORDER — TIZANIDINE 2 MG/1
2 TABLET ORAL EVERY 8 HOURS PRN
Qty: 90 TABLET | Refills: 2 | Status: SHIPPED | OUTPATIENT
Start: 2024-02-08 | End: 2024-05-08

## 2024-02-08 ASSESSMENT — PAIN SCALES - GENERAL: PAINLEVEL: 7

## 2024-02-08 NOTE — PROGRESS NOTES
Subjective   Patient ID: Shiva Hardy is a 83 y.o. male who presents for Back Pain.  HPI    83-year-old male with midthoracic back pain for a long period of time.  Patient does have a history of some type of lower back surgery but again his pain is more localized around the scapula around his thoracic spine.  Denies any radiation to his abdomen or chest.  He previously was on Norco which did help, was started recently on gabapentin by his PCP which she took for couple months and said did not help so he stopped it.  Not taking really anything other than Tylenol right now.  He has had previous injections by one of my colleagues Dr. Lord which appear to be a thoracic epidural steroid injection which did not really help with symptoms.  Also seem to have some type of other thoracic spine procedures knowing exactly unsure of what those are by another pain physician about 7 to 8 years ago.  Denies any radiation down his legs or into his arms.  Does have some left shoulder issues as apparently he had some type of tear of his biceps tendon about a year ago with resultant weakness in his left upper extremity because of that.  He did not seek medical care for this issue.              Oswestry disability index score: 55%  Monitoring and compliance:    ORT:    PDUQ:    Office Agreement:      Review of Systems     Current Outpatient Medications   Medication Instructions    acetaminophen (TYLENOL) 500 mg, oral, Every 6 hours PRN    albuterol 1.25 mg, nebulization, Every 6 hours PRN    amLODIPine (NORVASC) 5 mg, oral, Every morning, Do not take until you follow up with cardiology, Dr. Cloud.    aspirin 81 mg, oral, Every morning    atorvastatin (LIPITOR) 20 mg, oral, Nightly    budesonide-formoteroL (Symbicort) 160-4.5 mcg/actuation inhaler 2 puffs, inhalation, 2 times daily RT, RINSE MOUTH AFTER USE.    ergocalciferol (VITAMIN D-2) 1,250 mcg, oral, Weekly    gabapentin (NEURONTIN) 300 mg, oral, 3 times daily    montelukast  (SINGULAIR) 10 mg, oral, Nightly    tiZANidine (ZANAFLEX) 2 mg, oral, Every 8 hours PRN        Past Medical History:   Diagnosis Date    Acute maxillary sinusitis, unspecified 12/19/2019    Acute maxillary sinusitis    Acute recurrent maxillary sinusitis 12/28/2016    Acute recurrent maxillary sinusitis    Acute upper respiratory infection, unspecified 03/13/2017    Acute URI    Acute upper respiratory infection, unspecified 12/30/2020    Viral URI with cough    Arthritis     Bilateral inguinal hernia, without obstruction or gangrene, not specified as recurrent 03/04/2014    Bilateral inguinal hernia    BPH (benign prostatic hyperplasia)     Chest pain, unspecified 12/16/2016    Intermittent chest pain    Chronic obstructive pulmonary disease with (acute) exacerbation (CMS/HCC) 12/19/2019    Acute exacerbation of chronic obstructive pulmonary disease (COPD)    Chronic pain disorder     back pain- chronic    Edema, unspecified 05/27/2021    Parotid swelling    Hyperlipidemia     Hypertension     Impacted cerumen, right ear 01/06/2017    Excessive cerumen in right ear canal    Low back pain, unspecified 07/25/2019    Acute exacerbation of chronic low back pain    Personal history of malignant neoplasm of prostate     History of malignant neoplasm of prostate    Personal history of malignant neoplasm, unspecified     Personal history of malignant neoplasm    Personal history of other diseases of the digestive system 05/27/2021    History of parotitis    Personal history of other diseases of the respiratory system 02/22/2017    History of pulmonary emphysema    Personal history of other endocrine, nutritional and metabolic disease 04/20/2017    History of hyperkalemia    Prostate cancer (CMS/HCC)     Sleep apnea     uses CPAP    Unspecified malignant neoplasm of skin of unspecified part of face 12/16/2016    Skin cancer of face    Unspecified symptoms and signs involving the genitourinary system 12/23/2021    UTI  symptoms        Past Surgical History:   Procedure Laterality Date    CATARACT EXTRACTION  10/01/2014    Cataract Surgery    HERNIA REPAIR  11/03/2015    Hernia Repair    OTHER SURGICAL HISTORY  01/25/2021    Spinal surgery    OTHER SURGICAL HISTORY  01/25/2021    Lower back surgery    OTHER SURGICAL HISTORY  12/16/2016    Destruction Of Malignant Lesion Face    OTHER SURGICAL HISTORY  05/08/2019    Skin biopsy    PROSTATE SURGERY  10/01/2014    Prostate Surgery    UMBILICAL HERNIA REPAIR  12/16/2016    Umbilical Hernia Repair        Family History   Problem Relation Name Age of Onset    Arthritis Mother      Hypertension Mother      Vision loss Mother      Arthritis Father      Other (CARDIAC DISORDER) Father      Prostate cancer Father      Colon cancer Brother      Arthritis Brother      Hypertension Brother      Liver cancer Brother      Lung cancer Brother      Vision loss Brother      Cancer Other MULTIPLE FAMILY MEMBERS         Allergies   Allergen Reactions    Percocet [Oxycodone-Acetaminophen] Nausea Only        Objective     Vitals:    02/08/24 1303   BP: 110/79   Pulse: 93   Resp: 16        Physical Exam    GENERAL EXAM  Vital Signs: Vital signs to include heart rate, respiration rate, blood pressure, and temperature were reviewed.  General Appearance:  Awake, alert, healthy appearing, well developed, No acute distress.  Head: Normocephalic without evidence of head injury.  Neck: The appearance of the neck was normal without swelling with a midline trachea.  Eyes: The eyelids and eyebrows exhibited no abnormalities.  Pupils were not pin-point.  Sclera was without icterus.  Lungs: Respiration rhythm and depth was normal.  Respiratory movements were normal without labored breathing.  Cardiovascular: No peripheral edema was present.    Neurological: Patient was oriented to time, place, and person.  Speech was normal.  Balance, gait, and stance were unremarkable.    Psychiatric: Appearance was normal with  appropriate dress.  Mood was euthymic and affect was normal.  Skin: Affected regions were without ecchymosis or skin lesions.    Shoulder (MSK/neuro/skin):  Full active and passive range of motion in all planes  Strength 5 out of 5 bilateral upper extremities  Sensation to light-touch grossly intact bilateral upper extremities  Deep tendon reflexes equal bilateral upper extremities  No edema/effusion/erythema  Skin: no skin lesions or scars  B Neer's (-)  B Hawkin's(-)  B Empty Can (-)  B cross arm (-)  B ER pain (-)  B Speed's(-)  B Crank (-)  B apprehension (-)  B sulcus (-)  B Yamhill's (-)    Physical exam as above except:  Tenderness palpation over thoracic paraspinals around T6-7.  Small Bhupendra deformity noted on left biceps with 4-5 elbow flexion and shoulder forward flexion on the left.      Assessment/Plan   Diagnoses and all orders for this visit:  Chronic midline thoracic back pain  -     Referral to Physical Therapy; Future  -     XR thoracic spine 3 views; Future  -     tiZANidine (Zanaflex) 2 mg tablet; Take 1 tablet (2 mg) by mouth every 8 hours if needed for muscle spasms.  Myofascial pain  -     Referral to Physical Therapy; Future  -     XR thoracic spine 3 views; Future  -     tiZANidine (Zanaflex) 2 mg tablet; Take 1 tablet (2 mg) by mouth every 8 hours if needed for muscle spasms.    83-year-old male with primarily thoracic back pain which seems more myofascial in nature currently.  I do wonder if some of it is at least related to the probable long head of his biceps tendon tear that he had about a year ago and abnormal use of his shoulder leading and compensatory myofascial pain in his trapezius muscle.  Regardless he has not had any recent conservative treatment and it does not appear he is having any thoracic neuritis as well.  He may have a thoracic spondylosis component of his pain as well.  He has never tried a muscle relaxer so I think be reasonable to start there.  Our plan for today:  -  X-ray of his thoracic spine.  - Physical therapy for his thoracic spine/myofascial pain and shoulders.  - We will trial tizanidine 2 mg 3 times daily.  Liver function testing from last year was within normal limits.  - Could consider duloxetine in the future as well.       This note was generated with the aid of dictation software, there may be typos despite my attempts at proofreading.

## 2024-02-13 ENCOUNTER — CLINICAL SUPPORT (OUTPATIENT)
Dept: UROLOGY | Facility: CLINIC | Age: 84
End: 2024-02-13
Payer: MEDICARE

## 2024-02-13 DIAGNOSIS — C67.9 MALIGNANT NEOPLASM OF URINARY BLADDER, UNSPECIFIED SITE (MULTI): ICD-10-CM

## 2024-02-13 PROCEDURE — 51720 TREATMENT OF BLADDER LESION: CPT | Performed by: PHYSICIAN ASSISTANT

## 2024-02-13 PROCEDURE — 81002 URINALYSIS NONAUTO W/O SCOPE: CPT | Performed by: PHYSICIAN ASSISTANT

## 2024-02-13 NOTE — PROGRESS NOTES
Patient here for BCG 4/6 Induction.  Patient reports increased frequency he thinks maybe related to a muscle relaxer he recently took. Urine dipped I/O prior to administration of BCG.  Informed consent completed by provider prior to start of tx series. 14F coude catheter inserted by sterile technique, urine drained yellow a few drops. 50 mg of BCG reconstituted in 50 mL of normal saline administered intravesically through catheter.         Catheter capped for 1hr 40 minutes (pt was very uncomfortable and wanted bladder drained/catheter removed). Patient had a moderate amount of urinary leakage around the catheter. Catheter drained about 150cc, catheter then removed after administration of BCG.  Carla-care performed d/t the leakage. Patient tolerated procedure well.   NS  NDC: 0572557550  Lot: [id number]  Expires: 1/1/2025

## 2024-02-15 DIAGNOSIS — E78.5 HYPERLIPIDEMIA, UNSPECIFIED HYPERLIPIDEMIA TYPE: ICD-10-CM

## 2024-02-15 RX ORDER — ATORVASTATIN CALCIUM 20 MG/1
20 TABLET, FILM COATED ORAL NIGHTLY
Qty: 90 TABLET | Refills: 0 | Status: SHIPPED | OUTPATIENT
Start: 2024-02-15 | End: 2024-05-13

## 2024-02-20 ENCOUNTER — CLINICAL SUPPORT (OUTPATIENT)
Dept: UROLOGY | Facility: CLINIC | Age: 84
End: 2024-02-20
Payer: MEDICARE

## 2024-02-20 DIAGNOSIS — C67.9 MALIGNANT NEOPLASM OF URINARY BLADDER, UNSPECIFIED SITE (MULTI): ICD-10-CM

## 2024-02-20 LAB
POC APPEARANCE, URINE: CLEAR
POC BILIRUBIN, URINE: NEGATIVE
POC BLOOD, URINE: ABNORMAL
POC COLOR, URINE: YELLOW
POC GLUCOSE, URINE: NEGATIVE MG/DL
POC KETONES, URINE: NEGATIVE MG/DL
POC LEUKOCYTES, URINE: NEGATIVE
POC NITRITE,URINE: NEGATIVE
POC PH, URINE: 8.5 PH
POC PROTEIN, URINE: ABNORMAL MG/DL
POC SPECIFIC GRAVITY, URINE: 1.02
POC UROBILINOGEN, URINE: 0.2 EU/DL

## 2024-02-20 PROCEDURE — 51720 TREATMENT OF BLADDER LESION: CPT | Performed by: PHYSICIAN ASSISTANT

## 2024-02-20 PROCEDURE — 81002 URINALYSIS NONAUTO W/O SCOPE: CPT | Performed by: PHYSICIAN ASSISTANT

## 2024-02-20 NOTE — PROGRESS NOTES
Patient here for BCG 5/6 Induction.  Patient denies UTI s/s (has urinary incontinence, denies other urinary s/s). Urine dipped I/O prior to administration of BCG.  Informed consent completed by provider prior to start of tx series. 14F coude catheter inserted by sterile technique, urine drained yellow a few drops. 50 mg of BCG reconstituted in 50 mL of normal saline administered intravesically through catheter. Catheter capped for 1hr 30minutes (pt was very uncomfortable and wanted bladder drained/catheter removed). Patient had no noticeable leakage during dwell time. Catheter drained about 190cc, catheter then removed after administration of BCG.  Carla-care performed d/t the leakage. Patient tolerated procedure well.   NS  NDC: 2907101262  Lot: [id number]  Expires: 1/1/2025

## 2024-02-27 ENCOUNTER — CLINICAL SUPPORT (OUTPATIENT)
Dept: UROLOGY | Facility: CLINIC | Age: 84
End: 2024-02-27
Payer: MEDICARE

## 2024-02-27 DIAGNOSIS — C67.9 MALIGNANT NEOPLASM OF URINARY BLADDER, UNSPECIFIED SITE (MULTI): ICD-10-CM

## 2024-02-27 LAB
POC APPEARANCE, URINE: CLEAR
POC BILIRUBIN, URINE: NEGATIVE
POC BLOOD, URINE: ABNORMAL
POC COLOR, URINE: YELLOW
POC GLUCOSE, URINE: NEGATIVE MG/DL
POC KETONES, URINE: NEGATIVE MG/DL
POC LEUKOCYTES, URINE: NEGATIVE
POC NITRITE,URINE: NEGATIVE
POC PH, URINE: 5.5 PH
POC PROTEIN, URINE: NEGATIVE MG/DL
POC SPECIFIC GRAVITY, URINE: 1.02
POC UROBILINOGEN, URINE: 0.2 EU/DL

## 2024-02-27 PROCEDURE — 51720 TREATMENT OF BLADDER LESION: CPT | Performed by: PHYSICIAN ASSISTANT

## 2024-02-27 PROCEDURE — 81002 URINALYSIS NONAUTO W/O SCOPE: CPT | Performed by: PHYSICIAN ASSISTANT

## 2024-02-27 NOTE — PROGRESS NOTES
Patient here for BCG 6/6 Induction.  Patient denies UTI s/s (has urinary incontinence, denies other urinary s/s). Urine dipped I/O prior to administration of BCG.  Informed consent completed by provider prior to start of tx series. 14F coude catheter inserted by sterile technique, urine drained yellow a few drops. 50 mg of BCG reconstituted in 50 mL of normal saline administered intravesically through catheter. Catheter capped for 1hr 30minutes (pt was very uncomfortable and wanted bladder drained/catheter removed). Patient had no noticeable leakage during dwell time. Catheter drained about 130cc, catheter then removed after administration of BCG. Instructed pt to schedule cysto in 4-6 weeks with Dr. Kelsey per office protocol.   NS  NDC: [id number]  Lot: [id number]  Expires: 1/1/2025

## 2024-04-01 DIAGNOSIS — R33.9 URINARY RETENTION: ICD-10-CM

## 2024-04-01 DIAGNOSIS — N39.0 URINARY TRACT INFECTION WITHOUT HEMATURIA, SITE UNSPECIFIED: ICD-10-CM

## 2024-04-02 ENCOUNTER — LAB (OUTPATIENT)
Dept: LAB | Facility: LAB | Age: 84
End: 2024-04-02
Payer: MEDICARE

## 2024-04-02 DIAGNOSIS — R33.9 URINARY RETENTION: ICD-10-CM

## 2024-04-02 LAB
APPEARANCE UR: ABNORMAL
BACTERIA #/AREA URNS AUTO: ABNORMAL /HPF
BILIRUB UR STRIP.AUTO-MCNC: NEGATIVE MG/DL
COLOR UR: ABNORMAL
GLUCOSE UR STRIP.AUTO-MCNC: NEGATIVE MG/DL
KETONES UR STRIP.AUTO-MCNC: ABNORMAL MG/DL
LEUKOCYTE ESTERASE UR QL STRIP.AUTO: ABNORMAL
MUCOUS THREADS #/AREA URNS AUTO: ABNORMAL /LPF
NITRITE UR QL STRIP.AUTO: NEGATIVE
PH UR STRIP.AUTO: 6 [PH]
PROT UR STRIP.AUTO-MCNC: ABNORMAL MG/DL
RBC # UR STRIP.AUTO: ABNORMAL /UL
RBC #/AREA URNS AUTO: >20 /HPF
SP GR UR STRIP.AUTO: 1.02
UROBILINOGEN UR STRIP.AUTO-MCNC: <2 MG/DL
WBC #/AREA URNS AUTO: >50 /HPF
WBC CLUMPS #/AREA URNS AUTO: ABNORMAL /HPF

## 2024-04-02 PROCEDURE — 87086 URINE CULTURE/COLONY COUNT: CPT

## 2024-04-02 PROCEDURE — 81001 URINALYSIS AUTO W/SCOPE: CPT

## 2024-04-02 PROCEDURE — 87186 SC STD MICRODIL/AGAR DIL: CPT

## 2024-04-05 LAB — BACTERIA UR CULT: ABNORMAL

## 2024-04-05 RX ORDER — NITROFURANTOIN 25; 75 MG/1; MG/1
100 CAPSULE ORAL 2 TIMES DAILY
Qty: 20 CAPSULE | Refills: 0 | Status: SHIPPED | OUTPATIENT
Start: 2024-04-05 | End: 2024-04-15

## 2024-04-08 ENCOUNTER — APPOINTMENT (OUTPATIENT)
Dept: PRIMARY CARE | Facility: CLINIC | Age: 84
End: 2024-04-08
Payer: MEDICARE

## 2024-04-09 ENCOUNTER — OFFICE VISIT (OUTPATIENT)
Dept: PRIMARY CARE | Facility: CLINIC | Age: 84
End: 2024-04-09
Payer: MEDICARE

## 2024-04-09 VITALS
WEIGHT: 178 LBS | HEART RATE: 88 BPM | OXYGEN SATURATION: 92 % | DIASTOLIC BLOOD PRESSURE: 68 MMHG | HEIGHT: 70 IN | BODY MASS INDEX: 25.48 KG/M2 | SYSTOLIC BLOOD PRESSURE: 130 MMHG

## 2024-04-09 DIAGNOSIS — E55.9 VITAMIN D DEFICIENCY: ICD-10-CM

## 2024-04-09 DIAGNOSIS — R05.9 COUGH IN ADULT: ICD-10-CM

## 2024-04-09 DIAGNOSIS — G89.29 CHRONIC LOW BACK PAIN WITH SCIATICA, SCIATICA LATERALITY UNSPECIFIED, UNSPECIFIED BACK PAIN LATERALITY: ICD-10-CM

## 2024-04-09 DIAGNOSIS — Z85.46 HISTORY OF PROSTATE CANCER: ICD-10-CM

## 2024-04-09 DIAGNOSIS — Z90.79 S/P TURP: ICD-10-CM

## 2024-04-09 DIAGNOSIS — J43.2 CENTRILOBULAR EMPHYSEMA (MULTI): ICD-10-CM

## 2024-04-09 DIAGNOSIS — I71.10 RUPTURED ANEURYSM OF THORACIC AORTA, UNSPECIFIED PART (MULTI): ICD-10-CM

## 2024-04-09 DIAGNOSIS — C67.0 MALIGNANT NEOPLASM OF TRIGONE OF URINARY BLADDER (MULTI): ICD-10-CM

## 2024-04-09 DIAGNOSIS — Z00.00 ROUTINE GENERAL MEDICAL EXAMINATION AT HEALTH CARE FACILITY: Primary | ICD-10-CM

## 2024-04-09 DIAGNOSIS — M54.40 CHRONIC LOW BACK PAIN WITH SCIATICA, SCIATICA LATERALITY UNSPECIFIED, UNSPECIFIED BACK PAIN LATERALITY: ICD-10-CM

## 2024-04-09 DIAGNOSIS — M47.814 SPONDYLOSIS, THORACIC: ICD-10-CM

## 2024-04-09 DIAGNOSIS — I10 ESSENTIAL HYPERTENSION: ICD-10-CM

## 2024-04-09 DIAGNOSIS — E78.49 OTHER HYPERLIPIDEMIA: ICD-10-CM

## 2024-04-09 DIAGNOSIS — Z13.6 SCREENING FOR CARDIOVASCULAR CONDITION: ICD-10-CM

## 2024-04-09 PROCEDURE — 99214 OFFICE O/P EST MOD 30 MIN: CPT | Performed by: INTERNAL MEDICINE

## 2024-04-09 PROCEDURE — 1126F AMNT PAIN NOTED NONE PRSNT: CPT | Performed by: INTERNAL MEDICINE

## 2024-04-09 PROCEDURE — 3075F SYST BP GE 130 - 139MM HG: CPT | Performed by: INTERNAL MEDICINE

## 2024-04-09 PROCEDURE — 3078F DIAST BP <80 MM HG: CPT | Performed by: INTERNAL MEDICINE

## 2024-04-09 PROCEDURE — G0439 PPPS, SUBSEQ VISIT: HCPCS | Performed by: INTERNAL MEDICINE

## 2024-04-09 PROCEDURE — 1170F FXNL STATUS ASSESSED: CPT | Performed by: INTERNAL MEDICINE

## 2024-04-09 PROCEDURE — 1159F MED LIST DOCD IN RCRD: CPT | Performed by: INTERNAL MEDICINE

## 2024-04-09 RX ORDER — MONTELUKAST SODIUM 10 MG/1
10 TABLET ORAL NIGHTLY
Qty: 90 TABLET | Refills: 0 | Status: SHIPPED | OUTPATIENT
Start: 2024-04-09

## 2024-04-09 RX ORDER — BUDESONIDE AND FORMOTEROL FUMARATE DIHYDRATE 160; 4.5 UG/1; UG/1
2 AEROSOL RESPIRATORY (INHALATION)
Qty: 10.2 G | Refills: 3 | Status: SHIPPED | OUTPATIENT
Start: 2024-04-09 | End: 2024-04-24 | Stop reason: ALTCHOICE

## 2024-04-09 RX ORDER — HYDROCODONE BITARTRATE AND ACETAMINOPHEN 5; 325 MG/1; MG/1
1 TABLET ORAL EVERY 6 HOURS PRN
Qty: 12 TABLET | Refills: 0 | Status: SHIPPED | OUTPATIENT
Start: 2024-04-09 | End: 2024-04-12

## 2024-04-09 RX ORDER — GABAPENTIN 300 MG/1
300 CAPSULE ORAL 3 TIMES DAILY
Qty: 90 CAPSULE | Refills: 0 | Status: SHIPPED | OUTPATIENT
Start: 2024-04-09 | End: 2024-05-13

## 2024-04-09 RX ORDER — DOXYCYCLINE 100 MG/1
100 CAPSULE ORAL 2 TIMES DAILY
Qty: 14 CAPSULE | Refills: 0 | Status: SHIPPED | OUTPATIENT
Start: 2024-04-09 | End: 2024-04-22 | Stop reason: WASHOUT

## 2024-04-09 RX ORDER — BENZONATATE 100 MG/1
100 CAPSULE ORAL 3 TIMES DAILY PRN
Qty: 30 CAPSULE | Refills: 0 | Status: SHIPPED | OUTPATIENT
Start: 2024-04-09 | End: 2024-04-24

## 2024-04-09 RX ORDER — ERGOCALCIFEROL 1.25 MG/1
1.25 CAPSULE ORAL
Qty: 90 CAPSULE | Refills: 1
Start: 2024-04-09 | End: 2024-05-07 | Stop reason: SDUPTHER

## 2024-04-09 ASSESSMENT — COLUMBIA-SUICIDE SEVERITY RATING SCALE - C-SSRS: 1. IN THE PAST MONTH, HAVE YOU WISHED YOU WERE DEAD OR WISHED YOU COULD GO TO SLEEP AND NOT WAKE UP?: NO

## 2024-04-09 ASSESSMENT — PATIENT HEALTH QUESTIONNAIRE - PHQ9
SUM OF ALL RESPONSES TO PHQ9 QUESTIONS 1 AND 2: 0
SUM OF ALL RESPONSES TO PHQ9 QUESTIONS 1 AND 2: 0
2. FEELING DOWN, DEPRESSED OR HOPELESS: NOT AT ALL
2. FEELING DOWN, DEPRESSED OR HOPELESS: NOT AT ALL
1. LITTLE INTEREST OR PLEASURE IN DOING THINGS: NOT AT ALL
1. LITTLE INTEREST OR PLEASURE IN DOING THINGS: NOT AT ALL
SUM OF ALL RESPONSES TO PHQ9 QUESTIONS 1 AND 2: 0
2. FEELING DOWN, DEPRESSED OR HOPELESS: NOT AT ALL
1. LITTLE INTEREST OR PLEASURE IN DOING THINGS: NOT AT ALL

## 2024-04-09 ASSESSMENT — ACTIVITIES OF DAILY LIVING (ADL)
DRESSING: INDEPENDENT
TAKING_MEDICATION: INDEPENDENT
GROCERY_SHOPPING: INDEPENDENT
MANAGING_FINANCES: INDEPENDENT
BATHING: INDEPENDENT
DOING_HOUSEWORK: INDEPENDENT

## 2024-04-09 ASSESSMENT — PAIN SCALES - GENERAL: PAINLEVEL: 0-NO PAIN

## 2024-04-09 NOTE — PROGRESS NOTES
Subjective   Reason for Visit: Shiva Hardy is an 83 y.o. male here for a Medicare Wellness visit.          Reviewed all medications by prescribing practitioner or clinical pharmacist (such as prescriptions, OTCs, herbal therapies and supplements) and documented in the medical record.    HPIpatient presents to clinic complaining of cough congestion with yellow sputum production and some shortness of breath going on for the past 1 week.  He is also complaining of also having intermittent low back pain for the past several months.  He is also here for Medicare annual wellness exam and follow-up visit on history of chronic low back pain has history of ascending dilated aorta, COPD, severe obstructive sleep apnea on CPAP,, nicotine dependence, multiple lung nodules, status post transurethral resection of bladder tumor on 8/29/2023, status post cystoscopy with evacuation of clot on 11/10/2023, status post diverticulitis, hard of hearing bilaterally, hyperlipidemia, hypertension, coronary atherosclerosis, sessile colonic polyp, hematuria,spondylosis of thoracic pain spine, thoracic disc herniation, vitamin D deficiency, prediabetes, prostate cancer, colonic polyp and s/p radiation treatment for prostate cancer.  He was admitted twice last year at [facility] regarding hematuria and was diagnosed with bladder cancer..  He is also here for Medicare annual wellness exam.  He denies any fever, chills, chest pain, wheezing, palpitation and diaphoresis.  Laboratory studies done including urinalysis showed proteinuria, hematuria and pyuria.    Patient Care Team:  Adria Galeano MD as PCP - General (Internal Medicine)     Review of Systems   Constitutional: Negative.    HENT:  Positive for congestion.    Eyes: Negative.    Respiratory:  Positive for cough.    Cardiovascular: Negative.    Gastrointestinal: Negative.    Endocrine: Negative.    Genitourinary: Negative.    Musculoskeletal:  Positive for back pain.  "  Skin: Negative.    Allergic/Immunologic: Negative.    Neurological: Negative.    Hematological: Negative.    Psychiatric/Behavioral: Negative.         Objective   Vitals:  /68   Pulse 88   Ht 1.778 m (5' 10\")   Wt 80.7 kg (178 lb)   SpO2 92%   BMI 25.54 kg/m²       Physical Exam  Constitutional:       Appearance: Normal appearance. He is normal weight.   HENT:      Right Ear: Tympanic membrane normal.      Left Ear: Tympanic membrane and ear canal normal.      Nose: Nose normal.   Neck:      Vascular: No carotid bruit.   Cardiovascular:      Rate and Rhythm: Normal rate.   Pulmonary:      Effort: No respiratory distress.      Breath sounds: No stridor. No wheezing.   Abdominal:      Palpations: Abdomen is soft.      Tenderness: There is no abdominal tenderness. There is no guarding or rebound.   Skin:     Coloration: Skin is not jaundiced.      Findings: No bruising.   Neurological:      General: No focal deficit present.      Mental Status: He is alert and oriented to person, place, and time.   Psychiatric:         Mood and Affect: Mood normal.         Assessment/Plan   Problem List Items Addressed This Visit    None  Patient is started on doxycycline and Tessalon Perles for possible upper respiratory infection.  He will continue to follow-up with urology clinic regarding history of bladder and prostate cancer.  He will be started on gabapentin and is given few days of hydrocodone regarding back discomfort.  Scheduled for CT cardiac scoring regarding screening for heart disease.  He is encouraged to drink fluids and take Tylenol for fever.  He will continue other medications and will return to clinic in 3 months for follow-up visit.       "

## 2024-04-09 NOTE — PROGRESS NOTES
"Subjective   Patient ID: Shiva Hardy is a 83 y.o. male who presents for Medicare Annual Wellness Visit Subsequent.    HPI     Review of Systems    Objective   /68   Pulse 88   Ht 1.778 m (5' 10\")   Wt 80.7 kg (178 lb)   SpO2 92%   BMI 25.54 kg/m²     Physical Exam    Assessment/Plan          "

## 2024-04-12 ENCOUNTER — PROCEDURE VISIT (OUTPATIENT)
Dept: UROLOGY | Facility: CLINIC | Age: 84
End: 2024-04-12
Payer: MEDICARE

## 2024-04-12 DIAGNOSIS — R33.9 URINARY RETENTION: ICD-10-CM

## 2024-04-12 DIAGNOSIS — C67.9 MALIGNANT NEOPLASM OF URINARY BLADDER, UNSPECIFIED SITE (MULTI): ICD-10-CM

## 2024-04-12 DIAGNOSIS — N30.00 ACUTE CYSTITIS WITHOUT HEMATURIA: Primary | ICD-10-CM

## 2024-04-12 LAB
POC APPEARANCE, URINE: CLEAR
POC BILIRUBIN, URINE: NEGATIVE
POC BLOOD, URINE: ABNORMAL
POC COLOR, URINE: YELLOW
POC GLUCOSE, URINE: NEGATIVE MG/DL
POC KETONES, URINE: NEGATIVE MG/DL
POC LEUKOCYTES, URINE: ABNORMAL
POC NITRITE,URINE: NEGATIVE
POC PH, URINE: 6 PH
POC PROTEIN, URINE: ABNORMAL MG/DL
POC SPECIFIC GRAVITY, URINE: 1.02
POC UROBILINOGEN, URINE: 0.2 EU/DL

## 2024-04-12 PROCEDURE — 99214 OFFICE O/P EST MOD 30 MIN: CPT | Performed by: STUDENT IN AN ORGANIZED HEALTH CARE EDUCATION/TRAINING PROGRAM

## 2024-04-12 ASSESSMENT — ENCOUNTER SYMPTOMS
PSYCHIATRIC NEGATIVE: 1
CONSTITUTIONAL NEGATIVE: 1
COUGH: 1
NEUROLOGICAL NEGATIVE: 1
HEMATOLOGIC/LYMPHATIC NEGATIVE: 1
ENDOCRINE NEGATIVE: 1
BACK PAIN: 1
CARDIOVASCULAR NEGATIVE: 1
ALLERGIC/IMMUNOLOGIC NEGATIVE: 1
EYES NEGATIVE: 1
GASTROINTESTINAL NEGATIVE: 1

## 2024-04-12 NOTE — PROGRESS NOTES
Patient ID: Shiva Hardy is a 83 y.o. male.    Procedures  PROCEDURE NOTE:    PREOPERATIVE DIAGNOSIS:  HG NMIBC s/p BCG    POSTOPERATIVE DIAGNOSIS:  Same    OPERATION:  Flexible Cystourethroscopy      SURGEON:  Irwin Kelsey MD    ANESTHESIA:  2%  lidocaine jelly    COMPLICATIONS:  None    EBL: Minimal    SPECIMEN:  Voided urine was not collected and submitted for cytology.    DISPOSITION:  The patient was discharged home after the procedure, per routine.    INDICATIONS: :  Mr. Hardy is a 83 y.o. patient with a history of HG NMIBC s/p BCG who presents today for Cystoscopy.     The indications, risks and benefits of this procedure were discussed with the patient, consent was obtained prior to the procedure, and to the best of my judgement the patient seemed to understand and agree to the procedure.    PROCEDURE:  The patient  was brought into the procedure suite and informed consent was reviewed and confirmed. Vital signs were obtained prior to the procedure: There were no vitals taken for this visit..  The patient was escorted onto the stretcher, placed supine, prepped with betadine and draped in the usual standard surgical fashion.  Intraurethral 2% viscous lidocaine jelly was used for local analgesia.  A 16 Czech flexible cystourethroscope was inserted into the urethra.   The penile urethra was normal.  The prostate urethra was missing, history of prosatectomy.  Upon entering the bladder the entire bladder was surveyed in a 360 degree fashion.  The left and right ureteral orifices were in normal orthotopic position effluxing clear yellow urine, bilaterally.   There was diffuse erythema on the lateral and posterior wall without evidence of obvious tumor. The cystoscope was then retroflexed.  The bladder neck was then further examined without any evidence of lesions. The scope was then removed and in an antegrade fashion, the urethra and bladder were again resurveyed with no evidence of additional  lesions.  The cystoscope was then fully removed.   The patient tolerated the procedure well.  Vitals were stable after the procedure.  The patient was able to void and was discharged home.  Verbal and written Post procedure instructions were reviewed with the patient.    IMPRESSION:  Cystitis, no evidence of recurrence    PLAN:  Repeat cystoscopy in 2 months       Subjective   Patient ID: Shiva Hardy is a 83 y.o. male.    HPI  Shiva Hardy is a 83 y.o. male with h/o bladder cancer s/p reTURBT 10/6/23 presented to the hospital with urinary retention and hematuria.      He underwent cystoscopy with evacuation of clots on 11/10/23. Post op, he was started on 3 way gorman catheter. He passed voiding trial.      He had post op respiratory insufficiency. He was weaned to RA.  He is slightly SOB. Improving.      Hemorrhage was a delayed complication of the recent bladder tumor resection. He still has hematuria. No burning with urination.    Cystoscopy with ablation tissue on 1/5/24 was cancelled.    Review of Systems    Objective   Physical Exam    Assessment/Plan   Diagnoses and all orders for this visit:  Acute cystitis without hematuria  Urinary retention  -     Urine Culture; Future  Malignant neoplasm of urinary bladder, unspecified site (Multi)    Shiva Hardy is a 83 y.o. male with h/o bladder cancer s/p reTURBT 10/6/23 presented to the hospital with urinary retention and hematuria.      He underwent cystoscopy with evacuation of clots on 11/10/23. Post op, he was started on 3 way gorman catheter. He passed voiding trial.   He had post op respiratory insufficiency. He was weaned to RA.     He is s/p BCG treatment, cystoscopy reveals a evelyne area of erythema on the lateral walls and posterior wall, more suspicious of cystitis than malignancy    Plan:  Continue antibiotics  Repeat urine culture in 10 days  Repeat cystoscopy in 2 months    Scribe Attestation  By signing my name below, I, Timothy De La Cruz    attest that this documentation has been prepared under the direction and in the presence of Irwin Kelsey MD.

## 2024-04-19 ENCOUNTER — APPOINTMENT (OUTPATIENT)
Dept: PAIN MEDICINE | Facility: HOSPITAL | Age: 84
End: 2024-04-19
Payer: MEDICARE

## 2024-04-22 ENCOUNTER — HOSPITAL ENCOUNTER (OUTPATIENT)
Dept: RADIOLOGY | Facility: CLINIC | Age: 84
Discharge: HOME | End: 2024-04-22
Payer: MEDICARE

## 2024-04-22 ENCOUNTER — OFFICE VISIT (OUTPATIENT)
Dept: PRIMARY CARE | Facility: CLINIC | Age: 84
End: 2024-04-22
Payer: MEDICARE

## 2024-04-22 VITALS
WEIGHT: 174 LBS | HEIGHT: 70 IN | BODY MASS INDEX: 24.91 KG/M2 | SYSTOLIC BLOOD PRESSURE: 117 MMHG | TEMPERATURE: 99 F | HEART RATE: 68 BPM | OXYGEN SATURATION: 92 % | DIASTOLIC BLOOD PRESSURE: 65 MMHG

## 2024-04-22 DIAGNOSIS — R05.3 CHRONIC COUGH: ICD-10-CM

## 2024-04-22 DIAGNOSIS — J43.9 PULMONARY EMPHYSEMA, UNSPECIFIED EMPHYSEMA TYPE (MULTI): ICD-10-CM

## 2024-04-22 DIAGNOSIS — Z13.6 SCREENING FOR CARDIOVASCULAR CONDITION: ICD-10-CM

## 2024-04-22 DIAGNOSIS — R05.3 CHRONIC COUGH: Primary | ICD-10-CM

## 2024-04-22 PROCEDURE — 3074F SYST BP LT 130 MM HG: CPT | Performed by: INTERNAL MEDICINE

## 2024-04-22 PROCEDURE — 3078F DIAST BP <80 MM HG: CPT | Performed by: INTERNAL MEDICINE

## 2024-04-22 PROCEDURE — 94640 AIRWAY INHALATION TREATMENT: CPT | Performed by: INTERNAL MEDICINE

## 2024-04-22 PROCEDURE — 71046 X-RAY EXAM CHEST 2 VIEWS: CPT

## 2024-04-22 PROCEDURE — 75571 CT HRT W/O DYE W/CA TEST: CPT

## 2024-04-22 PROCEDURE — 1159F MED LIST DOCD IN RCRD: CPT | Performed by: INTERNAL MEDICINE

## 2024-04-22 PROCEDURE — 1126F AMNT PAIN NOTED NONE PRSNT: CPT | Performed by: INTERNAL MEDICINE

## 2024-04-22 PROCEDURE — 1160F RVW MEDS BY RX/DR IN RCRD: CPT | Performed by: INTERNAL MEDICINE

## 2024-04-22 PROCEDURE — 99214 OFFICE O/P EST MOD 30 MIN: CPT | Performed by: INTERNAL MEDICINE

## 2024-04-22 PROCEDURE — 71046 X-RAY EXAM CHEST 2 VIEWS: CPT | Performed by: RADIOLOGY

## 2024-04-22 RX ORDER — ALBUTEROL SULFATE 0.83 MG/ML
2.5 SOLUTION RESPIRATORY (INHALATION) ONCE
Status: COMPLETED | OUTPATIENT
Start: 2024-04-22 | End: 2024-04-22

## 2024-04-22 RX ORDER — PROMETHAZINE HYDROCHLORIDE AND DEXTROMETHORPHAN HYDROBROMIDE 6.25; 15 MG/5ML; MG/5ML
5 SYRUP ORAL 4 TIMES DAILY PRN
Qty: 120 ML | Refills: 0 | Status: SHIPPED | OUTPATIENT
Start: 2024-04-22 | End: 2024-05-22

## 2024-04-22 RX ORDER — IPRATROPIUM BROMIDE AND ALBUTEROL SULFATE 2.5; .5 MG/3ML; MG/3ML
3 SOLUTION RESPIRATORY (INHALATION) ONCE
Status: DISCONTINUED | OUTPATIENT
Start: 2024-04-22 | End: 2024-04-22

## 2024-04-22 RX ORDER — PREDNISONE 10 MG/1
10 TABLET ORAL DAILY
Qty: 7 TABLET | Refills: 0 | Status: SHIPPED | OUTPATIENT
Start: 2024-04-22 | End: 2024-04-29

## 2024-04-22 RX ORDER — ALBUTEROL SULFATE 0.63 MG/3ML
0.63 SOLUTION RESPIRATORY (INHALATION) ONCE
Status: DISCONTINUED | OUTPATIENT
Start: 2024-04-22 | End: 2024-04-22

## 2024-04-22 RX ORDER — AZITHROMYCIN 250 MG/1
TABLET, FILM COATED ORAL DAILY
Qty: 6 TABLET | Refills: 0 | Status: SHIPPED | OUTPATIENT
Start: 2024-04-22 | End: 2024-04-27

## 2024-04-22 RX ADMIN — ALBUTEROL SULFATE 2.5 MG: 0.83 SOLUTION RESPIRATORY (INHALATION) at 13:50

## 2024-04-22 ASSESSMENT — COLUMBIA-SUICIDE SEVERITY RATING SCALE - C-SSRS: 1. IN THE PAST MONTH, HAVE YOU WISHED YOU WERE DEAD OR WISHED YOU COULD GO TO SLEEP AND NOT WAKE UP?: NO

## 2024-04-22 ASSESSMENT — PAIN SCALES - GENERAL: PAINLEVEL: 0-NO PAIN

## 2024-04-22 NOTE — PROGRESS NOTES
"Subjective   Patient ID: Shiva Hardy is a 83 y.o. male who presents for Cough (X 3 weeks ).    HPI     Review of Systems    Objective   /65   Pulse 68   Temp 37.2 °C (99 °F)   Ht 1.778 m (5' 10\")   Wt 78.9 kg (174 lb)   SpO2 92%   BMI 24.97 kg/m²     Physical Exam    Assessment/Plan          "

## 2024-04-22 NOTE — PROGRESS NOTES
"Subjective   Patient ID: Shiva Hardy is a 83 y.o. male who presents for Cough (X 3 weeks ).    HPI patient presents to clinic as problem visit because of persistent cough congestion, shortness of breath and some wheezing going on for the past 3 weeks.  He finished 1 round of doxycycline without any significant improvement in his symptoms.  He denies any fever, chills, palpitation, headache, rhinorrhea and facial pain.  History of chronic low back pain has history of ascending dilated aorta, COPD, severe obstructive sleep apnea on CPAP,, nicotine dependence, multiple lung nodules, status post transurethral resection of bladder tumor on 8/29/2023, status post cystoscopy with evacuation of clot on 11/10/2023, status post diverticulitis, hard of hearing bilaterally, hyperlipidemia, hypertension, coronary atherosclerosis, sessile colonic polyp, hematuria,spondylosis of thoracic pain spine, thoracic disc herniation, vitamin D deficiency, prediabetes, prostate cancer, colonic polyp and s/p radiation treatment for prostate cancer.      Review of Systems   Constitutional: Negative.    HENT:  Positive for congestion.    Eyes: Negative.    Respiratory:  Positive for cough, chest tightness and shortness of breath.    Cardiovascular: Negative.    Gastrointestinal: Negative.    Endocrine: Negative.    Genitourinary: Negative.    Musculoskeletal: Negative.    Skin: Negative.    Allergic/Immunologic: Negative.    Neurological: Negative.    Hematological: Negative.    Psychiatric/Behavioral: Negative.         Objective   /65   Pulse 68   Temp 37.2 °C (99 °F)   Ht 1.778 m (5' 10\")   Wt 78.9 kg (174 lb)   SpO2 92%   BMI 24.97 kg/m²     Physical Exam  Constitutional:       Appearance: Normal appearance. He is normal weight.   HENT:      Right Ear: Tympanic membrane normal.      Left Ear: Tympanic membrane and ear canal normal.      Nose: Nose normal.   Neck:      Vascular: No carotid bruit.   Cardiovascular:      Rate " and Rhythm: Normal rate.   Pulmonary:      Effort: Pulmonary effort is normal. No respiratory distress.      Breath sounds: No stridor. Wheezing present.   Abdominal:      Palpations: Abdomen is soft.      Tenderness: There is no abdominal tenderness. There is no guarding or rebound.   Skin:     Coloration: Skin is not jaundiced.      Findings: No bruising.   Neurological:      General: No focal deficit present.      Mental Status: He is alert and oriented to person, place, and time.   Psychiatric:         Mood and Affect: Mood normal.         Assessment/Plan    patient is given trial with Z-Willy, prednisone and promethazine cough suppressant regarding acute exacerbation of COPD and chronic cough.  He will be also scheduled for sputum for culture and sensitivity and chest x-ray.  He is encouraged to drink fluids and take Tylenol for fever.  He will be notified about test results and will make further recommendations.

## 2024-04-23 ENCOUNTER — LAB (OUTPATIENT)
Dept: LAB | Facility: LAB | Age: 84
End: 2024-04-23
Payer: MEDICARE

## 2024-04-23 DIAGNOSIS — R33.9 URINARY RETENTION: ICD-10-CM

## 2024-04-23 PROCEDURE — 87086 URINE CULTURE/COLONY COUNT: CPT

## 2024-04-23 PROCEDURE — 87186 SC STD MICRODIL/AGAR DIL: CPT

## 2024-04-23 NOTE — PROGRESS NOTES
" Patient: Shiva Hardy    [id number]  : 1940 -- AGE 83 y.o.    Provider: LEONA Faulkner     Location [facility]   Service Date: 2024       Department of Medicine  Division of Pulmonary, Critical Care, and Sleep Medicine       [facility]  New Visit Note    HISTORY OF PRESENT ILLNESS     PCP: Dr. Adria Galeano    HISTORY OF PRESENT ILLNESS   Shiva Hardy is a 83 y.o. male who presents to a [facility] for an evaluation with concerns of COPD.   I have independently interviewed and examined the patient in the office and reviewed available records.    Current History  This is a former patient of Dr. Huang last seen by him on 2022.  He does have a history of mild to moderate COPD; last PFT from 2020 did show some mild obstruction with an FEV1 of 84% predicted.  He is currently maintained on Symbicort 160, albuterol HFA as needed, albuterol nebulizers as needed.  He just recently had a chest x-ray on 2024 ordered by the PCP with concern for cough symptoms; chest x-ray did show patchy right upper lung airspace infiltrate concerning for pneumonia.  Was given a course of prednisone as well as azithromycin by the primary care; currently on these 2 medications now.    On today's visit, the patient reports his breathing has been \"the same\" as prior. Denies SOB at rest but does have LAZARO with shorter distance walking. Takes Symbicort daily; has been on this for awhile. Will use nebulizer treatments a couple times a day. Has Albuterol HFA as well; will use maybe 3-4 x a week. Does report a cough; intermittent mucous production - typically white. Intermittent wheezing. Mild intermittent orthopnea. No lower leg swelling. No CP, palpitations. Denies GERD. Denies rhinitis.    Denies premature birth. No childhood pulmonary issues growing up. No pulmonary hospitalizations. Has never been on home " oxygen therapy before.    He does have a history of severe YVETTE on CPAP. Has not been wearing it the past few weeks due to his recent cough.     CAT Today: 15  ACT Today: 15  ESS Today: 6    Prior Notes & History   **The below are prior notes from Dr. Torres**  9/6/22: Mr Hardy is doing well, He is using symbicort with albuterol as needed (trelegy was not covered) The latest CPAP download we have is March/April which shows 22/30 days use. He tells me he is using it more frequently now but does miss a few days.  previous note 3/7/2022: Mr Hardy is doing about the same. He is having a lot of back pain issues which hakes it hard to sleep at night. He also wakes up in the middle of the night with a sore throat and dry mouth. He has humidity on his CPAP but it isn't helping.     previous note 9/14/2021: Mr Wilburn is doing well. no exacerbations since last visit. He is having a lot of back pain issues so is not using CPAP as much as he usually does. The pain wakes him up so he takes the CPAP off.  He is using symbicort and uses albuterol most evenings.     previous note 3/9/2021: Mr Hardy has no complaints today. He had bronchitis vs pneumonia in early February. He got antibiotics and feels better now, at baseline. He is using symbicort twice daily and uses albuterol every night (he has developed this routine that works for him. he has not needed albuterol for rescue.  He is doing well with his CPAP. getting supplies, download data shows good compliance with AHI 7.3     previous note 9/11/2020: 80-year-old with severe obstructive sleep apnea on C Pap 6 with air fit N10 NM and with COPD here for routine follow-up. The patient has been doing well with C Pap, using it 7-8 hours every night. No snoring on C Pap. Feels rested with sleep and denies excessive daytime sleepiness. His breathing has been stable. Short of breath only when doing yard work. Occasional wheezing. Occasional cough. Rarely needs to Ventolin.  Compliant with Symbicort. Still smokes, was cutting back but has had some deaths in the family so he picked back up some.   Had back surgery which went well.     repeat PFT showed near normal spirometry, normal lung volumes and diffusing capacity     data download scanned to emr (good compliance)     Provider Impressions     82yr old with COPD and YVETTE doing well.     1. Severe Obstructive sleep apnea on C Pap, doing well with C Pap with good compliance and good control of sleep symptoms   - Continue with C Pap every night with sleep.   suggested appt with Dr Coker to discuss inspire and trouble shoot CPAP. he doesn't was to do that now.     2. Mild to moderate COPD, stable   - Continue with Symbicort 160/4.5, 2 puffs twice daily with a spacer. Take Ventolin as needed.     3. Multiple small lung nodules bilaterally on chest CT, 5-7 mm in the left lower lobe and 8 mm tubular opacity in right middle lobe, unchanged in 26 months, therefore considered benign   - No further chest imaging required for surveillance..     4. Tobacco abuse, still smoking   - encouraged patient to quit smoking.    REVIEW OF SYSTEMS     REVIEW OF SYSTEMS  Review of Systems   Constitutional:  Negative for activity change, appetite change, chills, fatigue, fever and unexpected weight change.   HENT:  Negative for congestion, postnasal drip, rhinorrhea, sinus pressure, sinus pain, sneezing, sore throat, trouble swallowing and voice change.         Denies throat clearing   Eyes:  Negative for redness and itching.   Respiratory:  Positive for cough and shortness of breath. Negative for chest tightness, wheezing and stridor.    Cardiovascular:  Negative for chest pain, palpitations and leg swelling.        Denies orthopnea   Gastrointestinal:  Negative for abdominal pain, diarrhea, nausea and vomiting.        Denies acid reflux   Musculoskeletal:  Positive for arthralgias and back pain. Negative for joint swelling and myalgias.   Skin:  Negative for  rash.   Allergic/Immunologic: Negative for immunocompromised state.   Neurological:  Negative for dizziness, tremors, weakness and headaches.   Hematological:  Does not bruise/bleed easily.   Psychiatric/Behavioral:  Negative for agitation and sleep disturbance. The patient is not nervous/anxious.         Denies depression   All other systems reviewed and are negative.      ALLERGIES AND MEDICATIONS     ALLERGIES  Allergies   Allergen Reactions    Percocet [Oxycodone-Acetaminophen] Nausea Only       MEDICATIONS  Current Outpatient Medications   Medication Sig Dispense Refill    acetaminophen (Tylenol) 500 mg tablet Take 1 tablet (500 mg) by mouth every 6 hours if needed for mild pain (1 - 3).      albuterol 1.25 mg/3 mL nebulizer solution Take 3 mL (1.25 mg) by nebulization every 6 hours if needed for wheezing.      albuterol 90 mcg/actuation aerosol powdr breath activated inhaler Inhale 2 puffs every 4 hours if needed for wheezing or shortness of breath.      amLODIPine (Norvasc) 5 mg tablet Take 1 tablet (5 mg) by mouth once daily in the morning. Do not take until you follow up with cardiology, Dr. Cloud.      aspirin 81 mg EC tablet Take 1 tablet (81 mg) by mouth once daily in the morning.      atorvastatin (Lipitor) 20 mg tablet TAKE 1 TABLET BY MOUTH ONCE DAILY AT BEDTIME 90 tablet 0    azithromycin (Zithromax) 250 mg tablet Take 2 tablets (500 mg) by mouth once daily for 1 day, THEN 1 tablet (250 mg) once daily for 4 days. Take 2 tabs (500 mg) by mouth today, than 1 daily for 4 days.. 6 tablet 0    benzonatate (Tessalon) 100 mg capsule Take 1 capsule (100 mg) by mouth 3 times a day as needed for cough for up to 10 days. Do not crush or chew. 30 capsule 0    budesonide-formoteroL (Symbicort) 160-4.5 mcg/actuation inhaler Inhale 2 puffs 2 times a day. RINSE MOUTH AFTER USE. 10.2 g 3    ergocalciferol (Vitamin D-2) 1.25 MG (15685 UT) capsule Take 1 capsule (1,250 mcg) by mouth 1 (one) time per week. 90 capsule  1    gabapentin (Neurontin) 300 mg capsule Take 1 capsule (300 mg) by mouth 3 times a day. 90 capsule 0    montelukast (Singulair) 10 mg tablet TAKE 1 TABLET BY MOUTH ONCE DAILY AT BEDTIME 90 tablet 0    predniSONE (Deltasone) 10 mg tablet Take 1 tablet (10 mg) by mouth once daily for 7 days. 7 tablet 0    promethazine-DM (Phenergan-DM) 6.25-15 mg/5 mL syrup Take 5 mL by mouth 4 times a day as needed for cough. 120 mL 0    tiZANidine (Zanaflex) 2 mg tablet Take 1 tablet (2 mg) by mouth every 8 hours if needed for muscle spasms. (Patient not taking: Reported on 4/24/2024) 90 tablet 2     No current facility-administered medications for this visit.       PAST HISTORY     PAST MEDICAL HISTORY  He  has a past medical history of Acute maxillary sinusitis, unspecified (12/19/2019), Acute recurrent maxillary sinusitis (12/28/2016), Acute upper respiratory infection, unspecified (03/13/2017), Acute upper respiratory infection, unspecified (12/30/2020), Arthritis, Bilateral inguinal hernia, without obstruction or gangrene, not specified as recurrent (03/04/2014), BPH (benign prostatic hyperplasia), Chest pain, unspecified (12/16/2016), Chronic obstructive pulmonary disease with (acute) exacerbation (Multi) (12/19/2019), Chronic pain disorder, Edema, unspecified (05/27/2021), Hyperlipidemia, Hypertension, Impacted cerumen, right ear (01/06/2017), Low back pain, unspecified (07/25/2019), Personal history of malignant neoplasm of prostate, Personal history of malignant neoplasm, unspecified, Personal history of other diseases of the digestive system (05/27/2021), Personal history of other diseases of the respiratory system (02/22/2017), Personal history of other endocrine, nutritional and metabolic disease (04/20/2017), Prostate cancer (Multi), Sleep apnea, Unspecified malignant neoplasm of skin of unspecified part of face (12/16/2016), and Unspecified symptoms and signs involving the genitourinary system (12/23/2021).    PAST  SURGICAL HISTORY  Past Surgical History:   Procedure Laterality Date    CATARACT EXTRACTION  10/01/2014    Cataract Surgery    HERNIA REPAIR  11/03/2015    Hernia Repair    OTHER SURGICAL HISTORY  01/25/2021    Spinal surgery    OTHER SURGICAL HISTORY  01/25/2021    Lower back surgery    OTHER SURGICAL HISTORY  12/16/2016    Destruction Of Malignant Lesion Face    OTHER SURGICAL HISTORY  05/08/2019    Skin biopsy    PROSTATE SURGERY  10/01/2014    Prostate Surgery    UMBILICAL HERNIA REPAIR  12/16/2016    Umbilical Hernia Repair       IMMUNIZATION HISTORY  Immunization History   Administered Date(s) Administered    Influenza, High Dose Seasonal, Preservative Free 10/13/2022    Moderna SARS-CoV-2 Vaccination 02/01/2021, 03/01/2021, 10/27/2021, 06/16/2022, 01/05/2023    Pneumococcal conjugate vaccine, 13-valent (PREVNAR 13) 11/03/2015    Pneumococcal polysaccharide vaccine, 23-valent, age 2 years and older (PNEUMOVAX 23) 12/16/2016    Td vaccine, age 7 years and older (TDVAX) 07/25/2019       SOCIAL HISTORY  He  reports that he has been smoking cigarettes. He started smoking about 66 years ago. He has a 66.3 pack-year smoking history. He has never used smokeless tobacco. He reports that he does not currently use alcohol. He reports that he does not currently use drugs.   Has 65 years of smoking hx. Averaged 1 ppd but has cut back to 0.5 ppd (for the past 2-3 months)  Has never quit smoking before  Feels like he is ready to quit    FAMILY HISTORY  Family History   Problem Relation Name Age of Onset    Arthritis Mother      Hypertension Mother      Vision loss Mother      Arthritis Father      Other (CARDIAC DISORDER) Father      Prostate cancer Father      Colon cancer Brother      Arthritis Brother      Hypertension Brother      Liver cancer Brother      Lung cancer Brother      Vision loss Brother      Cancer Other MULTIPLE FAMILY MEMBERS        PHYSICAL EXAM     VITAL SIGNS: /74   Pulse 67   Ht 1.791 m (5'  "10.5\")   Wt 79.4 kg (175 lb)   BMI 24.75 kg/m²      PREVIOUS WEIGHTS:  Wt Readings from Last 3 Encounters:   24 79.4 kg (175 lb)   24 78.9 kg (174 lb)   24 80.7 kg (178 lb)       Physical Exam  Vitals reviewed.   Constitutional:       General: He is not in acute distress.     Appearance: Normal appearance. He is not ill-appearing or toxic-appearing.   HENT:      Head: Normocephalic.      Nose: No rhinorrhea.   Cardiovascular:      Rate and Rhythm: Normal rate and regular rhythm.      Heart sounds: Normal heart sounds.   Pulmonary:      Effort: Pulmonary effort is normal. No respiratory distress.      Breath sounds: Normal breath sounds. No stridor. No wheezing, rhonchi or rales.   Abdominal:      General: Abdomen is flat.   Musculoskeletal:         General: Normal range of motion.      Right lower leg: No edema.      Left lower leg: No edema.   Skin:     General: Skin is warm and dry.      Nails: There is no clubbing.   Neurological:      General: No focal deficit present.      Mental Status: He is alert and oriented to person, place, and time.   Psychiatric:         Mood and Affect: Mood normal.         Behavior: Behavior normal.         Judgment: Judgment normal.       RESULTS/DATA     Pulmonary Function Test Results   PFT  20: FEV1/FVC: 56, FEV1: 2.43 (84%), FVC: 4.35 (106%), no BD response, SMI99-54: 27%, T.61 (85%), DLCO: 93%  3/2/17: FEV1/FVC: 63, FEV1: 2.38 (78%), FVC: 3.79 (89%), no BD response, HRT96-90: 30%, T.94 (105%), DLCO: 62%    Chest Radiograph   CXR  24: IMPRESSION: 1.  Patchy right upper lung airspace disease concerning for developing pneumonia. Recommend radiographic follow-up in 3-4 weeks  23: IMPRESSION: Streaky left retrocardiac opacities favored to be due to atelectasis rather than infection depending on the clinical setting.      Chest CT Scan   CTA Chest  21: IMPRESSION: 1. Distal ascending thoracic aortic aneurysm measuring 4 cm in maximum " "diameter. This is stable in appearance compared to a prior CT performed 06/26/2020. No evidence of acute aortic pathology. Would recommend follow-up thoracic MRA and cardiac MRI to avoid radiation in approximately 18 months. 2. Three-vessel coronary atherosclerosis. Mild LV remodeling with thinning of the mid to apical anterior septum. Possible LAD distribution infarct. 3. Thoracic aortic atherosclerosis.  6/26/20: IMPRESSION: 1. Stable borderline enlargement of the ascending thoracic aorta which measures up to 3.9 cm in the mid ascending portion. The remainder of the thoracic aorta is normal in course, caliber, and contour. No evidence of acute aortic pathology. 2. Moderate coronary artery calcifications. 3. Borderline dilation of the left atrium. 4. Mild to moderate upper lobe predominant centrilobular and paraseptal emphysematous changes with biapical pleuroparenchymal scarring and mild subpleural reticulation along the bilateral lung bases. 5. Stable appearance of few scattered noncalcified pulmonary nodules measuring up to 6 mm. 6. Few stable left hepatic hypodense lesions measuring up to 1.4 cm, likely cysts.      Echocardiogram & Cardiac Studies     No results found for this or any previous visit from the past 365 days.       Labwork & Pathology   Complete Blood Count  Lab Results   Component Value Date    WBC 10.5 12/13/2023    HGB 13.3 (L) 12/13/2023    HCT 43.0 12/13/2023    MCV 99 12/13/2023     12/13/2023       Peripheral Eosinophil Count:   Eosinophils Absolute   Date Value   11/09/2023 0.00 x10*3/uL   01/09/2023 0.16 x10E9/L   10/13/2022 0.17 x10E9/L   12/17/2021 0.11 x10E9/L       Serum Immunoglobulin E:    No results found for: \"IGE\"     Metabolic Parameters  Sodium   Date/Time Value Ref Range Status   12/13/2023 02:55  136 - 145 mmol/L Final     Potassium   Date/Time Value Ref Range Status   12/13/2023 02:55 PM 4.6 3.5 - 5.3 mmol/L Final     Chloride   Date/Time Value Ref Range Status "   12/13/2023 02:55  98 - 107 mmol/L Final     Bicarbonate   Date/Time Value Ref Range Status   12/13/2023 02:55 PM 24 21 - 32 mmol/L Final     Anion Gap   Date/Time Value Ref Range Status   12/13/2023 02:55 PM 15 10 - 20 mmol/L Final     Urea Nitrogen   Date/Time Value Ref Range Status   12/13/2023 02:55 PM 23 6 - 23 mg/dL Final     Creatinine   Date/Time Value Ref Range Status   12/13/2023 02:55 PM 0.81 0.50 - 1.30 mg/dL Final     GFR MALE   Date/Time Value Ref Range Status   08/23/2023 09:45 AM 84 >90 mL/min/1.73m2 Final     Comment:      CALCULATIONS OF ESTIMATED GFR ARE PERFORMED   USING THE 2021 CKD-EPI STUDY REFIT EQUATION   WITHOUT THE RACE VARIABLE FOR THE IDMS-TRACEABLE   CREATININE METHODS.    https://jasn.asnjournals.org/content/early/2021/09/22/ASN.9404764207       Glucose   Date/Time Value Ref Range Status   12/13/2023 02:55 PM 91 74 - 99 mg/dL Final     Calcium   Date/Time Value Ref Range Status   12/13/2023 02:55 PM 9.9 8.6 - 10.3 mg/dL Final     AST   Date/Time Value Ref Range Status   11/09/2023 10:45 AM 21 9 - 39 U/L Final     ALT   Date/Time Value Ref Range Status   11/09/2023 10:45 AM 20 10 - 52 U/L Final     Comment:     Patients treated with Sulfasalazine may generate falsely decreased results for ALT.       Bronchoscopy & Pathology/Cultures       ASSESSMENT/PLAN     Mr. Hardy is a 83 y.o. male; was referred to the [facility] for evaluation of COPD    Problem List and Orders  Diagnoses and all orders for this visit:  Chronic obstructive pulmonary disease, unspecified COPD type (Multi)  -     fluticasone-umeclidin-vilanter (Trelegy Ellipta) 100-62.5-25 mcg blister with device; Inhale 1 puff once daily. Rinse mouth with water after use to reduce aftertaste and incidence of candidiasis. Do not swallow.  -     ipratropium-albuteroL (Duo-Neb) 0.5-2.5 mg/3 mL nebulizer solution; Take 3 mL by nebulization every 6 hours if needed for shortness of  breath.  Pneumonia of right upper lobe due to infectious organism  -     XR chest 2 views; Future  Nicotine dependence, cigarettes, uncomplicated  YVETTE on CPAP  -     Referral to Adult Sleep Medicine; Future      Assessment and Plan / Recommendations:  COPD: history of mild to moderate COPD; last PFT from 8/25/2020 did show some mild obstruction with an FEV1 of 84% predicted.  He is currently maintained on Symbicort 160, albuterol HFA as needed, albuterol nebulizers as needed.  He denies shortness of breath at rest but does have LAZARO with short distance walking.  Reports an intermittent cough which can produce a white mucus.  Reports intermittent wheezing.  Will typically use his albuterol HFA 3-4 times a week.  -Stop Symbicort 160 (is no longer covered on his plan and will need a change anyway)  -Start Trelegy 100; 1 inhalation once a day.  Rinse mouth after use.  -Continue albuterol HFA as needed  -Start DuoNebs as needed  -Stop albuterol nebulizers    2. Pneumonia: He recently started having increased cough and shortness of breath symptoms in which she had a chest x-ray on 4/22/2024 showing patchy right upper lung airspace opacity concerning for developing pneumonia.  He was treated with azithromycin and prednisone by the primary care  -Continue antibiotic and steroid therapy until complete  -Ordering repeat chest x-ray in 1 month for follow-up    3.  Nicotine dependence: Has been a chronic daily smoker for the past 65 years averaging 1 PPD.  Dropped down to 0.5 PPD over the past 2 to 3 months.  He seems encouraged to quit.  - > 3 minutes spent on smoking cessation  - Start NRT program of patches/lozenges; titration schedule provided to patient    4. YVETTE on CPAP: hx of severe YVETTE and has CPAP device. Not currently managed by anyone.  -Referral to sleep medicine for establishment    RTC 2-3 months

## 2024-04-24 ENCOUNTER — OFFICE VISIT (OUTPATIENT)
Dept: PULMONOLOGY | Facility: CLINIC | Age: 84
End: 2024-04-24
Payer: MEDICARE

## 2024-04-24 VITALS
HEART RATE: 67 BPM | SYSTOLIC BLOOD PRESSURE: 141 MMHG | HEIGHT: 71 IN | BODY MASS INDEX: 24.5 KG/M2 | DIASTOLIC BLOOD PRESSURE: 74 MMHG | WEIGHT: 175 LBS

## 2024-04-24 DIAGNOSIS — J18.9 PNEUMONIA OF RIGHT UPPER LOBE DUE TO INFECTIOUS ORGANISM: ICD-10-CM

## 2024-04-24 DIAGNOSIS — G47.33 OSA ON CPAP: ICD-10-CM

## 2024-04-24 DIAGNOSIS — J44.9 CHRONIC OBSTRUCTIVE PULMONARY DISEASE, UNSPECIFIED COPD TYPE (MULTI): Primary | ICD-10-CM

## 2024-04-24 DIAGNOSIS — F17.210 NICOTINE DEPENDENCE, CIGARETTES, UNCOMPLICATED: ICD-10-CM

## 2024-04-24 PROCEDURE — 99406 BEHAV CHNG SMOKING 3-10 MIN: CPT | Performed by: NURSE PRACTITIONER

## 2024-04-24 PROCEDURE — 1160F RVW MEDS BY RX/DR IN RCRD: CPT | Performed by: NURSE PRACTITIONER

## 2024-04-24 PROCEDURE — 1159F MED LIST DOCD IN RCRD: CPT | Performed by: NURSE PRACTITIONER

## 2024-04-24 PROCEDURE — 3077F SYST BP >= 140 MM HG: CPT | Performed by: NURSE PRACTITIONER

## 2024-04-24 PROCEDURE — 1126F AMNT PAIN NOTED NONE PRSNT: CPT | Performed by: NURSE PRACTITIONER

## 2024-04-24 PROCEDURE — 99204 OFFICE O/P NEW MOD 45 MIN: CPT | Performed by: NURSE PRACTITIONER

## 2024-04-24 PROCEDURE — 3078F DIAST BP <80 MM HG: CPT | Performed by: NURSE PRACTITIONER

## 2024-04-24 PROCEDURE — 99214 OFFICE O/P EST MOD 30 MIN: CPT | Performed by: NURSE PRACTITIONER

## 2024-04-24 RX ORDER — IPRATROPIUM BROMIDE AND ALBUTEROL SULFATE 2.5; .5 MG/3ML; MG/3ML
3 SOLUTION RESPIRATORY (INHALATION) EVERY 6 HOURS PRN
Qty: 360 ML | Refills: 5 | Status: SHIPPED | OUTPATIENT
Start: 2024-04-24

## 2024-04-24 RX ORDER — IBUPROFEN 200 MG
TABLET ORAL
Qty: 42 PATCH | Refills: 0 | Status: SHIPPED | OUTPATIENT
Start: 2024-04-24

## 2024-04-24 RX ORDER — FLUTICASONE FUROATE, UMECLIDINIUM BROMIDE AND VILANTEROL TRIFENATATE 100; 62.5; 25 UG/1; UG/1; UG/1
1 POWDER RESPIRATORY (INHALATION) DAILY
Qty: 1 EACH | Refills: 3 | Status: SHIPPED | OUTPATIENT
Start: 2024-04-24 | End: 2024-06-11 | Stop reason: SDUPTHER

## 2024-04-24 RX ORDER — NICOTINE POLACRILEX 2 MG/1
LOZENGE ORAL
Qty: 200 LOZENGE | Refills: 2 | Status: SHIPPED | OUTPATIENT
Start: 2024-04-24

## 2024-04-24 RX ORDER — NICOTINE 7MG/24HR
PATCH, TRANSDERMAL 24 HOURS TRANSDERMAL
Qty: 14 PATCH | Refills: 0 | Status: SHIPPED | OUTPATIENT
Start: 2024-04-24

## 2024-04-24 ASSESSMENT — ENCOUNTER SYMPTOMS
ACTIVITY CHANGE: 0
CHEST TIGHTNESS: 0
SLEEP DISTURBANCE: 0
PALPITATIONS: 0
SORE THROAT: 0
FATIGUE: 0
RHINORRHEA: 0
HEADACHES: 0
SINUS PRESSURE: 0
WHEEZING: 0
WEAKNESS: 0
VOMITING: 0
NERVOUS/ANXIOUS: 0
TREMORS: 0
ARTHRALGIAS: 1
VOICE CHANGE: 0
STRIDOR: 0
DIZZINESS: 0
NAUSEA: 0
CHILLS: 0
APPETITE CHANGE: 0
ABDOMINAL PAIN: 0
UNEXPECTED WEIGHT CHANGE: 0
SINUS PAIN: 0
JOINT SWELLING: 0
TROUBLE SWALLOWING: 0
FEVER: 0
AGITATION: 0
MYALGIAS: 0
EYE ITCHING: 0
SHORTNESS OF BREATH: 1
COUGH: 1
BRUISES/BLEEDS EASILY: 0
EYE REDNESS: 0
BACK PAIN: 1
DIARRHEA: 0
ROS GI COMMENTS: DENIES ACID REFLUX

## 2024-04-24 ASSESSMENT — LIFESTYLE VARIABLES: HOW MANY STANDARD DRINKS CONTAINING ALCOHOL DO YOU HAVE ON A TYPICAL DAY: PATIENT DOES NOT DRINK

## 2024-04-24 ASSESSMENT — PAIN SCALES - GENERAL: PAINLEVEL: 0-NO PAIN

## 2024-04-24 NOTE — PATIENT INSTRUCTIONS
"Today we discussed how you have been feeling over the past couple of years with your breathing and your recent chest x-ray which was suggestive of pneumonia.    -Ordering repeat chest x-ray in 1 month  -Continue taking the antibiotic and prednisone that your primary care gave you  -Start Trelegy 100; 1 inhalation once a day.  Rinse mouth after use.  This will be your new long-acting daily maintenance inhaler.  Please contact my office if you have any difficulty getting this prescription  -Stop Symbicort as soon as you get the Trelegy inhaler  -Continue Albuterol Inhaler; 2 puffs every 4-6 hours as needed for shortness of breath. You can also take this 10-15 minutes prior to exertional activity to help \"prime\" your lungs.  -Stop albuterol nebulizers  -Start DuoNebs (albuterol/ipratropium) 1 unit dose via nebulizer every 6 hours as needed  -Discussed smoking cessation and nicotine replacement therapy  -Cigarette/nicotine use is harming your health; and specifically exacerbating the risk of primary lung cancer. Smoking causes 85-90% of all lung cancers.  I encourage your current efforts to stop and recommended that you stop smoking entirely. I recommended community support groups, and use local tobacco cessation hotlines 4-800-Quit-Now (1-977.908.2533). If you are ready to quit, I advise you letting either your primary care provider or myself know so we can discuss smoking cessation techniques/treatments. *E-cigarettes should not replace smoking or be used to help quit smoking.  Nicotine Replacement Therapy: (You CANNOT smoke cigarettes while on this program)  -Start Nicotine Patch Therapy: Begin with Step 2 (14 mg/day) for 6 weeks, followed by Step 3 (7 mg/day) for 2 weeks.    -Start Nicotine Lozenge Therapy: Weeks 1 to 6: 1 lozenge every 1 to 2 hours (maximum: 5 lozenges every 6 hours; 20 lozenges/day). Weeks 7 to 9: 1 lozenge every 2 to 4 hours (maximum: 5 lozenges every 6 hours; 20 lozenges/day). Weeks 10 to 12: 1 " lozenge every 4 to 8 hours (maximum: 5 lozenges every 6 hours; 20 lozenges/day).    Lozenge Instructions:  Place the lozenge in your mouth, occasionally moving it side to side.  Allow it to slowly dissolve (about 20-30 minutes) and try to minimize swallowing  Do not chew or swallow the lozenge        Thank you for visiting the pulmonary clinic today! It was a pleasure to participate in your care.  Please return to clinic 2 months or sooner if needed.    Manish Sparks CNP  My Office Number: [phone]   CT Scheduling: [phone]  PFT/Follow Up Visit Scheduling: [phone]  My Nurse: BOB Thakkar  My : Anyi    **For immediate needs such as medication issues/refills, active sick symptoms/medical concerns; I ask that you please call the office and speak to the pulmonary nurse. MyChart messages do not come directly to me. There can sometimes be a delay before I am aware of any messages that were sent. Thank you.**

## 2024-04-26 ENCOUNTER — TELEPHONE (OUTPATIENT)
Dept: PAIN MEDICINE | Facility: HOSPITAL | Age: 84
End: 2024-04-26
Payer: MEDICARE

## 2024-04-26 DIAGNOSIS — N39.0 URINARY TRACT INFECTION WITHOUT HEMATURIA, SITE UNSPECIFIED: ICD-10-CM

## 2024-04-26 LAB — BACTERIA UR CULT: ABNORMAL

## 2024-04-26 RX ORDER — AMPICILLIN 500 MG/1
500 CAPSULE ORAL 3 TIMES DAILY
Qty: 30 CAPSULE | Refills: 0 | Status: SHIPPED | OUTPATIENT
Start: 2024-04-26 | End: 2024-05-06

## 2024-04-26 ASSESSMENT — ENCOUNTER SYMPTOMS
COUGH: 1
GASTROINTESTINAL NEGATIVE: 1
CHEST TIGHTNESS: 1
CONSTITUTIONAL NEGATIVE: 1
ALLERGIC/IMMUNOLOGIC NEGATIVE: 1
MUSCULOSKELETAL NEGATIVE: 1
CARDIOVASCULAR NEGATIVE: 1
ENDOCRINE NEGATIVE: 1
PSYCHIATRIC NEGATIVE: 1
HEMATOLOGIC/LYMPHATIC NEGATIVE: 1
NEUROLOGICAL NEGATIVE: 1
EYES NEGATIVE: 1
SHORTNESS OF BREATH: 1

## 2024-05-06 ENCOUNTER — TELEPHONE (OUTPATIENT)
Dept: PRIMARY CARE | Facility: CLINIC | Age: 84
End: 2024-05-06
Payer: MEDICARE

## 2024-05-07 DIAGNOSIS — E55.9 VITAMIN D DEFICIENCY: ICD-10-CM

## 2024-05-07 RX ORDER — ERGOCALCIFEROL 1.25 MG/1
1.25 CAPSULE ORAL
Qty: 90 CAPSULE | Refills: 0 | Status: SHIPPED | OUTPATIENT
Start: 2024-05-07

## 2024-05-13 DIAGNOSIS — E78.5 HYPERLIPIDEMIA, UNSPECIFIED HYPERLIPIDEMIA TYPE: ICD-10-CM

## 2024-05-13 DIAGNOSIS — M47.814 SPONDYLOSIS, THORACIC: ICD-10-CM

## 2024-05-13 RX ORDER — ATORVASTATIN CALCIUM 20 MG/1
20 TABLET, FILM COATED ORAL NIGHTLY
Qty: 90 TABLET | Refills: 0 | Status: SHIPPED | OUTPATIENT
Start: 2024-05-13

## 2024-05-13 RX ORDER — GABAPENTIN 300 MG/1
300 CAPSULE ORAL 3 TIMES DAILY
Qty: 90 CAPSULE | Refills: 0 | Status: SHIPPED | OUTPATIENT
Start: 2024-05-13 | End: 2024-05-17

## 2024-05-17 ENCOUNTER — OFFICE VISIT (OUTPATIENT)
Dept: PAIN MEDICINE | Facility: HOSPITAL | Age: 84
End: 2024-05-17
Payer: MEDICARE

## 2024-05-17 ENCOUNTER — LAB (OUTPATIENT)
Dept: LAB | Facility: LAB | Age: 84
End: 2024-05-17
Payer: MEDICARE

## 2024-05-17 VITALS
DIASTOLIC BLOOD PRESSURE: 79 MMHG | SYSTOLIC BLOOD PRESSURE: 144 MMHG | BODY MASS INDEX: 24.64 KG/M2 | RESPIRATION RATE: 16 BRPM | TEMPERATURE: 97.8 F | HEIGHT: 71 IN | HEART RATE: 96 BPM | WEIGHT: 176 LBS

## 2024-05-17 DIAGNOSIS — M47.814 SPONDYLOSIS, THORACIC: Primary | ICD-10-CM

## 2024-05-17 DIAGNOSIS — Z79.899 MEDICATION MANAGEMENT: ICD-10-CM

## 2024-05-17 DIAGNOSIS — M79.18 MYOFASCIAL PAIN: ICD-10-CM

## 2024-05-17 PROCEDURE — 80307 DRUG TEST PRSMV CHEM ANLYZR: CPT

## 2024-05-17 PROCEDURE — 80355 GABAPENTIN NON-BLOOD: CPT | Performed by: NURSE PRACTITIONER

## 2024-05-17 PROCEDURE — 99213 OFFICE O/P EST LOW 20 MIN: CPT | Performed by: NURSE PRACTITIONER

## 2024-05-17 PROCEDURE — 36415 COLL VENOUS BLD VENIPUNCTURE: CPT

## 2024-05-17 RX ORDER — BACLOFEN 5 MG/1
5 TABLET ORAL 3 TIMES DAILY
Qty: 90 TABLET | Refills: 1 | Status: SHIPPED | OUTPATIENT
Start: 2024-05-17

## 2024-05-17 RX ORDER — GABAPENTIN 300 MG/1
300 CAPSULE ORAL SEE ADMIN INSTRUCTIONS
Qty: 120 CAPSULE | Refills: 1 | Status: SHIPPED | OUTPATIENT
Start: 2024-05-17 | End: 2024-06-11

## 2024-05-17 ASSESSMENT — PAIN SCALES - GENERAL: PAINLEVEL: 9

## 2024-05-17 NOTE — PATIENT INSTRUCTIONS
I ordered physical therapy.  Please get this scheduled.    I increase your gabapentin.  You may take 300 mg in the morning, 300 mg in the afternoon and 600 mg at night.  Do not take sedating medications together.    I started you on baclofen and you may take it as needed for muscle pain relief.  Do not take sedating medications together.    You have an appointment to see Dr. Wang in 8 weeks, 7/9/2024 at 2:30 PM in [place]

## 2024-05-17 NOTE — PROGRESS NOTES
Subjective   Shiva Hardy is a 83 y.o. male who is here to establish care with pain management.  Patient is ambulatory.  Gait steady.  He arrives with his spouse.      Patient saw pain provider, Dr. White, in February of this year.  There was an order for thoracic x-ray and physical therapy.  Patient did not make a follow-up appointment with him.      Patient has chronic pain to his middle back.  He rates his pain as 9 out of 10.  Pain is constant.  He describes his pain as throbbing.  He reports pain to his middle back interferes with his sleep and physical functioning.  He denies pain, numbness or tingling sensation to his arms or his legs.  He denies arm weakness, weakness in  or dropping objects.  He denies leg weakness or change in balance.  He denies bowel or bladder incontinence.  He denies recent falls, injuries, inciting events.    Patient had history of back surgery.  He had laminectomy done.  He reports his lower back is not hurting him.  He had seen pain management in the past where he received injections to his back.      Patient is taking Tylenol with minimal relief.  He has no recent physical therapy.  He is taking gabapentin 300 mg 3 times a day and reports that it is not working.  Patient is asking for pain medication.    Patient had x-ray to his thoracic spine that was done on 2/8/2024 and I reviewed the results with them.  I discussed the plan of care.  Patient will be seen for an office visit after physical therapy.  Nonnarcotic options were provided.  Questions were answered during this encounter.      OARRS:  CHILANGO Chavira-JOE, DNP on 5/17/2024  9:33 AM  I have personally reviewed the OARRS report for Shiva Hardy. I have considered the risks of abuse, dependence, addiction and diversion    ROS: No changes except for what was noted in HPI.      /79   Pulse 96   Temp 36.6 °C (97.8 °F) (Temporal)   Resp 16   Wt 79.8 kg (176 lb)     Objective       Past Medical  History  He has a past medical history of Acute maxillary sinusitis, unspecified (12/19/2019), Acute recurrent maxillary sinusitis (12/28/2016), Acute upper respiratory infection, unspecified (03/13/2017), Acute upper respiratory infection, unspecified (12/30/2020), Arthritis, Bilateral inguinal hernia, without obstruction or gangrene, not specified as recurrent (03/04/2014), BPH (benign prostatic hyperplasia), Chest pain, unspecified (12/16/2016), Chronic obstructive pulmonary disease with (acute) exacerbation (Multi) (12/19/2019), Chronic pain disorder, Edema, unspecified (05/27/2021), Hyperlipidemia, Hypertension, Impacted cerumen, right ear (01/06/2017), Low back pain, unspecified (07/25/2019), Personal history of malignant neoplasm of prostate, Personal history of malignant neoplasm, unspecified, Personal history of other diseases of the digestive system (05/27/2021), Personal history of other diseases of the respiratory system (02/22/2017), Personal history of other endocrine, nutritional and metabolic disease (04/20/2017), Prostate cancer (Multi), Sleep apnea, Unspecified malignant neoplasm of skin of unspecified part of face (12/16/2016), and Unspecified symptoms and signs involving the genitourinary system (12/23/2021).    Surgical History  Past Surgical History:   Procedure Laterality Date    CATARACT EXTRACTION  10/01/2014    Cataract Surgery    HERNIA REPAIR  11/03/2015    Hernia Repair    OTHER SURGICAL HISTORY  01/25/2021    Spinal surgery    OTHER SURGICAL HISTORY  01/25/2021    Lower back surgery    OTHER SURGICAL HISTORY  12/16/2016    Destruction Of Malignant Lesion Face    OTHER SURGICAL HISTORY  05/08/2019    Skin biopsy    PROSTATE SURGERY  10/01/2014    Prostate Surgery    UMBILICAL HERNIA REPAIR  12/16/2016    Umbilical Hernia Repair        Social History  He reports that he has been smoking cigarettes. He started smoking about 66 years ago. He has a 66.4 pack-year smoking history. He has  never used smokeless tobacco. He reports that he does not currently use alcohol. He reports that he does not currently use drugs.    Family History  Family History   Problem Relation Name Age of Onset    Arthritis Mother      Hypertension Mother      Vision loss Mother      Arthritis Father      Other (CARDIAC DISORDER) Father      Prostate cancer Father      Colon cancer Brother      Arthritis Brother      Hypertension Brother      Liver cancer Brother      Lung cancer Brother      Vision loss Brother      Cancer Other MULTIPLE FAMILY MEMBERS           Current Outpatient Medications:     acetaminophen (Tylenol) 500 mg tablet, Take 1 tablet (500 mg) by mouth every 6 hours if needed for mild pain (1 - 3)., Disp: , Rfl:     albuterol 90 mcg/actuation aerosol powdr breath activated inhaler, Inhale 2 puffs every 4 hours if needed for wheezing or shortness of breath., Disp: , Rfl:     amLODIPine (Norvasc) 5 mg tablet, Take 1 tablet (5 mg) by mouth once daily in the morning. Do not take until you follow up with cardiology, Dr. Cloud., Disp: , Rfl:     aspirin 81 mg EC tablet, Take 1 tablet (81 mg) by mouth once daily in the morning., Disp: , Rfl:     atorvastatin (Lipitor) 20 mg tablet, TAKE 1 TABLET BY MOUTH ONCE DAILY AT BEDTIME, Disp: 90 tablet, Rfl: 0    ergocalciferol (Vitamin D-2) 1.25 MG (73667 UT) capsule, Take 1 capsule (1,250 mcg) by mouth 1 (one) time per week., Disp: 90 capsule, Rfl: 0    fluticasone-umeclidin-vilanter (Trelegy Ellipta) 100-62.5-25 mcg blister with device, Inhale 1 puff once daily. Rinse mouth with water after use to reduce aftertaste and incidence of candidiasis. Do not swallow., Disp: 1 each, Rfl: 3    ipratropium-albuteroL (Duo-Neb) 0.5-2.5 mg/3 mL nebulizer solution, Take 3 mL by nebulization every 6 hours if needed for shortness of breath., Disp: 360 mL, Rfl: 5    montelukast (Singulair) 10 mg tablet, TAKE 1 TABLET BY MOUTH ONCE DAILY AT BEDTIME, Disp: 90 tablet, Rfl: 0    nicotine  (Nicoderm CQ) 14 mg/24 hr patch, 1 patch daily for 6 weeks, Disp: 42 patch, Rfl: 0    nicotine (Nicoderm CQ) 7 mg/24 hr patch, 1 patch daily for 2 weeks (following completion of Step 2; 14 mg patches), Disp: 14 patch, Rfl: 0    nicotine polacrilex (Commit) 2 mg lozenge, Weeks 1-6: 1 lozenge every 1-2 hrs (max: 5 every 6 hours; 20/day). Weeks 7-9: 1 sydnie every 2-4 hrs (max: 5 every 6 hours; 20/day). Weeks 10-12: 1 every 4-8 hrs (max: 5 every 6 hours; 20/day)., Disp: 200 lozenge, Rfl: 2    baclofen (Lioresal) 5 mg tablet, Take 1 tablet (5 mg) by mouth 3 times a day., Disp: 90 tablet, Rfl: 1    gabapentin (Neurontin) 300 mg capsule, Take 1 capsule (300 mg) by mouth see administration instructions. Take 300 mg in the morning, 300 mg in the afternoon and 600 mg at night, Disp: 120 capsule, Rfl: 1    promethazine-DM (Phenergan-DM) 6.25-15 mg/5 mL syrup, Take 5 mL by mouth 4 times a day as needed for cough., Disp: 120 mL, Rfl: 0    tiZANidine (Zanaflex) 2 mg tablet, Take 1 tablet (2 mg) by mouth every 8 hours if needed for muscle spasms. (Patient not taking: Reported on 4/24/2024), Disp: 90 tablet, Rfl: 2    Allergies  Percocet [oxycodone-acetaminophen]      Physical Exam  Vitals and nursing note reviewed.   HENT:      Head: Normocephalic.      Nose: Nose normal.   Eyes:      Extraocular Movements: Extraocular movements intact.      Conjunctiva/sclera: Conjunctivae normal.      Pupils: Pupils are equal, round, and reactive to light.   Cardiovascular:      Rate and Rhythm: Normal rate and regular rhythm.   Pulmonary:      Effort: Pulmonary effort is normal.      Breath sounds: Normal breath sounds.   Musculoskeletal:         General: Tenderness present. No swelling, deformity or signs of injury.      Cervical back: No rigidity or tenderness.      Lumbar back: Tenderness present.      Right lower leg: No edema.      Left lower leg: No edema.      Comments: No neck rigidity.  Full range of motion.  Negative Spurling test  bilaterally.  Negative for paraspinal tenderness at the the cervical region.  Positive for minimal paraspinal tenderness at the thoracic region.  No radicular symptoms.  Positive for myofascial tenderness on palpation at the left thoracic region.  Toasted skin syndrome thoracic region?  Large patches of redness along this area.  Instructed to limit the frequent use of heating pad.  Negative for paraspinal tenderness at the lumbar region.  No radicular symptoms.  Presence of scar at the lumbar spine from previous surgery.  Negative leg raise.  BUE 5/5, BLE 4-5/5.   Skin:     General: Skin is warm and dry.   Neurological:      General: No focal deficit present.      Mental Status: He is alert and oriented to person, place, and time.   Psychiatric:         Mood and Affect: Mood normal.         Behavior: Behavior normal.            Pain Management Panel  More data exists         Latest Ref Rng & Units 5/6/2022 10/14/2021   Pain Management Panel   Amphetamine Screen, Urine NEGATIVE PRESUMPTIVE NEGATIVE  PRESUMPTIVE NEGATIVE    Barbiturate Screen, Urine NEGATIVE PRESUMPTIVE NEGATIVE  PRESUMPTIVE NEGATIVE    Codeine IgE Cutoff <50 ng/mL <50  <50    Hydromorphone Urine Cutoff <25 ng/mL 666  554    Morphine  Cutoff <50 ng/mL <50  <50           Assessment/Plan   Problem List Items Addressed This Visit             ICD-10-CM    Myofascial pain M79.18    Relevant Medications    baclofen (Lioresal) 5 mg tablet    Spondylosis, thoracic - Primary M47.814    Relevant Medications    gabapentin (Neurontin) 300 mg capsule    Other Relevant Orders    PT eval and treat     Other Visit Diagnoses         Codes    Medication management     Z79.899    Relevant Orders    OOB Internal Tracking    Drug Screen 9 Panel, Blood with Reflex to Confirmation                Plan/Follow-up Instructions:    I ordered physical therapy.  Please get this scheduled.    I increase your gabapentin.  You may take 300 mg in the morning, 300 mg in the afternoon  and 600 mg at night.  Do not take sedating medications together.    I started you on baclofen and you may take it as needed for muscle pain relief.  Do not take sedating medications together.    You have an appointment to see Dr. Wang in 8 weeks, 7/9/2024 at 2:30 PM in [place]      Disclaimer: This note was created using voice recognition software. It was not corrected for typographical or grammatical errors, inadvertent word insertion, or any unintended errors. Please feel free to contact me for clarification.      Arlet Soares, DNP, APRN, FNP-C   [facility]  Office #: [phone]  Fax # [phone]

## 2024-05-17 NOTE — PROGRESS NOTES
Last urine drug screening date/ordered today: 05/17/24      Opioid Risk Screening:   THE OPIOID RISK TOOL (ORT)                                                                      Female                     Male     1. Family History of Substance Abuse:                              Alcohol                                                   [1]=                           [3]  = 0    Illicit Drugs                                             [2] =                          [3]   =0    Prescription Drugs                                [4]=                           [4]   =0    2. Personal History of Substance Abuse:     Alcohol                                                    [3] =                          [3] = 0    Illegal Drugs                                           [4] =                           [4]  = 0    Prescription Drugs                                [5]=                            [5]   =0    3. Age (If between 16 to 45):               [1]=                           [1]   =0    4. History of Preadolescent Sexual Abuse                                                                  [3]=                            [0]   =0    5. Psychological Disease:    ADD, OCD, Bipolar, Schizophrenia    [2]=                            [2]   =0    Depression                                          [1]=                             [1]   =1      TOTAL Score =  1     Last opioid risk screening date/ordered today: 5/17/2024  Patient's total score is 1    Reference :  Low Score = 0 to 3  Moderate Score = 4 to 7  High Score = =8       Pain Scale Screening:   Pain Assessment and Documentation Tool (PADT)   Date of Assessment: 5/17/2024  Analgesia:   Patient reports her pain level on average during the past week is 9on a 0 - 10 scale.   Patient reports that her pain level at its worst during the past week was 10 on a 0 -10 scale.   -----------------------

## 2024-05-22 LAB
AMPHETAMINES SERPL QL SCN: NEGATIVE NG/ML
ANNOTATION COMMENT IMP: NORMAL
BARBITURATES SERPL QL SCN: NEGATIVE NG/ML
BENZODIAZ SERPL QL SCN: NEGATIVE NG/ML
BUPRENORPHINE SERPL-MCNC: NEGATIVE NG/ML
CANNABINOIDS SERPL QL SCN: NEGATIVE NG/ML
COCAINE SERPL QL SCN: NEGATIVE NG/ML
METHADONE SERPL QL SCN: NEGATIVE NG/ML
METHAMPHET SERPL QL: NEGATIVE NG/ML
OPIATES SERPL QL SCN: NEGATIVE NG/ML
OXYCODONE SERPL QL: NEGATIVE NG/ML
PCP SERPL QL SCN: NEGATIVE NG/ML

## 2024-05-24 ENCOUNTER — HOSPITAL ENCOUNTER (OUTPATIENT)
Dept: RADIOLOGY | Facility: CLINIC | Age: 84
Discharge: HOME | End: 2024-05-24
Payer: MEDICARE

## 2024-05-24 DIAGNOSIS — J18.9 PNEUMONIA OF RIGHT UPPER LOBE DUE TO INFECTIOUS ORGANISM: ICD-10-CM

## 2024-05-24 PROCEDURE — 71046 X-RAY EXAM CHEST 2 VIEWS: CPT | Performed by: RADIOLOGY

## 2024-05-24 PROCEDURE — 71046 X-RAY EXAM CHEST 2 VIEWS: CPT

## 2024-05-28 NOTE — PROGRESS NOTES
"[facility]  New Visit Note        HISTORY OF PRESENT ILLNESS     The patient's referring provider is: Manish Sparks APRN-*    HISTORY OF PRESENT ILLNESS   Shiva Hardy is a 83 y.o. male who presents to a [facility] for a sleep medicine evaluation with concerns of Consult and Sleep Apnea.     PAST SLEEP HISTORY    Patient has the following sleep-related diagnoses and sleep study results: positive for severe YVETTE    CURRENT HISTORY    On today's visit, the patient reports he hasn't used in last 7 months due to having bladder CA and PNE diagnosis (2 times in past year)  Did feel CPAP helped  Does have nasal congestion  Doesn't think tube is heated    Has mild to moderate stable COPD    Wakes often to urinate at night    Back hurts from arthritis which makes it difficult to tolerate CPAP  Has tried injections and starting PT tomorrow    Neck 15\"            Sleep schedule  on weekdays / work days:  Usual Bedtime  11 p  Falls asleep around  ?  Wake time  5-6 am    Sleep schedule  on weekends/non work days :  same    Naps:   sometimes    Average sleep duration 3-4 hours/day    Preferred sleeping position: side    Sleep-related ROS:    Sleep Initiation: no problems going to sleep except due to pain    Sleep Maintenance: wakes due to pain and needing to urinate    Recreational drug use  Smokin/2 PPD  Alcohol consumption: never  Caffeine consumption:  3/day  Marijuana: never    ESS: 8      PHYSICAL EXAM     VITAL SIGNS: /71   Pulse 64   Ht 1.791 m (5' 10.5\")   Wt 79.4 kg (175 lb)   SpO2 93%   BMI 24.75 kg/m²      CURRENT WEIGHT: [unfilled]  BMI: [unfilled]  PREVIOUS WEIGHTS:  Wt Readings from Last 3 Encounters:   24 79.4 kg (175 lb)   24 79.8 kg (176 lb)   24 79.4 kg (175 lb)       PHYSICAL EXAM: GENERAL: alert oriented x 3 pleasant and cooperative no acute distress  MODIFIED MALLAMPATI SCORE: 3+  LATERAL PHARYNGEAL WALL: " "2+  NECK EXAM: normal supple no adenopathy    RESULTS/DATA     No results found for: \"IRON\", \"TRANSFERRIN\", \"IRONSAT\", \"TIBC\", \"FERRITIN\"    ASSESSMENT/PLAN     Mr. Hardy is a 83 y.o. male and was referred to the [facility] for the following issues:    OBSTRUCTIVE SLEEP APNEA  -Ordering supplies from [facility]   -Try n30i and heated tube  -Discussed INSPIRE as option but he wishes to wait and avoid surgery due to already having a lot of complex medical issues    HYPERTENSION  BP Readings from Last 3 Encounters:   05/30/24 125/71   05/17/24 144/79   04/24/24 141/74      -Mildly elevated at recent appointments  -Will benefit from CPAP use    ALLERGIC RHINITIS / NASAL CONGESTION  -Start flonase spray 1-2 per nostril;    TOBACCO USE  -Encouraged cessation    Follow up 3 months        "

## 2024-05-30 ENCOUNTER — OFFICE VISIT (OUTPATIENT)
Dept: SLEEP MEDICINE | Facility: CLINIC | Age: 84
End: 2024-05-30
Payer: MEDICARE

## 2024-05-30 VITALS
OXYGEN SATURATION: 93 % | HEIGHT: 71 IN | WEIGHT: 175 LBS | SYSTOLIC BLOOD PRESSURE: 125 MMHG | DIASTOLIC BLOOD PRESSURE: 71 MMHG | BODY MASS INDEX: 24.5 KG/M2 | HEART RATE: 64 BPM

## 2024-05-30 DIAGNOSIS — G47.33 OSA ON CPAP: Primary | ICD-10-CM

## 2024-05-30 DIAGNOSIS — J43.9 PULMONARY EMPHYSEMA, UNSPECIFIED EMPHYSEMA TYPE (MULTI): ICD-10-CM

## 2024-05-30 DIAGNOSIS — J44.9 CHRONIC OBSTRUCTIVE PULMONARY DISEASE, UNSPECIFIED COPD TYPE (MULTI): Primary | ICD-10-CM

## 2024-05-30 DIAGNOSIS — I10 ESSENTIAL HYPERTENSION: ICD-10-CM

## 2024-05-30 DIAGNOSIS — J30.2 SEASONAL ALLERGIES: ICD-10-CM

## 2024-05-30 PROCEDURE — 1159F MED LIST DOCD IN RCRD: CPT | Performed by: PHYSICIAN ASSISTANT

## 2024-05-30 PROCEDURE — 99214 OFFICE O/P EST MOD 30 MIN: CPT | Performed by: PHYSICIAN ASSISTANT

## 2024-05-30 PROCEDURE — 99204 OFFICE O/P NEW MOD 45 MIN: CPT | Performed by: PHYSICIAN ASSISTANT

## 2024-05-30 PROCEDURE — 3074F SYST BP LT 130 MM HG: CPT | Performed by: PHYSICIAN ASSISTANT

## 2024-05-30 PROCEDURE — 1160F RVW MEDS BY RX/DR IN RCRD: CPT | Performed by: PHYSICIAN ASSISTANT

## 2024-05-30 PROCEDURE — 1125F AMNT PAIN NOTED PAIN PRSNT: CPT | Performed by: PHYSICIAN ASSISTANT

## 2024-05-30 PROCEDURE — 3078F DIAST BP <80 MM HG: CPT | Performed by: PHYSICIAN ASSISTANT

## 2024-05-30 RX ORDER — BUDESONIDE AND FORMOTEROL FUMARATE DIHYDRATE 160; 4.5 UG/1; UG/1
2 AEROSOL RESPIRATORY (INHALATION)
Qty: 10.2 G | Refills: 5 | Status: SHIPPED | OUTPATIENT
Start: 2024-05-30 | End: 2024-11-26

## 2024-05-30 ASSESSMENT — SLEEP AND FATIGUE QUESTIONNAIRES
HOW LIKELY ARE YOU TO NOD OFF OR FALL ASLEEP WHILE SITTING QUIETLY AFTER LUNCH WITHOUT ALCOHOL: WOULD NEVER DOZE
HOW LIKELY ARE YOU TO NOD OFF OR FALL ASLEEP WHILE LYING DOWN TO REST IN THE AFTERNOON WHEN CIRCUMSTANCES PERMIT: MODERATE CHANCE OF DOZING
HOW LIKELY ARE YOU TO NOD OFF OR FALL ASLEEP WHILE WATCHING TV: HIGH CHANCE OF DOZING
HOW LIKELY ARE YOU TO NOD OFF OR FALL ASLEEP WHILE SITTING AND READING: HIGH CHANCE OF DOZING
ESS-CHAD TOTAL SCORE: 8
HOW LIKELY ARE YOU TO NOD OFF OR FALL ASLEEP WHEN YOU ARE A PASSENGER IN A CAR FOR AN HOUR WITHOUT A BREAK: WOULD NEVER DOZE
HOW LIKELY ARE YOU TO NOD OFF OR FALL ASLEEP IN A CAR, WHILE STOPPED FOR A FEW MINUTES IN TRAFFIC: WOULD NEVER DOZE
SITING INACTIVE IN A PUBLIC PLACE LIKE A CLASS ROOM OR A MOVIE THEATER: WOULD NEVER DOZE
HOW LIKELY ARE YOU TO NOD OFF OR FALL ASLEEP WHILE SITTING AND TALKING TO SOMEONE: WOULD NEVER DOZE

## 2024-05-30 ASSESSMENT — PATIENT HEALTH QUESTIONNAIRE - PHQ9
2. FEELING DOWN, DEPRESSED OR HOPELESS: NOT AT ALL
SUM OF ALL RESPONSES TO PHQ9 QUESTIONS 1 & 2: 0
1. LITTLE INTEREST OR PLEASURE IN DOING THINGS: NOT AT ALL

## 2024-05-30 ASSESSMENT — PAIN SCALES - GENERAL: PAINLEVEL: 6

## 2024-05-30 ASSESSMENT — LIFESTYLE VARIABLES: HOW MANY STANDARD DRINKS CONTAINING ALCOHOL DO YOU HAVE ON A TYPICAL DAY: PATIENT DOES NOT DRINK

## 2024-05-30 NOTE — PATIENT INSTRUCTIONS
Good seeing you today,    I am ordering supplies for CPAP.  We will try a new mask called the N30 I can also try a heated tube which can allow you to increase the humidity and may help with your nasal congestion.    Consider using Flonase (generic is fluticasone propionate) for nasal congestion.    We will see back in 3 months    Doroteo Sheets PA-C    IMPORTANT PHONE NUMBERS     [phone] ([phone])  [facility] [employer] ([employer]): [phone]  For clinical questions and refilling prescriptions: [phone]  Anyi Caballero (For [place]): P: [phone]

## 2024-05-31 ENCOUNTER — EVALUATION (OUTPATIENT)
Dept: PHYSICAL THERAPY | Facility: CLINIC | Age: 84
End: 2024-05-31
Payer: MEDICARE

## 2024-05-31 DIAGNOSIS — M54.9 DORSALGIA: Primary | ICD-10-CM

## 2024-05-31 PROCEDURE — 97161 PT EVAL LOW COMPLEX 20 MIN: CPT | Mod: GP | Performed by: PHYSICAL THERAPIST

## 2024-05-31 ASSESSMENT — ENCOUNTER SYMPTOMS
DEPRESSION: 0
OCCASIONAL FEELINGS OF UNSTEADINESS: 0
LOSS OF SENSATION IN FEET: 0

## 2024-05-31 ASSESSMENT — PAIN SCALES - GENERAL: PAINLEVEL_OUTOF10: 0 - NO PAIN

## 2024-05-31 ASSESSMENT — PAIN - FUNCTIONAL ASSESSMENT: PAIN_FUNCTIONAL_ASSESSMENT: 0-10

## 2024-05-31 NOTE — PROGRESS NOTES
"      Physical Therapy  Physical Therapy Evaluation      Patient Name: Shiva Hardy  MRN: [id number]  Today's Date: 5/31/2024  Time Calculation  Start Time: 1145  Stop Time: 1215  Time Calculation (min): 30 min  General  Reason for Referral: Chronic midline back pain  Referred By: Regina  Past Medical History Relevant to Rehab: Bladder CA last year with surgery, being monitored  General Comment: Onset date: Chronic, script 2/8/2024; No auth; Visit number 1: \"I have pain all the time. I have a lot of trouble sleeping. I feel the pain that goes up to a ten. I can't get some of the medications that I used get. I take a medication and Tylenol and that helped to get me here. I push through the pain to do stuff. I do a little and then go sit down.\"    TREATING DIAGNOSIS:  1. Dorsalgia  Follow Up In Physical Therapy          ASSESSMENT: Patient is a 84 yo male  with chronic thoracic pain resulting in limited participation in pain-free ADLs and inability to perform at their prior level of function specifically,  standing, walking, sleeping, and ADL performance. Pt would benefit from skilled Physical Therapy services to address the impairments of:    in order to return to safe and pain-free ADL's and prior level of function.    PLAN:      Planned Interventions include: therapeutic exercise, therapeutic activity, self-care home management, manual therapy, therapeutic activities, gait training, neuromuscular coordination, vasopneumatic, dry needling, electric stimulation, ultrasound, kinesiotaping, wound care    Goals:  Active       PT Problem       Patient will increase tolerance to activity in sleeping for >4 hours without pain in order to participate in ADL, IADL, work, and carl skills or activities.       Start:  05/31/24    Expected End:  09/06/24            Patient will increase scapular stabilization and BUE  strength to 4+/5 in order to participate in ADL, IADL, work, or leisure skills and activities.       " "Start:  05/31/24    Expected End:  09/06/24            Patient will be independent with symptom management in order to decrease pain to <5/10, in order to participate in ADL, IADL, work, or leisure skills and activities.       Start:  05/31/24    Expected End:  09/06/24            Patient will improve the outcome measure score of the CHAPARRO to < 50% for increased independence and ease with performance of ADL, IADL, work, or leisure activities.       Start:  05/31/24    Expected End:  09/06/24            Patient Stated Goal: \"I just want to get out of pain.\"       Start:  05/31/24    Expected End:  09/06/24              Plan of care was developed with input and agreement by the patient.  Potential to achieve goals:  Good    Subjective:    Pt goals for therapy: \"I just can't stand this pain.\"  Pain Assessment: 0-10  Pain Score: 0 - No pain (can and does  become a 10/10, patient reports that he is on a significant amount of medication to make it to the appointment.)  Aggravating Factors: Reaching overhead, Lifting/Carrying, Dressing/Grooming, and Other yard work, walking, sleeping  Relieving Factors: Heat rest    Precautions:  Precautions  STEADI Fall Risk Score (The score of 4 or more indicates an increased risk of falling): 4  Precautions Comment: None    Medical History Form: Reviewed (scanned into chart)    Current Condition since injury:   same     Relevant Information (PMH & Previous Tests/Imaging):     Previous Interventions/Treatments: None   Prior Level of Function (PLOF)Prior Function Per Pt/Caregiver Report  Level of [place]: Independent with ADLs and functional transfers (all activity with pain, stays home most of the time due to pain.)  Exercise/Physical Activity: none  Work/School: retired  Current ADL/IADL Status: Independent with pain     Patients Living Environment: Reviewed and no concern Home Living  Home Living Comment: [place]: 3 steps  FRANKIE: Insidious and Chronic Osteoarthritis    Primary Language: " English    Social Determinants of health:  (-) Lack of Money                                      (-) poor physical home environment     (-) no job/ poor work conditions    (-) un-education/illiteracy   (-) traumatic childhood experiences            (-) lacks social supports/coping skills    (-) lacks healthy behaviors                     (-) lacks access to healthcare                   Red Flags:   REVIEW OF SYSTEMS/ RED FLAGS  (-) osteoporosis  (-) Fever/chills, SOB, loss of taste/smell  (-) Cough/ Sneeze   (-) balance difficulties/FALLS   (-) numbness/tingle  (-) weakness  (-) unremitting night pain   Sleep:  position:  (-) AM Stiffness:            (-) Unexplained Wt. Loss  (-) h/o CA   (-) Pacemaker  (-) Bowel/Bladder:            (-) saddle paresthesia    Objective:          (Add objective section)    Precautions:   Precautions  STEADI Fall Risk Score (The score of 4 or more indicates an increased risk of falling): 4  Precautions Comment: None    Medical History Form: Reviewed (scanned into chart)    EDUCATION: home exercise program, plan of care, activity modifications, pain management, and injury pathology   Access Code: [email or web address]  URL: [email or web address]  Date: 05/31/2024  Prepared by: ALEXANDRIA Juarez    Exercises  - Seated Scapular Retraction  - 2 x daily - 7 x weekly - 1 sets - 20 reps  - Seated Flexion Stretch  - 2 x daily - 7 x weekly - 1 sets - 3-4 reps - 20 seconds hold  - Seated Rhomboid Stretch  - 2 x daily - 7 x weekly - 1 sets - 3-4 reps - 20 seconds hold    Treatments:   This therapist instructed and demonstrated interventions to patient, patient completed the following under direct supervision of this therapist:  PT Evaluation Time Entry  PT Evaluation (Low) Time Entry: 30      Dominick Damon, PT

## 2024-06-11 DIAGNOSIS — J44.9 CHRONIC OBSTRUCTIVE PULMONARY DISEASE, UNSPECIFIED COPD TYPE (MULTI): ICD-10-CM

## 2024-06-11 DIAGNOSIS — M47.814 SPONDYLOSIS, THORACIC: ICD-10-CM

## 2024-06-11 RX ORDER — FLUTICASONE FUROATE, UMECLIDINIUM BROMIDE AND VILANTEROL TRIFENATATE 100; 62.5; 25 UG/1; UG/1; UG/1
1 POWDER RESPIRATORY (INHALATION) DAILY
Qty: 1 EACH | Refills: 11 | Status: SHIPPED | OUTPATIENT
Start: 2024-06-11

## 2024-06-11 RX ORDER — GABAPENTIN 300 MG/1
300 CAPSULE ORAL 3 TIMES DAILY
Qty: 90 CAPSULE | Refills: 0 | Status: SHIPPED | OUTPATIENT
Start: 2024-06-11

## 2024-06-12 ENCOUNTER — APPOINTMENT (OUTPATIENT)
Dept: UROLOGY | Facility: CLINIC | Age: 84
End: 2024-06-12
Payer: MEDICARE

## 2024-06-12 DIAGNOSIS — C67.9 MALIGNANT NEOPLASM OF URINARY BLADDER, UNSPECIFIED SITE (MULTI): ICD-10-CM

## 2024-06-12 LAB
POC APPEARANCE, URINE: CLEAR
POC BILIRUBIN, URINE: ABNORMAL
POC BLOOD, URINE: NEGATIVE
POC COLOR, URINE: YELLOW
POC GLUCOSE, URINE: NEGATIVE MG/DL
POC KETONES, URINE: ABNORMAL MG/DL
POC LEUKOCYTES, URINE: NEGATIVE
POC NITRITE,URINE: NEGATIVE
POC PH, URINE: 5.5 PH
POC PROTEIN, URINE: ABNORMAL MG/DL
POC SPECIFIC GRAVITY, URINE: >=1.03
POC UROBILINOGEN, URINE: 0.2 EU/DL

## 2024-06-12 PROCEDURE — 52000 CYSTOURETHROSCOPY: CPT | Performed by: STUDENT IN AN ORGANIZED HEALTH CARE EDUCATION/TRAINING PROGRAM

## 2024-06-12 PROCEDURE — 99214 OFFICE O/P EST MOD 30 MIN: CPT | Performed by: STUDENT IN AN ORGANIZED HEALTH CARE EDUCATION/TRAINING PROGRAM

## 2024-06-12 NOTE — PROGRESS NOTES
Patient ID: Shiva Hardy is a 83 y.o. male.    Procedures  PROCEDURE NOTE:    PREOPERATIVE DIAGNOSIS:  HG NMIBC s/p BCG    POSTOPERATIVE DIAGNOSIS:  Same    OPERATION:  Flexible Cystourethroscopy      SURGEON:  Irwin Kelsey MD    ANESTHESIA:  2%  lidocaine jelly    COMPLICATIONS:  None    EBL: Minimal    SPECIMEN:  Voided urine was collected and submitted for cytology.    DISPOSITION:  The patient was discharged home after the procedure, per routine.    INDICATIONS: :  Mr. Hardy is a 83 y.o. patient with a history of HG NMIBC s/p BCG who presents today for Cystoscopy.     The indications, risks and benefits of this procedure were discussed with the patient, consent was obtained prior to the procedure, and to the best of my judgement the patient seemed to understand and agree to the procedure.    PROCEDURE:  The patient  was brought into the procedure suite and informed consent was reviewed and confirmed. Vital signs were obtained prior to the procedure: There were no vitals taken for this visit..  The patient was escorted onto the stretcher, placed supine, prepped with betadine and draped in the usual standard surgical fashion.  Intraurethral 2% viscous lidocaine jelly was used for local analgesia.  A 16 [place] flexible cystourethroscope was inserted into the urethra.   The penile urethra was normal.  The prostate urethra was surgically absent.  Upon entering the bladder the entire bladder was surveyed in a 360 degree fashion.  The left and right ureteral orifices were in normal orthotopic position effluxing clear yellow urine, bilaterally.   There was no evidence of any bladder lesions, foreign objects, stones. There were 2 areas of erythematous mucosal changes. The cystoscope was then retroflexed.  The bladder neck was then further examined without any evidence of lesions. The scope was then removed and in an antegrade fashion, the urethra and bladder were again resurveyed with no evidence of additional  lesions.  The cystoscope was then fully removed.   The patient tolerated the procedure well.  Vitals were stable after the procedure.  The patient was able to void and was discharged home.  Verbal and written Post procedure instructions were reviewed with the patient.    IMPRESSION:  2 areas of erythema suspicious for CIS vs inflammatory reaction    PLAN:  Bladder biopsy in OR           Subjective   Patient ID: Shiva Hardy is a 83 y.o. male.    HPI  Shiva Hardy is a 83 y.o. male with h/o bladder cancer s/p reTURBT 10/6/23 presented to the hospital with urinary retention and hematuria. Presents today for cystoscopy.      He underwent cystoscopy with evacuation of clots on 11/10/23. Post op, he was started on 3 way gorman catheter. He passed voiding trial. He had post op respiratory insufficiency. He was weaned to RA. Doing well.      Hemorrhage was a delayed complication of the recent bladder tumor resection. No burning with urination.     Cystoscopy with ablation tissue on 1/5/24 was cancelled.    Had infection April 2024 which was not symptomatic.     Review of Systems  A complete review of systems was performed. All systems are noted to be negative unless indicated in the history of present illness, impression, active problem list, or past histories.     Objective   Physical Exam    Assessment/Plan   Shiva Hardy is a 83 y.o. male with h/o bladder cancer s/p reTURBT 10/6/23 presented to the hospital with urinary retention and hematuria. Presents today for cystoscopy.      He underwent cystoscopy with evacuation of clots on 11/10/23. Post op, he was started on 3 way gorman catheter. He passed voiding trial. He had post op respiratory insufficiency. He was weaned to RA. Doing well.      Hemorrhage was a delayed complication of the recent bladder tumor resection. No burning with urination.     Cystoscopy with ablation tissue on 1/5/24 was cancelled.    Had infection April 2024 which was not symptomatic.      Cystoscopy details as above. Will proceed with bladder biopsy 7/2/24. We discussed risks like bleeding and infection, benefits, alternatives and side effects.     Plan:  Cystoscopy with bladder biopsy 7/2/24.     Diagnoses and all orders for this visit:  Malignant neoplasm of urinary bladder, unspecified site (Multi)  -     Cytology Consultation (Non-Gynecologic); Future    Scribe Attestation  By signing my name below, IJacquie, Scribjaelyn attest that this documentation has been prepared under the direction and in the presence of Irwin Kelsey MD.

## 2024-06-13 ENCOUNTER — TREATMENT (OUTPATIENT)
Dept: PHYSICAL THERAPY | Facility: CLINIC | Age: 84
End: 2024-06-13
Payer: MEDICARE

## 2024-06-13 DIAGNOSIS — M54.9 DORSALGIA: ICD-10-CM

## 2024-06-13 PROCEDURE — 97140 MANUAL THERAPY 1/> REGIONS: CPT | Mod: GP | Performed by: PHYSICAL THERAPIST

## 2024-06-13 PROCEDURE — 97110 THERAPEUTIC EXERCISES: CPT | Mod: GP | Performed by: PHYSICAL THERAPIST

## 2024-06-13 ASSESSMENT — PAIN SCALES - GENERAL: PAINLEVEL_OUTOF10: 4

## 2024-06-13 ASSESSMENT — PAIN - FUNCTIONAL ASSESSMENT: PAIN_FUNCTIONAL_ASSESSMENT: 0-10

## 2024-06-13 NOTE — PROGRESS NOTES
"    Physical Therapy  Physical Therapy Treatment    Patient Name: Shiva Hardy  MRN: [id number]  Today's Date: 6/13/2024  Time Calculation  Start Time: 1100  Stop Time: 1140  Time Calculation (min): 40 min    General  Reason for Referral: Chronic midline back pain  Referred By: Regina  Past Medical History Relevant to Rehab: Bladder CA last year with surgery, being monitored  General Comment: Onset date: Chronic, script 2/8/2024; No auth; Visit number 2: \"I am working on the exercises. I don't feel much different.\"  Assessment:    Patient reported significant release in the muscles post treatment and reported feeling that \"I can stand up straighter after that.\"  Plan:  Progress as tolerated     Active       PT Problem       Patient will increase tolerance to activity in sleeping for >4 hours without pain in order to participate in ADL, IADL, work, and carl skills or activities.       Start:  05/31/24    Expected End:  09/06/24            Patient will increase scapular stabilization and BUE  strength to 4+/5 in order to participate in ADL, IADL, work, or leisure skills and activities.       Start:  05/31/24    Expected End:  09/06/24            Patient will be independent with symptom management in order to decrease pain to <5/10, in order to participate in ADL, IADL, work, or leisure skills and activities.       Start:  05/31/24    Expected End:  09/06/24            Patient will improve the outcome measure score of the CHAPARRO to < 50% for increased independence and ease with performance of ADL, IADL, work, or leisure activities.       Start:  05/31/24    Expected End:  09/06/24            Patient Stated Goal: \"I just want to get out of pain.\"       Start:  05/31/24    Expected End:  09/06/24                General  Chief Complaint:   1. Dorsalgia  Follow Up In Physical Therapy          Subjective:     Current Problem  General  Reason for Referral: Chronic midline back pain  Referred By: Regina  Past Medical " "History Relevant to Rehab: Bladder CA last year with surgery, being monitored  General Comment: Onset date: Chronic, script 2/8/2024; No auth; Visit number 2: \"I am working on the exercises. I don't feel much different.\"    Precautions  Precautions Comment: none    Patient perform today's treatment with reduced pain and muscle tension    Pain  Pain Assessment: 0-10  Pain Score: 4    Objective:     Treatments:   This therapist instructed and demonstrated interventions to patient, patient completed the following under direct supervision of this therapist:    24 minutes  Therapeutic Exercise  Therapeutic Exercise Performed: Yes  Therapeutic Exercise Activity 1: Scifit: seat 14, lvl 1, 5'  Therapeutic Exercise Activity 2: 3 way Sball stretch: 3 x 20\" R/L/M  Therapeutic Exercise Activity 3: Green Tubing: Row 20 x  Therapeutic Exercise Activity 4: LAE with scapular squeeze: 20 x  Therapeutic Exercise Activity 5: Horizontal abduction: 10 x 2     16 minutes  Manual Therapy  Manual Therapy Performed: Yes  Manual Therapy Activity 1: Patient seated with trunk flexed to treatment table: Patient had DTM to left thoracic paraspinals with trigger point release   Education:      Dominick Damon, PT  "

## 2024-06-14 ENCOUNTER — OFFICE VISIT (OUTPATIENT)
Dept: PAIN MEDICINE | Facility: HOSPITAL | Age: 84
End: 2024-06-14
Payer: MEDICARE

## 2024-06-14 VITALS
DIASTOLIC BLOOD PRESSURE: 71 MMHG | OXYGEN SATURATION: 92 % | HEART RATE: 71 BPM | RESPIRATION RATE: 17 BRPM | SYSTOLIC BLOOD PRESSURE: 139 MMHG | TEMPERATURE: 97.2 F

## 2024-06-14 DIAGNOSIS — M96.1 LUMBAR POST-LAMINECTOMY SYNDROME: ICD-10-CM

## 2024-06-14 DIAGNOSIS — M47.814 THORACIC SPONDYLOSIS: ICD-10-CM

## 2024-06-14 DIAGNOSIS — M47.812 MULTILEVEL CERVICAL SPONDYLOSIS WITHOUT MYELOPATHY: ICD-10-CM

## 2024-06-14 DIAGNOSIS — M62.830 MUSCLE SPASM OF BACK: Primary | ICD-10-CM

## 2024-06-14 DIAGNOSIS — Z79.899 MEDICATION MANAGEMENT: ICD-10-CM

## 2024-06-14 PROCEDURE — 1159F MED LIST DOCD IN RCRD: CPT | Performed by: ANESTHESIOLOGY

## 2024-06-14 PROCEDURE — 99213 OFFICE O/P EST LOW 20 MIN: CPT | Performed by: ANESTHESIOLOGY

## 2024-06-14 PROCEDURE — 1125F AMNT PAIN NOTED PAIN PRSNT: CPT | Performed by: ANESTHESIOLOGY

## 2024-06-14 PROCEDURE — 3078F DIAST BP <80 MM HG: CPT | Performed by: ANESTHESIOLOGY

## 2024-06-14 PROCEDURE — 3075F SYST BP GE 130 - 139MM HG: CPT | Performed by: ANESTHESIOLOGY

## 2024-06-14 RX ORDER — TRAMADOL HYDROCHLORIDE 50 MG/1
50 TABLET ORAL EVERY 12 HOURS PRN
Qty: 60 TABLET | Refills: 1 | Status: SHIPPED | OUTPATIENT
Start: 2024-06-14 | End: 2024-07-14

## 2024-06-14 RX ORDER — IBUPROFEN 200 MG
TABLET ORAL AS NEEDED
COMMUNITY

## 2024-06-14 RX ORDER — NALOXONE HYDROCHLORIDE 4 MG/.1ML
1 SPRAY NASAL AS NEEDED
Qty: 2 EACH | Refills: 0 | Status: SHIPPED | OUTPATIENT
Start: 2024-06-14

## 2024-06-14 ASSESSMENT — PAIN SCALES - GENERAL: PAINLEVEL: 4

## 2024-06-14 NOTE — PROGRESS NOTES
Patient is 83 year old male here for follow up visit for pain rated 4/10 on pain scale to back. Patient states pain is constant and describes it as aching, burning and spasms, patient states pain radiates up and down the back. Patient takes gabapentin and baclofen for pain and does not feel it does much to help.    Date of the last Controlled Substance Agreement: N/A       Last urine drug screening date/ordered today: 06/14/24     Results of last screen: N/A      Last opioid risk screening date/ordered today: 5/17/2024      Pain Scale Screening:   Pain Assessment and Documentation Tool (PADT)   Date of Assessment: 6/14/2024  Analgesia:   Patient reports her pain level on average during the past week is 6on a 0 - 10 scale.   Patient reports that her pain level at its worst during the past week was 10 on a 0 -10 scale.   10% of pain has been relieved during the past week per patient   Patient states that the amount of pain relief she is now obtaining from her current pain reliever(s) is enough to make a real difference in her life.   Query to clinician: Is the patient's pain relief clinically significant? N/A  Activities of Daily Living:   Physical functioning: unchanged  Family relationships: unchanged  Social relationships: unchanged  Mood: unchanged  Sleep patterns: unchanged  Overall functioning: unchanged  Adverse Events: No, Shiva Lucasan is not experiencing side effects from current pain reliever.  Patients overall severity of side effect:none  Specific Analgesic Plan: Continue present regimen.

## 2024-06-14 NOTE — PROGRESS NOTES
Subjective   Patient ID: Shiva Hardy is a 83 y.o. male.    HPI  Patient has a history of chronic low back pain secondary to advanced facet arthrosis and cervical thoracic and lumbar spine.  He has a history of a previous lumbar laminectomy 4 years ago and has had chronic pain.  Patient admits to being on hydrocodone for over 10 years and has not been able to receive any over the last 4 years.  He was placed on gabapentin and extra-strength Tylenol which has not given him significant relief.  Pain is rated to be 7-8 on a 1-10 scale and constant nature.    Review of Systems  Chronic pain involving his thoracic lumbar spine.;  History of pulmonary nodule; positive history of COPD; positive history of bladder cancer;' chronic thoracic back pain  Objective   Physical Exam Gen. appearance:  Patient's alert and oriented ×3 ;cooperative mild to moderate distress  Heart: Regular rate and rhythm  Lungs: Clear to auscultation  Abdomen: Soft nontender  Neuro: Cranial nerves II -XII intact; DTR: +2 over 4 biceps triceps brachial radialis patellar and Achilles  Spinal exam: Positive paraspinal tenderness noted bilaterally L4 5 L5-S1 with flexion extension and rotation/no bony abnormalities  Musculoskeletal:  Upper and lower extremity strength was 4/5 bilaterally  :  deferred  Skin: Warm and dry        Assessment/Plan   Diagnoses and all orders for this visit:  Muscle spasm of back  Multilevel cervical spondylosis without myelopathy  -     Follow Up In Pain Medicine; Future  Thoracic spondylosis  -     traMADol (Ultram) 50 mg tablet; Take 1 tablet (50 mg) by mouth every 12 hours if needed for moderate pain (4 - 6).  Lumbar post-laminectomy syndrome  -     traMADol (Ultram) 50 mg tablet; Take 1 tablet (50 mg) by mouth every 12 hours if needed for moderate pain (4 - 6).  Medication management  -     traMADol (Ultram) 50 mg tablet; Take 1 tablet (50 mg) by mouth every 12 hours if needed for moderate pain (4 - 6).  -      naloxone (Narcan) 4 mg/0.1 mL nasal spray; Administer 1 spray (4 mg) into affected nostril(s) if needed for opioid reversal. May repeat every 2-3 minutes if needed, alternating nostrils, until medical assistance becomes available.  -     Follow Up In Pain Medicine; Future    Patient was placed on tramadol 50 mg tablets every 12 hours and told to take gabapentin along with that medication on a daily basis for pain control.  Home exercise was recommended.  Patient has a follow-up visit in 2 months

## 2024-06-15 ENCOUNTER — PREP FOR PROCEDURE (OUTPATIENT)
Dept: UROLOGY | Facility: HOSPITAL | Age: 84
End: 2024-06-15
Payer: MEDICARE

## 2024-06-15 DIAGNOSIS — R39.9 LOWER URINARY TRACT SYMPTOMS (LUTS): ICD-10-CM

## 2024-06-15 DIAGNOSIS — C67.9 MALIGNANT NEOPLASM OF URINARY BLADDER, UNSPECIFIED SITE (MULTI): Primary | ICD-10-CM

## 2024-06-18 ASSESSMENT — ENCOUNTER SYMPTOMS
AGITATION: 0
DIZZINESS: 0
CHEST TIGHTNESS: 0
ARTHRALGIAS: 1
HEADACHES: 0
UNEXPECTED WEIGHT CHANGE: 0
WEAKNESS: 0
ACTIVITY CHANGE: 0
TROUBLE SWALLOWING: 0
NAUSEA: 0
ROS GI COMMENTS: DENIES ACID REFLUX
CHILLS: 0
FEVER: 0
COUGH: 1
EYE REDNESS: 0
SORE THROAT: 0
ABDOMINAL PAIN: 0
WHEEZING: 0
JOINT SWELLING: 0
VOICE CHANGE: 0
RHINORRHEA: 0
SINUS PAIN: 0
TREMORS: 0
STRIDOR: 0
NERVOUS/ANXIOUS: 0
BACK PAIN: 1
APPETITE CHANGE: 0
PALPITATIONS: 0
DIARRHEA: 0
EYE ITCHING: 0
VOMITING: 0

## 2024-06-18 NOTE — PROGRESS NOTES
Patient: Shiva Hardy    [id number]  : 1940 -- AGE 83 y.o.    Provider: LEONA Faulkner     Location [facility]   Service Date: 2024       Department of Medicine  Division of Pulmonary, Critical Care, and Sleep Medicine       [facility]  Follow Up Visit Note    HISTORY OF PRESENT ILLNESS     PCP: Dr. Adria Galeano  Sleep: Doroteo Sheets PA-C  Urology: Dr. Kelsey    HISTORY OF PRESENT ILLNESS   Shiva Hardy is a 83 y.o. male who presents to a [facility] for a follow up visit with concerns of COPD.   I have independently interviewed and examined the patient in the office and reviewed available records.    DATE OF LAST VISIT: 2024    Current History  Since last visit, patient completed previously ordered chest x-ray on 2024; no evidence of prior pneumonia consolidation as previously seen on 2024 scan.  Last visit I had started him on Trelegy 100 as well as DuoNebs. My office was assisting with getting patient assistance for Trelegy. Pulmonary RN confirmed the assistance forms were received today.    On today's visit, He reports he reports LAZARO with short distance walking. Using Symbicort 2 puffs BID, albuterol HFA last used last month (usually forgets to bring it with him). The new DuoNebs he used once last month; has found the treatments helpful when used. Reports a cough, mostly when laying down on his back. Thick mucous, clear/white in color. Intermittent wheezing; worse with exertional activity. Denies GERD. Reports throat clearing. Denies fever, sweats, chills. Weight stable. No lower leg swelling. Reports numbness in his lower legs. No CP, palps. No hemoptysis. Continues to smoke; not interested in quitting. Not using the nicotine replacement therapy I last prescribed him (was never filled).  CAT Today: 18      Prior Visits & History   2024: This is a former patient of   "Reina last seen by him on 9/6/2022.  He does have a history of mild to moderate COPD; last PFT from 8/25/2020 did show some mild obstruction with an FEV1 of 84% predicted.    He is currently maintained on Symbicort 160, albuterol HFA as needed, albuterol nebulizers as needed.  He just recently had a chest x-ray on 4/22/2024 ordered by the PCP with concern for cough symptoms;   chest x-ray did show patchy right upper lung airspace infiltrate concerning for pneumonia.  Was given a course of prednisone as well as azithromycin by the primary care; currently on these 2 medications now.    On today's visit, the patient reports his breathing has been \"the same\" as prior. Denies SOB at rest but does have LAZARO with shorter distance walking. Takes Symbicort daily; has been on this for awhile. Will use nebulizer treatments a couple times a day.   Has Albuterol HFA as well; will use maybe 3-4 x a week. Does report a cough; intermittent mucous production - typically white. Intermittent wheezing. Mild intermittent orthopnea. No lower leg swelling. No CP, palpitations. Denies GERD. Denies rhinitis.    Denies premature birth. No childhood pulmonary issues growing up. No pulmonary hospitalizations. Has never been on home oxygen therapy before.    He does have a history of severe YVETTE on CPAP. Has not been wearing it the past few weeks due to his recent cough.   CAT Today: 15  ACT Today: 15  ESS Today: 6    **The below are prior notes from Dr. Torres**  09/6/22: Mr Hardy is doing well, He is using symbicort with albuterol as needed (trelegy was not covered) The latest CPAP download we have is March/April which shows 22/30 days use. He tells me he is using it more frequently now but does miss a few days.  previous note 3/7/2022: Mr Hardy is doing about the same. He is having a lot of back pain issues which hakes it hard to sleep at night. He also wakes up in the middle of the night with a sore throat and dry mouth. He has " humidity on his CPAP but it isn't helping.     Previous note 9/14/2021: Mr Wilburn is doing well. no exacerbations since last visit. He is having a lot of back pain issues so is not using CPAP as much as he usually does. The pain wakes him up so he takes the CPAP off.  He is using symbicort and uses albuterol most evenings.     Previous note 3/9/2021: Mr Hardy has no complaints today. He had bronchitis vs pneumonia in early February. He got antibiotics and feels better now, at baseline. He is using symbicort twice daily and uses albuterol every night (he has developed this routine that works for him. he has not needed albuterol for rescue.  He is doing well with his CPAP. getting supplies, download data shows good compliance with AHI 7.3     Previous note 9/11/2020: 80-year-old with severe obstructive sleep apnea on C Pap 6 with air fit N10 NM and with COPD here for routine follow-up. The patient has been doing well with C Pap, using it 7-8 hours every night. No snoring on C Pap. Feels rested with sleep and denies excessive daytime sleepiness. His breathing has been stable. Short of breath only when doing yard work. Occasional wheezing. Occasional cough. Rarely needs to Ventolin. Compliant with Symbicort. Still smokes, was cutting back but has had some deaths in the family so he picked back up some.   Had back surgery which went well. Repeat PFT showed near normal spirometry, normal lung volumes and diffusing capacity data download scanned to emr (good compliance)     Provider Impressions     82yr old with COPD and YVETTE doing well.     1. Severe Obstructive sleep apnea on C Pap, doing well with C Pap with good compliance and good control of sleep symptoms   - Continue with C Pap every night with sleep.   suggested appt with Dr Coker to discuss inspire and trouble shoot CPAP. he doesn't was to do that now.     2. Mild to moderate COPD, stable   - Continue with Symbicort 160/4.5, 2 puffs twice daily with a spacer.  Take Ventolin as needed.     3. Multiple small lung nodules bilaterally on chest CT, 5-7 mm in the left lower lobe and 8 mm tubular opacity in right middle lobe, unchanged in 26 months, therefore considered benign   - No further chest imaging required for surveillance..     4. Tobacco abuse, still smoking   - encouraged patient to quit smoking.    REVIEW OF SYSTEMS     REVIEW OF SYSTEMS  Review of Systems   Constitutional:  Positive for fatigue. Negative for activity change, appetite change, chills, fever and unexpected weight change.   HENT:  Positive for sinus pressure. Negative for congestion, postnasal drip, rhinorrhea, sinus pain, sneezing, sore throat, trouble swallowing and voice change.         Denies throat clearing   Eyes:  Negative for redness and itching.   Respiratory:  Positive for cough. Negative for chest tightness, shortness of breath, wheezing and stridor.    Cardiovascular:  Negative for chest pain, palpitations and leg swelling.        Denies orthopnea   Gastrointestinal:  Negative for abdominal pain, diarrhea, nausea and vomiting.        Denies acid reflux   Musculoskeletal:  Positive for arthralgias, back pain and myalgias. Negative for joint swelling.   Skin:  Negative for rash.   Allergic/Immunologic: Negative for immunocompromised state.   Neurological:  Negative for dizziness, tremors, weakness and headaches.   Hematological:  Bruises/bleeds easily.   Psychiatric/Behavioral:  Positive for sleep disturbance. Negative for agitation. The patient is not nervous/anxious.         Denies depression   All other systems reviewed and are negative.      ALLERGIES AND MEDICATIONS     ALLERGIES  Allergies   Allergen Reactions    Percocet [Oxycodone-Acetaminophen] Nausea Only       MEDICATIONS  Current Outpatient Medications   Medication Sig Dispense Refill    acetaminophen (Tylenol) 500 mg tablet Take 1 tablet (500 mg) by mouth every 6 hours if needed for mild pain (1 - 3).      albuterol 90  mcg/actuation aerosol powdr breath activated inhaler Inhale 2 puffs every 4 hours if needed for wheezing or shortness of breath.      amLODIPine (Norvasc) 5 mg tablet Take 1 tablet (5 mg) by mouth once daily in the morning. Do not take until you follow up with cardiology, Dr. Cloud.      aspirin 81 mg EC tablet Take 1 tablet (81 mg) by mouth once daily in the morning.      atorvastatin (Lipitor) 20 mg tablet TAKE 1 TABLET BY MOUTH ONCE DAILY AT BEDTIME 90 tablet 0    baclofen (Lioresal) 5 mg tablet Take 1 tablet (5 mg) by mouth 3 times a day. 90 tablet 1    budesonide-formoteroL (Symbicort) 160-4.5 mcg/actuation inhaler Inhale 2 puffs 2 times a day. Rinse mouth with water after use to reduce aftertaste and incidence of candidiasis. Do not swallow. 10.2 g 5    ergocalciferol (Vitamin D-2) 1.25 MG (48473 UT) capsule Take 1 capsule (1,250 mcg) by mouth 1 (one) time per week. 90 capsule 0    gabapentin (Neurontin) 300 mg capsule TAKE 1 CAPSULE BY MOUTH THREE TIMES DAILY 90 capsule 0    glucose 4 gram chewable tablet Chew if needed for low blood sugar - see comments.      ipratropium-albuteroL (Duo-Neb) 0.5-2.5 mg/3 mL nebulizer solution Take 3 mL by nebulization every 6 hours if needed for shortness of breath. 360 mL 5    montelukast (Singulair) 10 mg tablet TAKE 1 TABLET BY MOUTH ONCE DAILY AT BEDTIME 90 tablet 0    naloxone (Narcan) 4 mg/0.1 mL nasal spray Administer 1 spray (4 mg) into affected nostril(s) if needed for opioid reversal. May repeat every 2-3 minutes if needed, alternating nostrils, until medical assistance becomes available. 2 each 0    nicotine (Nicoderm CQ) 14 mg/24 hr patch 1 patch daily for 6 weeks 42 patch 0    nicotine (Nicoderm CQ) 7 mg/24 hr patch 1 patch daily for 2 weeks (following completion of Step 2; 14 mg patches) 14 patch 0    nicotine polacrilex (Commit) 2 mg lozenge Weeks 1-6: 1 lozenge every 1-2 hrs (max: 5 every 6 hours; 20/day). Weeks 7-9: 1 sydnie every 2-4 hrs (max: 5 every 6  hours; 20/day). Weeks 10-12: 1 every 4-8 hrs (max: 5 every 6 hours; 20/day). 200 lozenge 2    traMADol (Ultram) 50 mg tablet Take 1 tablet (50 mg) by mouth every 12 hours if needed for moderate pain (4 - 6). 60 tablet 1    fluticasone-umeclidin-vilanter (Trelegy Ellipta) 100-62.5-25 mcg blister with device Inhale 1 puff once daily. Rinse mouth with water after use to reduce aftertaste and incidence of candidiasis. Do not swallow. (Patient not taking: Reported on 6/19/2024) 1 each 11    tiZANidine (Zanaflex) 2 mg tablet Take 1 tablet (2 mg) by mouth every 8 hours if needed for muscle spasms. (Patient not taking: Reported on 4/24/2024) 90 tablet 2     No current facility-administered medications for this visit.       PAST HISTORY     PAST MEDICAL HISTORY  He  has a past medical history of Acute maxillary sinusitis, unspecified (12/19/2019), Acute recurrent maxillary sinusitis (12/28/2016), Acute upper respiratory infection, unspecified (03/13/2017), Acute upper respiratory infection, unspecified (12/30/2020), Arthritis, Bilateral inguinal hernia, without obstruction or gangrene, not specified as recurrent (03/04/2014), BPH (benign prostatic hyperplasia), Chest pain, unspecified (12/16/2016), Chronic obstructive pulmonary disease with (acute) exacerbation (Multi) (12/19/2019), Chronic pain disorder, Edema, unspecified (05/27/2021), Hyperlipidemia, Hypertension, Impacted cerumen, right ear (01/06/2017), Low back pain, unspecified (07/25/2019), Personal history of malignant neoplasm of prostate, Personal history of malignant neoplasm, unspecified, Personal history of other diseases of the digestive system (05/27/2021), Personal history of other diseases of the respiratory system (02/22/2017), Personal history of other endocrine, nutritional and metabolic disease (04/20/2017), Prostate cancer (Multi), Sleep apnea, Unspecified malignant neoplasm of skin of unspecified part of face (12/16/2016), and Unspecified symptoms and  signs involving the genitourinary system (12/23/2021).    PAST SURGICAL HISTORY  Past Surgical History:   Procedure Laterality Date    CATARACT EXTRACTION  10/01/2014    Cataract Surgery    HERNIA REPAIR  11/03/2015    Hernia Repair    OTHER SURGICAL HISTORY  01/25/2021    Spinal surgery    OTHER SURGICAL HISTORY  01/25/2021    Lower back surgery    OTHER SURGICAL HISTORY  12/16/2016    Destruction Of Malignant Lesion Face    OTHER SURGICAL HISTORY  05/08/2019    Skin biopsy    PROSTATE SURGERY  10/01/2014    Prostate Surgery    UMBILICAL HERNIA REPAIR  12/16/2016    Umbilical Hernia Repair       IMMUNIZATION HISTORY  Immunization History   Administered Date(s) Administered    Influenza, High Dose Seasonal, Preservative Free 10/13/2022    Moderna SARS-CoV-2 Vaccination 02/01/2021, 03/01/2021, 10/27/2021, 06/16/2022, 01/05/2023    Pneumococcal conjugate vaccine, 13-valent (PREVNAR 13) 11/03/2015    Pneumococcal polysaccharide vaccine, 23-valent, age 2 years and older (PNEUMOVAX 23) 12/16/2016    Td vaccine, age 7 years and older (TDVAX) 07/25/2019       SOCIAL HISTORY  He  reports that he has been smoking cigarettes. He started smoking about 66 years ago. He has a 66.5 pack-year smoking history. He has never used smokeless tobacco. He reports that he does not currently use alcohol. He reports that he does not currently use drugs.   Has 65 years of smoking hx. Averaged 1 ppd but has cut back to 0.5 ppd (for the past 2-3 months)  Has never quit smoking before  Feels like he is ready to quit    FAMILY HISTORY  Family History   Problem Relation Name Age of Onset    Arthritis Mother      Hypertension Mother      Vision loss Mother      Arthritis Father      Other (CARDIAC DISORDER) Father      Prostate cancer Father      Colon cancer Brother      Arthritis Brother      Hypertension Brother      Liver cancer Brother      Lung cancer Brother      Vision loss Brother      Cancer Other MULTIPLE FAMILY MEMBERS        PHYSICAL  "EXAM     VITAL SIGNS: /70   Pulse 69   Ht 1.791 m (5' 10.5\")   Wt 77.6 kg (171 lb)   SpO2 93%   BMI 24.19 kg/m²      PREVIOUS WEIGHTS:  Wt Readings from Last 3 Encounters:   24 77.6 kg (171 lb)   24 79.4 kg (175 lb)   24 79.8 kg (176 lb)       Physical Exam  Vitals reviewed.   Constitutional:       General: He is not in acute distress.     Appearance: Normal appearance. He is not ill-appearing or toxic-appearing.   HENT:      Head: Normocephalic.      Nose: No rhinorrhea.   Cardiovascular:      Rate and Rhythm: Normal rate and regular rhythm.      Heart sounds: Normal heart sounds.   Pulmonary:      Effort: Pulmonary effort is normal. No respiratory distress.      Breath sounds: Normal breath sounds. No stridor. No wheezing, rhonchi or rales.   Abdominal:      General: Abdomen is flat.   Musculoskeletal:         General: Normal range of motion.      Right lower leg: No edema.      Left lower leg: No edema.   Skin:     General: Skin is warm and dry.      Nails: There is no clubbing.   Neurological:      General: No focal deficit present.      Mental Status: He is alert and oriented to person, place, and time.   Psychiatric:         Mood and Affect: Mood normal.         Behavior: Behavior normal.         Judgment: Judgment normal.         RESULTS/DATA     Pulmonary Function Test Results   PFT  20: FEV1/FVC: 56, FEV1: 2.43 (84%), FVC: 4.35 (106%), no BD response, [id number]: 27%, T.61 (85%), DLCO: 93%  3/2/17: FEV1/FVC: 63, FEV1: 2.38 (78%), FVC: 3.79 (89%), no BD response, [id number]: 30%, T.94 (105%), DLCO: 62%    Chest Radiograph   CXR  24: IMPRESSION: 1. No evidence of acute cardiopulmonary process.  24: IMPRESSION: 1.  Patchy right upper lung airspace disease concerning for developing pneumonia. Recommend radiographic follow-up in 3-4 weeks  23: IMPRESSION: Streaky left retrocardiac opacities favored to be due to atelectasis rather than infection depending on " "the clinical setting.      Chest CT Scan   CTA Chest  6/30/21: IMPRESSION: 1. Distal ascending thoracic aortic aneurysm measuring 4 cm in maximum diameter. This is stable in appearance compared to a prior CT performed 06/26/2020. No evidence of acute aortic pathology. Would recommend follow-up thoracic MRA and cardiac MRI to avoid radiation in approximately 18 months. 2. Three-vessel coronary atherosclerosis. Mild LV remodeling with thinning of the mid to apical anterior septum. Possible LAD distribution infarct. 3. Thoracic aortic atherosclerosis.  6/26/20: IMPRESSION: 1. Stable borderline enlargement of the ascending thoracic aorta which measures up to 3.9 cm in the mid ascending portion. The remainder of the thoracic aorta is normal in course, caliber, and contour. No evidence of acute aortic pathology. 2. Moderate coronary artery calcifications. 3. Borderline dilation of the left atrium. 4. Mild to moderate upper lobe predominant centrilobular and paraseptal emphysematous changes with biapical pleuroparenchymal scarring and mild subpleural reticulation along the bilateral lung bases. 5. Stable appearance of few scattered noncalcified pulmonary nodules measuring up to 6 mm. 6. Few stable left hepatic hypodense lesions measuring up to 1.4 cm, likely cysts.      Echocardiogram & Cardiac Studies     No results found for this or any previous visit from the past 365 days.       Labwork & Pathology   Complete Blood Count  Lab Results   Component Value Date    WBC 10.5 12/13/2023    HGB 13.3 (L) 12/13/2023    HCT 43.0 12/13/2023    MCV 99 12/13/2023     12/13/2023       Peripheral Eosinophil Count:   Eosinophils Absolute   Date Value   11/09/2023 0.00 x10*3/uL   01/09/2023 0.16 x10E9/L   10/13/2022 0.17 x10E9/L   12/17/2021 0.11 x10E9/L       Serum Immunoglobulin E:    No results found for: \"IGE\"     Metabolic Parameters  Sodium   Date/Time Value Ref Range Status   12/13/2023 02:55  136 - 145 mmol/L Final "     Potassium   Date/Time Value Ref Range Status   12/13/2023 02:55 PM 4.6 3.5 - 5.3 mmol/L Final     Chloride   Date/Time Value Ref Range Status   12/13/2023 02:55  98 - 107 mmol/L Final     Bicarbonate   Date/Time Value Ref Range Status   12/13/2023 02:55 PM 24 21 - 32 mmol/L Final     Anion Gap   Date/Time Value Ref Range Status   12/13/2023 02:55 PM 15 10 - 20 mmol/L Final     Urea Nitrogen   Date/Time Value Ref Range Status   12/13/2023 02:55 PM 23 6 - 23 mg/dL Final     Creatinine   Date/Time Value Ref Range Status   12/13/2023 02:55 PM 0.81 0.50 - 1.30 mg/dL Final     GFR MALE   Date/Time Value Ref Range Status   08/23/2023 09:45 AM 84 >90 mL/min/1.73m2 Final     Comment:      CALCULATIONS OF ESTIMATED GFR ARE PERFORMED   USING THE 2021 CKD-EPI STUDY REFIT EQUATION   WITHOUT THE RACE VARIABLE FOR THE IDMS-TRACEABLE   CREATININE METHODS.    https://jasn.asnjournals.org/content/early/2021/09/22/ASN.2769786911       Glucose   Date/Time Value Ref Range Status   12/13/2023 02:55 PM 91 74 - 99 mg/dL Final     Calcium   Date/Time Value Ref Range Status   12/13/2023 02:55 PM 9.9 8.6 - 10.3 mg/dL Final     AST   Date/Time Value Ref Range Status   11/09/2023 10:45 AM 21 9 - 39 U/L Final     ALT   Date/Time Value Ref Range Status   11/09/2023 10:45 AM 20 10 - 52 U/L Final     Comment:     Patients treated with Sulfasalazine may generate falsely decreased results for ALT.       Bronchoscopy & Pathology/Cultures       ASSESSMENT/PLAN     Mr. Hardy is a 83 y.o. male; was referred to the [facility] for evaluation of COPD.    Problem List and Orders  Diagnoses and all orders for this visit:  Chronic obstructive pulmonary disease, unspecified COPD type (Multi)  Pneumonia of right upper lobe due to infectious organism  Nicotine dependence, cigarettes, uncomplicated        Assessment and Plan / Recommendations:  COPD: history of mild to moderate COPD; last PFT from 8/25/2020 did show  some mild obstruction with an FEV1 of 84% predicted.  He is currently maintained on Symbicort 160, albuterol HFA as needed, albuterol nebulizers as needed.  He denies shortness of breath at rest but does have LAZARO with short distance walking.  Reports an intermittent cough which can produce a white mucus.  Reports intermittent wheezing.  Will typically use his albuterol HFA 3-4 times a week.  -Start Trelegy 100; 1 inhalation once a day.  Rinse mouth after use. (Patient assistance forms have been sent in by my office for him; should be hearing back soon - advised he call the clinic if he hasn't heard back in the next 1-2 weeks)  -Continue Symbicort 160 for the time being (will stop once he has Trelegy)  -Continue albuterol HFA as needed  -Continue DuoNebs as needed    2. Pneumonia: He recently started having increased cough and shortness of breath symptoms in which she had a chest x-ray on 4/22/2024 showing patchy right upper lung airspace opacity concerning for developing pneumonia.  He was treated with azithromycin and prednisone by the primary care. Repeat chest x-ray on 5/24/2024; no evidence of prior pneumonia consolidation as previously seen on 4/22/2024 scan.   -Resolved    3.  Nicotine dependence: Has been a chronic daily smoker for the past 65 years averaging 1 PPD.  Dropped down to 0.5 PPD over the past 2 to 3 months.  He seems encouraged to quit.  - > 3 minutes spent on smoking cessation  - Previously prescribed NRT program of patches/lozenges; titration schedule provided to patient (he never filled this - states he isn't ready to quit).    4. YVETTE on CPAP: hx of severe YVETTE and has CPAP device. Not currently managed by anyone.  -Currently established with sleep medicine    RTC 6 months

## 2024-06-19 ENCOUNTER — OFFICE VISIT (OUTPATIENT)
Dept: PULMONOLOGY | Facility: CLINIC | Age: 84
End: 2024-06-19
Payer: MEDICARE

## 2024-06-19 ENCOUNTER — TELEPHONE (OUTPATIENT)
Dept: PULMONOLOGY | Facility: HOSPITAL | Age: 84
End: 2024-06-19
Payer: MEDICARE

## 2024-06-19 VITALS
HEIGHT: 71 IN | SYSTOLIC BLOOD PRESSURE: 115 MMHG | BODY MASS INDEX: 23.94 KG/M2 | OXYGEN SATURATION: 93 % | DIASTOLIC BLOOD PRESSURE: 70 MMHG | HEART RATE: 69 BPM | WEIGHT: 171 LBS

## 2024-06-19 DIAGNOSIS — F17.210 NICOTINE DEPENDENCE, CIGARETTES, UNCOMPLICATED: ICD-10-CM

## 2024-06-19 DIAGNOSIS — J18.9 PNEUMONIA OF RIGHT UPPER LOBE DUE TO INFECTIOUS ORGANISM: ICD-10-CM

## 2024-06-19 DIAGNOSIS — J44.9 CHRONIC OBSTRUCTIVE PULMONARY DISEASE, UNSPECIFIED COPD TYPE (MULTI): Primary | ICD-10-CM

## 2024-06-19 PROCEDURE — 99214 OFFICE O/P EST MOD 30 MIN: CPT | Performed by: NURSE PRACTITIONER

## 2024-06-19 PROCEDURE — 1159F MED LIST DOCD IN RCRD: CPT | Performed by: NURSE PRACTITIONER

## 2024-06-19 PROCEDURE — 3074F SYST BP LT 130 MM HG: CPT | Performed by: NURSE PRACTITIONER

## 2024-06-19 PROCEDURE — 3078F DIAST BP <80 MM HG: CPT | Performed by: NURSE PRACTITIONER

## 2024-06-19 PROCEDURE — 1126F AMNT PAIN NOTED NONE PRSNT: CPT | Performed by: NURSE PRACTITIONER

## 2024-06-19 ASSESSMENT — PATIENT HEALTH QUESTIONNAIRE - PHQ9
2. FEELING DOWN, DEPRESSED OR HOPELESS: NOT AT ALL
1. LITTLE INTEREST OR PLEASURE IN DOING THINGS: NOT AT ALL
SUM OF ALL RESPONSES TO PHQ9 QUESTIONS 1 & 2: 0

## 2024-06-19 ASSESSMENT — ENCOUNTER SYMPTOMS
SHORTNESS OF BREATH: 0
MYALGIAS: 1
FATIGUE: 1
BRUISES/BLEEDS EASILY: 1
SINUS PRESSURE: 1
SLEEP DISTURBANCE: 1

## 2024-06-19 ASSESSMENT — LIFESTYLE VARIABLES
HOW OFTEN DO YOU HAVE A DRINK CONTAINING ALCOHOL: NEVER
HOW MANY STANDARD DRINKS CONTAINING ALCOHOL DO YOU HAVE ON A TYPICAL DAY: PATIENT DOES NOT DRINK

## 2024-06-19 ASSESSMENT — PAIN SCALES - GENERAL: PAINLEVEL: 0-NO PAIN

## 2024-06-19 NOTE — TELEPHONE ENCOUNTER
Faxed the [facility] patient assistance application to [email or web address].  Fax confirmation received.

## 2024-06-19 NOTE — PATIENT INSTRUCTIONS
"Today we discussed how you have been feeling. Your prior pneumonia has since cleared.    -Start Trelegy 100; 1 inhalation once a day.  Rinse mouth after use.  This will be your new long-acting daily maintenance inhaler.  Please contact my office if you haven't heard anything back about the patient assistance within the next 1-2 weeks.  -Stop Symbicort as soon as you get the Trelegy inhaler (can continue using it for the time being)  -Continue Albuterol Inhaler; 2 puffs every 4-6 hours as needed for shortness of breath. You can also take this 10-15 minutes prior to exertional activity to help \"prime\" your lungs.  -Continue DuoNebs (albuterol/ipratropium) 1 unit dose via nebulizer every 6 hours as needed  -Cigarette/nicotine use is harming your health; and specifically exacerbating the risk of primary lung cancer. Smoking causes 85-90% of all lung cancers.  I encourage your current efforts to stop and recommended that you stop smoking entirely. I recommended community support groups, and use local tobacco cessation hotlines 8-800-Quit-Now (1-291.466.1980). If you are ready to quit, I advise you letting either your primary care provider or myself know so we can discuss smoking cessation techniques/treatments. *E-cigarettes should not replace smoking or be used to help quit smoking.    Thank you for visiting the pulmonary clinic today! It was a pleasure to participate in your care.  Please return to clinic 6 months or sooner if needed.    Manish Sparks, CNP  My Office Number: [phone]   CT Scheduling: [phone]  PFT/Follow Up Visit Scheduling: [phone]  My Nurse: BOB Thakkar  My : Any[facility]    To reach the nurse, Ariane Peck RN, please call ([phone]) Ariane has a secure voice mail account if you want to leave a message.    **For immediate needs such as medication issues/refills, active sick symptoms/medical concerns; I ask that you please call the office and speak to the pulmonary nurse. " [email or web address] messages do not come directly to me. There can sometimes be a delay before I am aware of any messages that were sent. Thank you.**

## 2024-06-20 ENCOUNTER — TREATMENT (OUTPATIENT)
Dept: PHYSICAL THERAPY | Facility: CLINIC | Age: 84
End: 2024-06-20
Payer: MEDICARE

## 2024-06-20 DIAGNOSIS — M54.9 DORSALGIA: ICD-10-CM

## 2024-06-20 PROCEDURE — 97110 THERAPEUTIC EXERCISES: CPT | Mod: GP | Performed by: PHYSICAL THERAPIST

## 2024-06-20 PROCEDURE — 97140 MANUAL THERAPY 1/> REGIONS: CPT | Mod: GP | Performed by: PHYSICAL THERAPIST

## 2024-06-20 ASSESSMENT — PAIN - FUNCTIONAL ASSESSMENT: PAIN_FUNCTIONAL_ASSESSMENT: 0-10

## 2024-06-20 ASSESSMENT — PAIN SCALES - GENERAL: PAINLEVEL_OUTOF10: 0 - NO PAIN

## 2024-06-20 NOTE — PROGRESS NOTES
"    Physical Therapy  Physical Therapy Treatment    Patient Name: Shiva Hardy  MRN: [id number]  Today's Date: 6/20/2024  Time Calculation  Start Time: 1307  Stop Time: 1345  Time Calculation (min): 38 min    General  Reason for Referral: Chronic midline back pain  Referred By: Regina  Past Medical History Relevant to Rehab: Bladder CA last year with surgery, being monitored  General Comment: Onset date: Chronic, script 2/8/2024; No auth; Visit number 3: \"I felt better for a few hours. It is looser for a few days after but gets tight.\"  Assessment:    Patient has had some pain reduction with current treatment plan and will do well with progression of therapeutic exercises.   Plan:  Progress as tolerated     Active       PT Problem       Patient will increase tolerance to activity in sleeping for >4 hours without pain in order to participate in ADL, IADL, work, and carl skills or activities.       Start:  05/31/24    Expected End:  09/06/24            Patient will increase scapular stabilization and BUE  strength to 4+/5 in order to participate in ADL, IADL, work, or leisure skills and activities.       Start:  05/31/24    Expected End:  09/06/24            Patient will be independent with symptom management in order to decrease pain to <5/10, in order to participate in ADL, IADL, work, or leisure skills and activities.       Start:  05/31/24    Expected End:  09/06/24            Patient will improve the outcome measure score of the CHAPARRO to < 50% for increased independence and ease with performance of ADL, IADL, work, or leisure activities.       Start:  05/31/24    Expected End:  09/06/24            Patient Stated Goal: \"I just want to get out of pain.\"       Start:  05/31/24    Expected End:  09/06/24                General  Chief Complaint:   1. Dorsalgia  Follow Up In Physical Therapy          Subjective:     Current Problem  General  Reason for Referral: Chronic midline back pain  Referred By: " "Regina  Past Medical History Relevant to Rehab: Bladder CA last year with surgery, being monitored  General Comment: Onset date: Chronic, script 2/8/2024; No auth; Visit number 3: \"I felt better for a few hours. It is looser for a few days after but gets tight.\"         Patient perform today's treatment with reduced pain and muscle tension.      Pain  Pain Assessment: 0-10  0-10 (Numeric) Pain Score: 0 - No pain (first thing in the morning is the)    Objective:     Treatments:   This therapist instructed and demonstrated interventions to patient, patient completed the following under direct supervision of this therapist:    14 minutes  Therapeutic Exercise  Therapeutic Exercise Activity 1: Scifit: seat 14, lvl 1, 5'  Therapeutic Exercise Activity 2: 3 way Sball stretch: 3 x 20\" R/L/M  Therapeutic Exercise Activity 3: Green Tubing: Row 20 x  Therapeutic Exercise Activity 4: LAE with scapular squeeze: 20 x  Therapeutic Exercise Activity 5: Parloff Press: 20 x Green tubing each R/L      24 minutes  Manual Therapy  Manual Therapy Performed: Yes  Manual Therapy Activity 1: Seated with trunk flexed into treatment table: DTM to the thoracolumbar paraspinals with trigger point release.     Education:      Dominick Damon, PT  "

## 2024-06-24 ENCOUNTER — PRE-ADMISSION TESTING (OUTPATIENT)
Dept: PREADMISSION TESTING | Facility: HOSPITAL | Age: 84
End: 2024-06-24
Payer: MEDICARE

## 2024-06-24 VITALS
TEMPERATURE: 96.8 F | RESPIRATION RATE: 16 BRPM | BODY MASS INDEX: 24.74 KG/M2 | OXYGEN SATURATION: 94 % | WEIGHT: 172.84 LBS | SYSTOLIC BLOOD PRESSURE: 133 MMHG | HEIGHT: 70 IN | HEART RATE: 77 BPM | DIASTOLIC BLOOD PRESSURE: 83 MMHG

## 2024-06-24 DIAGNOSIS — C67.9 MALIGNANT NEOPLASM OF URINARY BLADDER, UNSPECIFIED SITE (MULTI): ICD-10-CM

## 2024-06-24 DIAGNOSIS — Z01.818 PRE-OPERATIVE EXAMINATION: Primary | ICD-10-CM

## 2024-06-24 LAB
ANION GAP SERPL CALC-SCNC: 10 MMOL/L (ref 10–20)
APPEARANCE UR: CLEAR
BACTERIA #/AREA URNS AUTO: ABNORMAL /HPF
BILIRUB UR STRIP.AUTO-MCNC: NEGATIVE MG/DL
BUN SERPL-MCNC: 20 MG/DL (ref 6–23)
CALCIUM SERPL-MCNC: 9.9 MG/DL (ref 8.6–10.3)
CHLORIDE SERPL-SCNC: 99 MMOL/L (ref 98–107)
CO2 SERPL-SCNC: 28 MMOL/L (ref 21–32)
COLOR UR: ABNORMAL
CREAT SERPL-MCNC: 0.84 MG/DL (ref 0.5–1.3)
EGFRCR SERPLBLD CKD-EPI 2021: 85 ML/MIN/1.73M*2
ERYTHROCYTE [DISTWIDTH] IN BLOOD BY AUTOMATED COUNT: 12.6 % (ref 11.5–14.5)
GLUCOSE SERPL-MCNC: 99 MG/DL (ref 74–99)
GLUCOSE UR STRIP.AUTO-MCNC: NORMAL MG/DL
HCT VFR BLD AUTO: 42.7 % (ref 41–52)
HGB BLD-MCNC: 13.8 G/DL (ref 13.5–17.5)
HOLD SPECIMEN: NORMAL
KETONES UR STRIP.AUTO-MCNC: NEGATIVE MG/DL
LEUKOCYTE ESTERASE UR QL STRIP.AUTO: ABNORMAL
MCH RBC QN AUTO: 31 PG (ref 26–34)
MCHC RBC AUTO-ENTMCNC: 32.3 G/DL (ref 32–36)
MCV RBC AUTO: 96 FL (ref 80–100)
MUCOUS THREADS #/AREA URNS AUTO: ABNORMAL /LPF
NITRITE UR QL STRIP.AUTO: NEGATIVE
NRBC BLD-RTO: 0 /100 WBCS (ref 0–0)
PH UR STRIP.AUTO: 6.5 [PH]
PLATELET # BLD AUTO: 307 X10*3/UL (ref 150–450)
POTASSIUM SERPL-SCNC: 4.6 MMOL/L (ref 3.5–5.3)
PROT UR STRIP.AUTO-MCNC: ABNORMAL MG/DL
RBC # BLD AUTO: 4.45 X10*6/UL (ref 4.5–5.9)
RBC # UR STRIP.AUTO: NEGATIVE /UL
RBC #/AREA URNS AUTO: ABNORMAL /HPF
SODIUM SERPL-SCNC: 132 MMOL/L (ref 136–145)
SP GR UR STRIP.AUTO: 1.02
UROBILINOGEN UR STRIP.AUTO-MCNC: NORMAL MG/DL
WBC # BLD AUTO: 7.6 X10*3/UL (ref 4.4–11.3)
WBC #/AREA URNS AUTO: ABNORMAL /HPF

## 2024-06-24 PROCEDURE — 36415 COLL VENOUS BLD VENIPUNCTURE: CPT

## 2024-06-24 PROCEDURE — 85027 COMPLETE CBC AUTOMATED: CPT | Performed by: STUDENT IN AN ORGANIZED HEALTH CARE EDUCATION/TRAINING PROGRAM

## 2024-06-24 PROCEDURE — 99203 OFFICE O/P NEW LOW 30 MIN: CPT | Performed by: REGISTERED NURSE

## 2024-06-24 PROCEDURE — 81001 URINALYSIS AUTO W/SCOPE: CPT | Performed by: STUDENT IN AN ORGANIZED HEALTH CARE EDUCATION/TRAINING PROGRAM

## 2024-06-24 PROCEDURE — 87086 URINE CULTURE/COLONY COUNT: CPT | Mod: GEALAB | Performed by: STUDENT IN AN ORGANIZED HEALTH CARE EDUCATION/TRAINING PROGRAM

## 2024-06-24 PROCEDURE — 80048 BASIC METABOLIC PNL TOTAL CA: CPT | Performed by: STUDENT IN AN ORGANIZED HEALTH CARE EDUCATION/TRAINING PROGRAM

## 2024-06-24 PROCEDURE — 87081 CULTURE SCREEN ONLY: CPT | Mod: GEALAB | Performed by: REGISTERED NURSE

## 2024-06-24 RX ORDER — CHLORHEXIDINE GLUCONATE ORAL RINSE 1.2 MG/ML
15 SOLUTION DENTAL DAILY
Qty: 120 ML | Refills: 0 | Status: SHIPPED | OUTPATIENT
Start: 2024-06-24 | End: 2024-07-02 | Stop reason: HOSPADM

## 2024-06-24 ASSESSMENT — DUKE ACTIVITY SCORE INDEX (DASI)
CAN YOU WALK A BLOCK OR TWO ON LEVEL GROUND: YES
CAN YOU WALK INDOORS, SUCH AS AROUND YOUR HOUSE: YES
CAN YOU CLIMB A FLIGHT OF STAIRS OR WALK UP A HILL: NO
CAN YOU DO HEAVY WORK AROUND THE HOUSE LIKE SCRUBBING FLOORS OR LIFTING AND MOVING HEAVY FURNITURE: NO
CAN YOU HAVE SEXUAL RELATIONS: YES
CAN YOU PARTICIPATE IN STRENOUS SPORTS LIKE SWIMMING, SINGLES TENNIS, FOOTBALL, BASKETBALL, OR SKIING: NO
CAN YOU TAKE CARE OF YOURSELF (EAT, DRESS, BATHE, OR USE TOILET): YES
DASI METS SCORE: 5.6
TOTAL_SCORE: 23.2
CAN YOU DO YARD WORK LIKE RAKING LEAVES, WEEDING OR PUSHING A MOWER: YES
CAN YOU DO LIGHT WORK AROUND THE HOUSE LIKE DUSTING OR WASHING DISHES: YES
CAN YOU DO MODERATE WORK AROUND THE HOUSE LIKE VACUUMING, SWEEPING FLOORS OR CARRYING GROCERIES: YES
CAN YOU RUN A SHORT DISTANCE: NO
CAN YOU PARTICIPATE IN MODERATE RECREATIONAL ACTIVITIES LIKE GOLF, BOWLING, DANCING, DOUBLES TENNIS OR THROWING A BASEBALL OR FOOTBALL: NO

## 2024-06-24 ASSESSMENT — ACTIVITIES OF DAILY LIVING (ADL): ADL_SCORE: 1

## 2024-06-24 ASSESSMENT — ENCOUNTER SYMPTOMS
NEUROLOGICAL NEGATIVE: 1
CONSTITUTIONAL NEGATIVE: 1
ENDOCRINE NEGATIVE: 1
EYES NEGATIVE: 1
RESPIRATORY NEGATIVE: 1
GASTROINTESTINAL NEGATIVE: 1
NECK STIFFNESS: 1
ARTHRALGIAS: 1
DIFFICULTY URINATING: 1

## 2024-06-24 ASSESSMENT — PAIN SCALES - GENERAL: PAINLEVEL_OUTOF10: 8

## 2024-06-24 ASSESSMENT — PAIN - FUNCTIONAL ASSESSMENT: PAIN_FUNCTIONAL_ASSESSMENT: 0-10

## 2024-06-24 NOTE — CPM/PAT H&P
CPM/PAT Evaluation       Name: Shiva Hardy (Shiva Hardy)  /Age: 1940/83 y.o.     In-Person       Chief Complaint: Evaluation prior to surgery    HPI    Past Medical History:   Diagnosis Date    Acute maxillary sinusitis, unspecified 2019    Acute maxillary sinusitis    Acute recurrent maxillary sinusitis 2016    Acute recurrent maxillary sinusitis    Acute upper respiratory infection, unspecified 2017    Acute URI    Acute upper respiratory infection, unspecified 2020    Viral URI with cough    Arthritis     Bilateral inguinal hernia, without obstruction or gangrene, not specified as recurrent 2014    Bilateral inguinal hernia    BPH (benign prostatic hyperplasia)     Chest pain, unspecified 2016    Intermittent chest pain    Chronic obstructive pulmonary disease with (acute) exacerbation (Multi) 2019    Acute exacerbation of chronic obstructive pulmonary disease (COPD)    Chronic pain disorder     back pain- chronic    Edema, unspecified 2021    Parotid swelling    Hearing aid worn     HL (hearing loss)     Hyperlipidemia     Hypertension     Impacted cerumen, right ear 2017    Excessive cerumen in right ear canal    Low back pain, unspecified 2019    Acute exacerbation of chronic low back pain    Personal history of malignant neoplasm of prostate     History of malignant neoplasm of prostate    Personal history of malignant neoplasm, unspecified     Personal history of malignant neoplasm    Personal history of other diseases of the digestive system 2021    History of parotitis    Personal history of other diseases of the respiratory system 2017    History of pulmonary emphysema    Personal history of other endocrine, nutritional and metabolic disease 2017    History of hyperkalemia    Prostate cancer (Multi)     Sleep apnea     uses CPAP    Unspecified malignant neoplasm of skin of unspecified part of face 2016     Skin cancer of face    Unspecified symptoms and signs involving the genitourinary system 12/23/2021    UTI symptoms       Past Surgical History:   Procedure Laterality Date    CATARACT EXTRACTION  10/01/2014    Cataract Surgery    HERNIA REPAIR  11/03/2015    Hernia Repair    OTHER SURGICAL HISTORY  01/25/2021    Spinal surgery    OTHER SURGICAL HISTORY  01/25/2021    Lower back surgery    OTHER SURGICAL HISTORY  12/16/2016    Destruction Of Malignant Lesion Face    OTHER SURGICAL HISTORY  05/08/2019    Skin biopsy    PROSTATE SURGERY  10/01/2014    Prostate Surgery    UMBILICAL HERNIA REPAIR  12/16/2016    Umbilical Hernia Repair       Patient Sexual activity questions deferred to the physician.    Family History   Problem Relation Name Age of Onset    Arthritis Mother      Hypertension Mother      Vision loss Mother      Arthritis Father      Other (CARDIAC DISORDER) Father      Prostate cancer Father      Colon cancer Brother      Arthritis Brother      Hypertension Brother      Liver cancer Brother      Lung cancer Brother      Vision loss Brother      Cancer Other MULTIPLE FAMILY MEMBERS        Allergies   Allergen Reactions    Percocet [Oxycodone-Acetaminophen] Nausea Only       Prior to Admission medications    Medication Sig Start Date End Date Taking? Authorizing Provider   acetaminophen (Tylenol) 500 mg tablet Take 1 tablet (500 mg) by mouth every 6 hours if needed for mild pain (1 - 3).    Historical Provider, MD   albuterol 90 mcg/actuation aerosol powdr breath activated inhaler Inhale 2 puffs every 4 hours if needed for wheezing or shortness of breath.    Historical Provider, MD   amLODIPine (Norvasc) 5 mg tablet Take 1 tablet (5 mg) by mouth once daily in the morning. Do not take until you follow up with cardiology, Dr. Cloud. 11/13/23   Brooke Chilel MD   aspirin 81 mg EC tablet Take 1 tablet (81 mg) by mouth once daily in the morning.    Historical Provider, MD   atorvastatin (Lipitor) 20 mg  tablet TAKE 1 TABLET BY MOUTH ONCE DAILY AT BEDTIME 5/13/24   Adria Galeano MD   baclofen (Lioresal) 5 mg tablet Take 1 tablet (5 mg) by mouth 3 times a day. 5/17/24   LEONA Chavira, DNP   budesonide-formoteroL (Symbicort) 160-4.5 mcg/actuation inhaler Inhale 2 puffs 2 times a day. Rinse mouth with water after use to reduce aftertaste and incidence of candidiasis. Do not swallow. 5/30/24 11/26/24  LEONA Faulkner   ergocalciferol (Vitamin D-2) 1.25 MG (45746 UT) capsule Take 1 capsule (1,250 mcg) by mouth 1 (one) time per week. 5/7/24   Adria Galeano MD   fluticasone-umeclidin-vilanter (Trelegy Ellipta) 100-62.5-25 mcg blister with device Inhale 1 puff once daily. Rinse mouth with water after use to reduce aftertaste and incidence of candidiasis. Do not swallow.  Patient not taking: Reported on 6/19/2024 6/11/24   LEONA Faulkner   gabapentin (Neurontin) 300 mg capsule TAKE 1 CAPSULE BY MOUTH THREE TIMES DAILY 6/11/24   Adria Galeano MD   glucose 4 gram chewable tablet Chew if needed for low blood sugar - see comments.    Historical Provider, MD   ipratropium-albuteroL (Duo-Neb) 0.5-2.5 mg/3 mL nebulizer solution Take 3 mL by nebulization every 6 hours if needed for shortness of breath. 4/24/24   LEONA Faulkner   montelukast (Singulair) 10 mg tablet TAKE 1 TABLET BY MOUTH ONCE DAILY AT BEDTIME 4/9/24   Adria Galeano MD   naloxone (Narcan) 4 mg/0.1 mL nasal spray Administer 1 spray (4 mg) into affected nostril(s) if needed for opioid reversal. May repeat every 2-3 minutes if needed, alternating nostrils, until medical assistance becomes available. 6/14/24   Kingston Wang,    nicotine (Nicoderm CQ) 14 mg/24 hr patch 1 patch daily for 6 weeks 4/24/24   LEONA Faulkner   nicotine (Nicoderm CQ) 7 mg/24 hr patch 1 patch daily for 2 weeks (following completion of Step 2; 14 mg patches) 4/24/24   LEONA Faulkner   nicotine polacrilex (Commit) 2 mg  lozenge Weeks 1-6: 1 lozenge every 1-2 hrs (max: 5 every 6 hours; 20/day). Weeks 7-9: 1 sydnie every 2-4 hrs (max: 5 every 6 hours; 20/day). Weeks 10-12: 1 every 4-8 hrs (max: 5 every 6 hours; 20/day). 4/24/24   CHILANGO Faulkner-CNP   tiZANidine (Zanaflex) 2 mg tablet Take 1 tablet (2 mg) by mouth every 8 hours if needed for muscle spasms.  Patient not taking: Reported on 4/24/2024 2/8/24 5/8/24  Kobi White MD   traMADol (Ultram) 50 mg tablet Take 1 tablet (50 mg) by mouth every 12 hours if needed for moderate pain (4 - 6). 6/14/24 7/14/24  DO LIANA Yusuf ROS:   Constitutional:   neg    Neuro/Psych:   neg    Eyes:   neg    Ears:    Bilateral hearing aids   hearing loss   hearing aides  Nose:   Mouth:   neg    Throat:   neg    Neck:    Limited ROM due to stiffness   neck stiffness  Cardio:   Respiratory:   neg    Endocrine:   neg    GI:   neg    :    difficulty urinating  Musculoskeletal:    arthralgias  Hematologic:   neg    Skin:  neg        Physical Exam  Vitals reviewed.   Constitutional:       Appearance: Normal appearance.   HENT:      Head: Normocephalic and atraumatic.      Nose: Nose normal.      Mouth/Throat:      Mouth: Mucous membranes are moist.      Pharynx: Oropharynx is clear.   Eyes:      Pupils: Pupils are equal, round, and reactive to light.   Neck:      Vascular: No carotid bruit.      Comments: Limited ROM due to stiffness  Cardiovascular:      Rate and Rhythm: Normal rate and regular rhythm.      Pulses: Normal pulses.      Heart sounds: Normal heart sounds.   Pulmonary:      Effort: Pulmonary effort is normal.      Breath sounds: Normal breath sounds.   Abdominal:      Palpations: Abdomen is soft.   Musculoskeletal:         General: Tenderness present.      Cervical back: Normal range of motion and neck supple.      Comments: generalized   Skin:     General: Skin is warm and dry.      Capillary Refill: Capillary refill takes less than 2 seconds.   Neurological:       General: No focal deficit present.      Mental Status: He is alert and oriented to person, place, and time.   Psychiatric:         Mood and Affect: Mood normal.         Behavior: Behavior normal.         Thought Content: Thought content normal.         Judgment: Judgment normal.          PAT AIRWAY:   Airway:     Mallampati::  II    TM distance::  >3 FB    Neck ROM::  Limited  normal        Visit Vitals  /83   Pulse 77   Temp 36 °C (96.8 °F) (Tympanic)   Resp 16       DASI Risk Score      Flowsheet Row Most Recent Value   DASI SCORE 23.2   METS Score (Will be calculated only when all the questions are answered) 5.6          Caprini DVT Assessment      Flowsheet Row Most Recent Value   DVT Score 11   Current Status COPD, Major surgery planned, including arthroscopic and laproscopic (1-2 hours), Present cancer or chemotherapy   History COPD, Prior major surgery   Age Over 75 years   BMI 30 or less          Modified Frailty Index    No data to display       CHADS2 Stroke Risk  Current as of 29 minutes ago        N/A 3 to 100%: High Risk   2 to < 3%: Medium Risk   0 to < 2%: Low Risk     Last Change: N/A          This score determines the patient's risk of having a stroke if the patient has atrial fibrillation.        This score is not applicable to this patient. Components are not calculated.          Revised Cardiac Risk Index      Flowsheet Row Most Recent Value   Revised Cardiac Risk Calculator 0          Apfel Simplified Score    No data to display       Risk Analysis Index Results This Encounter         6/24/2024  1103             COLE Cancer History: Patient indicates history of cancer    Total Risk Analysis Index Score Without Cancer: 32    Total Risk Analysis Index Score: 40          Stop Bang Score      Flowsheet Row Most Recent Value   Do you snore loudly? 1   Do you often feel tired or fatigued after your sleep? 1   Has anyone ever observed you stop breathing in your sleep? 0   Do you have or are you  being treated for high blood pressure? 1   Recent BMI (Calculated) 24.2   Is BMI greater than 35 kg/m2? 0=No   Age older than 50 years old? 1=Yes   Is your neck circumference greater than 17 inches (Male) or 16 inches (Female)? 0   Gender - Male 1=Yes   STOP-BANG Total Score 5                Assessment & Plan:    83 y.o.  male  scheduled for CYSTOSCOPY WITH EXCISION TISSUE  on 7/2/24 with Dr. Kelsey for  Malignant neoplasm of urinary bladder.  PMHX includes Ascending Aorta Dilation, HTN, HLD, YVETTE on Cpap, Asthma, COPD, Paraseptal emphysema, Lung Nodule, Hx Prostate CA, Prediabetes.  PAT consulted for perioperative risk stratification and optimization    Neuro:  No neurologic diagnosis, however, the patient is at increased risk for perioperative delirium secondary to  age  Patient is at increased risk for perioperative CVA secondary to  carotid stenosis, HTN    HEENT:  No HEENT diagnosis or significant findings on chart review or clinical presentation and evaluation. No further preoperative testing/intervention indicated at this time.    Cardiovascular:  History of Ascending Aorta Dilation, HTN, HLD on Norvasc  Mild bilateral peripheral vascular disease and carotid disease  Normal LV systolic function, mitral regurgitation  Patient adan Cloud, Last seen OCT 23, visit scheduled 7/9/24. ECHO and Carotid scheduled 6/28/24.     4/22/24 CT cardiac scoring  FINDINGS:  The score and distribution of calcium in the coronary arteries is as  follows:      LM 29.4,  LAD 1023.41,  LCx 88.61,  .58,      Total 1476      The visualized ascending thoracic aorta is aneurysmal measuring 4.2  cm in its maximum dimension at the level of main pulmonary artery.  The heart is normal in size. No pericardial effusion is present.  Extensive scattered focal calcified atherosclerotic disease of the  visualized thoracic aorta.      There is a partially calcified 1.4 cm mediastinal lymph node. The  visualized segments of the lungs  demonstrate paraseptal and  centrilobular emphysematous changes. Multiple scattered subcentimeter  noncalcified pulmonary nodules visualized in the bilateral lungs, for  example largest measuring 5.0 mm in the right lower lobe (image 10 of  76).      The main pulmonary artery, right and left pulmonary arteries appear  dilated measuring 3.1, 3.0 and 2.9 cm respectively      The visualized subdiaphragmatic structures appear intact.      Moderate-to-severe degenerative changes of the thoracic vertebra with  endplate osteophytes      IMPRESSION:  1. Coronary artery calcium score of 1476*.  2. Ascending aortic aneurysm measuring 4.2 cm on axial images at the  level of the main pulmonary artery. Recommend follow-up radiation  sparing cardiac MRI and thoracic MRA to assess status of the aortic  valve and size and extent of the aneurysm in approximately 12 months.  3. Paraseptal and centrilobular emphysematous changes.  4. Noncalcified pulmonary nodules as described (max 5 mm).  Incidental Finding:  A non-calcified pulmonary nodule/multiple  non-calcified pulmonary nodules measuring less than 6 mm, likely  benign.  (**-YCF-**)      Instructions:  No further follow-up is required, however, if the  patient has high risk factors for primary lung malignancy, follow-up  noncontrast CT scan chest in 12 months may be obtained. (Niko Hoang et al., Guidelines for management of incidental pulmonary  nodules detected on CT images: From the Fleischner Society 2017,  Radiology. 2017 Jul;284 (1):228-243.) [email or web address]          This patient is at risk for future cardiovascular events. If this  patient is currently not seeing a cardiologist, consider referring to  the [facility] Prevention Program or if patient is diabetic  please consider referrring to the [facility] comprehensive cardiovascular  disease management program by emailing [email or web address].  *Coronary Artery  Agatston score      Score  risk  Very low  1-99  Mildly increased 100-299  Moderately increased >300  Moderate to severely increased >800      Gumaro et al. JCCT 2016 (http://dx.doi.org/10.1016/j.jcct.2016.11.003)      MCKNIGHT 10-Year CHD Risk with Coronary Artery Calcification can be  calcuate using link below  [email or web address]  Frida et al. JACC 2015 (http://dx.doi.org/10.1016/j.j  acc.2015.08.035)      2017 Fleischner Society Guidelines for Incidental Nodules  Consider all relevant risk factors.      SOLID - SINGLE NODULE:      Low Risk:  < 6 mm   No routine follow-up  6-8 mm  CT at 6-12 months, then consider CT at 18-24 months  > 8 mm   Consider CT at 3 months, PET/CT, or tissue sampling      High risk:  < 6 mm   Optional CT at 12 months  6-8 mm    CT at 6-12 months, then CT at 18-24 months  > 8 mm   Consider CT at 3 months, PET/CT, or tissue sampling.      SOLID - MULTIPLE NODULES:      Low risk:  < 6 mm   No routine follow-up  6-8 mm    CT at 3-6 months, then consider CT at 18-24 months  > 8 mm   CT at 3-6 months, then consider CT at 18-24 months      High risk:  < 6 mm   Optional CT at 12 months  6-8 mm   CT at 3-6 months, then at 18-24 months  > 8 mm   CT at 3-6 months, then at 18-24 months      Signed by: Jeffrey Mullins 4/24/2024 8:18 AM  Dictation workstation:   [id number]  METS: 4.3  RCRI: 0 points, 3.9%  risk for postoperative MACE   MARILEE: 0.3% risk for 30 day postoperative MACE  EKG - 1/2/24  Sinus Rhythm with Premature Atrial Complexes  Otherwise normal ECG  Rate 73    Cardiac Clearance obtained from Dr. Lopez/[facility] 6/24/24. Per note: Patient is cleared for surgery at moderate risk. Must stay on ASA 81mg every day.     Patients daughter Bre Avitia notified and aware to continue ASA prior to surgery per Dr. Lopez.     Pulmonary:  The patient has a history of YVETTE on Cpap, Asthma, COPD, Paraseptal emphysema, Lung Nodule. On Symbicort/Ina Zaldivar  Visit Pulmonology  6/19/24: Using Symbicort 2 puffs BID, albuterol HFA last used last month (usually forgets to bring it with him). The new DuoNebs he used once last month.     He had post op respiratory insufficiency after Cysto 11/10/23. He was weaned to RA.  age > 60, COPD, Tobacco abuse  Stop Bang score is 5 placing patient at high risk for YVETTE  ARISCAT: <26 points, 1.6% risk of in-hospital postoperative pulmonary complication  PRODIGY: High risk for opioid induced respiratory depression  Smoking cessation discussed with patient, patient also provided cessation education/hotline handout, [facility], patient also provided deep breathing exercises and educational handout    5/24/24 XR Chest  FINDINGS:      CARDIOMEDIASTINAL SILHOUETTE:  Cardiomediastinal silhouette is stable in size and configuration.      LUNGS:  Lungs are clear.      ABDOMEN:  No remarkable upper abdominal findings.      BONES:  No acute osseous changes.      IMPRESSION:  1.  No evidence of acute cardiopulmonary process.    Urological/GYN/Renal  History of Malignant neoplasm of urinary bladder, Prostate CA  6/12/24 Cysto with Ghandour  IMPRESSION:  2 areas of erythema suspicious for CIS vs inflammatory reaction    Cystoscopy with evacuation of clots on 11/10/23. Post op, he was started on 3 way gorman catheter. He passed voiding trial. He had post op respiratory insufficiency. He was weaned to RA.    Endocrine:  No endocrine diagnosis or significant findings on chart review or clinical presentation and evaluation. No further testing or intervention is indicated at this time.    Hematologic:  Antiplatelet management   The patient is currently receiving antiplatelet therapy for CAD. The patient was instructed to continue ASA 81 mg per John RUELAS.  Anticoagulation management  The patient is not currently receiving anticoagulation therapy. Patient provided with DVT educational handout.    Caprini Score 11, patient at High risk for perioperative DVT.   Patient provided with VTE education/handout.    Gastrointestinal:   No GI diagnosis or significant findings on chart review or clinical presentation and evaluation.   Eat-10 score 0      Infectious disease:   No infectious diagnosis or significant findings on chart review or clinical presentation and evaluation.   Prescription provided for CHG body wash and dental rinse. CHG use instructions reviewed and provided to patient.  Staph screen collected    Musculoskeletal:   No diagnosis or significant findings on chart review or clinical presentation and evaluation.     Anesthesia/Airway:  History of respiratory insufficiency, weaned to RA following Cysto 11/23.       Medication instructions and NPO guidelines reviewed with the patient.  All questions or concerns discussed and addressed.

## 2024-06-24 NOTE — PREPROCEDURE INSTRUCTIONS
Medication List            Accurate as of June 24, 2024 11:59 PM. Always use your most recent med list.                acetaminophen 500 mg tablet  Commonly known as: Tylenol  Medication Adjustments for Surgery: Take morning of surgery with sip of water, no other fluids     albuterol 90 mcg/actuation aerosol powdr breath activated inhaler  Notes to patient: Continue morning of surgery     amLODIPine 5 mg tablet  Commonly known as: Norvasc  Take 1 tablet (5 mg) by mouth once daily in the morning. Do not take until you follow up with cardiology, Dr. Cloud.  Medication Adjustments for Surgery: Take morning of surgery with sip of water, no other fluids     aspirin 81 mg EC tablet  Medication Adjustments for Surgery: Take morning of surgery with sip of water, no other fluids  Notes to patient: Continue daily per Dr. Lopez     atorvastatin 20 mg tablet  Commonly known as: Lipitor  TAKE 1 TABLET BY MOUTH ONCE DAILY AT BEDTIME  Medication Adjustments for Surgery: Take morning of surgery with sip of water, no other fluids     baclofen 5 mg tablet  Commonly known as: Lioresal  Take 1 tablet (5 mg) by mouth 3 times a day.  Medication Adjustments for Surgery: Take morning of surgery with sip of water, no other fluids     budesonide-formoteroL 160-4.5 mcg/actuation inhaler  Commonly known as: Symbicort  Inhale 2 puffs 2 times a day. Rinse mouth with water after use to reduce aftertaste and incidence of candidiasis. Do not swallow.  Notes to patient: Continue day of surgery     chlorhexidine 0.12 % solution  Commonly known as: Peridex  Use 15 mL in the mouth or throat once daily for 2 days. Use 15ml once the night before surgery and 15ml once the morning of surgery     ergocalciferol 1.25 MG (43992 UT) capsule  Commonly known as: Vitamin D-2  Take 1 capsule (1,250 mcg) by mouth 1 (one) time per week.  Medication Adjustments for Surgery: Stop 7 days before surgery     gabapentin 300 mg capsule  Commonly known as:  Neurontin  TAKE 1 CAPSULE BY MOUTH THREE TIMES DAILY  Medication Adjustments for Surgery: Take morning of surgery with sip of water, no other fluids     glucose 4 gram chewable tablet     ipratropium-albuteroL 0.5-2.5 mg/3 mL nebulizer solution  Commonly known as: Duo-Neb  Take 3 mL by nebulization every 6 hours if needed for shortness of breath.  Notes to patient: Continue day of surgery     montelukast 10 mg tablet  Commonly known as: Singulair  TAKE 1 TABLET BY MOUTH ONCE DAILY AT BEDTIME  Medication Adjustments for Surgery: Take morning of surgery with sip of water, no other fluids     naloxone 4 mg/0.1 mL nasal spray  Commonly known as: Narcan  Administer 1 spray (4 mg) into affected nostril(s) if needed for opioid reversal. May repeat every 2-3 minutes if needed, alternating nostrils, until medical assistance becomes available.     * nicotine 14 mg/24 hr patch  Commonly known as: Nicoderm CQ  1 patch daily for 6 weeks  Medication Adjustments for Surgery: Other (Comment)  Notes to patient: Remove night before surgery     * nicotine 7 mg/24 hr patch  Commonly known as: Nicoderm CQ  1 patch daily for 2 weeks (following completion of Step 2; 14 mg patches)  Medication Adjustments for Surgery: Other (Comment)  Notes to patient: Remove night before surgery     nicotine polacrilex 2 mg lozenge  Commonly known as: Commit  Weeks 1-6: 1 lozenge every 1-2 hrs (max: 5 every 6 hours; 20/day). Weeks 7-9: 1 sydnie every 2-4 hrs (max: 5 every 6 hours; 20/day). Weeks 10-12: 1 every 4-8 hrs (max: 5 every 6 hours; 20/day).  Medication Adjustments for Surgery: Other (Comment)  Notes to patient: STOP 8 hours before arrival to hospital     tiZANidine 2 mg tablet  Commonly known as: Zanaflex  Take 1 tablet (2 mg) by mouth every 8 hours if needed for muscle spasms.     traMADol 50 mg tablet  Commonly known as: Ultram  Take 1 tablet (50 mg) by mouth every 12 hours if needed for moderate pain (4 - 6).  Medication Adjustments for Surgery:  Take morning of surgery with sip of water, no other fluids           * This list has 2 medication(s) that are the same as other medications prescribed for you. Read the directions carefully, and ask your doctor or other care provider to review them with you.                                      Thank you for visiting [facility]mission [facility] (PAT) today for your pre-procedure evaluation, you were seen by     Elli Arguelles CNP  Pre Admission Testing  [facility]  [phone]    This summary includes instructions and information to aid you during your perioperative period.  Please read carefully. If you have any questions about your visit today, please call the number listed above.  If you become ill or have any changes to your health before your surgery, please contact your primary care provider and alert your surgeon.    Preparing for your Surgery       Exercises  Preoperative Deep Breathing Exercises  Why it is important to do deep breathing exercises before my surgery?  Deep breathing exercises strengthen your breathing muscles.  This helps you to recover after your surgery and decreases the chance of breathing complications.  How are the deep breathing exercises done?  Sit straight with your back supported.  Breathe in deeply and slowly through your nose. Your lower rib cage should expand and your abdomen may move forward.  Hold that breath for 3 to 5 seconds.  Breathe out through pursed lips, slowly and completely.  Rest and repeat 10 times every hour while awake.  Rest longer if you become dizzy or lightheaded.       Preoperative Brain Exercises    What are brain exercises?  A brain exercise is any activity that engages your thinking (cognitive) skills.    What types of activities are considered brain exercises?  Jigsaw puzzles, crossword puzzles, word jumble, memory games, word search, and many more.  Many can be found free online or on your phone via a mobile juan.    Why should I do brain  exercises before my surgery?  More recent research has shown brain exercise before surgery can lower the risk of postoperative delirium (confusion) which can be especially important for older adults.  Patients who did brain exercises for 5 to 10 hours the days before surgery, cut their risk of postoperative delirium in half up to 1 week after surgery.    Sit-to-Stand Exercise    What is the sit-to-stand exercise?  The sit-to-stand exercise strengthens the muscles of your lower body and muscles in the center of your body (core muscles for stability) helping to maintain and improve your strength and mobility.  How do I do the sit-to-stand exercise?  The goal is to do this exercise without using your arms or hands.  If this is too difficult, use your arms and hands or a chair with armrests to help slowly push yourself to the standing position and lower yourself back to the sitting position. As the movement becomes easier use your arms and hands less.    Steps to the sit-to-stand exercise  Sit up tall in a sturdy chair, knees bent, feet flat on the floor shoulder-width apart.  Shift your hips/pelvis forward in the chair to correctly position yourself for the next movement.  Lean forward at your hips.  Stand up straight putting equal weight on both feet.  Check to be sure you are properly aligned with the chair, in a slow controlled movement sit back down.  Repeat this exercise 10-15 times.  If needed you can do it fewer times until your strength improves.  Rest for 1 minute.  Do another 10-15 sit-to-stand exercises.  Try to do this in the morning and evening.        Instructions    Preoperative Fasting Guidelines    Why must I stop eating and drinking near surgery time?  With sedation, food or liquid in your stomach can enter your lungs causing serious complications  Food can increase nausea and vomiting  When do I need to stop eating and drinking before my surgery?      Do not eat any food or drink any liquids after  midnight the night before your surgery/procedure.  You may have sips of water to take medications.            Simple things you can do to help prevent blood clots     Blood clots are blockages that can form in the body's veins. When a blood clot forms in your deep veins, it may be called a deep vein thrombosis, or DVT for short. Blood clots can happen in any part of the body where blood flows, but they are most common in the arms and legs. If a piece of a blood clot breaks free and travels to the lungs, it is called a pulmonary embolus (PE). A PE can be a very serious problem.         Being in the hospital or having surgery can raise your chances of getting a blood clot because you may not be well enough to move around as much as you normally do.         Ways you can help prevent blood clots in the hospital       Wearing SCDs  SCDs stands for Sequential Compression Devices.   SCDs are special sleeves that wrap around your legs. They attach to a pump that fills them with air to gently squeeze your legs every few minutes.  This helps return the blood in your legs to your heart.   SCDs should only be taken off when walking or bathing. SCDs may not be comfortable, but they can help save your life.              Pump SCD leg sleeves  Wearing compression stockings - if your doctor orders them. These special snug-fitting stockings gently squeeze your legs to help blood flow.       Walking. Walking helps move the blood in your legs.   If your doctor says it is ok, try walking the halls at least   5 times a day. Ask us to help you get up, so you don't fall.      Taking any blood-thinning medicines your doctor orders.              Ways you can help prevent blood clots at home         Wearing compression stockings - if your doctor orders them.   Walking - to help move the blood in your legs.    Taking any blood-thinning medicines your doctor orders.      Signs of a blood clot or PE    Tell your doctor or nurse right away if you  have any of the problems listed below.         If you are at home, seek medical care right away. Call 911 for chest pain or problems breathing.            Signs of a blood clot (DVT) - such as pain, swelling, redness, or warmth in your arm or legs.  Signs of a pulmonary embolism (PE) - such as chest pain or feeling short of breath      Tobacco and Alcohol;  Do not drink alcohol or smoke within 24 hours of surgery.  It is best to quit smoking for as long as possible before any surgery or procedure.    Quitting Smoking; Recognizing Dangerous Situations:  1-Alcohol use during the first month after quitting, 2-Being around smoke or someone who smokes 3-Times situation routinely smoked  4-Triggers-car, breaks, coffee, when awakening, social events  Coping Skills: 1-Learning new ways to manage stress 2-Exercising 3-Relaxation breathing   4-Change routines 5-Distraction techniques    Websites:  Smoke-Free - offers free text messages and an juan to help you quit. Info includes eating and mood issues that may come with quitting. There is a Live Helpline to talk to an expert. Go to smokefree.gov; Become an Ex-Smoker - the free EX Plan is based on scientific research and useful advice from ex-smokers. It isn't just about quitting smoking.  It's about re-learning life without cigarettes using a 3-step program.  Go to becomeanex.org   Centers for Disease Control offer many suggestions for helping you quit. Includes a Quit Guide and real-life stories. There are sections for specific groups such as LGBT, , different ethnic groups, and pregnant women.  Go to cdc.gov/tobacco/campaign/tips    Other Resources:  [place] Tobacco Quit Line - call 1-800-QUIT-NOW or 1-636.773.1346.  [facility] 2-1-1 - to find local programs and resources. Call 211 or go to 211.org.  [facility] Tobacco Cessation Program - call 815-791-7184.  American Lung Association offers classes for quitting smoking. Some places may charge a fee. For a list  "of classes, go to lung.org or call [facility].     Some things to think about:; The health benefits of quitting smoking can help most of the major parts of your body. There is no safe amount of cigarette smoke. Quitting smoking can add years to your life. When you quit, you'll also protect your loved ones from dangerous secondhand smoke. Make a plan, join a support group, and talk to your physician to assist in quitting smoking.                                                                                                                  Other Instructions  Why did I have my nose, under my arms, and groin swabbed? The purpose of the swab is to identify Staphylococcus aureus inside your nose or on your skin.  The swab was sent to the laboratory for culture.  A positive swab/culture for Staphylococcus aureus is called colonization or carriage.     What is Staphylococcus aureus? Staphylococcus aureus, also known as \"staph\", is a germ found on the skin or in the nose of healthy people.  Sometimes Staphylococcus aureus can get into the body and cause an infection.  This can be minor (such as pimples, boils, or other skin problems).  It might also be serious (such as a blood infection, pneumonia, or a surgical site infection).     What is Staphylococcus aureus colonization or carriage? Colonization or carriage means that a person has the germ but is not sick from it.  These bacteria can be spread on the hands or when breathing or sneezing.   How is Staphylococcus aureus spread? It is most often spread by close contact with a person or item that carries it.   What happens if my culture is positive for Staphylococcus aureus? Your doctor/medical team will use this information to guide any antibiotic treatment which may be necessary.  Regardless of the culture results, we will clean the inside of your nose with a betadine swab just before you have your surgery.   Will I get an infection if I have Staphylococcus aureus " in my nose or on my skin? Anyone can get an infection with Staphylococcus aureus.  However, the best way to reduce your risk of infection is to follow the instructions provided to you for the use of your CHG soap and dental rinse.      Body Wash:     What is a home preoperative antibacterial shower? This shower is a way of cleaning the skin with a germ-killing solution before surgery.  The solution contains chlorhexidine, commonly known as CHG.  CHG is a skin cleanser with germ-killing ability.  Let your doctor know if you are allergic to chlorhexidine.   Why do I need to take a preoperative antibacterial shower? Skin is not sterile.  It is best to try to make your skin as free of germs as possible before surgery.  Proper cleansing with a germ-killing soap before surgery can lower the number of germs on your skin.  This helps to reduce the risk of infection at the surgical site.    Following the instructions listed below will help you prepare your skin for surgery.   How do I use the solution? Steps:  Begin using your CHG soap 5 days before your scheduled surgery       Oral/Dental Rinse:     What is oral/dental rinse?  It is a mouthwash. It is a way of cleaning the mouth with a germ-killing solution before your surgery.  The solution contains chlorhexidine, commonly known as CHG. It is used inside the mouth to kill a bacteria known as Staphylococcus aureus.  Let your doctor know if you are allergic to Chlorhexidine.   Why do I need to use CHG oral/dental rinse? The CHG oral/dental rinse helps to kill a bacteria in your mouth known as Staphylococcus aureus.  This reduces the risk of infection at the surgical site.    Using your CHG oral/dental rinse STEPS: Use your CHG oral/dental rinse after you brush your teeth the night before (at bedtime) and the morning of your surgery.  Follow all directions on your prescription label.    Use the cap on the container to measure 15 ml.  Swish (gargle if you can) the mouthwash in  your mouth for at least 30 seconds, (do not swallow) and spit out.  After you use your CHG rinse, do not rinse your mouth with water, drink or eat.    Please refer to the prescription label for the appropriate time to resume oral intake   What side effects might I have using the CHG oral/dental rinse? CHG rinse will stick to plaque on the teeth.  Brush and floss just before use.  Teeth brushing will help avoid staining of plaque during use.             The Week before Surgery        Seven days before Surgery  Check your PAT medication instructions  Do the exercises provided to you by PAT  Arrange for a responsible, adult licensed  to take you home after surgery and stay with you for 24 hours.  You will not be permitted to drive yourself home if you have received any anesthetic/sedation  Six days before surgery  Check your PAT medication instructions  Do the exercises provided to you by PAT  Start using Chlorhexidene (CHG) body wash if prescribed  Five days before surgery  Check your PAT medication instructions  Do the exercises provided to you by PAT  Continue to use CHG body wash if prescribed  Three days before surgery  Check your PAT medication instructions  Do the exercises provided to you by PAT  Continue to use CHG body wash if prescribed  Two days before surgery  Check your PAT medication instructions  Do the exercises provided to you by PAT  Continue to use CHG body wash if prescribed    The Day before Surgery       Check your PAT medication and all other PAT instructions including when to stop eating and drinking  You will be called with your arrival time for surgery in the late afternoon.  If you do not receive a call please reach out to your surgeon's office.  Do not smoke or drink 24 hours before surgery  Prepare items to bring with you to the hospital  Shower with your chlorhexidine wash if prescribed  Brush your teeth and use your chlorhexidine dental rinse if prescribed    The Day of Surgery        Check your PAT medication instructions  Ensure you follow the instructions for when to stop eating and drinking  Shower, if prescribed use CHG.  Do not apply any lotions, creams, moisturizers, perfume or deodorant  Brush your teeth and use your CHG dental rinse if prescribed  Wear loose comfortable clothing  Avoid make-up  Remove  jewelry and piercings, consider professional piercing removal with a plastic spacer if needed  Bring photo ID and Insurance card  Bring an accurate medication list that includes medication dose, frequency and allergies  Bring a copy of your advanced directives (will, health care power of )  Bring any devices and controllers as well as medical devices you have been provided with for surgery (CPAP, slings, braces, etc.)  Dentures, eyeglasses, and contacts will be removed before surgery, please bring cases for contacts or glasses

## 2024-06-26 LAB
BACTERIA UR CULT: NORMAL
STAPHYLOCOCCUS SPEC CULT: NORMAL

## 2024-07-01 ENCOUNTER — ANESTHESIA EVENT (OUTPATIENT)
Dept: OPERATING ROOM | Facility: HOSPITAL | Age: 84
End: 2024-07-01
Payer: MEDICARE

## 2024-07-01 RX ORDER — DROPERIDOL 2.5 MG/ML
0.62 INJECTION, SOLUTION INTRAMUSCULAR; INTRAVENOUS ONCE AS NEEDED
Status: CANCELLED | OUTPATIENT
Start: 2024-07-01

## 2024-07-01 RX ORDER — SODIUM CHLORIDE, SODIUM LACTATE, POTASSIUM CHLORIDE, CALCIUM CHLORIDE 600; 310; 30; 20 MG/100ML; MG/100ML; MG/100ML; MG/100ML
100 INJECTION, SOLUTION INTRAVENOUS CONTINUOUS
Status: CANCELLED | OUTPATIENT
Start: 2024-07-01

## 2024-07-02 ENCOUNTER — HOSPITAL ENCOUNTER (OUTPATIENT)
Facility: HOSPITAL | Age: 84
Setting detail: OUTPATIENT SURGERY
Discharge: HOME | End: 2024-07-02
Attending: STUDENT IN AN ORGANIZED HEALTH CARE EDUCATION/TRAINING PROGRAM | Admitting: STUDENT IN AN ORGANIZED HEALTH CARE EDUCATION/TRAINING PROGRAM
Payer: MEDICARE

## 2024-07-02 ENCOUNTER — ANESTHESIA (OUTPATIENT)
Dept: OPERATING ROOM | Facility: HOSPITAL | Age: 84
End: 2024-07-02
Payer: MEDICARE

## 2024-07-02 ENCOUNTER — PHARMACY VISIT (OUTPATIENT)
Dept: PHARMACY | Facility: CLINIC | Age: 84
End: 2024-07-02
Payer: COMMERCIAL

## 2024-07-02 ENCOUNTER — APPOINTMENT (OUTPATIENT)
Dept: RADIOLOGY | Facility: CLINIC | Age: 84
End: 2024-07-02
Payer: MEDICARE

## 2024-07-02 ENCOUNTER — APPOINTMENT (OUTPATIENT)
Dept: CARDIOLOGY | Facility: HOSPITAL | Age: 84
End: 2024-07-02
Payer: MEDICARE

## 2024-07-02 VITALS
OXYGEN SATURATION: 92 % | WEIGHT: 171.52 LBS | HEART RATE: 63 BPM | SYSTOLIC BLOOD PRESSURE: 129 MMHG | TEMPERATURE: 97.3 F | BODY MASS INDEX: 24.55 KG/M2 | HEIGHT: 70 IN | RESPIRATION RATE: 19 BRPM | DIASTOLIC BLOOD PRESSURE: 85 MMHG

## 2024-07-02 DIAGNOSIS — C67.9 MALIGNANT NEOPLASM OF URINARY BLADDER, UNSPECIFIED SITE (MULTI): ICD-10-CM

## 2024-07-02 LAB
ATRIAL RATE: 63 BPM
P AXIS: 50 DEGREES
P OFFSET: 192 MS
P ONSET: 124 MS
PR INTERVAL: 188 MS
Q ONSET: 218 MS
QRS COUNT: 10 BEATS
QRS DURATION: 86 MS
QT INTERVAL: 422 MS
QTC CALCULATION(BAZETT): 431 MS
QTC FREDERICIA: 429 MS
R AXIS: 34 DEGREES
T AXIS: 50 DEGREES
T OFFSET: 429 MS
VENTRICULAR RATE: 63 BPM

## 2024-07-02 PROCEDURE — 3700000001 HC GENERAL ANESTHESIA TIME - INITIAL BASE CHARGE: Performed by: STUDENT IN AN ORGANIZED HEALTH CARE EDUCATION/TRAINING PROGRAM

## 2024-07-02 PROCEDURE — 88305 TISSUE EXAM BY PATHOLOGIST: CPT | Mod: TC,GEALAB | Performed by: STUDENT IN AN ORGANIZED HEALTH CARE EDUCATION/TRAINING PROGRAM

## 2024-07-02 PROCEDURE — 2500000004 HC RX 250 GENERAL PHARMACY W/ HCPCS (ALT 636 FOR OP/ED): Performed by: ANESTHESIOLOGY

## 2024-07-02 PROCEDURE — 2500000004 HC RX 250 GENERAL PHARMACY W/ HCPCS (ALT 636 FOR OP/ED): Performed by: NURSE ANESTHETIST, CERTIFIED REGISTERED

## 2024-07-02 PROCEDURE — 7100000009 HC PHASE TWO TIME - INITIAL BASE CHARGE: Performed by: STUDENT IN AN ORGANIZED HEALTH CARE EDUCATION/TRAINING PROGRAM

## 2024-07-02 PROCEDURE — 52224 CYSTOSCOPY AND TREATMENT: CPT | Performed by: STUDENT IN AN ORGANIZED HEALTH CARE EDUCATION/TRAINING PROGRAM

## 2024-07-02 PROCEDURE — 2500000002 HC RX 250 W HCPCS SELF ADMINISTERED DRUGS (ALT 637 FOR MEDICARE OP, ALT 636 FOR OP/ED): Performed by: ANESTHESIOLOGY

## 2024-07-02 PROCEDURE — RXMED WILLOW AMBULATORY MEDICATION CHARGE

## 2024-07-02 PROCEDURE — 93005 ELECTROCARDIOGRAM TRACING: CPT | Mod: 59

## 2024-07-02 PROCEDURE — 7100000010 HC PHASE TWO TIME - EACH INCREMENTAL 1 MINUTE: Performed by: STUDENT IN AN ORGANIZED HEALTH CARE EDUCATION/TRAINING PROGRAM

## 2024-07-02 PROCEDURE — 2500000005 HC RX 250 GENERAL PHARMACY W/O HCPCS: Performed by: NURSE ANESTHETIST, CERTIFIED REGISTERED

## 2024-07-02 PROCEDURE — 52640 RELIEVE BLADDER CONTRACTURE: CPT | Performed by: STUDENT IN AN ORGANIZED HEALTH CARE EDUCATION/TRAINING PROGRAM

## 2024-07-02 PROCEDURE — 3600000003 HC OR TIME - INITIAL BASE CHARGE - PROCEDURE LEVEL THREE: Performed by: STUDENT IN AN ORGANIZED HEALTH CARE EDUCATION/TRAINING PROGRAM

## 2024-07-02 PROCEDURE — 3700000002 HC GENERAL ANESTHESIA TIME - EACH INCREMENTAL 1 MINUTE: Performed by: STUDENT IN AN ORGANIZED HEALTH CARE EDUCATION/TRAINING PROGRAM

## 2024-07-02 PROCEDURE — 3600000008 HC OR TIME - EACH INCREMENTAL 1 MINUTE - PROCEDURE LEVEL THREE: Performed by: STUDENT IN AN ORGANIZED HEALTH CARE EDUCATION/TRAINING PROGRAM

## 2024-07-02 PROCEDURE — 7100000001 HC RECOVERY ROOM TIME - INITIAL BASE CHARGE: Performed by: STUDENT IN AN ORGANIZED HEALTH CARE EDUCATION/TRAINING PROGRAM

## 2024-07-02 PROCEDURE — 7100000002 HC RECOVERY ROOM TIME - EACH INCREMENTAL 1 MINUTE: Performed by: STUDENT IN AN ORGANIZED HEALTH CARE EDUCATION/TRAINING PROGRAM

## 2024-07-02 RX ORDER — SODIUM CHLORIDE, SODIUM LACTATE, POTASSIUM CHLORIDE, CALCIUM CHLORIDE 600; 310; 30; 20 MG/100ML; MG/100ML; MG/100ML; MG/100ML
100 INJECTION, SOLUTION INTRAVENOUS CONTINUOUS
Status: DISCONTINUED | OUTPATIENT
Start: 2024-07-02 | End: 2024-07-02 | Stop reason: HOSPADM

## 2024-07-02 RX ORDER — GLYCOPYRROLATE 0.2 MG/ML
INJECTION INTRAMUSCULAR; INTRAVENOUS AS NEEDED
Status: DISCONTINUED | OUTPATIENT
Start: 2024-07-02 | End: 2024-07-02

## 2024-07-02 RX ORDER — ONDANSETRON HYDROCHLORIDE 2 MG/ML
4 INJECTION, SOLUTION INTRAVENOUS ONCE AS NEEDED
Status: DISCONTINUED | OUTPATIENT
Start: 2024-07-02 | End: 2024-07-02 | Stop reason: HOSPADM

## 2024-07-02 RX ORDER — IPRATROPIUM BROMIDE AND ALBUTEROL SULFATE 2.5; .5 MG/3ML; MG/3ML
3 SOLUTION RESPIRATORY (INHALATION) ONCE
Status: COMPLETED | OUTPATIENT
Start: 2024-07-02 | End: 2024-07-02

## 2024-07-02 RX ORDER — PHENAZOPYRIDINE HYDROCHLORIDE 200 MG/1
200 TABLET, FILM COATED ORAL 3 TIMES DAILY PRN
Qty: 15 TABLET | Refills: 0 | Status: SHIPPED | OUTPATIENT
Start: 2024-07-02 | End: 2024-07-07

## 2024-07-02 RX ORDER — LIDOCAINE HYDROCHLORIDE 20 MG/ML
INJECTION, SOLUTION INFILTRATION; PERINEURAL AS NEEDED
Status: DISCONTINUED | OUTPATIENT
Start: 2024-07-02 | End: 2024-07-02

## 2024-07-02 RX ORDER — PROPOFOL 10 MG/ML
INJECTION, EMULSION INTRAVENOUS AS NEEDED
Status: DISCONTINUED | OUTPATIENT
Start: 2024-07-02 | End: 2024-07-02

## 2024-07-02 RX ORDER — CEFAZOLIN SODIUM 2 G/100ML
2 INJECTION, SOLUTION INTRAVENOUS ONCE
Status: DISCONTINUED | OUTPATIENT
Start: 2024-07-02 | End: 2024-07-02 | Stop reason: HOSPADM

## 2024-07-02 RX ORDER — OXYCODONE HYDROCHLORIDE 5 MG/1
5 TABLET ORAL EVERY 4 HOURS PRN
Status: DISCONTINUED | OUTPATIENT
Start: 2024-07-02 | End: 2024-07-02 | Stop reason: HOSPADM

## 2024-07-02 RX ORDER — CEFAZOLIN 1 G/1
INJECTION, POWDER, FOR SOLUTION INTRAVENOUS AS NEEDED
Status: DISCONTINUED | OUTPATIENT
Start: 2024-07-02 | End: 2024-07-02

## 2024-07-02 RX ORDER — ONDANSETRON HYDROCHLORIDE 2 MG/ML
INJECTION, SOLUTION INTRAVENOUS AS NEEDED
Status: DISCONTINUED | OUTPATIENT
Start: 2024-07-02 | End: 2024-07-02

## 2024-07-02 SDOH — HEALTH STABILITY: MENTAL HEALTH: CURRENT SMOKER: 0

## 2024-07-02 ASSESSMENT — PAIN SCALES - GENERAL
PAINLEVEL_OUTOF10: 5 - MODERATE PAIN
PAINLEVEL_OUTOF10: 0 - NO PAIN
PAINLEVEL_OUTOF10: 3
PAINLEVEL_OUTOF10: 7

## 2024-07-02 ASSESSMENT — PAIN - FUNCTIONAL ASSESSMENT
PAIN_FUNCTIONAL_ASSESSMENT: 0-10
PAIN_FUNCTIONAL_ASSESSMENT: 0-10

## 2024-07-02 NOTE — ANESTHESIA PROCEDURE NOTES
Airway  Date/Time: 7/2/2024 1:47 PM  Urgency: elective    Airway not difficult    Staffing  Performed: CRNA   Authorized by: Tamir Ochoa MD    Performed by: CHILANGO Ojeda-MOSES  Patient location during procedure: OR    Indications and Patient Condition  Indications for airway management: anesthesia  Spontaneous Ventilation: absent  Sedation level: deep  Preoxygenated: yes  Patient position: sniffing  Mask difficulty assessment: 0 - not attempted    Final Airway Details  Final airway type: supraglottic airway      Successful airway: Supraglottic airway: igel.  Size 4     Number of attempts at approach: 1

## 2024-07-02 NOTE — OP NOTE
CYSTOSCOPY WITH EXCISION TISSUE Operative Note     Date: 2024  OR Location: [facility] OR    Name: Shiva Hardy, : 1940, Age: 83 y.o., MRN: [id number], Sex: male    Diagnosis  Pre-op Diagnosis     * Malignant neoplasm of urinary bladder, unspecified site (Multi) [C67.9] Post-op Diagnosis     * Malignant neoplasm of urinary bladder, unspecified site (Multi) [C67.9]     Procedures  CYSTOSCOPY WITH EXCISION TISSUE  53722 - [place] CYSTO W/REMOVAL OF LESIONS SMALL    Dilatation of bladder neck contracture    Surgeons      * Irwin Kelsey - Primary    Resident/Fellow/Other Assistant:  Surgeons and Role:  * No surgeons found with a matching role *    Procedure Summary  Anesthesia: General  ASA: III  Anesthesia Staff: Anesthesiologist: Tamir Ochoa MD  CRNA: CHILANGO Ojeda-CRNA  Estimated Blood Loss: 2 mL  Intra-op Medications: Administrations occurring from 1300 to 1430 on 24:  * No intraprocedure medications in log *           Anesthesia Record               Intraprocedure I/O Totals       None           Specimen:   ID Type Source Tests Collected by Time   1 : RIGHT BLADDER WALL BIOPSY Tissue BLADDER BIOPSY SURGICAL PATHOLOGY EXAM Irwin Kelsey MD 2024 1407        Staff:   Circulator: Danay Camara Person: Ramonita  Circulator: Yessi         Drains and/or Catheters:   Urethral Catheter 18 Fr. (Active)       Tourniquet Times:         Implants:     Findings: tight bladder neck, right bladder wall erythema    Indications: Shiva Hardy is an 83 y.o. male who is having surgery for Malignant neoplasm of urinary bladder, unspecified site (Multi) [C67.9].     The patient was seen in the preoperative area. The risks, benefits, complications, treatment options, non-operative alternatives, expected recovery and outcomes were discussed with the patient. The possibilities of reaction to medication, pulmonary aspiration, injury to surrounding structures, bleeding, recurrent infection, the need for  additional procedures, failure to diagnose a condition, and creating a complication requiring transfusion or operation were discussed with the patient. The patient concurred with the proposed plan, giving informed consent.  The site of surgery was properly noted/marked if necessary per policy. The patient has been actively warmed in preoperative area. Preoperative antibiotics have been ordered and given within 1 hours of incision. Venous thrombosis prophylaxis have been ordered including bilateral sequential compression devices    Procedure Details: Under general anesthesia, with the patient in the dorsal lithotomy position, genitalia was prepped and draped in the usual manner.  #22 cystoscope was inserted down the urethra into the bladder, the bladder neck was initially very tight, and the cystoscope was forced in, with movement of the cystoscope very restricted. Cystoscopy was performed showing the area of suspicion on the right lateral wall.  Using the biopsy forceps, the area of suspicion was biopsied 3 times.  Then the resectoscope was inserted, again tight bladder neck, the site of biopsy was fulgurated.  Hemostasis was achieved.  Then the fibrous tissue on the bladder neck which appeared to have formed a flap. Using the resectoscope, the fibrous tissue was resected to ensure an open bladder neck.  Bladder was emptied using the cystoscope.  18Fr Talbert catheter was placed.  Patient tolerated procedure well, and was transferred to PACU in stable condition.      Complications:  None; patient tolerated the procedure well.    Disposition: PACU - hemodynamically stable.  Condition: stable         Additional Details:     Attending Attestation: I performed the procedure.    Irwin Kelsey  Phone Number: [phone]

## 2024-07-02 NOTE — DISCHARGE INSTRUCTIONS
You will remove gorman tomorrow- please take the 10 mL syringe and connect it.  Remove all fluid from balloon.  The gorman catheter should then easily pull out.  If difficult, attempt to remove more saline from the gorman balloon and then remove gorman.      Post-Operative Instructions:  Cystoscopy and biopsy    Medications  Tylenol and/or Motrin may be given to help with generalized discomfort after surgery. You may alternate these so that one is taken every 6 hours.  Take Pyridium as needed for burning with urination. This may cause urine to turn orange or red.   Keep taking any medications that you have been on for urination.   If you are on blood thinners - usually these can be restarted in 2-3 days if your urine is clear or pink, unless the doctor tells you otherwise. You may continue taking Aspirin immediately.      Activity  You may resume your normal diet. You should stick to fluids and bland foods at first, as you may be nauseous after surgery.   Make sure to drink plenty of water and stay hydrated.  You may shower immediately after surgery.   There are no activity restrictions.     Things to expect after surgery  You will have to remove the catheter the next morning after surgery  Blood in urine for several days with passage of small clots or bits of stone (if a stone was treated), which may persist as long as the stent is in place.       When to call the surgeon:  Fever higher than 102?F or shaking chills  Repeated vomiting with inability to keep down fluids  Severe pain not controlled with medication  Inability to urinate for more than 8 hours  For questions or problems, call our office [phone]  Evenings and weekends: call the hospital  [phone] and ask for the urology resident on-call.  In case of emergency, call [phone].    Follow-up appointment:    You will receive a phone call with your follow-up appointment details. Please call [phone] if you do not hear from our office within 2  days or if you need to reschedule.

## 2024-07-02 NOTE — ANESTHESIA POSTPROCEDURE EVALUATION
Patient: Shiva Hardy    Procedure Summary       Date: 07/02/24 Room / Location: [facility] / [facility]    Anesthesia Start: 1342 Anesthesia Stop: 1429    Procedure: CYSTOSCOPY WITH EXCISION TISSUE Diagnosis:       Malignant neoplasm of urinary bladder, unspecified site (Multi)      (Malignant neoplasm of urinary bladder, unspecified site (Multi) [C67.9])    Surgeons: Irwin Kelsey MD Responsible Provider: Tamir Ochoa MD    Anesthesia Type: general ASA Status: 3            Anesthesia Type: general    Vitals Value Taken Time   BP See vitals chart 07/02/24 1442   Temp  07/02/24 1442   Pulse 74 07/02/24 1435   Resp  07/02/24 1442   SpO2 96 % 07/02/24 1435   Vitals shown include unfiled device data.    Anesthesia Post Evaluation    Patient location during evaluation: PACU  Patient participation: complete - patient participated  Level of consciousness: awake  Pain management: adequate  Multimodal analgesia pain management approach  Airway patency: patent  Two or more strategies used to mitigate risk of obstructive sleep apnea  Cardiovascular status: acceptable  Respiratory status: acceptable  Hydration status: acceptable  Postoperative Nausea and Vomiting: none        No notable events documented.

## 2024-07-02 NOTE — ANESTHESIA PREPROCEDURE EVALUATION
Patient: Shiva Hardy    Procedure Information       Date/Time: 07/02/24 1300    Procedure: CYSTOSCOPY WITH EXCISION TISSUE    Location: [facility] / [facility]    Surgeons: Irwin Kelsey MD            There were no vitals filed for this visit.    Past Surgical History:   Procedure Laterality Date    CATARACT EXTRACTION  10/01/2014    Cataract Surgery    HERNIA REPAIR  11/03/2015    Hernia Repair    OTHER SURGICAL HISTORY  01/25/2021    Spinal surgery    OTHER SURGICAL HISTORY  01/25/2021    Lower back surgery    OTHER SURGICAL HISTORY  12/16/2016    Destruction Of Malignant Lesion Face    OTHER SURGICAL HISTORY  05/08/2019    Skin biopsy    PROSTATE SURGERY  10/01/2014    Prostate Surgery    UMBILICAL HERNIA REPAIR  12/16/2016    Umbilical Hernia Repair     Past Medical History:   Diagnosis Date    Acute maxillary sinusitis, unspecified 12/19/2019    Acute maxillary sinusitis    Acute recurrent maxillary sinusitis 12/28/2016    Acute recurrent maxillary sinusitis    Acute upper respiratory infection, unspecified 03/13/2017    Acute URI    Acute upper respiratory infection, unspecified 12/30/2020    Viral URI with cough    Arthritis     Bilateral inguinal hernia, without obstruction or gangrene, not specified as recurrent 03/04/2014    Bilateral inguinal hernia    BPH (benign prostatic hyperplasia)     Chest pain, unspecified 12/16/2016    Intermittent chest pain    Chronic obstructive pulmonary disease with (acute) exacerbation (Multi) 12/19/2019    Acute exacerbation of chronic obstructive pulmonary disease (COPD)    Chronic pain disorder     back pain- chronic    Edema, unspecified 05/27/2021    Parotid swelling    Hearing aid worn     HL (hearing loss)     Hyperlipidemia     Hypertension     Impacted cerumen, right ear 01/06/2017    Excessive cerumen in right ear canal    Low back pain, unspecified 07/25/2019    Acute exacerbation of chronic low back pain    Personal history of malignant neoplasm of  prostate     History of malignant neoplasm of prostate    Personal history of malignant neoplasm, unspecified     Personal history of malignant neoplasm    Personal history of other diseases of the digestive system 05/27/2021    History of parotitis    Personal history of other diseases of the respiratory system 02/22/2017    History of pulmonary emphysema    Personal history of other endocrine, nutritional and metabolic disease 04/20/2017    History of hyperkalemia    Prostate cancer (Multi)     Sleep apnea     uses CPAP    Unspecified malignant neoplasm of skin of unspecified part of face 12/16/2016    Skin cancer of face    Unspecified symptoms and signs involving the genitourinary system 12/23/2021    UTI symptoms       Current Facility-Administered Medications:     ceFAZolin in dextrose (iso-os) (Ancef) IVPB 2 g, 2 g, intravenous, Once, Irwin Kelsey MD    lactated Ringer's infusion, 100 mL/hr, intravenous, Continuous, Tamir Ochoa MD  Prior to Admission medications    Medication Sig Start Date End Date Taking? Authorizing Provider   acetaminophen (Tylenol) 500 mg tablet Take 1 tablet (500 mg) by mouth every 6 hours if needed for mild pain (1 - 3).    Historical Provider, MD   albuterol 90 mcg/actuation aerosol powdr breath activated inhaler Inhale 2 puffs every 4 hours if needed for wheezing or shortness of breath.    Historical Provider, MD   amLODIPine (Norvasc) 5 mg tablet Take 1 tablet (5 mg) by mouth once daily in the morning. Do not take until you follow up with cardiology, Dr. Cloud. 11/13/23   Brooke Chilel MD   aspirin 81 mg EC tablet Take 1 tablet (81 mg) by mouth once daily in the morning.    Historical Provider, MD   atorvastatin (Lipitor) 20 mg tablet TAKE 1 TABLET BY MOUTH ONCE DAILY AT BEDTIME 5/13/24   Adria Galeano MD   baclofen (Lioresal) 5 mg tablet Take 1 tablet (5 mg) by mouth 3 times a day.  Patient not taking: Reported on 6/24/2024 5/17/24   Arlet Soares, CHILANGO-CNP, DNP    budesonide-formoteroL (Symbicort) 160-4.5 mcg/actuation inhaler Inhale 2 puffs 2 times a day. Rinse mouth with water after use to reduce aftertaste and incidence of candidiasis. Do not swallow. 5/30/24 11/26/24  LEONA Faulkner   chlorhexidine (Peridex) 0.12 % solution Use 15 mL in the mouth or throat once daily for 2 days. Use 15ml once the night before surgery and 15ml once the morning of surgery 6/24/24 6/26/24  LEONA Patiño   ergocalciferol (Vitamin D-2) 1.25 MG (66648 UT) capsule Take 1 capsule (1,250 mcg) by mouth 1 (one) time per week. 5/7/24   Adria Galeano MD   gabapentin (Neurontin) 300 mg capsule TAKE 1 CAPSULE BY MOUTH THREE TIMES DAILY 6/11/24   Adria Galeano MD   glucose 4 gram chewable tablet Chew if needed for low blood sugar - see comments.    Historical MD Dmitriy   ipratropium-albuteroL (Duo-Neb) 0.5-2.5 mg/3 mL nebulizer solution Take 3 mL by nebulization every 6 hours if needed for shortness of breath.  Patient not taking: Reported on 6/24/2024 4/24/24   LEONA Faulkner   montelukast (Singulair) 10 mg tablet TAKE 1 TABLET BY MOUTH ONCE DAILY AT BEDTIME 4/9/24   Adria Galeano MD   naloxone (Narcan) 4 mg/0.1 mL nasal spray Administer 1 spray (4 mg) into affected nostril(s) if needed for opioid reversal. May repeat every 2-3 minutes if needed, alternating nostrils, until medical assistance becomes available.  Patient not taking: Reported on 6/24/2024 6/14/24   Kingston Wang DO   nicotine (Nicoderm CQ) 14 mg/24 hr patch 1 patch daily for 6 weeks  Patient not taking: Reported on 6/24/2024 4/24/24   LEONA Faulkner   nicotine (Nicoderm CQ) 7 mg/24 hr patch 1 patch daily for 2 weeks (following completion of Step 2; 14 mg patches)  Patient not taking: Reported on 6/24/2024 4/24/24   Manish Sparks, APRN-CNP   nicotine polacrilex (Commit) 2 mg lozenge Weeks 1-6: 1 lozenge every 1-2 hrs (max: 5 every 6 hours; 20/day). Weeks 7-9: 1 sydnie every 2-4  hrs (max: 5 every 6 hours; 20/day). Weeks 10-12: 1 every 4-8 hrs (max: 5 every 6 hours; 20/day).  Patient not taking: Reported on 6/24/2024 4/24/24   CHILANGO Faulkner-CNP   tiZANidine (Zanaflex) 2 mg tablet Take 1 tablet (2 mg) by mouth every 8 hours if needed for muscle spasms.  Patient not taking: Reported on 4/24/2024 2/8/24 5/8/24  Kobi White MD   traMADol (Ultram) 50 mg tablet Take 1 tablet (50 mg) by mouth every 12 hours if needed for moderate pain (4 - 6). 6/14/24 7/14/24  Kingston Wang,      Allergies   Allergen Reactions    Percocet [Oxycodone-Acetaminophen] Nausea Only     Social History     Tobacco Use    Smoking status: Every Day     Current packs/day: 1.00     Average packs/day: 1 pack/day for 66.5 years (66.5 ttl pk-yrs)     Types: Cigarettes     Start date: 1958    Smokeless tobacco: Never    Tobacco comments:     Currently smokes 1/2 or a little more PPD trying to cut back   Substance Use Topics    Alcohol use: Not Currently         Chemistry    Lab Results   Component Value Date/Time     (L) 06/24/2024 1122    K 4.6 06/24/2024 1122    CL 99 06/24/2024 1122    CO2 28 06/24/2024 1122    BUN 20 06/24/2024 1122    CREATININE 0.84 06/24/2024 1122    Lab Results   Component Value Date/Time    CALCIUM 9.9 06/24/2024 1122    ALKPHOS 62 11/09/2023 1045    AST 21 11/09/2023 1045    ALT 20 11/09/2023 1045    BILITOT 0.4 11/09/2023 1045          Lab Results   Component Value Date/Time    WBC 7.6 06/24/2024 1122    HGB 13.8 06/24/2024 1122    HCT 42.7 06/24/2024 1122     06/24/2024 1122     Lab Results   Component Value Date/Time    PROTIME 11.4 11/09/2023 1045    INR 1.0 11/09/2023 1045     Encounter Date: 01/02/24   ECG 12 Lead   Result Value    Ventricular Rate 73    Atrial Rate 73    FL Interval 170    QRS Duration 82    QT Interval 386    QTC Calculation(Bazett) 425    P Axis 68    R Axis 42    T Axis 87    QRS Count 12    Q Onset 219    P Onset 134    P Offset 197    T  Offset 412    QTC Fredericia 412    Narrative    Sinus rhythm with Premature atrial complexes  Otherwise normal ECG  When compared with ECG of 02-OCT-2023 11:06,  Premature atrial complexes are now Present  Confirmed by Veronica Mcwilliams ([id number]) on 1/3/2024 1:40:34 PM     No results found for this or any previous visit from the past 1095 days.      Relevant Problems   Cardiac   (+) Aortic aneurysm (CMS-HCC)   (+) Chronic midline thoracic back pain   (+) Essential hypertension   (+) Hyperlipidemia      Pulmonary   (+) Asthma (HHS-HCC)   (+) COPD (chronic obstructive pulmonary disease) (Multi)   (+) Centrilobular emphysema (Multi)   (+) Left lower lobe pneumonia   (+) Multiple lung nodules on CT   (+) YVETTE on CPAP   (+) Paraseptal emphysema (Multi)      Neuro   (+) Thoracic neuritis      Musculoskeletal   (+) Spondylosis, thoracic   (+) Thoracic spondylosis      HEENT   (+) Bilateral hearing loss   (+) Seasonal allergies      ID   (+) Oral thrush       Clinical information reviewed:                   NPO Detail:  No data recorded     Physical Exam    Airway  Mallampati: II     Cardiovascular - normal exam     Dental    Pulmonary    Abdominal            Anesthesia Plan    History of general anesthesia?: yes  History of complications of general anesthesia?: no    ASA 3     general     The patient is not a current smoker.  Patient was not previously instructed to abstain from smoking on day of procedure.  Patient did not smoke on day of procedure.    intravenous induction   Anesthetic plan and risks discussed with patient.    Plan discussed with CRNA.

## 2024-07-09 ENCOUNTER — APPOINTMENT (OUTPATIENT)
Dept: PAIN MEDICINE | Facility: HOSPITAL | Age: 84
End: 2024-07-09
Payer: MEDICARE

## 2024-07-09 ENCOUNTER — APPOINTMENT (OUTPATIENT)
Dept: PRIMARY CARE | Facility: CLINIC | Age: 84
End: 2024-07-09
Payer: MEDICARE

## 2024-07-11 ENCOUNTER — TREATMENT (OUTPATIENT)
Dept: PHYSICAL THERAPY | Facility: CLINIC | Age: 84
End: 2024-07-11
Payer: MEDICARE

## 2024-07-11 DIAGNOSIS — J44.9 CHRONIC OBSTRUCTIVE PULMONARY DISEASE, UNSPECIFIED COPD TYPE (MULTI): Primary | ICD-10-CM

## 2024-07-11 DIAGNOSIS — M54.9 DORSALGIA: ICD-10-CM

## 2024-07-11 PROCEDURE — 97140 MANUAL THERAPY 1/> REGIONS: CPT | Mod: GP | Performed by: PHYSICAL THERAPIST

## 2024-07-11 PROCEDURE — 97110 THERAPEUTIC EXERCISES: CPT | Mod: GP | Performed by: PHYSICAL THERAPIST

## 2024-07-11 RX ORDER — UMECLIDINIUM 62.5 UG/1
1 AEROSOL, POWDER ORAL DAILY
Qty: 1 EACH | Refills: 5 | Status: SHIPPED | OUTPATIENT
Start: 2024-07-11

## 2024-07-11 ASSESSMENT — PAIN - FUNCTIONAL ASSESSMENT: PAIN_FUNCTIONAL_ASSESSMENT: 0-10

## 2024-07-11 ASSESSMENT — PAIN SCALES - GENERAL: PAINLEVEL_OUTOF10: 4

## 2024-07-11 NOTE — PROGRESS NOTES
"    Physical Therapy  Physical Therapy Treatment    Patient Name: Shiva Hardy  MRN: [id number]  Today's Date: 7/11/2024  Time Calculation  Start Time: 1359  Stop Time: 1437  Time Calculation (min): 38 min    General  Reason for Referral: Chronic midline back pain  Referred By: Regina  Past Medical History Relevant to Rehab: Bladder CA last year with surgery, being monitored  General Comment: Onset date: Chronic, script 2/8/2024; No auth; Visit number 4: \"I had a biopsy down there( peroneal region) so I was down for a few days. I feel like this is helping me. I did not think it would.\"  Assessment:    Patient continues to report a significant relief from current treatment plan and overall progress will be re-evaluated in 2 visits.   Plan:  Progress as tolerated     Active       PT Problem       Patient will increase tolerance to activity in sleeping for >4 hours without pain in order to participate in ADL, IADL, work, and carl skills or activities.       Start:  05/31/24    Expected End:  09/06/24            Patient will increase scapular stabilization and BUE  strength to 4+/5 in order to participate in ADL, IADL, work, or leisure skills and activities.       Start:  05/31/24    Expected End:  09/06/24            Patient will be independent with symptom management in order to decrease pain to <5/10, in order to participate in ADL, IADL, work, or leisure skills and activities.       Start:  05/31/24    Expected End:  09/06/24            Patient will improve the outcome measure score of the CHAPARRO to < 50% for increased independence and ease with performance of ADL, IADL, work, or leisure activities.       Start:  05/31/24    Expected End:  09/06/24            Patient Stated Goal: \"I just want to get out of pain.\"       Start:  05/31/24    Expected End:  09/06/24                General  Chief Complaint:   1. Dorsalgia  Follow Up In Physical Therapy          Subjective:     Current Problem  General  Reason for " "Referral: Chronic midline back pain  Referred By: Regina  Past Medical History Relevant to Rehab: Bladder CA last year with surgery, being monitored  General Comment: Onset date: Chronic, script 2/8/2024; No auth; Visit number 4: \"I had a biopsy down there( peroneal region) so I was down for a few days. I feel like this is helping me. I did not think it would.\"         Patient perform today's treatment with reduced muscle tension and pain       Pain  Pain Assessment: 0-10  0-10 (Numeric) Pain Score: 4  Pain Type: Chronic pain  Pain Location: Back  Pain Orientation: Mid    Objective:     Treatments:   This therapist instructed and demonstrated interventions to patient, patient completed the following under direct supervision of this therapist:    15 minutes  Therapeutic Exercise  Therapeutic Exercise Activity 1: Scifit: seat 14, lvl 1, 5'  Therapeutic Exercise Activity 2: 3 way Sball stretch: 3 x 20\" R/L/M  Therapeutic Exercise Activity 3: Green Tubing: Row 20 x  Therapeutic Exercise Activity 4: LAE with scapular squeeze: 20 x  Therapeutic Exercise Activity 5: Parloff Press: 20 x Green tubing each R/L     25 minutes  Manual Therapy  Manual Therapy Performed: Yes  Manual Therapy Activity 1: Seated with trunk flexed to treatment table: Patient had DTM to the thoracic paraspinals, mid and lower trap, rhomboids and latisumus with concentration on the left.      Education:      Dominick Damon, PT  "

## 2024-07-15 ENCOUNTER — TELEPHONE (OUTPATIENT)
Dept: PRIMARY CARE | Facility: CLINIC | Age: 84
End: 2024-07-15
Payer: MEDICARE

## 2024-07-15 DIAGNOSIS — M47.814 SPONDYLOSIS, THORACIC: ICD-10-CM

## 2024-07-16 DIAGNOSIS — R05.9 COUGH IN ADULT: Primary | ICD-10-CM

## 2024-07-16 DIAGNOSIS — M47.814 SPONDYLOSIS, THORACIC: ICD-10-CM

## 2024-07-16 RX ORDER — GABAPENTIN 300 MG/1
300 CAPSULE ORAL 3 TIMES DAILY
Qty: 90 CAPSULE | Refills: 0 | Status: SHIPPED | OUTPATIENT
Start: 2024-07-16

## 2024-07-16 RX ORDER — MONTELUKAST SODIUM 10 MG/1
10 TABLET ORAL NIGHTLY
Qty: 30 TABLET | Refills: 5 | Status: SHIPPED | OUTPATIENT
Start: 2024-07-16 | End: 2025-01-12

## 2024-07-18 ENCOUNTER — TREATMENT (OUTPATIENT)
Dept: PHYSICAL THERAPY | Facility: CLINIC | Age: 84
End: 2024-07-18
Payer: MEDICARE

## 2024-07-18 DIAGNOSIS — M54.9 DORSALGIA: ICD-10-CM

## 2024-07-18 PROCEDURE — 97110 THERAPEUTIC EXERCISES: CPT | Mod: GP | Performed by: PHYSICAL THERAPIST

## 2024-07-18 PROCEDURE — 97140 MANUAL THERAPY 1/> REGIONS: CPT | Mod: GP | Performed by: PHYSICAL THERAPIST

## 2024-07-18 ASSESSMENT — PAIN - FUNCTIONAL ASSESSMENT: PAIN_FUNCTIONAL_ASSESSMENT: 0-10

## 2024-07-18 ASSESSMENT — PAIN SCALES - GENERAL: PAINLEVEL_OUTOF10: 0 - NO PAIN

## 2024-07-18 NOTE — PROGRESS NOTES
"    Physical Therapy  Physical Therapy Treatment    Patient Name: Shiva Hardy  MRN: [id number]  Today's Date: 7/18/2024  Time Calculation  Start Time: 1355  Stop Time: 1433  Time Calculation (min): 38 min    General  Reason for Referral: Chronic midline back pain  Referred By: Regina  Past Medical History Relevant to Rehab: Bladder CA last year with surgery, being monitored  General Comment: Onset date: Chronic, script 2/8/2024; No auth; Visit number 5: \"I still hurt in the morning. I am feeling better with the therapy, especially during the day.\"  Assessment:    Although pain in the morning still persists, he reports he is doing better with movement and driving at this time. He will be reassessed next visit for full progress report.  Plan:  Reassess next visit     Active       PT Problem       Patient will increase tolerance to activity in sleeping for >4 hours without pain in order to participate in ADL, IADL, work, and carl skills or activities.       Start:  05/31/24    Expected End:  09/06/24            Patient will increase scapular stabilization and BUE  strength to 4+/5 in order to participate in ADL, IADL, work, or leisure skills and activities.       Start:  05/31/24    Expected End:  09/06/24            Patient will be independent with symptom management in order to decrease pain to <5/10, in order to participate in ADL, IADL, work, or leisure skills and activities.       Start:  05/31/24    Expected End:  09/06/24            Patient will improve the outcome measure score of the CHAPARRO to < 50% for increased independence and ease with performance of ADL, IADL, work, or leisure activities.       Start:  05/31/24    Expected End:  09/06/24            Patient Stated Goal: \"I just want to get out of pain.\"       Start:  05/31/24    Expected End:  09/06/24                General  Chief Complaint:   1. Dorsalgia  Follow Up In Physical Therapy          Subjective:     Current Problem  General  Reason for " "Referral: Chronic midline back pain  Referred By: Regina  Past Medical History Relevant to Rehab: Bladder CA last year with surgery, being monitored  General Comment: Onset date: Chronic, script 2/8/2024; No auth; Visit number 5: \"I still hurt in the morning. I am feeling better with the therapy, especially during the day.\"         Patient perform today's treatment with reduced pain and muscle tension      Pain  Pain Assessment: 0-10  0-10 (Numeric) Pain Score: 0 - No pain  Pain Type: Chronic pain  Pain Location: Back  Pain Orientation: Mid    Objective:     Treatments:   This therapist instructed and demonstrated interventions to patient, patient completed the following under direct supervision of this therapist:    10 minutes  Therapeutic Exercise  Therapeutic Exercise Performed: Yes  Therapeutic Exercise Activity 1: Scifit: seat 14, lvl 1, 6'  Therapeutic Exercise Activity 2: 3 way Sball stretch: 3 x 20\" R/L/M       28 minutes  Manual Therapy  Manual Therapy Performed: Yes  Manual Therapy Activity 1: Seated with trunk flexed to and supported by treatment table: DTM and trigger point release to the rhomboids and thoracic paraspinals T4-T8 with concentration on the left      Education:      Dominick Damon, PT  "

## 2024-07-24 ENCOUNTER — APPOINTMENT (OUTPATIENT)
Dept: UROLOGY | Facility: CLINIC | Age: 84
End: 2024-07-24
Payer: MEDICARE

## 2024-07-24 DIAGNOSIS — C67.9 MALIGNANT NEOPLASM OF URINARY BLADDER, UNSPECIFIED SITE (MULTI): Primary | ICD-10-CM

## 2024-07-24 PROCEDURE — 99024 POSTOP FOLLOW-UP VISIT: CPT | Performed by: STUDENT IN AN ORGANIZED HEALTH CARE EDUCATION/TRAINING PROGRAM

## 2024-07-24 NOTE — PROGRESS NOTES
Subjective   Patient ID: Shiva Hardy is a 83 y.o. male.    HPI  82 y/o M s/p cystoscopy with excision of tissue 7/2/24. Final pathology showed largely denuded urothelial mucosa with reactive atypia, chronic inflammation, and granulation tissue reaction with focal hemorrhage.     h/o bladder cancer s/p reTURBT 10/6/23, s/p BCG.     He tolerated procedure well. No major issues after.     FINAL DIAGNOSIS   A. RIGHT BLADDER WALL, BIOPSY:  -- LARGELY DENUDED UROTHELIAL MUCOSA WITH REACTIVE ATYPIA, CHRONIC INFLAMMATION, AND GRANULATION TISSUE REACTION WITH FOCAL HEMORRHAGE.     Review of Systems    Objective   Physical Exam    Assessment/Plan   82 y/o M s/p cystoscopy with excision of tissue 7/2/24. Final pathology showed largely denuded urothelial mucosa with reactive atypia, chronic inflammation, and granulation tissue reaction with focal hemorrhage.     h/o bladder cancer s/p reTURBT 10/6/23, s/p BCG.     He tolerated procedure well. No major issues after.     We discussed he would need to continue surveillance for now. Will be due for repeat cysto first week of October 2024.     Plan:  Cystoscopy for bladder cancer surveillance October 2024.     Diagnoses and all orders for this visit:  Malignant neoplasm of urinary bladder, unspecified site (Multi)    Scribe Attestation  By signing my name below, IJacquie Scribe attest that this documentation has been prepared under the direction and in the presence of Irwin Kelsey MD.

## 2024-08-08 ENCOUNTER — TREATMENT (OUTPATIENT)
Dept: PHYSICAL THERAPY | Facility: CLINIC | Age: 84
End: 2024-08-08
Payer: MEDICARE

## 2024-08-08 DIAGNOSIS — M54.9 DORSALGIA: ICD-10-CM

## 2024-08-08 PROCEDURE — 97140 MANUAL THERAPY 1/> REGIONS: CPT | Mod: GP | Performed by: PHYSICAL THERAPIST

## 2024-08-08 PROCEDURE — 97530 THERAPEUTIC ACTIVITIES: CPT | Mod: GP,59 | Performed by: PHYSICAL THERAPIST

## 2024-08-08 ASSESSMENT — PAIN SCALES - GENERAL: PAINLEVEL_OUTOF10: 3

## 2024-08-08 ASSESSMENT — PAIN - FUNCTIONAL ASSESSMENT: PAIN_FUNCTIONAL_ASSESSMENT: 0-10

## 2024-08-08 NOTE — PROGRESS NOTES
Physical Therapy  Physical Therapy Evaluation      Patient Name: Shiva Hardy  MRN: [id number]  Today's Date: 8/8/2024          TREATING DIAGNOSIS:  1. Dorsalgia  Follow Up In Physical Therapy          ASSESSMENT: Patient is a *** yo ***  s/p *** resulting in limited participation in pain-free ADLs and inability to perform at their prior level of function specifically,  ***. Pt would benefit from skilled Physical Therapy services to address the impairments of:    in order to return to safe and pain-free ADL's and prior level of function.    PLAN:      Planned Interventions include: therapeutic exercise, therapeutic activity, self-care home management, manual therapy, therapeutic activities, gait training, neuromuscular coordination, vasopneumatic, dry needling, electric stimulation, ultrasound, kinesiotaping, wound care    Goals:    Plan of care was developed with input and agreement by the patient.  Potential to achieve goals:  Good    Subjective:    Pt goals for therapy: ***     Aggravating Factors: {Aggravating Factors:46602}   Relieving Factors: {Relieving Factors:18588}     Precautions:       Medical History Form: Reviewed (scanned into chart)    Current Condition since injury:   {current condition:86277}     Relevant Information (PMH & Previous Tests/Imaging):     Previous Interventions/Treatments: {Previous Interventions/Treatments:27850}   Prior Level of Function (PLOF)   Exercise/Physical Activity: ***  Work/School: ***  Current ADL/IADL Status: ***     Patients Living Environment: Reviewed and no concern    FRANKIE: {FRANKIE:36755} ***    Primary Language: English    Social Determinants of health:  (-) Lack of Money                                      (-) poor physical home environment     (-) no job/ poor work conditions    (-) un-education/illiteracy   (-) traumatic childhood experiences            (-) lacks social supports/coping skills    (-) lacks healthy behaviors                     (-) lacks access  to healthcare                   Red Flags:   REVIEW OF SYSTEMS/ RED FLAGS  (-) osteoporosis  (-) Fever/chills, SOB, loss of taste/smell  (-) Cough/ Sneeze   (-) balance difficulties/FALLS   (-) numbness/tingle  (-) weakness  (-) unremitting night pain   Sleep:  position:  (-) AM Stiffness:            (-) Unexplained Wt. Loss  (-) h/o CA   (-) Pacemaker  (-) Bowel/Bladder:            (-) saddle paresthesia    Objective:          (Add objective section)    Precautions:        Medical History Form: Reviewed (scanned into chart)    EDUCATION: home exercise program, plan of care, activity modifications, pain management, and injury pathology       Treatments:   This therapist instructed and demonstrated interventions to patient, patient completed the following under direct supervision of this therapist:              Minutes     MINUTES       MINUTES        MINUTES        MINUTES         MINUTES         MINUTES            Dominick Damon, PT     urinary frequency/DYSURIA

## 2024-08-08 NOTE — PROGRESS NOTES
"    Physical Therapy  Physical Therapy Treatment    Patient Name: Shiva Hardy  MRN: [id number]  Today's Date: 8/8/2024  Time Calculation  Start Time: 1349  Stop Time: 1427  Time Calculation (min): 38 min    General  Reason for Referral: Chronic midline back pain  Referred By: Regina  Past Medical History Relevant to Rehab: Bladder CA last year with surgery, being monitored  General Comment: Onset date: Chronic, script 2/8/2024; No auth; Visit number 6: \"I did really good until the last couple days. Driving here was painful. It got better as I sat.\"  Assessment:    Patient has achieved or nearly achieved all long term goals. He has had little to no pain over the last three weeks and is prepared for discharge. He and PT discussed continued consistent performance of the HEP to maintain his current levels of reduced pain.  Plan:  Discharge PT services to HEP only      Resolved       PT Problem       Patient will increase tolerance to activity in sleeping for >4 hours without pain in order to participate in ADL, IADL, work, and carl skills or activities. (Met)       Start:  05/31/24    Expected End:  09/06/24    Resolved:  08/08/24         Patient will increase scapular stabilization and BUE  strength to 4+/5 in order to participate in ADL, IADL, work, or leisure skills and activities. (Adequate for Discharge)       Start:  05/31/24    Expected End:  09/06/24            Patient will be independent with symptom management in order to decrease pain to <5/10, in order to participate in ADL, IADL, work, or leisure skills and activities. (Progressing)       Start:  05/31/24    Expected End:  09/06/24    Resolved:  08/08/24      Goal Note       Most of the last 3 weeks he has not had much pain. The last few days he has had some increased pain but spikes in pain will go to an 8/10              Patient will improve the outcome measure score of the CHAPARRO to < 50% for increased independence and ease with performance of ADL, " "IADL, work, or leisure activities. (Met)       Start:  05/31/24    Expected End:  09/06/24    Resolved:  08/08/24         Patient Stated Goal: \"I just want to get out of pain.\" (Progressing)       Start:  05/31/24    Expected End:  09/06/24    Resolved:  08/08/24             General  Chief Complaint:   1. Dorsalgia  Follow Up In Physical Therapy          Subjective:     Current Problem  General  Reason for Referral: Chronic midline back pain  Referred By: Regina  Past Medical History Relevant to Rehab: Bladder CA last year with surgery, being monitored  General Comment: Onset date: Chronic, script 2/8/2024; No auth; Visit number 6: \"I did really good until the last couple days. Driving here was painful. It got better as I sat.\"         Patient perform today's treatment with  improved knowledge and reduced pain     Pain  Pain Assessment: 0-10  0-10 (Numeric) Pain Score: 3  Pain Type: Chronic pain    Objective:     Treatments:   This therapist instructed and demonstrated interventions to patient, patient completed the following under direct supervision of this therapist:  23   minutes  Therapeutic Activity  Therapeutic Activity Performed: Yes  Therapeutic Activity 1: Reassessment testing and measures and patient education on need to perform HEP to maintain reduced and pain free levels.    15 minutes  Manual Therapy  Manual Therapy Performed: Yes  Manual Therapy Activity 1: Seated with trunk flexed to treatment table wtih DTM and trigger point release to the left thoracic paraspinals and rhomboids        Education:      Dominick Damon, PT  "

## 2024-08-13 ENCOUNTER — OFFICE VISIT (OUTPATIENT)
Dept: PAIN MEDICINE | Facility: HOSPITAL | Age: 84
End: 2024-08-13
Payer: MEDICARE

## 2024-08-13 VITALS
TEMPERATURE: 96.4 F | HEART RATE: 67 BPM | DIASTOLIC BLOOD PRESSURE: 71 MMHG | SYSTOLIC BLOOD PRESSURE: 122 MMHG | WEIGHT: 175 LBS | HEIGHT: 70 IN | RESPIRATION RATE: 17 BRPM | OXYGEN SATURATION: 94 % | BODY MASS INDEX: 25.05 KG/M2

## 2024-08-13 DIAGNOSIS — R91.8 MULTIPLE LUNG NODULES ON CT: ICD-10-CM

## 2024-08-13 DIAGNOSIS — Z79.899 MEDICATION MANAGEMENT: ICD-10-CM

## 2024-08-13 DIAGNOSIS — G89.29 CHRONIC BILATERAL LOW BACK PAIN WITH SCIATICA, SCIATICA LATERALITY UNSPECIFIED: ICD-10-CM

## 2024-08-13 DIAGNOSIS — M47.814 SPONDYLOSIS, THORACIC: ICD-10-CM

## 2024-08-13 DIAGNOSIS — J41.0 SIMPLE CHRONIC BRONCHITIS (MULTI): ICD-10-CM

## 2024-08-13 DIAGNOSIS — M47.812 MULTILEVEL CERVICAL SPONDYLOSIS WITHOUT MYELOPATHY: ICD-10-CM

## 2024-08-13 DIAGNOSIS — M62.830 MUSCLE SPASM OF BACK: Primary | ICD-10-CM

## 2024-08-13 DIAGNOSIS — M54.40 CHRONIC BILATERAL LOW BACK PAIN WITH SCIATICA, SCIATICA LATERALITY UNSPECIFIED: ICD-10-CM

## 2024-08-13 PROBLEM — M47.816 SPONDYLOSIS WITHOUT MYELOPATHY OR RADICULOPATHY, LUMBAR REGION: Status: ACTIVE | Noted: 2024-08-13

## 2024-08-13 LAB
AMPHETAMINES UR QL SCN: NORMAL
BARBITURATES UR QL SCN: NORMAL
BZE UR QL SCN: NORMAL
CANNABINOIDS UR QL SCN: NORMAL
CREAT UR-MCNC: 126 MG/DL (ref 20–370)
PCP UR QL SCN: NORMAL

## 2024-08-13 PROCEDURE — 80346 BENZODIAZEPINES1-12: CPT | Performed by: ANESTHESIOLOGY

## 2024-08-13 PROCEDURE — 3074F SYST BP LT 130 MM HG: CPT | Performed by: ANESTHESIOLOGY

## 2024-08-13 PROCEDURE — 1159F MED LIST DOCD IN RCRD: CPT | Performed by: ANESTHESIOLOGY

## 2024-08-13 PROCEDURE — 1125F AMNT PAIN NOTED PAIN PRSNT: CPT | Performed by: ANESTHESIOLOGY

## 2024-08-13 PROCEDURE — 82570 ASSAY OF URINE CREATININE: CPT | Mod: 59 | Performed by: ANESTHESIOLOGY

## 2024-08-13 PROCEDURE — 99214 OFFICE O/P EST MOD 30 MIN: CPT | Performed by: ANESTHESIOLOGY

## 2024-08-13 PROCEDURE — 1160F RVW MEDS BY RX/DR IN RCRD: CPT | Performed by: ANESTHESIOLOGY

## 2024-08-13 PROCEDURE — 80355 GABAPENTIN NON-BLOOD: CPT | Performed by: ANESTHESIOLOGY

## 2024-08-13 PROCEDURE — 3078F DIAST BP <80 MM HG: CPT | Performed by: ANESTHESIOLOGY

## 2024-08-13 RX ORDER — TRAMADOL HYDROCHLORIDE 50 MG/1
50 TABLET ORAL
COMMUNITY
Start: 2024-07-24

## 2024-08-13 RX ORDER — TRAMADOL HYDROCHLORIDE 50 MG/1
50 TABLET ORAL EVERY 8 HOURS
Qty: 90 TABLET | Refills: 1 | Status: SHIPPED | OUTPATIENT
Start: 2024-08-13 | End: 2024-09-12

## 2024-08-13 ASSESSMENT — PAIN SCALES - GENERAL: PAINLEVEL: 4

## 2024-08-13 NOTE — PROGRESS NOTES
"Patient is an 84YO male who presents today for an office visit. Patient currently rates pain 4/10 located in his upper back. Pain is best described as burning and throbbing. The pain is does not radiate but is described as \"steady\" per the patient. The patient describes issues with his bladder such as leaking and episodes of incontinence, but relates that to bladder and prostate cancer. On average the patient states that his pain is a 6/10 with the highest pain level reaching a 10/10. Patient currently takes tramadol for pain management and states that he feels 80% relief and it has made a difference in his everyday life. Patient did not bring his Tramadol prescription for an accurate pill count today. Patient and family educated and advised to bring bottle at next appointment.        Date of the last Controlled Substance Agreement: 06/14/2024       Last urine drug screening date/ordered today: 08/13/24     Results of last screen:       Last opioid risk screening date/ordered today: 05/17/2024      Pain Scale Screening:   Pain Assessment and Documentation Tool (PADT)   Date of Assessment: 08/13/2024  Analgesia:   Patient reports her pain level on average during the past week is 2on a 0 - 10 scale.   Patient reports that her pain level at its worst during the past week was 2 on a 0 -10 scale.   80% of pain has been relieved during the past week per patient   Patient states that the amount of pain relief she is now obtaining from her current pain reliever(s) is enough to make a real difference in her life.   Query to clinician: Is the patient's pain relief clinically significant?   Activities of Daily Living:   Physical functioning: better  Family relationships: unchanged  Social relationships: unchanged  Mood: unchanged  Sleep patterns: unchanged  Overall functioning: unchanged  Adverse Events: No, Shiva Hardy is not experiencing side effects from current pain reliever.  Patients overall severity of side " effect:none  Specific Analgesic Plan: Continue present regimen.

## 2024-08-13 NOTE — PROGRESS NOTES
Subjective   Patient ID: Shiva Hardy is a 83 y.o. male who presents for Back Pain (47/10 pain to patients upper back today).  Patient is an 83-year-old  male who suffers from chronic pain syndrome.  He has a history of chronic back pain as well as right lung nodules along with a history of COPD.  His OARRS report was reviewed and pain management agreement is up-to-date.  He has been somewhat skeptical about taking tramadol on a regular basis and states that he is suffering from pain.  He is accompanied today by his wife who seems to be very supportive.  HPI  Positive history of bladder cancer COPD;; multiple right lung nodules; previous lumbar laminectomy; history of prostate cancer with chronic back pain  Review of Systems  Unremarkable except for chief complaint of chronic low back pain primarily in the morning.  Objective   Physical Exam  Gen. appearance:  Patient's alert and oriented ×3 ;cooperative mild to moderate distress  Heart: Regular rate and rhythm  Lungs: Clear to auscultation  Abdomen: Soft nontender  Neuro: Cranial nerves II -XII intact; DTR: +2 over 4 biceps triceps brachial radialis patellar and Achilles  Spinal exam: Positive paraspinal tenderness noted bilaterally L4 5 L5-S1 with flexion extension and rotation/no bony abnormalities  Musculoskeletal:  Upper and lower extremity strength was 4/5 bilaterally  :  deferred  Skin: Warm and dry      Assessment/Plan   Diagnoses and all orders for this visit:  Muscle spasm of back  Medication management  -     Opiate/Opioid/Benzo Prescription Compliance; Future  -     Gabapentin,Urine; Future  -     Follow Up In Pain Medicine  -     OOB Internal Tracking  -     Follow Up In Pain Medicine; Future  -     traMADol (Ultram) 50 mg tablet; Take 1 tablet (50 mg) by mouth every 8 hours.  Multilevel cervical spondylosis without myelopathy  -     Follow Up In Pain Medicine  -     Follow Up In Pain Medicine; Future  -     traMADol (Ultram) 50 mg tablet;  Take 1 tablet (50 mg) by mouth every 8 hours.  Spondylosis, thoracic  -     Follow Up In Pain Medicine; Future  -     traMADol (Ultram) 50 mg tablet; Take 1 tablet (50 mg) by mouth every 8 hours.  Chronic bilateral low back pain with sciatica, sciatica laterality unspecified  Simple chronic bronchitis (Multi)  Multiple lung nodules on CT  Patient was encouraged to start taking tramadol 50 mg tablets 3 times a day.  He was told to combine them with his gabapentin and extra-strength Tylenol to moderate his pain.  He was also urged to continue his low back strengthening exercises.  He has a follow-up visit scheduled in 8 weeks and was specifically told that he would not be getting any refills unless he had his pill bottle in for pill count.  All questions were answered prior to discharge today.       Kingston Wang DO 08/13/24 3:19 PM

## 2024-08-17 LAB — GABAPENTIN UR-MCNC: 376.2 UG/ML

## 2024-08-19 LAB
1OH-MIDAZOLAM UR CFM-MCNC: <25 NG/ML
6MAM UR CFM-MCNC: <25 NG/ML
7AMINOCLONAZEPAM UR CFM-MCNC: <25 NG/ML
A-OH ALPRAZ UR CFM-MCNC: <25 NG/ML
ALPRAZ UR CFM-MCNC: <25 NG/ML
CHLORDIAZEP UR CFM-MCNC: <25 NG/ML
CLONAZEPAM UR CFM-MCNC: <25 NG/ML
CODEINE UR CFM-MCNC: <50 NG/ML
DIAZEPAM UR CFM-MCNC: <25 NG/ML
EDDP UR CFM-MCNC: <25 NG/ML
FENTANYL UR CFM-MCNC: <2.5 NG/ML
HYDROCODONE CTO UR CFM-MCNC: <25 NG/ML
HYDROMORPHONE UR CFM-MCNC: <25 NG/ML
LORAZEPAM UR CFM-MCNC: <25 NG/ML
METHADONE UR CFM-MCNC: <25 NG/ML
MIDAZOLAM UR CFM-MCNC: <25 NG/ML
MORPHINE UR CFM-MCNC: <50 NG/ML
NORDIAZEPAM UR CFM-MCNC: <25 NG/ML
NORFENTANYL UR CFM-MCNC: <2.5 NG/ML
NORHYDROCODONE UR CFM-MCNC: <25 NG/ML
NOROXYCODONE UR CFM-MCNC: <25 NG/ML
NORTRAMADOL UR-MCNC: >1000 NG/ML
OXAZEPAM UR CFM-MCNC: <25 NG/ML
OXYCODONE UR CFM-MCNC: <25 NG/ML
OXYMORPHONE UR CFM-MCNC: <25 NG/ML
TEMAZEPAM UR CFM-MCNC: <25 NG/ML
TRAMADOL UR CFM-MCNC: >1000 NG/ML
ZOLPIDEM UR CFM-MCNC: <25 NG/ML
ZOLPIDEM UR-MCNC: <25 NG/ML

## 2024-08-23 DIAGNOSIS — E78.5 HYPERLIPIDEMIA, UNSPECIFIED HYPERLIPIDEMIA TYPE: ICD-10-CM

## 2024-08-23 RX ORDER — ATORVASTATIN CALCIUM 20 MG/1
20 TABLET, FILM COATED ORAL NIGHTLY
Qty: 90 TABLET | Refills: 0 | Status: SHIPPED | OUTPATIENT
Start: 2024-08-23

## 2024-08-27 NOTE — PROGRESS NOTES
"[facility]  Followup Visit Note    HISTORY OF PRESENT ILLNESS   Shiva Hardy is a 84 y.o. male who presents to a [facility] for followup.       Current History    On today's visit, the patient reports he has not yet received an N30 I mask or heated tube.  CPAP tolerance is about the same as last appointment.    He wakes frequently to urinate due to hx of bladder cancer and treatment.    Previous visit 05/30/2024  OBSTRUCTIVE SLEEP APNEA  -Ordering supplies from [facility]   -Try n30i and heated tube  -Discussed INSPIRE as option but he wishes to wait and avoid surgery due to already having a lot of complex medical issues    HYPERTENSION  BP Readings from Last 3 Encounters:   05/30/24 125/71   05/17/24 144/79   04/24/24 141/74      -Mildly elevated at recent appointments  -Will benefit from CPAP use    ALLERGIC RHINITIS / NASAL CONGESTION  -Start flonase spray 1-2 per nostril;    TOBACCO USE  -Encouraged cessation    Follow up 3 months     PAP Adherence:    No recent data available - did not bring machine or SD card today            Sleep Scales:  ESS: 8     REVIEW OF SYSTEMS    All other systems negative      PHYSICAL EXAM     VITAL SIGNS: There were no vitals taken for this visit.     PREVIOUS WEIGHTS:  Wt Readings from Last 3 Encounters:   08/13/24 79.4 kg (175 lb)   07/02/24 77.8 kg (171 lb 8.3 oz)   06/24/24 78.4 kg (172 lb 13.5 oz)       Constitutional: Alert and oriented, cooperative, no obvious distress   HEENT: Non icteric or anemic, EOM WNL bilaterally   Neck: Supple, no JVD, no goiter, no adenopathy, no rigidity  Extremities: No clubbing, no LL edema   Neuromuscular: Cranial nerves grossly intact, no focal deficits     RESULTS/DATA     No results found for: \"IRON\", \"TRANSFERRIN\", \"IRONSAT\", \"TIBC\", \"FERRITIN\"      ASSESSMENT/PLAN     Mr. Hardy is a 84 y.o. male and returns in followup for the following issues:    OBSTRUCTIVE SLEEP APNEA  -didn't " get n30i or heated tube yet  -Ordering n30i and heated tube from DME  -provided dream wear nasal mask to try  -recommend he unplug tube when he needs to go to bathroom at night to make process of stopping/restarting CPAP easier    HYPERTENSION  BP Readings from Last 3 Encounters:   08/29/24 104/65   08/13/24 122/71   07/02/24 129/85      -Well controlled with current treatments    Follow up 3 months

## 2024-08-29 ENCOUNTER — APPOINTMENT (OUTPATIENT)
Dept: SLEEP MEDICINE | Facility: CLINIC | Age: 84
End: 2024-08-29
Payer: MEDICARE

## 2024-08-29 VITALS
DIASTOLIC BLOOD PRESSURE: 65 MMHG | RESPIRATION RATE: 16 BRPM | HEART RATE: 70 BPM | WEIGHT: 173.5 LBS | OXYGEN SATURATION: 92 % | HEIGHT: 71 IN | BODY MASS INDEX: 24.29 KG/M2 | SYSTOLIC BLOOD PRESSURE: 104 MMHG

## 2024-08-29 DIAGNOSIS — G47.33 OSA ON CPAP: Primary | ICD-10-CM

## 2024-08-29 DIAGNOSIS — I10 ESSENTIAL HYPERTENSION: ICD-10-CM

## 2024-08-29 DIAGNOSIS — J44.9 CHRONIC OBSTRUCTIVE PULMONARY DISEASE, UNSPECIFIED COPD TYPE (MULTI): ICD-10-CM

## 2024-08-29 PROCEDURE — 1126F AMNT PAIN NOTED NONE PRSNT: CPT | Performed by: PHYSICIAN ASSISTANT

## 2024-08-29 PROCEDURE — 3074F SYST BP LT 130 MM HG: CPT | Performed by: PHYSICIAN ASSISTANT

## 2024-08-29 PROCEDURE — 3078F DIAST BP <80 MM HG: CPT | Performed by: PHYSICIAN ASSISTANT

## 2024-08-29 PROCEDURE — 99213 OFFICE O/P EST LOW 20 MIN: CPT | Performed by: PHYSICIAN ASSISTANT

## 2024-08-29 PROCEDURE — 1159F MED LIST DOCD IN RCRD: CPT | Performed by: PHYSICIAN ASSISTANT

## 2024-08-29 PROCEDURE — 1160F RVW MEDS BY RX/DR IN RCRD: CPT | Performed by: PHYSICIAN ASSISTANT

## 2024-08-29 RX ORDER — BUDESONIDE AND FORMOTEROL FUMARATE DIHYDRATE 160; 4.5 UG/1; UG/1
2 AEROSOL RESPIRATORY (INHALATION)
Qty: 10.2 G | Refills: 5 | Status: SHIPPED | OUTPATIENT
Start: 2024-08-29 | End: 2025-02-25

## 2024-08-29 ASSESSMENT — SLEEP AND FATIGUE QUESTIONNAIRES
HOW LIKELY ARE YOU TO NOD OFF OR FALL ASLEEP WHILE SITTING AND TALKING TO SOMEONE: WOULD NEVER DOZE
HOW LIKELY ARE YOU TO NOD OFF OR FALL ASLEEP WHILE SITTING AND READING: MODERATE CHANCE OF DOZING
HOW LIKELY ARE YOU TO NOD OFF OR FALL ASLEEP WHILE LYING DOWN TO REST IN THE AFTERNOON WHEN CIRCUMSTANCES PERMIT: MODERATE CHANCE OF DOZING
ESS TOTAL SCORE: 6
HOW LIKELY ARE YOU TO NOD OFF OR FALL ASLEEP IN A CAR, WHILE STOPPED FOR A FEW MINUTES IN TRAFFIC: WOULD NEVER DOZE
HOW LIKELY ARE YOU TO NOD OFF OR FALL ASLEEP WHILE SITTING INACTIVE IN A PUBLIC PLACE: WOULD NEVER DOZE
HOW LIKELY ARE YOU TO NOD OFF OR FALL ASLEEP WHEN YOU ARE A PASSENGER IN A CAR FOR AN HOUR WITHOUT A BREAK: WOULD NEVER DOZE
HOW LIKELY ARE YOU TO NOD OFF OR FALL ASLEEP WHILE SITTING QUIETLY AFTER LUNCH WITHOUT ALCOHOL: WOULD NEVER DOZE
HOW LIKELY ARE YOU TO NOD OFF OR FALL ASLEEP WHILE WATCHING TV: MODERATE CHANCE OF DOZING

## 2024-08-29 ASSESSMENT — ENCOUNTER SYMPTOMS: DEPRESSION: 0

## 2024-08-29 ASSESSMENT — PAIN SCALES - GENERAL: PAINLEVEL: 0-NO PAIN

## 2024-08-29 NOTE — PATIENT INSTRUCTIONS
Good seeing you today,    Try the mask I gave you today. Your company should send a heated tube as well which can help with dryness without causing water in your mask or tube.    Order placed to [facility] for supplies.    Follow up 3 months     Doroteo Sheets PA-C    IMPORTANT PHONE NUMBERS     [phone] ([phone])  [employer] (DME): [phone]  For clinical questions and refilling prescriptions: [phone]  Anyi Caballero (For [place]): P: [phone]

## 2024-10-02 ENCOUNTER — APPOINTMENT (OUTPATIENT)
Dept: UROLOGY | Facility: CLINIC | Age: 84
End: 2024-10-02
Payer: MEDICARE

## 2024-10-02 ENCOUNTER — LAB (OUTPATIENT)
Dept: LAB | Facility: LAB | Age: 84
End: 2024-10-02
Payer: MEDICARE

## 2024-10-02 DIAGNOSIS — C67.9 MALIGNANT NEOPLASM OF URINARY BLADDER, UNSPECIFIED SITE (MULTI): ICD-10-CM

## 2024-10-02 DIAGNOSIS — R31.9 HEMATURIA, UNSPECIFIED TYPE: ICD-10-CM

## 2024-10-02 DIAGNOSIS — N99.111 POSTPROCEDURAL BULBOUS URETHRAL STRICTURE: ICD-10-CM

## 2024-10-02 DIAGNOSIS — C67.9 MALIGNANT NEOPLASM OF URINARY BLADDER, UNSPECIFIED SITE (MULTI): Primary | ICD-10-CM

## 2024-10-02 LAB
ANION GAP SERPL CALC-SCNC: 11 MMOL/L (ref 10–20)
BUN SERPL-MCNC: 21 MG/DL (ref 6–23)
CALCIUM SERPL-MCNC: 9.9 MG/DL (ref 8.6–10.3)
CHLORIDE SERPL-SCNC: 102 MMOL/L (ref 98–107)
CO2 SERPL-SCNC: 27 MMOL/L (ref 21–32)
CREAT SERPL-MCNC: 0.72 MG/DL (ref 0.5–1.3)
EGFRCR SERPLBLD CKD-EPI 2021: 89 ML/MIN/1.73M*2
ERYTHROCYTE [DISTWIDTH] IN BLOOD BY AUTOMATED COUNT: 12.5 % (ref 11.5–14.5)
GLUCOSE SERPL-MCNC: 109 MG/DL (ref 74–99)
HCT VFR BLD AUTO: 41.8 % (ref 41–52)
HGB BLD-MCNC: 13.8 G/DL (ref 13.5–17.5)
MCH RBC QN AUTO: 31.2 PG (ref 26–34)
MCHC RBC AUTO-ENTMCNC: 33 G/DL (ref 32–36)
MCV RBC AUTO: 95 FL (ref 80–100)
NRBC BLD-RTO: 0 /100 WBCS (ref 0–0)
PLATELET # BLD AUTO: 316 X10*3/UL (ref 150–450)
POTASSIUM SERPL-SCNC: 4.3 MMOL/L (ref 3.5–5.3)
RBC # BLD AUTO: 4.42 X10*6/UL (ref 4.5–5.9)
SODIUM SERPL-SCNC: 136 MMOL/L (ref 136–145)
WBC # BLD AUTO: 8.7 X10*3/UL (ref 4.4–11.3)

## 2024-10-02 PROCEDURE — 80048 BASIC METABOLIC PNL TOTAL CA: CPT

## 2024-10-02 PROCEDURE — 52000 CYSTOURETHROSCOPY: CPT | Performed by: STUDENT IN AN ORGANIZED HEALTH CARE EDUCATION/TRAINING PROGRAM

## 2024-10-02 PROCEDURE — 36415 COLL VENOUS BLD VENIPUNCTURE: CPT

## 2024-10-02 PROCEDURE — 99214 OFFICE O/P EST MOD 30 MIN: CPT | Performed by: STUDENT IN AN ORGANIZED HEALTH CARE EDUCATION/TRAINING PROGRAM

## 2024-10-02 PROCEDURE — 85027 COMPLETE CBC AUTOMATED: CPT

## 2024-10-02 RX ORDER — SODIUM CHLORIDE, SODIUM LACTATE, POTASSIUM CHLORIDE, CALCIUM CHLORIDE 600; 310; 30; 20 MG/100ML; MG/100ML; MG/100ML; MG/100ML
20 INJECTION, SOLUTION INTRAVENOUS CONTINUOUS
Status: CANCELLED | OUTPATIENT
Start: 2024-10-02

## 2024-10-02 RX ORDER — CEFAZOLIN SODIUM 2 G/100ML
2 INJECTION, SOLUTION INTRAVENOUS ONCE
Status: CANCELLED | OUTPATIENT
Start: 2024-10-02 | End: 2024-10-02

## 2024-10-02 NOTE — PROGRESS NOTES
Subjective   Patient ID: Shiva Hardy is a 84 y.o. male.    HPI    84 y/o M s/p cystoscopy with excision of tissue 7/2/24. Final pathology showed largely denuded urothelial mucosa with reactive atypia, chronic inflammation, and granulation tissue reaction with focal hemorrhage.      h/o bladder cancer s/p reTURBT 10/6/23, s/p BCG.      He tolerated procedure well. No major issues after.      FINAL DIAGNOSIS   A. RIGHT BLADDER WALL, BIOPSY:  -- LARGELY DENUDED UROTHELIAL MUCOSA WITH REACTIVE ATYPIA, CHRONIC INFLAMMATION, AND GRANULATION TISSUE REACTION WITH FOCAL HEMORRHAGE.        Review of Systems  A complete review of systems was performed. All systems are noted to be negative unless indicated in the history of present illness, impression, active problem list, or past histories.    Objective   Physical Exam    Assessment/Plan       84 y/o M s/p cystoscopy with excision of tissue 7/2/24. Final pathology showed largely denuded urothelial mucosa with reactive atypia, chronic inflammation, and granulation tissue reaction with focal hemorrhage.      h/o bladder cancer s/p reTURBT 10/6/23, s/p BCG.      Today cystoscopy revealed a urethral stricture with near complete obliteration of the urethra, wire was used to bypass the stricture, Talbert catheter placed over a wire. Clear urine afterwards.     Plan:  Will proceed with Cystoscopy with visual urethrotomy on Friday  FU next Wednesday POV.  Will get lab work CBC and BMP.    Diagnoses and all orders for this visit:  Malignant neoplasm of urinary bladder, unspecified site (Multi)  -     Case Request Operating Room: Cystoscopy with Urethrotomy; Standing  -     CBC; Future  -     Basic Metabolic Panel; Future  Hematuria, unspecified type  Postprocedural bulbous urethral stricture  -     Case Request Operating Room: Cystoscopy with Urethrotomy; Standing  -     CBC; Future  -     Basic Metabolic Panel; Future  Other orders  -     NPO Diet Except: Sips with meds; Effective  now; Standing  -     Height and weight; Standing  -     Insert and maintain peripheral IV; Standing  -     Saline lock IV; Standing  -     POCT Glucose; Standing  -     Type And Screen; Standing  -     Inpatient consult to Respiratory Care; Standing  -     lactated Ringer's infusion  -     Place in outpatient/hospital ambulatory surgery; Standing  -     Full code; Standing  -     ceFAZolin (Ancef) 2 g in dextrose (iso)  mL      Scribe Attestation  By signing my name below, ISita Scribe attest that this documentation has been prepared under the direction and in the presence of Irwin Kelsey MD.

## 2024-10-02 NOTE — LETTER
Ella Hardy accompanied Shiva Hardy to the Urology department on 10/2/2024. They may return to work on 10/3 - 10/9 due to continued care needed at this time.     If you have any questions or concerns, please don't hesitate to call.      Thank you  Dr. Irwin Kelsey

## 2024-10-03 ENCOUNTER — PRE-ADMISSION TESTING (OUTPATIENT)
Dept: PREADMISSION TESTING | Facility: HOSPITAL | Age: 84
End: 2024-10-03
Payer: MEDICARE

## 2024-10-03 ENCOUNTER — ANESTHESIA EVENT (OUTPATIENT)
Dept: OPERATING ROOM | Facility: HOSPITAL | Age: 84
End: 2024-10-03
Payer: MEDICARE

## 2024-10-03 VITALS — WEIGHT: 178 LBS | BODY MASS INDEX: 24.92 KG/M2 | HEIGHT: 71 IN

## 2024-10-03 DIAGNOSIS — Z01.818 PREOP TESTING: ICD-10-CM

## 2024-10-03 DIAGNOSIS — N39.9 RENAL AND UROLOGIC DISORDERS: ICD-10-CM

## 2024-10-03 DIAGNOSIS — N28.9 RENAL AND UROLOGIC DISORDERS: ICD-10-CM

## 2024-10-03 DIAGNOSIS — R19.04 LEFT LOWER QUADRANT ABDOMINAL MASS: Primary | ICD-10-CM

## 2024-10-03 RX ORDER — CHLORHEXIDINE GLUCONATE 40 MG/ML
SOLUTION TOPICAL DAILY
Qty: 354 ML | Refills: 0 | Status: SHIPPED | OUTPATIENT
Start: 2024-10-03 | End: 2024-10-08

## 2024-10-03 RX ORDER — CHLORHEXIDINE GLUCONATE ORAL RINSE 1.2 MG/ML
15 SOLUTION DENTAL DAILY
Qty: 30 ML | Refills: 0 | Status: SHIPPED | OUTPATIENT
Start: 2024-10-03 | End: 2024-10-05

## 2024-10-03 ASSESSMENT — DUKE ACTIVITY SCORE INDEX (DASI)
CAN YOU RUN A SHORT DISTANCE: NO
CAN YOU WALK INDOORS, SUCH AS AROUND YOUR HOUSE: YES
CAN YOU DO MODERATE WORK AROUND THE HOUSE LIKE VACUUMING, SWEEPING FLOORS OR CARRYING GROCERIES: YES
CAN YOU WALK A BLOCK OR TWO ON LEVEL GROUND: YES
CAN YOU PARTICIPATE IN MODERATE RECREATIONAL ACTIVITIES LIKE GOLF, BOWLING, DANCING, DOUBLES TENNIS OR THROWING A BASEBALL OR FOOTBALL: NO
CAN YOU DO HEAVY WORK AROUND THE HOUSE LIKE SCRUBBING FLOORS OR LIFTING AND MOVING HEAVY FURNITURE: NO
CAN YOU TAKE CARE OF YOURSELF (EAT, DRESS, BATHE, OR USE TOILET): YES
CAN YOU CLIMB A FLIGHT OF STAIRS OR WALK UP A HILL: NO
CAN YOU PARTICIPATE IN STRENOUS SPORTS LIKE SWIMMING, SINGLES TENNIS, FOOTBALL, BASKETBALL, OR SKIING: NO
CAN YOU DO LIGHT WORK AROUND THE HOUSE LIKE DUSTING OR WASHING DISHES: YES
CAN YOU DO YARD WORK LIKE RAKING LEAVES, WEEDING OR PUSHING A MOWER: YES

## 2024-10-03 NOTE — PROGRESS NOTES
Patient ID: Shiva Hardy is a 84 y.o. male.    Procedures  PROCEDURE NOTE:    PREOPERATIVE DIAGNOSIS:  Bladder cancer    POSTOPERATIVE DIAGNOSIS:  Bladder cancer  Urethral stricture    OPERATION:  Flexible Cystourethroscopy  Complex Talbert catheter insertion over a wire    SURGEON:  Irwin Kelsey MD    ANESTHESIA:  2%  lidocaine jelly    COMPLICATIONS:  None    EBL: Minimal    SPECIMEN:  Voided urine was not collected and submitted for cytology.    DISPOSITION:  The patient was discharged home after the procedure, per routine.    INDICATIONS: :  Mr. Hardy is a 84 y.o. patient with a history of HG NMIBC s/p BCG who presents today for Cystoscopy.     The indications, risks and benefits of this procedure were discussed with the patient, consent was obtained prior to the procedure, and to the best of my judgement the patient seemed to understand and agree to the procedure.    PROCEDURE:  The patient  was brought into the procedure suite and informed consent was reviewed and confirmed. Vital signs were obtained prior to the procedure: There were no vitals taken for this visit..  The patient was escorted onto the stretcher, placed supine, prepped with betadine and draped in the usual standard surgical fashion.  Intraurethral 2% viscous lidocaine jelly was used for local analgesia.  A 16 Yoruba flexible cystourethroscope was inserted into the urethra.   The penile urethra was completely obliterated at the level of the bulbar urethra, was bypassed using a glidewire. The cystoscope was then introduced over the wire into the bladder.  The prostate urethra was absent.  Upon entering the bladder the entire bladder was surveyed in a 360 degree fashion.  The left and right ureteral orifices were in normal orthotopic position effluxing clear yellow urine, bilaterally.   There was no evidence of any bladder lesions, foreign objects, stones. There was a small area of reddness over the left lateral wall. The cystoscope was  then retroflexed.  The bladder neck was then further examined without any evidence of lesions. The scope was then removed and in an antegrade fashion, leaving the glidewire in.  The cystoscope was then fully removed.      An 18 Fr [employer] [place] [employer] [place] tip catheter was inserted over the glidewire, meeting some resistance, eventually making it into the bladder, draining clear urine. The patient tolerated the procedure well.  Vitals were stable after the procedure.  The patient was able to void and was discharged home.  Verbal and written Post procedure instructions were reviewed with the patient.    IMPRESSION:  Urethral stricture, near completely obliterated  Left lateral wall erythematous spots    PLAN:  Take to OR for cystoscopy and visual urethrotomy with Talbert catheter insertion and bladder biopsy

## 2024-10-03 NOTE — ANESTHESIA PREPROCEDURE EVALUATION
Patient: Shiva Hardy    Procedure Information       Date/Time: 10/04/24 8795    Procedure: Cystoscopy with Urethrotomy    Location: [facility] / [facility]    Surgeons: Irwin Kelsey MD            Relevant Problems   Anesthesia (within normal limits)      Cardiac   (+) Aortic aneurysm (CMS-HCC)   (+) Essential hypertension   (+) Hyperlipidemia      Pulmonary   (+) Asthma   (+) COPD (chronic obstructive pulmonary disease) (Multi)   (+) Centrilobular emphysema (Multi)   (+) Multiple lung nodules on CT   (+) YVETTE on CPAP      GI (within normal limits)      /Renal (within normal limits)      Liver (within normal limits)      Endocrine (within normal limits)   (+) Prediabetes      Hematology (within normal limits)      Musculoskeletal   (+) Spondylosis without myelopathy or radiculopathy, lumbar region   (+) Spondylosis, thoracic      HEENT   (+) Bilateral hearing loss   (+) Seasonal allergies      Skin (within normal limits)      GYN (within normal limits)      Respiratory   (+) Lung nodule      Circulatory   (+) Ascending aorta dilation (CMS-HCC)   (+) Irregular heartbeat      Genitourinary   (+) Malignant neoplasm of urinary bladder (Multi)   (+) Postprocedural bulbous urethral stricture      Genitourinary and Reproductive   (+) S/P TURP      Hematology and Neoplasia   (+) History of prostate cancer     There were no vitals filed for this visit.    Past Surgical History:   Procedure Laterality Date    CATARACT EXTRACTION  10/01/2014    Cataract Surgery    HERNIA REPAIR  11/03/2015    Hernia Repair    OTHER SURGICAL HISTORY  01/25/2021    Spinal surgery    OTHER SURGICAL HISTORY  01/25/2021    Lower back surgery    OTHER SURGICAL HISTORY  12/16/2016    Destruction Of Malignant Lesion Face    OTHER SURGICAL HISTORY  05/08/2019    Skin biopsy    PROSTATE SURGERY  10/01/2014    Prostate Surgery    UMBILICAL HERNIA REPAIR  12/16/2016    Umbilical Hernia Repair     Past Medical History:   Diagnosis Date     Acute maxillary sinusitis, unspecified 12/19/2019    Acute maxillary sinusitis    Acute recurrent maxillary sinusitis 12/28/2016    Acute recurrent maxillary sinusitis    Acute upper respiratory infection, unspecified 03/13/2017    Acute URI    Acute upper respiratory infection, unspecified 12/30/2020    Viral URI with cough    Arthritis     Bilateral inguinal hernia, without obstruction or gangrene, not specified as recurrent 03/04/2014    Bilateral inguinal hernia    BPH (benign prostatic hyperplasia)     Chest pain, unspecified 12/16/2016    Intermittent chest pain    Chronic obstructive pulmonary disease with (acute) exacerbation (Multi) 12/19/2019    Acute exacerbation of chronic obstructive pulmonary disease (COPD)    Chronic pain disorder     back pain- chronic    Edema, unspecified 05/27/2021    Parotid swelling    Hearing aid worn     HL (hearing loss)     Hyperlipidemia     Hypertension     Impacted cerumen, right ear 01/06/2017    Excessive cerumen in right ear canal    Low back pain, unspecified 07/25/2019    Acute exacerbation of chronic low back pain    Personal history of malignant neoplasm of prostate     History of malignant neoplasm of prostate    Personal history of malignant neoplasm, unspecified     Personal history of malignant neoplasm    Personal history of other diseases of the digestive system 05/27/2021    History of parotitis    Personal history of other diseases of the respiratory system 02/22/2017    History of pulmonary emphysema    Personal history of other endocrine, nutritional and metabolic disease 04/20/2017    History of hyperkalemia    Prostate cancer (Multi)     Sleep apnea     uses CPAP    Unspecified malignant neoplasm of skin of unspecified part of face 12/16/2016    Skin cancer of face    Unspecified symptoms and signs involving the genitourinary system 12/23/2021    UTI symptoms     No current facility-administered medications for this encounter.    Current Outpatient  Medications:     acetaminophen (Tylenol) 500 mg tablet, Take 1 tablet (500 mg) by mouth every 6 hours if needed for mild pain (1 - 3)., Disp: , Rfl:     albuterol 90 mcg/actuation aerosol powdr breath activated inhaler, Inhale 2 puffs every 4 hours if needed for wheezing or shortness of breath., Disp: , Rfl:     amLODIPine (Norvasc) 5 mg tablet, Take 1 tablet (5 mg) by mouth once daily in the morning. Do not take until you follow up with cardiology, Dr. Cloud., Disp: , Rfl:     aspirin 81 mg EC tablet, Take 1 tablet (81 mg) by mouth once daily in the morning., Disp: , Rfl:     atorvastatin (Lipitor) 20 mg tablet, TAKE 1 TABLET BY MOUTH ONCE DAILY AT BEDTIME, Disp: 90 tablet, Rfl: 0    budesonide-formoteroL (Symbicort) 160-4.5 mcg/actuation inhaler, Inhale 2 puffs 2 times a day. Rinse mouth with water after use to reduce aftertaste and incidence of candidiasis. Do not swallow., Disp: 10.2 g, Rfl: 5    chlorhexidine (Hibiclens) 4 % external liquid, Apply topically once daily for 5 days., Disp: 354 mL, Rfl: 0    chlorhexidine (Peridex) 0.12 % solution, Use 15 mL in the mouth or throat once daily for 2 days. Once the night before surgery and once the morning of, Disp: 30 mL, Rfl: 0    ergocalciferol (Vitamin D-2) 1.25 MG (06739 UT) capsule, Take 1 capsule (1,250 mcg) by mouth 1 (one) time per week., Disp: 90 capsule, Rfl: 0    gabapentin (Neurontin) 300 mg capsule, TAKE 1 CAPSULE BY MOUTH THREE TIMES DAILY, Disp: 90 capsule, Rfl: 0    ipratropium-albuteroL (Duo-Neb) 0.5-2.5 mg/3 mL nebulizer solution, Take 3 mL by nebulization every 6 hours if needed for shortness of breath., Disp: 360 mL, Rfl: 5    montelukast (Singulair) 10 mg tablet, Take 1 tablet (10 mg) by mouth once daily at bedtime., Disp: 30 tablet, Rfl: 5    traMADol (Ultram) 50 mg tablet, Take 1 tablet (50 mg) by mouth., Disp: , Rfl:     umeclidinium (Incruse Ellipta) 62.5 mcg/actuation inhalation, Inhale 1 puff (62.5 mcg) once daily. (Patient not taking:  Reported on 8/29/2024), Disp: 1 each, Rfl: 5  Prior to Admission medications    Medication Sig Start Date End Date Taking? Authorizing Provider   acetaminophen (Tylenol) 500 mg tablet Take 1 tablet (500 mg) by mouth every 6 hours if needed for mild pain (1 - 3).    Historical Provider, MD   albuterol 90 mcg/actuation aerosol powdr breath activated inhaler Inhale 2 puffs every 4 hours if needed for wheezing or shortness of breath.    Historical Provider, MD   amLODIPine (Norvasc) 5 mg tablet Take 1 tablet (5 mg) by mouth once daily in the morning. Do not take until you follow up with cardiology, Dr. Cloud. 11/13/23   Brooke Chilel MD   aspirin 81 mg EC tablet Take 1 tablet (81 mg) by mouth once daily in the morning.    Historical Provider, MD   atorvastatin (Lipitor) 20 mg tablet TAKE 1 TABLET BY MOUTH ONCE DAILY AT BEDTIME 8/23/24   Adria Galeano MD   budesonide-formoteroL (Symbicort) 160-4.5 mcg/actuation inhaler Inhale 2 puffs 2 times a day. Rinse mouth with water after use to reduce aftertaste and incidence of candidiasis. Do not swallow. 8/29/24 2/25/25  CHILANGO Faulkner-CNP   chlorhexidine (Hibiclens) 4 % external liquid Apply topically once daily for 5 days. 10/3/24 10/8/24  Piper Alexandre PA-C   chlorhexidine (Peridex) 0.12 % solution Use 15 mL in the mouth or throat once daily for 2 days. Once the night before surgery and once the morning of 10/3/24 10/5/24  Piper Alexandre PA-C   ergocalciferol (Vitamin D-2) 1.25 MG (92567 UT) capsule Take 1 capsule (1,250 mcg) by mouth 1 (one) time per week. 5/7/24   Adria Galeano MD   gabapentin (Neurontin) 300 mg capsule TAKE 1 CAPSULE BY MOUTH THREE TIMES DAILY 7/16/24   Adria Galeano MD   ipratropium-albuteroL (Duo-Neb) 0.5-2.5 mg/3 mL nebulizer solution Take 3 mL by nebulization every 6 hours if needed for shortness of breath. 4/24/24   Manish Sparks APRN-CNP   montelukast (Singulair) 10 mg tablet Take 1 tablet (10 mg) by mouth once daily at bedtime.  7/16/24 1/12/25  Adria Galeano MD   traMADol (Ultram) 50 mg tablet Take 1 tablet (50 mg) by mouth. 7/24/24   Historical Provider, MD   umeclidinium (Incruse Ellipta) 62.5 mcg/actuation inhalation Inhale 1 puff (62.5 mcg) once daily.  Patient not taking: Reported on 8/29/2024 7/11/24   Manish Sparks, APRN-CNP     Allergies   Allergen Reactions    Percocet [Oxycodone-Acetaminophen] Nausea Only     Social History     Tobacco Use    Smoking status: Every Day     Current packs/day: 1.00     Average packs/day: 1 pack/day for 66.8 years (66.8 ttl pk-yrs)     Types: Cigarettes     Start date: 1958    Smokeless tobacco: Never    Tobacco comments:     Currently smokes 1/2 or a little more PPD trying to cut back   Substance Use Topics    Alcohol use: Not Currently         Chemistry    Lab Results   Component Value Date/Time     10/02/2024 1447    K 4.3 10/02/2024 1447     10/02/2024 1447    CO2 27 10/02/2024 1447    BUN 21 10/02/2024 1447    CREATININE 0.72 10/02/2024 1447    Lab Results   Component Value Date/Time    CALCIUM 9.9 10/02/2024 1447    ALKPHOS 62 11/09/2023 1045    AST 21 11/09/2023 1045    ALT 20 11/09/2023 1045    BILITOT 0.4 11/09/2023 1045          Lab Results   Component Value Date/Time    WBC 8.7 10/02/2024 1447    HGB 13.8 10/02/2024 1447    HCT 41.8 10/02/2024 1447     10/02/2024 1447     Lab Results   Component Value Date/Time    PROTIME 11.4 11/09/2023 1045    INR 1.0 11/09/2023 1045     Encounter Date: 07/02/24   ECG 12 Lead   Result Value    Ventricular Rate 63    Atrial Rate 63    TX Interval 188    QRS Duration 86    QT Interval 422    QTC Calculation(Bazett) 431    P Axis 50    R Axis 34    T Axis 50    QRS Count 10    Q Onset 218    P Onset 124    P Offset 192    T Offset 429    QTC Fredericia 429    Narrative    Normal sinus rhythm  Cannot rule out Anterior infarct , age undetermined  Abnormal ECG  When compared with ECG of 02-JAN-2024 15:04,  Premature atrial complexes are  no longer Present  Confirmed by Pedro Luis Salinas ([id number]) on 7/8/2024 2:25:59 PM     No results found for this or any previous visit from the past 1095 days.    Clinical information reviewed:                 Chart reviewed.  Recent cardiac clearance on chart from 6/24/24.  No issues or complications with anesthesia on 1/5/24 or 7/2/24.    CT cardiac scoring 4/22/24-  IMPRESSION:  1. Coronary artery calcium score of 1476*.  2. Ascending aortic aneurysm measuring 4.2 cm on axial images at the level of the main pulmonary artery. Recommend follow-up radiation sparing cardiac MRI and thoracic MRA to assess status of the aortic valve and size and extent of the aneurysm in approximately 12 months.  3. Paraseptal and centrilobular emphysematous changes.  4. Noncalcified pulmonary nodules as described (max 5 mm). Incidental Finding:  A non-calcified pulmonary nodule/multiple non-calcified pulmonary nodules measuring less than 6 mm, likely benign.  (**-YCF-**)    Echo 6/28/23 (per note from cardiology)-  EF 60-65%  Mild left atrial enlargement  Mildly dilated aortic root/ascending aorta  Mild AI, Moderate MR, Mild TR, Moderate LVH, Mild pulm HTN, Normal CVP    NPO Detail:  No data recorded     Physical Exam    Airway  Mallampati: II     Cardiovascular - normal exam     Dental    Pulmonary - normal exam     Abdominal - normal exam             Anesthesia Plan    History of general anesthesia?: yes  History of complications of general anesthesia?: no    ASA 3     general     The patient is not a current smoker.  Patient did not smoke on day of procedure.    intravenous induction   Anesthetic plan and risks discussed with patient.

## 2024-10-03 NOTE — PREPROCEDURE INSTRUCTIONS
Medication List            Accurate as of October 3, 2024 10:53 AM. Always use your most recent med list.                acetaminophen 500 mg tablet  Commonly known as: Tylenol  Medication Adjustments for Surgery: Take/Use as prescribed     albuterol 90 mcg/actuation aerosol powdr breath activated inhaler  Medication Adjustments for Surgery: Take on the morning of surgery     amLODIPine 5 mg tablet  Commonly known as: Norvasc  Take 1 tablet (5 mg) by mouth once daily in the morning. Do not take until you follow up with cardiology, Dr. Cloud.  Medication Adjustments for Surgery: Take/Use as prescribed     aspirin 81 mg EC tablet  Medication Adjustments for Surgery: Do Not take on the morning of surgery     atorvastatin 20 mg tablet  Commonly known as: Lipitor  TAKE 1 TABLET BY MOUTH ONCE DAILY AT BEDTIME  Medication Adjustments for Surgery: Take last dose 3 days before surgery     budesonide-formoteroL 160-4.5 mcg/actuation inhaler  Commonly known as: Symbicort  Inhale 2 puffs 2 times a day. Rinse mouth with water after use to reduce aftertaste and incidence of candidiasis. Do not swallow.  Medication Adjustments for Surgery: Take on the morning of surgery     * chlorhexidine 4 % external liquid  Commonly known as: Hibiclens  Apply topically once daily for 5 days.  Medication Adjustments for Surgery: Take on the morning of surgery     * chlorhexidine 0.12 % solution  Commonly known as: Peridex  Use 15 mL in the mouth or throat once daily for 2 days. Once the night before surgery and once the morning of  Medication Adjustments for Surgery: Take on the morning of surgery     ergocalciferol 1.25 MG (89134 UT) capsule  Commonly known as: Vitamin D-2  Take 1 capsule (1,250 mcg) by mouth 1 (one) time per week.  Medication Adjustments for Surgery: Take/Use as prescribed     gabapentin 300 mg capsule  Commonly known as: Neurontin  TAKE 1 CAPSULE BY MOUTH THREE TIMES DAILY  Medication Adjustments for Surgery: Take/Use  as prescribed     Incruse Ellipta 62.5 mcg/actuation inhalation  Generic drug: umeclidinium  Inhale 1 puff (62.5 mcg) once daily.     ipratropium-albuteroL 0.5-2.5 mg/3 mL nebulizer solution  Commonly known as: Duo-Neb  Take 3 mL by nebulization every 6 hours if needed for shortness of breath.  Medication Adjustments for Surgery: Take on the morning of surgery     montelukast 10 mg tablet  Commonly known as: Singulair  Take 1 tablet (10 mg) by mouth once daily at bedtime.  Medication Adjustments for Surgery: Take on the morning of surgery     traMADol 50 mg tablet  Commonly known as: Ultram  Medication Adjustments for Surgery: Take/Use as prescribed           * This list has 2 medication(s) that are the same as other medications prescribed for you. Read the directions carefully, and ask your doctor or other care provider to review them with you.                                  NPO Instructions:    Do not eat any food after midnight the night before your surgery/procedure.  You may have clear liquids until TWO hours before surgery/procedure. This includes water, black tea/coffee, (no milk or cream) apple juice and electrolyte drinks (Gatorade).  You may chew gum up to TWO hours before your surgery/procedure.    Additional Instructions:     Review your medication instructions, take indicated medications  You may have clear liquids until TWO hours before surgery/procedure.  This includes water, black tea/coffee, (no milk or cream) apple juice and electrolyte drinks (Gatorade)  You may chew gum up to TWO hours before your surgery/procedure  Wear  comfortable loose fitting clothing  Do not use moisturizers, creams, lotions or perfume  All jewelry and valuables should be left at home    We will call you the business day before your procedure between 1-3p to confirm your procedure and arrival time  Please park in the back of the hospital, in the ED parking and proceed to the second floor  Please make arrangements to have  a responsible adult take you home and stay with you for 24 hours  Please bring your ID and insurance card to your procedure  Please shower or bathe the morning of your procedure with antibacterial soap. You may receive instructions for Hibiclens shower.   Shower as you normally would do   Use soap from neck down, focusing on area that is having the surgery (avoid eyes and genitals)   Turn water off and let dry for three minutes.   Turn water back on and rinse off  When checked in to the outpatient unit, you will be asked to change into a hospital gown and remove all clothing, including underwear  An IV will be inserted   Your family or  will be asked to wait in the waiting room or cafeteria and will be notified when able to come sit with you  Please call [phone] with any further questions

## 2024-10-03 NOTE — PREPROCEDURE INSTRUCTIONS
Medication List            Accurate as of October 3, 2024  3:44 PM. Always use your most recent med list.                acetaminophen 500 mg tablet  Commonly known as: Tylenol  Medication Adjustments for Surgery: Take/Use as prescribed     albuterol 90 mcg/actuation aerosol powdr breath activated inhaler  Medication Adjustments for Surgery: Take on the morning of surgery     amLODIPine 5 mg tablet  Commonly known as: Norvasc  Take 1 tablet (5 mg) by mouth once daily in the morning. Do not take until you follow up with cardiology, Dr. Cloud.  Medication Adjustments for Surgery: Take on the morning of surgery     aspirin 81 mg EC tablet  Medication Adjustments for Surgery: Take last dose 1 day (24 hours) before surgery     atorvastatin 20 mg tablet  Commonly known as: Lipitor  TAKE 1 TABLET BY MOUTH ONCE DAILY AT BEDTIME  Medication Adjustments for Surgery: Take last dose 1 day (24 hours) before surgery     budesonide-formoteroL 160-4.5 mcg/actuation inhaler  Commonly known as: Symbicort  Inhale 2 puffs 2 times a day. Rinse mouth with water after use to reduce aftertaste and incidence of candidiasis. Do not swallow.  Medication Adjustments for Surgery: Take on the morning of surgery     * chlorhexidine 4 % external liquid  Commonly known as: Hibiclens  Apply topically once daily for 5 days.  Medication Adjustments for Surgery: Take on the morning of surgery     * chlorhexidine 0.12 % solution  Commonly known as: Peridex  Use 15 mL in the mouth or throat once daily for 2 days. Once the night before surgery and once the morning of  Medication Adjustments for Surgery: Take on the morning of surgery     ergocalciferol 1.25 MG (95555 UT) capsule  Commonly known as: Vitamin D-2  Take 1 capsule (1,250 mcg) by mouth 1 (one) time per week.  Medication Adjustments for Surgery: Take last dose 1 day (24 hours) before surgery     gabapentin 300 mg capsule  Commonly known as: Neurontin  TAKE 1 CAPSULE BY MOUTH THREE TIMES  DAILY  Medication Adjustments for Surgery: Take last dose 1 day (24 hours) before surgery     Incruse Ellipta 62.5 mcg/actuation inhalation  Generic drug: umeclidinium  Inhale 1 puff (62.5 mcg) once daily.  Medication Adjustments for Surgery: Take on the morning of surgery     ipratropium-albuteroL 0.5-2.5 mg/3 mL nebulizer solution  Commonly known as: Duo-Neb  Take 3 mL by nebulization every 6 hours if needed for shortness of breath.  Medication Adjustments for Surgery: Take on the morning of surgery     montelukast 10 mg tablet  Commonly known as: Singulair  Take 1 tablet (10 mg) by mouth once daily at bedtime.  Medication Adjustments for Surgery: Take on the morning of surgery     traMADol 50 mg tablet  Commonly known as: Ultram  Medication Adjustments for Surgery: Take/Use as prescribed           * This list has 2 medication(s) that are the same as other medications prescribed for you. Read the directions carefully, and ask your doctor or other care provider to review them with you.                                  NPO Instructions:    Do not eat any food after midnight the night before your surgery/procedure.  May have clears up to 3 hours preop. Water, Black coffee, tea, gatorade, and clear soda. (11am)  Remember to stop drinking by mouth 3 hours prior to procedure. No gum, candy, mints or smoking.      Additional Instructions:     Review your medication instructions, take indicated medications  Wear  comfortable loose fitting clothing  All jewelry and valuables should be left at home    Park in back of hospital by ER. Come up to Second floor-Outpt dept to check in.  Bring Photo ID and Insurance card,   You MUST have a  with you.  No more than 2 visitors with you please.      If you get ill at all before your procedure- CALL YOUR DOCTOR/SURGEON.  We want you in the best shape that is possible. Any sickness might lead to your procedure being delayed.      Call Outpatient dept at [phone] the night  before your procedure (Friday for Monday procedure), between 1-3 pm.        Try not to smoke day of the procedure.       You will have 2 prescriptions called into your pharmacy today- Hibiclens (chlorhexidine) Dental Rinse and liquid soap.  Use soap with your shower once a day x 2 days-including day of procedure- Get skin wet, apply soap- leave on for 3 minutes then rinse. No lotion, powder in that area.  Use dental rinse night before and morning of procedure. 1 capful for 30 seconds, gargle and spit out. Do not rinse with water afterwards.

## 2024-10-04 ENCOUNTER — HOSPITAL ENCOUNTER (OUTPATIENT)
Facility: HOSPITAL | Age: 84
Setting detail: OUTPATIENT SURGERY
Discharge: HOME | End: 2024-10-04
Attending: STUDENT IN AN ORGANIZED HEALTH CARE EDUCATION/TRAINING PROGRAM | Admitting: STUDENT IN AN ORGANIZED HEALTH CARE EDUCATION/TRAINING PROGRAM
Payer: MEDICARE

## 2024-10-04 ENCOUNTER — ANESTHESIA (OUTPATIENT)
Dept: OPERATING ROOM | Facility: HOSPITAL | Age: 84
End: 2024-10-04
Payer: MEDICARE

## 2024-10-04 ENCOUNTER — PHARMACY VISIT (OUTPATIENT)
Dept: PHARMACY | Facility: CLINIC | Age: 84
End: 2024-10-04
Payer: MEDICARE

## 2024-10-04 VITALS
HEART RATE: 65 BPM | OXYGEN SATURATION: 95 % | SYSTOLIC BLOOD PRESSURE: 120 MMHG | BODY MASS INDEX: 24.92 KG/M2 | HEIGHT: 71 IN | WEIGHT: 178 LBS | RESPIRATION RATE: 14 BRPM | TEMPERATURE: 97.5 F | DIASTOLIC BLOOD PRESSURE: 76 MMHG

## 2024-10-04 DIAGNOSIS — N99.111 POSTPROCEDURAL BULBOUS URETHRAL STRICTURE: ICD-10-CM

## 2024-10-04 DIAGNOSIS — Z79.899 MEDICATION MANAGEMENT: ICD-10-CM

## 2024-10-04 DIAGNOSIS — C67.9 MALIGNANT NEOPLASM OF URINARY BLADDER, UNSPECIFIED SITE (MULTI): Primary | ICD-10-CM

## 2024-10-04 LAB
APPEARANCE UR: ABNORMAL
BILIRUB UR STRIP.AUTO-MCNC: NEGATIVE MG/DL
COLOR UR: YELLOW
GLUCOSE UR STRIP.AUTO-MCNC: NEGATIVE MG/DL
HOLD SPECIMEN: NORMAL
HYALINE CASTS #/AREA URNS AUTO: ABNORMAL /LPF
KETONES UR STRIP.AUTO-MCNC: ABNORMAL MG/DL
LEUKOCYTE ESTERASE UR QL STRIP.AUTO: ABNORMAL
MUCOUS THREADS #/AREA URNS AUTO: ABNORMAL /LPF
NITRITE UR QL STRIP.AUTO: NEGATIVE
PH UR STRIP.AUTO: 6 [PH]
PROT UR STRIP.AUTO-MCNC: ABNORMAL MG/DL
RBC # UR STRIP.AUTO: ABNORMAL /UL
RBC #/AREA URNS AUTO: >20 /HPF
SP GR UR STRIP.AUTO: >1.03
UROBILINOGEN UR STRIP.AUTO-MCNC: ABNORMAL MG/DL
WBC #/AREA URNS AUTO: >50 /HPF

## 2024-10-04 PROCEDURE — 88342 IMHCHEM/IMCYTCHM 1ST ANTB: CPT | Performed by: PATHOLOGY

## 2024-10-04 PROCEDURE — 87086 URINE CULTURE/COLONY COUNT: CPT | Mod: GENLAB | Performed by: PHYSICIAN ASSISTANT

## 2024-10-04 PROCEDURE — 2500000004 HC RX 250 GENERAL PHARMACY W/ HCPCS (ALT 636 FOR OP/ED): Performed by: STUDENT IN AN ORGANIZED HEALTH CARE EDUCATION/TRAINING PROGRAM

## 2024-10-04 PROCEDURE — 2721000 HC OR 272 NO HCPCS: Performed by: STUDENT IN AN ORGANIZED HEALTH CARE EDUCATION/TRAINING PROGRAM

## 2024-10-04 PROCEDURE — 3700000002 HC GENERAL ANESTHESIA TIME - EACH INCREMENTAL 1 MINUTE: Performed by: STUDENT IN AN ORGANIZED HEALTH CARE EDUCATION/TRAINING PROGRAM

## 2024-10-04 PROCEDURE — 2500000004 HC RX 250 GENERAL PHARMACY W/ HCPCS (ALT 636 FOR OP/ED): Performed by: NURSE ANESTHETIST, CERTIFIED REGISTERED

## 2024-10-04 PROCEDURE — 80307 DRUG TEST PRSMV CHEM ANLYZR: CPT | Mod: GENLAB | Performed by: ANESTHESIOLOGY

## 2024-10-04 PROCEDURE — 2500000005 HC RX 250 GENERAL PHARMACY W/O HCPCS: Performed by: STUDENT IN AN ORGANIZED HEALTH CARE EDUCATION/TRAINING PROGRAM

## 2024-10-04 PROCEDURE — 3600000009 HC OR TIME - EACH INCREMENTAL 1 MINUTE - PROCEDURE LEVEL FOUR: Performed by: STUDENT IN AN ORGANIZED HEALTH CARE EDUCATION/TRAINING PROGRAM

## 2024-10-04 PROCEDURE — 88305 TISSUE EXAM BY PATHOLOGIST: CPT | Performed by: PATHOLOGY

## 2024-10-04 PROCEDURE — 52204 CYSTOSCOPY W/BIOPSY(S): CPT | Performed by: STUDENT IN AN ORGANIZED HEALTH CARE EDUCATION/TRAINING PROGRAM

## 2024-10-04 PROCEDURE — 81001 URINALYSIS AUTO W/SCOPE: CPT | Performed by: PHYSICIAN ASSISTANT

## 2024-10-04 PROCEDURE — 7100000010 HC PHASE TWO TIME - EACH INCREMENTAL 1 MINUTE: Performed by: STUDENT IN AN ORGANIZED HEALTH CARE EDUCATION/TRAINING PROGRAM

## 2024-10-04 PROCEDURE — 3600000004 HC OR TIME - INITIAL BASE CHARGE - PROCEDURE LEVEL FOUR: Performed by: STUDENT IN AN ORGANIZED HEALTH CARE EDUCATION/TRAINING PROGRAM

## 2024-10-04 PROCEDURE — 7100000002 HC RECOVERY ROOM TIME - EACH INCREMENTAL 1 MINUTE: Performed by: STUDENT IN AN ORGANIZED HEALTH CARE EDUCATION/TRAINING PROGRAM

## 2024-10-04 PROCEDURE — 82570 ASSAY OF URINE CREATININE: CPT | Mod: 59,GENLAB | Performed by: ANESTHESIOLOGY

## 2024-10-04 PROCEDURE — 80358 DRUG SCREENING METHADONE: CPT | Mod: GENLAB | Performed by: ANESTHESIOLOGY

## 2024-10-04 PROCEDURE — 80355 GABAPENTIN NON-BLOOD: CPT | Performed by: ANESTHESIOLOGY

## 2024-10-04 PROCEDURE — 3700000001 HC GENERAL ANESTHESIA TIME - INITIAL BASE CHARGE: Performed by: STUDENT IN AN ORGANIZED HEALTH CARE EDUCATION/TRAINING PROGRAM

## 2024-10-04 PROCEDURE — 87081 CULTURE SCREEN ONLY: CPT | Mod: GENLAB | Performed by: PHYSICIAN ASSISTANT

## 2024-10-04 PROCEDURE — 2500000005 HC RX 250 GENERAL PHARMACY W/O HCPCS: Performed by: NURSE ANESTHETIST, CERTIFIED REGISTERED

## 2024-10-04 PROCEDURE — 2500000005 HC RX 250 GENERAL PHARMACY W/O HCPCS: Performed by: PHYSICIAN ASSISTANT

## 2024-10-04 PROCEDURE — 52276 CYSTOSCOPY AND TREATMENT: CPT | Performed by: STUDENT IN AN ORGANIZED HEALTH CARE EDUCATION/TRAINING PROGRAM

## 2024-10-04 PROCEDURE — 7100000009 HC PHASE TWO TIME - INITIAL BASE CHARGE: Performed by: STUDENT IN AN ORGANIZED HEALTH CARE EDUCATION/TRAINING PROGRAM

## 2024-10-04 PROCEDURE — 88342 IMHCHEM/IMCYTCHM 1ST ANTB: CPT | Mod: TC,SUR,GENLAB | Performed by: STUDENT IN AN ORGANIZED HEALTH CARE EDUCATION/TRAINING PROGRAM

## 2024-10-04 PROCEDURE — 7100000001 HC RECOVERY ROOM TIME - INITIAL BASE CHARGE: Performed by: STUDENT IN AN ORGANIZED HEALTH CARE EDUCATION/TRAINING PROGRAM

## 2024-10-04 PROCEDURE — RXMED WILLOW AMBULATORY MEDICATION CHARGE

## 2024-10-04 RX ORDER — FENTANYL CITRATE 50 UG/ML
INJECTION, SOLUTION INTRAMUSCULAR; INTRAVENOUS AS NEEDED
Status: DISCONTINUED | OUTPATIENT
Start: 2024-10-04 | End: 2024-10-04

## 2024-10-04 RX ORDER — LIDOCAINE HYDROCHLORIDE 20 MG/ML
INJECTION, SOLUTION INFILTRATION; PERINEURAL AS NEEDED
Status: DISCONTINUED | OUTPATIENT
Start: 2024-10-04 | End: 2024-10-04

## 2024-10-04 RX ORDER — PROPOFOL 10 MG/ML
INJECTION, EMULSION INTRAVENOUS AS NEEDED
Status: DISCONTINUED | OUTPATIENT
Start: 2024-10-04 | End: 2024-10-04

## 2024-10-04 RX ORDER — CEFAZOLIN SODIUM 2 G/50ML
2 SOLUTION INTRAVENOUS ONCE
Status: COMPLETED | OUTPATIENT
Start: 2024-10-04 | End: 2024-10-04

## 2024-10-04 RX ORDER — NORETHINDRONE AND ETHINYL ESTRADIOL 0.5-0.035
KIT ORAL AS NEEDED
Status: DISCONTINUED | OUTPATIENT
Start: 2024-10-04 | End: 2024-10-04

## 2024-10-04 RX ORDER — AMOXICILLIN 500 MG/1
1 CAPSULE ORAL
COMMUNITY
Start: 2024-09-26

## 2024-10-04 RX ORDER — BISMUTH SUBSALICYLATE 262 MG
1 TABLET,CHEWABLE ORAL DAILY
COMMUNITY

## 2024-10-04 RX ORDER — ONDANSETRON HYDROCHLORIDE 2 MG/ML
INJECTION, SOLUTION INTRAVENOUS AS NEEDED
Status: DISCONTINUED | OUTPATIENT
Start: 2024-10-04 | End: 2024-10-04

## 2024-10-04 RX ORDER — DOXYCYCLINE 100 MG/1
CAPSULE ORAL
COMMUNITY

## 2024-10-04 RX ORDER — SULFAMETHOXAZOLE AND TRIMETHOPRIM 400; 80 MG/1; MG/1
1 TABLET ORAL 2 TIMES DAILY
Qty: 10 TABLET | Refills: 0 | Status: SHIPPED | OUTPATIENT
Start: 2024-10-04 | End: 2024-10-09

## 2024-10-04 RX ORDER — SODIUM CHLORIDE, SODIUM LACTATE, POTASSIUM CHLORIDE, CALCIUM CHLORIDE 600; 310; 30; 20 MG/100ML; MG/100ML; MG/100ML; MG/100ML
20 INJECTION, SOLUTION INTRAVENOUS CONTINUOUS
Status: DISCONTINUED | OUTPATIENT
Start: 2024-10-04 | End: 2024-10-04 | Stop reason: HOSPADM

## 2024-10-04 RX ORDER — SODIUM CHLORIDE 0.9 G/100ML
IRRIGANT IRRIGATION AS NEEDED
Status: DISCONTINUED | OUTPATIENT
Start: 2024-10-04 | End: 2024-10-04 | Stop reason: HOSPADM

## 2024-10-04 SDOH — HEALTH STABILITY: MENTAL HEALTH: CURRENT SMOKER: 0

## 2024-10-04 ASSESSMENT — PAIN - FUNCTIONAL ASSESSMENT
PAIN_FUNCTIONAL_ASSESSMENT: 0-10

## 2024-10-04 ASSESSMENT — COLUMBIA-SUICIDE SEVERITY RATING SCALE - C-SSRS
1. IN THE PAST MONTH, HAVE YOU WISHED YOU WERE DEAD OR WISHED YOU COULD GO TO SLEEP AND NOT WAKE UP?: NO
6. HAVE YOU EVER DONE ANYTHING, STARTED TO DO ANYTHING, OR PREPARED TO DO ANYTHING TO END YOUR LIFE?: NO
2. HAVE YOU ACTUALLY HAD ANY THOUGHTS OF KILLING YOURSELF?: NO

## 2024-10-04 ASSESSMENT — PAIN SCALES - GENERAL
PAIN_LEVEL: 0
PAINLEVEL_OUTOF10: 0 - NO PAIN

## 2024-10-04 NOTE — ANESTHESIA POSTPROCEDURE EVALUATION
Patient: Shiva Hardy    Procedure Summary       Date: 10/04/24 Room / Location: [facility] / [facility]    Anesthesia Start: 1525 Anesthesia Stop: 1616    Procedure: Cystoscopy with Visual Urethrotomy; BLADDER BIOPSY Diagnosis:       Malignant neoplasm of urinary bladder, unspecified site (Multi)      Postprocedural bulbous urethral stricture      (Malignant neoplasm of urinary bladder, unspecified site (Multi) [C67.9])      (Postprocedural bulbous urethral stricture [N99.111])    Surgeons: Irwin Kelsey MD Responsible Provider: MARIA C Grissom    Anesthesia Type: general ASA Status: 3            Anesthesia Type: general    Vitals Value Taken Time   /77 10/04/24 1635   Temp 36.3 °C (97.3 °F) 10/04/24 1615   Pulse 75 10/04/24 1635   Resp 19 10/04/24 1635   SpO2 94 % 10/04/24 1635       Anesthesia Post Evaluation    Patient location during evaluation: PACU  Patient participation: complete - patient participated  Level of consciousness: awake  Pain score: 0  Pain management: adequate  Multimodal analgesia pain management approach  Airway patency: patent  Two or more strategies used to mitigate risk of obstructive sleep apnea  Cardiovascular status: acceptable  Respiratory status: acceptable  Hydration status: acceptable  Postoperative Nausea and Vomiting: none        No notable events documented.

## 2024-10-04 NOTE — INTERVAL H&P NOTE
H&P reviewed. The patient was examined and there are no changes to the H&P.    Heart and lung exam normal.

## 2024-10-04 NOTE — ANESTHESIA PROCEDURE NOTES
Airway  Date/Time: 10/4/2024 3:37 PM  Urgency: elective    Airway not difficult    Staffing  Performed: CRNA   Authorized by: MARIA C Grissom    Performed by: MARIA C Grissom  Patient location during procedure: OR    Indications and Patient Condition  Indications for airway management: anesthesia  Spontaneous Ventilation: absent  Sedation level: deep  Preoxygenated: yes  Patient position: sniffing  MILS maintained throughout  Mask difficulty assessment: 0 - not attempted    Final Airway Details  Final airway type: supraglottic airway      Successful airway: Size 4     Number of attempts at approach: 1  Number of other approaches attempted: 0

## 2024-10-04 NOTE — OP NOTE
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]     Procedures  Cystoscopy with Visual Urethrotomy; BLADDER BIOPSY  08100 - [facility] CYSTOU[facility] W/INTERNAL URETHROTOMY    Cystoscopy with Visual Urethrotomy; BLADDER BIOPSY  13227 - [facility] CYSTOURETHROSCOPY WITH BIOPSY      Surgeons      * Irwin Kelsey - Primary    Resident/Fellow/Other Assistant:  Surgeons and Role:  * No surgeons found with a matching role *    Procedure Summary  Anesthesia: General  ASA: III  Anesthesia Staff: CRNA: CHILANGO Grissom-CRNA  Estimated Blood Loss: 5 mL  Intra-op Medications: Administrations occurring from 1345 to 1450 on 10/04/24:  * No intraprocedure medications in log *           Anesthesia Record               Intraprocedure I/O Totals          Intake    Propofol Drip 0.00 mL    The total shown is the total volume documented since Anesthesia Start was filed.    lactated Ringer's infusion 300.00 mL    ceFAZolin (Ancef) 2 g in dextrose (iso) IV 50 mL 50.00 mL    Total Intake 350 mL          Specimen:   ID Type Source Tests Collected by Time   1 : left lateral wall Tissue BLADDER BIOPSY SURGICAL PATHOLOGY EXAM Irwin Kelsey MD 10/4/2024 1549   2 : posterior wall Tissue BLADDER BIOPSY SURGICAL PATHOLOGY EXAM Irwin Kelsey MD 10/4/2024 1550        Staff:   Yolieulator: Shelley  Circulator: Naty  Circulator: Ramonita  Circulator: Breann         Drains and/or Catheters:   Urethral Catheter Straight-tip 20 Fr. (Active)   Site Assessment Clean;Skin intact 10/04/24 8113    Collection Container Standard drainage bag 10/04/24 1615   Securement Method Securing device (Describe) 10/04/24 1615   Reason for Continuing Urinary Catheterization surgical procedures: urological/gynecological, pelvic oncology, anal, prolonged surgical procedure 10/04/24 1615       Tourniquet Times:         Implants:     Findings: scarring at bladder neck, erythema of posterior and left lateral wall    Indications: Shiva Hardy is an 84 y.o. 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 History of prostatectomy.    The patient was seen in the preoperative area. The risks, benefits, complications, treatment options, non-operative alternatives, expected recovery and outcomes were discussed with the patient. The possibilities of reaction to medication, pulmonary aspiration, injury to surrounding structures, bleeding, recurrent infection, the need for additional procedures, failure to diagnose a condition, and creating a complication requiring transfusion or operation were discussed with the patient. The patient concurred with the proposed plan, giving informed consent.  The site of surgery was properly noted/marked if necessary per policy. The patient has been actively warmed in preoperative area. Preoperative antibiotics have been ordered and given within 1 hours of incision. Venous thrombosis prophylaxis have been ordered including bilateral sequential compression devices    Procedure Details: Under general anesthesia, with the patient in the dorsal lithotomy position, genitalia was prepped and draped in the usual manner.  #22 cystoscope was inserted down the urethra into the bladder, and cystoscopy was performed showing a very tight bladder neck with scarring. The cystoscope could be inserted identifying the area of erythema on posterior and left lateral wall.  Using the biopsy forceps, the areas of  suspicion were biopsied.  Then using the Bugbee, the site of biopsy was fulgurated.  Hemostasis was achieved.  The the visual urethrotome was obtained, and an incision was made at 12 o'clock to render the bladder neck wide. We avoided over incising the bladder neck to try to avoid incontinence since the patient had a history of prostatectomy.  20Fr Talbert catheter was placed.  Patient tolerated procedure well, and was transferred to PACU in stable condition.      Complications:  None; patient tolerated the procedure well.    Disposition: PACU - hemodynamically stable.  Condition: stable         Additional Details: Talbert to be removed in 5 days    Attending Attestation: I performed the procedure.    Irwin Kelsey  Phone Number: [phone]

## 2024-10-05 LAB
AMPHETAMINES UR QL SCN: NORMAL
BARBITURATES UR QL SCN: NORMAL
BZE UR QL SCN: NORMAL
CANNABINOIDS UR QL SCN: NORMAL
CREAT UR-MCNC: 145.6 MG/DL (ref 20–370)
PCP UR QL SCN: NORMAL

## 2024-10-06 LAB
BACTERIA UR CULT: NO GROWTH
STAPHYLOCOCCUS SPEC CULT: NORMAL

## 2024-10-07 ASSESSMENT — PAIN SCALES - GENERAL: PAINLEVEL_OUTOF10: 0 - NO PAIN

## 2024-10-08 ENCOUNTER — OFFICE VISIT (OUTPATIENT)
Dept: PAIN MEDICINE | Facility: HOSPITAL | Age: 84
End: 2024-10-08
Payer: MEDICARE

## 2024-10-08 ENCOUNTER — APPOINTMENT (OUTPATIENT)
Dept: PAIN MEDICINE | Facility: HOSPITAL | Age: 84
End: 2024-10-08
Payer: MEDICARE

## 2024-10-08 VITALS
HEART RATE: 75 BPM | DIASTOLIC BLOOD PRESSURE: 70 MMHG | TEMPERATURE: 97.2 F | SYSTOLIC BLOOD PRESSURE: 150 MMHG | HEIGHT: 71 IN | RESPIRATION RATE: 17 BRPM | BODY MASS INDEX: 25.2 KG/M2 | WEIGHT: 180 LBS | OXYGEN SATURATION: 93 %

## 2024-10-08 DIAGNOSIS — Z79.899 MEDICATION MANAGEMENT: ICD-10-CM

## 2024-10-08 DIAGNOSIS — M47.812 MULTILEVEL CERVICAL SPONDYLOSIS WITHOUT MYELOPATHY: ICD-10-CM

## 2024-10-08 DIAGNOSIS — N32.9 LESION OF BLADDER: Primary | ICD-10-CM

## 2024-10-08 DIAGNOSIS — M47.814 SPONDYLOSIS, THORACIC: ICD-10-CM

## 2024-10-08 PROCEDURE — 99213 OFFICE O/P EST LOW 20 MIN: CPT | Performed by: ANESTHESIOLOGY

## 2024-10-08 PROCEDURE — 3077F SYST BP >= 140 MM HG: CPT | Performed by: ANESTHESIOLOGY

## 2024-10-08 PROCEDURE — 1160F RVW MEDS BY RX/DR IN RCRD: CPT | Performed by: ANESTHESIOLOGY

## 2024-10-08 PROCEDURE — 1159F MED LIST DOCD IN RCRD: CPT | Performed by: ANESTHESIOLOGY

## 2024-10-08 PROCEDURE — 3078F DIAST BP <80 MM HG: CPT | Performed by: ANESTHESIOLOGY

## 2024-10-08 PROCEDURE — 1125F AMNT PAIN NOTED PAIN PRSNT: CPT | Performed by: ANESTHESIOLOGY

## 2024-10-08 RX ORDER — TRAMADOL HYDROCHLORIDE 50 MG/1
50 TABLET ORAL EVERY 8 HOURS PRN
Qty: 75 TABLET | Refills: 0 | Status: SHIPPED | OUTPATIENT
Start: 2024-10-08 | End: 2024-11-07

## 2024-10-08 ASSESSMENT — PAIN SCALES - GENERAL: PAINLEVEL: 3

## 2024-10-08 NOTE — PROGRESS NOTES
Subjective   Patient ID: Shiva Hardy is a 84 y.o. male here today for medication management.  Patient has had bladder cancer and currently has a catheter inserted secondary to urinary obstruction.  He has chronic low back pain and has had failed laminectomy syndrome for over 4 years.  Currently he is on tramadol 50 mg which she takes 2 times a day.  Pill count is 75 tablets and he states his pain is 0 on a 1-10 scale since he has been on this current pain regimen.  He continues to smoke and suffers from COPD and history of a lung nodule.    HPI  Chronic low back pain secondary to failed laminectomy syndrome  Review of Systems  Unremarkable except chief complaint of low back pain.  Patient also is being treated for bladder cancer as well as prostate CVA.  Objective   Physical Exam    Assessment/Plan   Diagnoses and all orders for this visit:  Lesion of bladder  Medication management  -     Follow Up In Pain Medicine  Multilevel cervical spondylosis without myelopathy  -     Follow Up In Pain Medicine  Spondylosis, thoracic  -     Follow Up In Pain Medicine    Risk and benefits continued use of tramadol was discussed with the patient at length.  He was e-prescribed refill of 75 tablets and has a scheduled follow-up visit in 10 weeks.  He was told to call if he had any further issues needing more immediate attention.

## 2024-10-08 NOTE — PROGRESS NOTES
83yo male presents today with for a follow up visit with no complaints of pain to his back, but discomfort to his bladder and catheter r/t recent urologic procedure.  Patient states that his discomfort to his bladder is rated 3/10 to his urinary tract.  Patient states that he is still taking his prescribed Tramadol bid and his last dose was 10/08/2024 at 0700.  Patient presents today with 75 remaining tablets.    Patient states all aspects of daily living are better than his last office visit.         Date of the last Controlled Substance Agreement: 06/14/2024       Last urine drug screening date/ordered today: 08/13/2024     Results of last screen:       Last opioid risk screening date/ordered today: 05/17/2024      Pain Scale Screening:   Pain Assessment and Documentation Tool (PADT)   Date of Assessment: 10/08/2024  Analgesia:   Patient reports her pain level on average during the past week is 0on a 0 - 10 scale.   Patient reports that her pain level at its worst during the past week was 0 on a 0 -10 scale.   100% of pain has been relieved during the past week per patient   Patient states that the amount of pain relief she is now obtaining from her current pain reliever(s) is enough to make a real difference in her life.   Query to clinician: Is the patient's pain relief clinically significant? yes  Activities of Daily Living:   Physical functioning: better  Family relationships: better  Social relationships: better  Mood: better  Sleep patterns: better  Overall functioning: better  Adverse Events: No, Shiva Hardy is not experiencing side effects from current pain reliever.  Patients overall severity of side effect:none  Specific Analgesic Plan: Continue present regimen.

## 2024-10-09 ENCOUNTER — APPOINTMENT (OUTPATIENT)
Dept: UROLOGY | Facility: CLINIC | Age: 84
End: 2024-10-09
Payer: MEDICARE

## 2024-10-09 DIAGNOSIS — N99.111 POSTPROCEDURAL BULBOUS URETHRAL STRICTURE: Primary | ICD-10-CM

## 2024-10-09 PROCEDURE — 99024 POSTOP FOLLOW-UP VISIT: CPT | Performed by: STUDENT IN AN ORGANIZED HEALTH CARE EDUCATION/TRAINING PROGRAM

## 2024-10-09 NOTE — LETTER
Elif Skelton accompanied Shiva Hardy to his office appointment on 10/2/24. She will be off work from 10/2 to 10/9 to care for the patient. She may return to work on 10/10.    If you have any questions or concerns, please don't hesitate to call.      Thank you,     Dr. Irwin Kelsey MD

## 2024-10-09 NOTE — PROGRESS NOTES
Subjective   Patient ID: Shiva Hardy is a 84 y.o. male  presenting following cystoscopy and visual urethrotomy 10/04/2024.    HPI    84 y/o M s/p cystoscopy with excision of tissue 7/2/24. Final pathology showed largely denuded urothelial mucosa with reactive atypia, chronic inflammation, and granulation tissue reaction with focal hemorrhage presenting following cystoscopy and visual urethrotomy 10/04/2024.     Patient successfully passed his TOV. Filled 120 ml and patient voided successfully.   His flow is better but does note some pain. He did not dribbling. No worsening incontinence than before.     Pathology not available right now.    h/o bladder cancer s/p reTURBT 10/6/23, s/p BCG.      He tolerated procedure well. No major issues after.      FINAL DIAGNOSIS   A. RIGHT BLADDER WALL, BIOPSY:  -- LARGELY DENUDED UROTHELIAL MUCOSA WITH REACTIVE ATYPIA, CHRONIC INFLAMMATION, AND GRANULATION TISSUE REACTION WITH FOCAL HEMORRHAGE.        Review of Systems  A complete review of systems was performed. All systems are noted to be negative unless indicated in the history of present illness, impression, active problem list, or past histories.    Objective   Physical Exam    Assessment/Plan       84 y/o M s/p cystoscopy with excision of tissue 7/2/24. Final pathology showed largely denuded urothelial mucosa with reactive atypia, chronic inflammation, and granulation tissue reaction with focal hemorrhage presenting following cystoscopy and visual urethrotomy 10/04/2024.     Patient successfully passed his TOV. Filled 120 ml and patient voided successfully.   His flow is better but does note some pain. He did not dribbling. No worsening incontinence than before.     Pathology not available for bladder biopsy.    h/o bladder cancer s/p reTURBT 10/6/23, s/p BCG.      Discussed that he had a  bladder neck stricture,we  will wait for now and  repeat cystoscopy. Should it reoccur, we might send him for reconstructive urology  for something  more extensive. For now we will just wait.  Will call patient should pathology show something concerns.     Plan:  Will call the patient with pathology results.  Repeat cystoscopy in 3 months if biopsy negative this time.     Diagnoses and all orders for this visit:  Postprocedural bulbous urethral stricture        Scribe Attestation  By signing my name below, ISita, Screstiven attest that this documentation has been prepared under the direction and in the presence of Irwin Kelsey MD.

## 2024-10-10 LAB — GABAPENTIN UR-MCNC: <5 UG/ML

## 2024-10-11 ENCOUNTER — APPOINTMENT (OUTPATIENT)
Dept: PAIN MEDICINE | Facility: HOSPITAL | Age: 84
End: 2024-10-11
Payer: MEDICARE

## 2024-10-17 ENCOUNTER — LAB (OUTPATIENT)
Dept: LAB | Facility: LAB | Age: 84
End: 2024-10-17
Payer: COMMERCIAL

## 2024-10-17 DIAGNOSIS — N39.0 URINARY TRACT INFECTION WITH HEMATURIA, SITE UNSPECIFIED: ICD-10-CM

## 2024-10-17 DIAGNOSIS — R31.9 URINARY TRACT INFECTION WITH HEMATURIA, SITE UNSPECIFIED: ICD-10-CM

## 2024-10-17 DIAGNOSIS — C67.0 MALIGNANT NEOPLASM OF TRIGONE OF URINARY BLADDER (MULTI): ICD-10-CM

## 2024-10-17 LAB
APPEARANCE UR: ABNORMAL
BILIRUB UR STRIP.AUTO-MCNC: NEGATIVE MG/DL
COLOR UR: ABNORMAL
GLUCOSE UR STRIP.AUTO-MCNC: NORMAL MG/DL
HYALINE CASTS #/AREA URNS AUTO: ABNORMAL /LPF
KETONES UR STRIP.AUTO-MCNC: ABNORMAL MG/DL
LAB AP ASR DISCLAIMER: NORMAL
LABORATORY COMMENT REPORT: NORMAL
LEUKOCYTE ESTERASE UR QL STRIP.AUTO: ABNORMAL
MUCOUS THREADS #/AREA URNS AUTO: ABNORMAL /LPF
NITRITE UR QL STRIP.AUTO: NEGATIVE
PATH REPORT.FINAL DX SPEC: NORMAL
PATH REPORT.GROSS SPEC: NORMAL
PATH REPORT.RELEVANT HX SPEC: NORMAL
PATH REPORT.TOTAL CANCER: NORMAL
PH UR STRIP.AUTO: 6 [PH]
PROT UR STRIP.AUTO-MCNC: ABNORMAL MG/DL
RBC # UR STRIP.AUTO: ABNORMAL /UL
RBC #/AREA URNS AUTO: >20 /HPF
SP GR UR STRIP.AUTO: 1.03
SQUAMOUS #/AREA URNS AUTO: ABNORMAL /HPF
UROBILINOGEN UR STRIP.AUTO-MCNC: ABNORMAL MG/DL
WAXY CASTS #/AREA UR COMP ASSIST: ABNORMAL /LPF
WBC #/AREA URNS AUTO: >50 /HPF

## 2024-10-17 PROCEDURE — 87086 URINE CULTURE/COLONY COUNT: CPT

## 2024-10-17 PROCEDURE — 81001 URINALYSIS AUTO W/SCOPE: CPT

## 2024-10-17 NOTE — PROGRESS NOTES
Patient's partner called complaining that the patient was presenting with UTI like symptoms. I ordered a urine culture and the patient will be going to the lab and leaving a urine specimen.

## 2024-10-19 LAB — BACTERIA UR CULT: NORMAL

## 2024-11-04 NOTE — PROGRESS NOTES
"[facility]  Followup Visit Note        HISTORY OF PRESENT ILLNESS   Shiva Hardy is a 84 y.o. male who presents to a [facility] for followup.       Current History    On today's visit, the patient reports the N30i pulled hair out so he stopped using.  He is back to using under the nose mask.  He does feel when he uses CPAP he benefits and is less tired during the day.  He notes he still wakes up frequently in the night needing to urinate.  He does have a history of bladder cancer.  He also feels he probably wakes up less with CPAP.    Previous visit 08/29/24  OBSTRUCTIVE SLEEP APNEA  -didn't get n30i or heated tube yet  -Ordering n30i and heated tube from [facility]  -provided dream wear nasal mask to try  -recommend he unplug tube when he needs to go to bathroom at night to make process of stopping/restarting CPAP easier    HYPERTENSION  BP Readings from Last 3 Encounters:   08/29/24 104/65   08/13/24 122/71   07/02/24 129/85      -Well controlled with current treatments    Follow up 3 months       PAP Adherence:      Sleep Scales:  ESS: 5     REVIEW OF SYSTEMS    All other systems negative      PHYSICAL EXAM     VITAL SIGNS: /85   Pulse 69   Ht 1.791 m (5' 10.5\")   Wt 77.6 kg (171 lb)   SpO2 93%   BMI 24.19 kg/m²      PREVIOUS WEIGHTS:  Wt Readings from Last 3 Encounters:   11/07/24 77.6 kg (171 lb)   10/08/24 81.6 kg (180 lb)   10/04/24 80.7 kg (178 lb)       Constitutional: Alert and oriented, cooperative, no obvious distress   HEENT: Non icteric or anemic, EOM WNL bilaterally   Neck: Supple, no JVD, no goiter, no adenopathy, no rigidity  Extremities: No clubbing, no LL edema   Neuromuscular: Cranial nerves grossly intact, no focal deficits     RESULTS/DATA     No results found for: \"IRON\", \"TRANSFERRIN\", \"IRONSAT\", \"TIBC\", \"FERRITIN\"      ASSESSMENT/PLAN     Mr. Hardy is a 84 y.o. male and returns in followup for the following " issues:    OBSTRUCTIVE SLEEP APNEA  -Benefiting from CPAP  -try n30i airtouch (cloth that shouldn't cause hair to tear out)  -Tolerating, pressure, humidity well    HYPERTENSION  BP Readings from Last 3 Encounters:   11/07/24 151/85   10/08/24 150/70   10/04/24 120/76      -Elevated last 2 appointments - recommend follow up with PCP    Follow up 3 months

## 2024-11-07 ENCOUNTER — OFFICE VISIT (OUTPATIENT)
Dept: SLEEP MEDICINE | Facility: CLINIC | Age: 84
End: 2024-11-07
Payer: MEDICARE

## 2024-11-07 VITALS
HEIGHT: 71 IN | OXYGEN SATURATION: 93 % | SYSTOLIC BLOOD PRESSURE: 151 MMHG | WEIGHT: 171 LBS | HEART RATE: 69 BPM | BODY MASS INDEX: 23.94 KG/M2 | DIASTOLIC BLOOD PRESSURE: 85 MMHG

## 2024-11-07 DIAGNOSIS — G47.33 OSA ON CPAP: Primary | ICD-10-CM

## 2024-11-07 DIAGNOSIS — I10 ESSENTIAL HYPERTENSION: ICD-10-CM

## 2024-11-07 PROCEDURE — 99214 OFFICE O/P EST MOD 30 MIN: CPT | Performed by: PHYSICIAN ASSISTANT

## 2024-11-07 PROCEDURE — 1160F RVW MEDS BY RX/DR IN RCRD: CPT | Performed by: PHYSICIAN ASSISTANT

## 2024-11-07 PROCEDURE — 1126F AMNT PAIN NOTED NONE PRSNT: CPT | Performed by: PHYSICIAN ASSISTANT

## 2024-11-07 PROCEDURE — 3079F DIAST BP 80-89 MM HG: CPT | Performed by: PHYSICIAN ASSISTANT

## 2024-11-07 PROCEDURE — 1159F MED LIST DOCD IN RCRD: CPT | Performed by: PHYSICIAN ASSISTANT

## 2024-11-07 PROCEDURE — 3077F SYST BP >= 140 MM HG: CPT | Performed by: PHYSICIAN ASSISTANT

## 2024-11-07 ASSESSMENT — LIFESTYLE VARIABLES: HOW MANY STANDARD DRINKS CONTAINING ALCOHOL DO YOU HAVE ON A TYPICAL DAY: PATIENT DOES NOT DRINK

## 2024-11-07 ASSESSMENT — SLEEP AND FATIGUE QUESTIONNAIRES
HOW LIKELY ARE YOU TO NOD OFF OR FALL ASLEEP WHEN YOU ARE A PASSENGER IN A CAR FOR AN HOUR WITHOUT A BREAK: WOULD NEVER DOZE
SITING INACTIVE IN A PUBLIC PLACE LIKE A CLASS ROOM OR A MOVIE THEATER: WOULD NEVER DOZE
HOW LIKELY ARE YOU TO NOD OFF OR FALL ASLEEP WHILE SITTING AND READING: MODERATE CHANCE OF DOZING
HOW LIKELY ARE YOU TO NOD OFF OR FALL ASLEEP WHILE WATCHING TV: WOULD NEVER DOZE
HOW LIKELY ARE YOU TO NOD OFF OR FALL ASLEEP WHILE SITTING QUIETLY AFTER LUNCH WITHOUT ALCOHOL: WOULD NEVER DOZE
HOW LIKELY ARE YOU TO NOD OFF OR FALL ASLEEP WHILE SITTING AND TALKING TO SOMEONE: WOULD NEVER DOZE
HOW LIKELY ARE YOU TO NOD OFF OR FALL ASLEEP WHILE LYING DOWN TO REST IN THE AFTERNOON WHEN CIRCUMSTANCES PERMIT: HIGH CHANCE OF DOZING
ESS-CHAD TOTAL SCORE: 5
HOW LIKELY ARE YOU TO NOD OFF OR FALL ASLEEP IN A CAR, WHILE STOPPED FOR A FEW MINUTES IN TRAFFIC: WOULD NEVER DOZE

## 2024-11-07 ASSESSMENT — PAIN SCALES - GENERAL: PAINLEVEL_OUTOF10: 0-NO PAIN

## 2024-11-07 NOTE — PATIENT INSTRUCTIONS
Great seeing you today,    Lets try a mask called an airtouch that is cloth and not silicone/rubber. This shouldn't cause your hair to pull out.    Make sure you bring your CPAP at the best visit.    Follow up 3 months     Doroteo Sheets PA-C    IMPORTANT PHONE NUMBERS     [phone] ([phone])  [employer] ([employer]): [phone]  For clinical questions and refilling prescriptions: [phone]  Anyi Caballero (For [place]): P: [phone]

## 2024-11-15 DIAGNOSIS — E78.5 HYPERLIPIDEMIA, UNSPECIFIED HYPERLIPIDEMIA TYPE: ICD-10-CM

## 2024-11-15 RX ORDER — ATORVASTATIN CALCIUM 20 MG/1
20 TABLET, FILM COATED ORAL NIGHTLY
Qty: 90 TABLET | Refills: 0 | Status: SHIPPED | OUTPATIENT
Start: 2024-11-15

## 2024-11-27 ENCOUNTER — ANCILLARY PROCEDURE (OUTPATIENT)
Dept: URGENT CARE | Age: 84
End: 2024-11-27
Payer: MEDICARE

## 2024-11-27 ENCOUNTER — OFFICE VISIT (OUTPATIENT)
Dept: URGENT CARE | Age: 84
End: 2024-11-27
Payer: MEDICARE

## 2024-11-27 VITALS
OXYGEN SATURATION: 95 % | DIASTOLIC BLOOD PRESSURE: 83 MMHG | WEIGHT: 182 LBS | RESPIRATION RATE: 16 BRPM | TEMPERATURE: 97.5 F | BODY MASS INDEX: 25.75 KG/M2 | SYSTOLIC BLOOD PRESSURE: 136 MMHG | HEART RATE: 76 BPM

## 2024-11-27 DIAGNOSIS — R05.1 ACUTE COUGH: Primary | ICD-10-CM

## 2024-11-27 DIAGNOSIS — R05.1 ACUTE COUGH: ICD-10-CM

## 2024-11-27 PROCEDURE — 71046 X-RAY EXAM CHEST 2 VIEWS: CPT

## 2024-11-27 RX ORDER — ALBUTEROL SULFATE 90 UG/1
2 INHALANT RESPIRATORY (INHALATION) EVERY 4 HOURS PRN
Qty: 8 G | Refills: 0 | Status: SHIPPED | OUTPATIENT
Start: 2024-11-27 | End: 2025-11-27

## 2024-11-27 RX ORDER — ALBUTEROL SULFATE 0.83 MG/ML
2.5 SOLUTION RESPIRATORY (INHALATION) EVERY 6 HOURS PRN
Qty: 75 ML | Refills: 0 | Status: SHIPPED | OUTPATIENT
Start: 2024-11-27 | End: 2024-12-27

## 2024-11-27 RX ORDER — IPRATROPIUM BROMIDE AND ALBUTEROL SULFATE 2.5; .5 MG/3ML; MG/3ML
3 SOLUTION RESPIRATORY (INHALATION) ONCE
Status: COMPLETED | OUTPATIENT
Start: 2024-11-27 | End: 2024-11-27

## 2024-11-27 RX ORDER — AMOXICILLIN AND CLAVULANATE POTASSIUM 875; 125 MG/1; MG/1
1 TABLET, FILM COATED ORAL 2 TIMES DAILY
Qty: 14 TABLET | Refills: 0 | Status: SHIPPED | OUTPATIENT
Start: 2024-11-27 | End: 2024-12-04

## 2024-11-27 ASSESSMENT — ENCOUNTER SYMPTOMS: COUGH: 1

## 2024-11-27 NOTE — PROGRESS NOTES
Subjective   Patient ID: Shiva Hardy is a 84 y.o. male. They present today with a chief complaint of Cough (For 1 month).    History of Present Illness  Patient reports cough for 1 month, denies chest pain or shortness of breath.            Past Medical History  Allergies as of 11/27/2024 - Reviewed 11/27/2024   Allergen Reaction Noted    Percocet [oxycodone-acetaminophen] Nausea Only 01/28/2023       (Not in a hospital admission)       Past Medical History:   Diagnosis Date    Acute maxillary sinusitis, unspecified 12/19/2019    Acute maxillary sinusitis    Acute recurrent maxillary sinusitis 12/28/2016    Acute recurrent maxillary sinusitis    Acute upper respiratory infection, unspecified 03/13/2017    Acute URI    Acute upper respiratory infection, unspecified 12/30/2020    Viral URI with cough    Arthritis     Bilateral inguinal hernia, without obstruction or gangrene, not specified as recurrent 03/04/2014    Bilateral inguinal hernia    BPH (benign prostatic hyperplasia)     Chest pain, unspecified 12/16/2016    Intermittent chest pain    Chronic obstructive pulmonary disease with (acute) exacerbation (Multi) 12/19/2019    Acute exacerbation of chronic obstructive pulmonary disease (COPD)    Chronic pain disorder     back pain- chronic    Edema, unspecified 05/27/2021    Parotid swelling    Hearing aid worn     HL (hearing loss)     Hyperlipidemia     Hypertension     Impacted cerumen, right ear 01/06/2017    Excessive cerumen in right ear canal    Low back pain, unspecified 07/25/2019    Acute exacerbation of chronic low back pain    Personal history of malignant neoplasm of prostate     History of malignant neoplasm of prostate    Personal history of malignant neoplasm, unspecified     Personal history of malignant neoplasm    Personal history of other diseases of the digestive system 05/27/2021    History of parotitis    Personal history of other diseases of the respiratory system 02/22/2017    History  of pulmonary emphysema    Personal history of other endocrine, nutritional and metabolic disease 04/20/2017    History of hyperkalemia    Prostate cancer (Multi)     Sleep apnea     uses CPAP    Unspecified malignant neoplasm of skin of unspecified part of face 12/16/2016    Skin cancer of face    Unspecified symptoms and signs involving the genitourinary system 12/23/2021    UTI symptoms       Past Surgical History:   Procedure Laterality Date    CATARACT EXTRACTION  10/01/2014    Cataract Surgery    CYSTOSCOPY  07/02/2024    with biopsy    HERNIA REPAIR  11/03/2015    Hernia Repair    OTHER SURGICAL HISTORY  01/25/2021    Spinal surgery    OTHER SURGICAL HISTORY  01/25/2021    Lower back surgery    OTHER SURGICAL HISTORY  12/16/2016    Destruction Of Malignant Lesion Face    OTHER SURGICAL HISTORY  05/08/2019    Skin biopsy    PROSTATE SURGERY  10/01/2014    Prostate Surgery    UMBILICAL HERNIA REPAIR  12/16/2016    Umbilical Hernia Repair        reports that he has been smoking cigarettes. He started smoking about 66 years ago. He has a 66.9 pack-year smoking history. He has never used smokeless tobacco. He reports that he does not currently use alcohol. He reports that he does not currently use drugs.    Review of Systems  Review of Systems   Respiratory:  Positive for cough.    All other systems reviewed and are negative.                                 Objective    Vitals:    11/27/24 1012   BP: 136/83   BP Location: Left arm   Patient Position: Sitting   BP Cuff Size: Adult   Pulse: 76   Resp: 16   Temp: 36.4 °C (97.5 °F)   TempSrc: Oral   SpO2: 95%   Weight: 82.6 kg (182 lb)     No LMP for male patient.    Physical Exam  Vitals reviewed.   Constitutional:       Appearance: Normal appearance.   HENT:      Head: Normocephalic and atraumatic.      Right Ear: Tympanic membrane, ear canal and external ear normal.      Left Ear: Tympanic membrane, ear canal and external ear normal.      Nose: Congestion present.       Mouth/Throat:      Mouth: Mucous membranes are moist.      Pharynx: Oropharynx is clear.   Eyes:      Extraocular Movements: Extraocular movements intact.      Conjunctiva/sclera: Conjunctivae normal.      Pupils: Pupils are equal, round, and reactive to light.   Cardiovascular:      Rate and Rhythm: Normal rate and regular rhythm.      Pulses: Normal pulses.      Heart sounds: Normal heart sounds.   Pulmonary:      Effort: Pulmonary effort is normal.      Breath sounds: Wheezing present.   Musculoskeletal:         General: Normal range of motion.      Cervical back: Normal range of motion.   Skin:     General: Skin is warm.      Capillary Refill: Capillary refill takes less than 2 seconds.   Neurological:      General: No focal deficit present.      Mental Status: He is alert and oriented to person, place, and time.   Psychiatric:         Mood and Affect: Mood normal.         Behavior: Behavior normal.         Procedures    Point of Care Test & Imaging Results from this visit  No results found for this visit on 11/27/24.   No results found.    Diagnostic study results (if any) were reviewed by LEONA Mendes.    Assessment/Plan   Allergies, medications, history, and pertinent labs/EKGs/Imaging reviewed by LEONA Mendes.     Medical Decision Making  Patient reports cough 1 month, denies fever or chills, on exam there is wheezing bilateral lungs.    Given duoneb with good results.  Chest Xray negative per radiology.  Will treat symptoms with Augmentin as directed, albuterol nebulizer as directed, as needed for wheezing and albuterol inhaler as needed,   Go to ED with worsening symptoms, follow up with PCP and cardiology, patient agrees with plan of care.      Orders and Diagnoses  There are no diagnoses linked to this encounter.    Medical Admin Record      Patient disposition: Home    Electronically signed by LEONA Mendes  10:24 AM

## 2024-11-27 NOTE — PATIENT INSTRUCTIONS
Start Augmentin as directed, albuterol inhaler as needed, albuterol for nebulizer as needed, go to emergency department for worsening symptoms, follow up with primary care doctor.

## 2024-12-17 ENCOUNTER — OFFICE VISIT (OUTPATIENT)
Dept: PAIN MEDICINE | Facility: HOSPITAL | Age: 84
End: 2024-12-17
Payer: MEDICARE

## 2024-12-17 VITALS
DIASTOLIC BLOOD PRESSURE: 73 MMHG | OXYGEN SATURATION: 93 % | HEART RATE: 77 BPM | TEMPERATURE: 97 F | BODY MASS INDEX: 25.48 KG/M2 | WEIGHT: 182 LBS | SYSTOLIC BLOOD PRESSURE: 144 MMHG | RESPIRATION RATE: 17 BRPM | HEIGHT: 71 IN

## 2024-12-17 DIAGNOSIS — M51.24 THORACIC DISC HERNIATION: ICD-10-CM

## 2024-12-17 DIAGNOSIS — G89.29 CHRONIC MIDLINE THORACIC BACK PAIN: ICD-10-CM

## 2024-12-17 DIAGNOSIS — M47.814 THORACIC SPONDYLOSIS: ICD-10-CM

## 2024-12-17 DIAGNOSIS — M47.814 SPONDYLOSIS, THORACIC: ICD-10-CM

## 2024-12-17 DIAGNOSIS — M54.6 CHRONIC MIDLINE THORACIC BACK PAIN: ICD-10-CM

## 2024-12-17 DIAGNOSIS — M47.816 SPONDYLOSIS WITHOUT MYELOPATHY OR RADICULOPATHY, LUMBAR REGION: ICD-10-CM

## 2024-12-17 DIAGNOSIS — Z79.899 MEDICATION MANAGEMENT: Primary | ICD-10-CM

## 2024-12-17 PROCEDURE — 1159F MED LIST DOCD IN RCRD: CPT | Performed by: ANESTHESIOLOGY

## 2024-12-17 PROCEDURE — 99213 OFFICE O/P EST LOW 20 MIN: CPT | Performed by: ANESTHESIOLOGY

## 2024-12-17 PROCEDURE — 3077F SYST BP >= 140 MM HG: CPT | Performed by: ANESTHESIOLOGY

## 2024-12-17 PROCEDURE — 3078F DIAST BP <80 MM HG: CPT | Performed by: ANESTHESIOLOGY

## 2024-12-17 PROCEDURE — 1160F RVW MEDS BY RX/DR IN RCRD: CPT | Performed by: ANESTHESIOLOGY

## 2024-12-17 PROCEDURE — 1126F AMNT PAIN NOTED NONE PRSNT: CPT | Performed by: ANESTHESIOLOGY

## 2024-12-17 RX ORDER — TRAMADOL HYDROCHLORIDE 50 MG/1
TABLET ORAL
COMMUNITY
Start: 2024-11-15

## 2024-12-17 ASSESSMENT — COLUMBIA-SUICIDE SEVERITY RATING SCALE - C-SSRS
1. IN THE PAST MONTH, HAVE YOU WISHED YOU WERE DEAD OR WISHED YOU COULD GO TO SLEEP AND NOT WAKE UP?: NO
2. HAVE YOU ACTUALLY HAD ANY THOUGHTS OF KILLING YOURSELF?: NO
6. HAVE YOU EVER DONE ANYTHING, STARTED TO DO ANYTHING, OR PREPARED TO DO ANYTHING TO END YOUR LIFE?: NO

## 2024-12-17 ASSESSMENT — PAIN SCALES - GENERAL: PAINLEVEL_OUTOF10: 0-NO PAIN

## 2024-12-17 NOTE — PROGRESS NOTES
Subjective   Patient ID: Shiva Hardy is a 84 y.o. male here today for med management.  Patient suffers from chronic back pain and has had back surgery 4 years ago.  He has a history of bladder cancer and had been placed on tramadol 50 mg tablets 2 a day to moderate his pain.  OARRS report was reviewed and his pain management agreement is up-to-date.  Patient is not drug-seeking and he states he functions at a high level when taking his medications on a regular basis.  Pill count today is 15 tablets which is appropriate..    HPI  Post lumbar laminectomy; thoracic spondylosis; continuous use of opioids for pain control  Review of Systems  Remarkable except chief complaint  Objective   Physical Exam  Gen. appearance:  Patient's alert and oriented ×3 ;cooperative mild to moderate distress  Heart: Regular rate and rhythm  Lungs: Clear to auscultation  Abdomen: Soft nontender  Neuro: Cranial nerves II -XII intact; DTR: +2 over 4 biceps triceps brachial radialis patellar and Achilles  Spinal exam: Positive paraspinal tenderness noted bilaterally L4 5 L5-S1 with flexion extension and rotation/no bony abnormalities  Musculoskeletal:  Upper and lower extremity strength was 4/5 bilaterally  :  deferred  Skin: Warm and dry      Assessment/Plan   Diagnoses and all orders for this visit:  Medication management  -     Opiate/Opioid/Benzo Prescription Compliance; Future  -     Gabapentin,Urine; Future  -     OOB Internal Tracking  -     Follow Up In Pain Medicine; Future  Thoracic spondylosis  -     Follow Up In Pain Medicine; Future  Spondylosis, thoracic  -     Follow Up In Pain Medicine; Future  Thoracic disc herniation  Chronic midline thoracic back pain  Spondylosis without myelopathy or radiculopathy, lumbar region  -     Follow Up In Pain Medicine; Future    Patient prescribed tramadol 50 mg tablets #61 p.o. every 12 hours as needed with 2 refills.  He has a scheduled follow-up visit set for March 18, 2025.  He was  told to call if he had any more immediate needs regarding his back pain.  He was instructed to keep all medication in a secure location at all times.

## 2024-12-17 NOTE — PROGRESS NOTES
Patient is 84 year old male here for follow up, chief complaint is back pain rated 0/10 on pain scale. Patient takes Tramadol for pain and has 15 pills remaining, patient does not take any blood thinners.     Date of the last Controlled Substance Agreement: 06/14/2024       Last urine drug screening date/ordered today: 12/17/24     Results of last screen: N/A      Last opioid risk screening date/ordered today: 05/17/2024      Pain Scale Screening:   Pain Assessment and Documentation Tool (PADT)   Date of Assessment: 12/17/2024  Analgesia:   Patient reports her pain level on average during the past week is 0 on a 0 - 10 scale.   Patient reports that her pain level at its worst during the past week was 2 on a 0 -10 scale.   100% of pain has been relieved during the past week per patient   Patient states that the amount of pain relief she is now obtaining from her current pain reliever(s) is enough to make a real difference in her life.   Query to clinician: Is the patient's pain relief clinically significant? N/A  Activities of Daily Living:   Physical functioning: unchanged  Family relationships: unchanged  Social relationships: unchanged  Mood: better  Sleep patterns: unchanged  Overall functioning: unchanged  Adverse Events: No Shiva Hardy is not experiencing side effects from current pain reliever.  Patients overall severity of side effect:none  Specific Analgesic Plan: Continue present regimen.

## 2024-12-18 ENCOUNTER — APPOINTMENT (OUTPATIENT)
Dept: PULMONOLOGY | Facility: CLINIC | Age: 84
End: 2024-12-18
Payer: MEDICARE

## 2024-12-23 ENCOUNTER — TELEPHONE (OUTPATIENT)
Dept: OPERATING ROOM | Facility: HOSPITAL | Age: 84
End: 2024-12-23
Payer: MEDICARE

## 2024-12-23 DIAGNOSIS — M47.816 SPONDYLOSIS WITHOUT MYELOPATHY OR RADICULOPATHY, LUMBAR REGION: Primary | ICD-10-CM

## 2024-12-23 DIAGNOSIS — C67.9 MALIGNANT NEOPLASM OF URINARY BLADDER, UNSPECIFIED SITE (MULTI): ICD-10-CM

## 2024-12-23 RX ORDER — TRAMADOL HYDROCHLORIDE 50 MG/1
50 TABLET ORAL 2 TIMES DAILY PRN
Qty: 60 TABLET | Refills: 2 | Status: SHIPPED | OUTPATIENT
Start: 2024-12-23

## 2025-01-16 ENCOUNTER — APPOINTMENT (OUTPATIENT)
Dept: PRIMARY CARE | Facility: CLINIC | Age: 85
End: 2025-01-16
Payer: MEDICARE

## 2025-01-16 VITALS
HEART RATE: 78 BPM | WEIGHT: 186 LBS | SYSTOLIC BLOOD PRESSURE: 138 MMHG | BODY MASS INDEX: 26.63 KG/M2 | HEIGHT: 70 IN | DIASTOLIC BLOOD PRESSURE: 80 MMHG | OXYGEN SATURATION: 94 %

## 2025-01-16 DIAGNOSIS — R04.2 HEMOPTYSIS: Primary | ICD-10-CM

## 2025-01-16 DIAGNOSIS — R59.0 ANTERIOR CERVICAL LYMPHADENOPATHY: ICD-10-CM

## 2025-01-16 DIAGNOSIS — F17.200 CURRENT SMOKER: ICD-10-CM

## 2025-01-16 DIAGNOSIS — E78.5 HYPERLIPIDEMIA, UNSPECIFIED HYPERLIPIDEMIA TYPE: ICD-10-CM

## 2025-01-16 DIAGNOSIS — E55.9 VITAMIN D DEFICIENCY: ICD-10-CM

## 2025-01-16 DIAGNOSIS — R91.8 MULTIPLE LUNG NODULES ON CT: ICD-10-CM

## 2025-01-16 PROCEDURE — 3075F SYST BP GE 130 - 139MM HG: CPT

## 2025-01-16 PROCEDURE — 1159F MED LIST DOCD IN RCRD: CPT

## 2025-01-16 PROCEDURE — 3079F DIAST BP 80-89 MM HG: CPT

## 2025-01-16 PROCEDURE — 99214 OFFICE O/P EST MOD 30 MIN: CPT

## 2025-01-16 PROCEDURE — G2211 COMPLEX E/M VISIT ADD ON: HCPCS

## 2025-01-16 RX ORDER — ATORVASTATIN CALCIUM 20 MG/1
20 TABLET, FILM COATED ORAL NIGHTLY
Qty: 90 TABLET | Refills: 0 | Status: SHIPPED | OUTPATIENT
Start: 2025-01-16

## 2025-01-16 RX ORDER — UMECLIDINIUM 62.5 UG/1
AEROSOL, POWDER ORAL
COMMUNITY
End: 2025-01-16 | Stop reason: WASHOUT

## 2025-01-16 ASSESSMENT — PATIENT HEALTH QUESTIONNAIRE - PHQ9
SUM OF ALL RESPONSES TO PHQ9 QUESTIONS 1 AND 2: 2
1. LITTLE INTEREST OR PLEASURE IN DOING THINGS: SEVERAL DAYS
2. FEELING DOWN, DEPRESSED OR HOPELESS: SEVERAL DAYS

## 2025-01-16 NOTE — PROGRESS NOTES
Chief Complaint Shiva Hardy is a 84 y.o. male who presents for Establishing care with complaints of hemoptysis, swelling of the neck, and new cough.    HPI:  Pt states the he has a history of prostate cancer that was removed years ago and is currently undergoing treatment with Dr. Kelsey for bladder cancer. Patient requested a PSA since one has not been checked on him in a while. Patient has a history of hypertension with he takes amlodipine for and that keeps his blood pressure under control. States that he follows with a pulmonologist Dr. Sheets, cardiologist Dr. Lopez, and pain medicine Dr. Wang. Recently patient has noticed the onset ofa new cough along with coughing up blood for the last two weeks. Patient has also noticed some tender swellings in his neck. Is currently smoking 1/2 ppd and has been smoking since he was a kid. Denies any recent illness or viral symptoms. ROS reviewed below.        ROS:  Review of Systems   Constitutional:  Negative for appetite change, chills, fatigue, fever and unexpected weight change.   HENT:  Negative for congestion, postnasal drip, rhinorrhea, sinus pressure, sinus pain, sneezing, sore throat, trouble swallowing and voice change.    Respiratory:  Positive for cough. Negative for shortness of breath and wheezing.    Cardiovascular:  Negative for chest pain and palpitations.   Gastrointestinal:  Negative for constipation, diarrhea, nausea and vomiting.   Neurological:  Negative for dizziness, syncope, weakness, light-headedness and headaches.       Meds:    Current Outpatient Medications:     acetaminophen (Tylenol) 500 mg tablet, Take 1 tablet (500 mg) by mouth every 6 hours if needed for mild pain (1 - 3)., Disp: , Rfl:     albuterol (Ventolin HFA) 90 mcg/actuation inhaler, Inhale 2 puffs every 4 hours if needed for wheezing or shortness of breath., Disp: 8 g, Rfl: 0    albuterol 2.5 mg /3 mL (0.083 %) nebulizer solution, Take 3 mL (2.5 mg) by nebulization every 6  "hours if needed for wheezing or shortness of breath., Disp: 75 mL, Rfl: 0    amLODIPine (Norvasc) 5 mg tablet, Take 1 tablet (5 mg) by mouth once daily in the morning. Do not take until you follow up with cardiology, Dr. Cloud., Disp: , Rfl:     aspirin 81 mg EC tablet, Take 1 tablet (81 mg) by mouth once daily in the morning., Disp: , Rfl:     budesonide-formoteroL (Symbicort) 160-4.5 mcg/actuation inhaler, Inhale 2 puffs 2 times a day. Rinse mouth with water after use to reduce aftertaste and incidence of candidiasis. Do not swallow., Disp: 10.2 g, Rfl: 5    ergocalciferol (Vitamin D-2) 1.25 MG (47014 UT) capsule, Take 1 capsule (1,250 mcg) by mouth 1 (one) time per week., Disp: 90 capsule, Rfl: 0    ipratropium-albuteroL (Duo-Neb) 0.5-2.5 mg/3 mL nebulizer solution, Take 3 mL by nebulization every 6 hours if needed for shortness of breath., Disp: 360 mL, Rfl: 5    multivitamin tablet, Take 1 tablet by mouth once daily., Disp: , Rfl:     traMADol (Ultram) 50 mg tablet, Take 1 tablet (50 mg) by mouth 2 times a day as needed for severe pain (7 - 10)., Disp: 60 tablet, Rfl: 2    albuterol 90 mcg/actuation aerosol powdr breath activated inhaler, Inhale 2 puffs every 4 hours if needed for wheezing or shortness of breath. (Patient not taking: Reported on 1/16/2025), Disp: , Rfl:     atorvastatin (Lipitor) 20 mg tablet, Take 1 tablet (20 mg) by mouth once daily at bedtime., Disp: 90 tablet, Rfl: 0    montelukast (Singulair) 10 mg tablet, Take 1 tablet (10 mg) by mouth once daily at bedtime. (Patient not taking: Reported on 1/16/2025), Disp: 30 tablet, Rfl: 5    Allergies:  Allergies   Allergen Reactions    Percocet [Oxycodone-Acetaminophen] Nausea Only       PE:  Visit Vitals  /80   Pulse 78   Ht 1.778 m (5' 10\")   Wt 84.4 kg (186 lb)   SpO2 94%   BMI 26.69 kg/m²   Smoking Status Every Day   BSA 2.04 m²     Physical Exam  Constitutional:       Appearance: Normal appearance. He is normal weight.   HENT:      " Head: Normocephalic and atraumatic.      Nose: Nose normal. No congestion or rhinorrhea.      Mouth/Throat:      Mouth: Mucous membranes are moist.      Pharynx: Oropharynx is clear. No posterior oropharyngeal erythema.   Eyes:      Extraocular Movements: Extraocular movements intact.      Conjunctiva/sclera: Conjunctivae normal.   Cardiovascular:      Rate and Rhythm: Normal rate and regular rhythm.      Pulses: Normal pulses.      Heart sounds: Normal heart sounds.   Pulmonary:      Breath sounds: Normal breath sounds. No wheezing, rhonchi or rales.   Musculoskeletal:      Right lower leg: No edema.      Left lower leg: No edema.   Lymphadenopathy:      Cervical: Cervical adenopathy present.   Skin:     General: Skin is warm and dry.      Capillary Refill: Capillary refill takes less than 2 seconds.   Neurological:      General: No focal deficit present.      Mental Status: He is alert and oriented to person, place, and time. Mental status is at baseline.   Psychiatric:         Mood and Affect: Mood normal.         Behavior: Behavior normal.         Thought Content: Thought content normal.         Judgment: Judgment normal.           Assessment/Plan  Shiva Hardy is a 84 y.o. male who presents for [facility] care with complaint of new cough with hemoptysis and lymphadenopathy. With history of smoking and current nicotine use plus history of multiple lung nodules noted on CT low dose screening. There is a suspicion that new cough with hemoptysis and lymphadenoapthy might be a neoplastic process. Recommend patient to obtain CXR and CT chest. Patient agreed. Will also obtain some blood work to look for other pathologies. Other diagnosis include viral URI, strep throat, or other acute infections. Patient agreed with treatment and plan.    Shiva was seen today for [facility] care.  Diagnoses and all orders for this visit:  Hemoptysis (Primary)  -     XR chest 2 views; Future  -     CBC and Auto Differential; Future  -      CT chest wo IV contrast; Future  Multiple lung nodules on CT  -     Comprehensive metabolic panel; Future  -     CT chest wo IV contrast; Future  Current smoker  -     Comprehensive metabolic panel; Future  -     CBC and Auto Differential; Future  -     CT chest wo IV contrast; Future  Anterior cervical lymphadenopathy  -     Comprehensive metabolic panel; Future  -     CBC and Auto Differential; Future  -     CT chest wo IV contrast; Future  Hyperlipidemia, unspecified hyperlipidemia type  -     Lipid panel; Future  -     atorvastatin (Lipitor) 20 mg tablet; Take 1 tablet (20 mg) by mouth once daily at bedtime.  Vitamin D deficiency  -     Vitamin D 25-Hydroxy,Total (for eval of Vitamin D levels); Future         Follow up with results.      Patient was staffed with Dr. Sandi Mack DO, PGY-3  [facility]

## 2025-01-19 ASSESSMENT — ENCOUNTER SYMPTOMS
FEVER: 0
VOICE CHANGE: 0
LIGHT-HEADEDNESS: 0
NAUSEA: 0
SINUS PAIN: 0
CONSTIPATION: 0
CHILLS: 0
DIARRHEA: 0
WHEEZING: 0
HEADACHES: 0
SINUS PRESSURE: 0
SORE THROAT: 0
UNEXPECTED WEIGHT CHANGE: 0
RHINORRHEA: 0
TROUBLE SWALLOWING: 0
PALPITATIONS: 0
SHORTNESS OF BREATH: 0
WEAKNESS: 0
FATIGUE: 0
APPETITE CHANGE: 0
DIZZINESS: 0
VOMITING: 0
COUGH: 1

## 2025-01-20 ENCOUNTER — APPOINTMENT (OUTPATIENT)
Dept: UROLOGY | Facility: CLINIC | Age: 85
End: 2025-01-20
Payer: MEDICARE

## 2025-01-29 ENCOUNTER — HOSPITAL ENCOUNTER (OUTPATIENT)
Dept: RADIOLOGY | Facility: HOSPITAL | Age: 85
Discharge: HOME | End: 2025-01-29
Payer: MEDICARE

## 2025-01-29 DIAGNOSIS — R91.8 MULTIPLE LUNG NODULES ON CT: ICD-10-CM

## 2025-01-29 DIAGNOSIS — R59.0 ANTERIOR CERVICAL LYMPHADENOPATHY: ICD-10-CM

## 2025-01-29 DIAGNOSIS — R04.2 HEMOPTYSIS: ICD-10-CM

## 2025-01-29 DIAGNOSIS — F17.200 CURRENT SMOKER: ICD-10-CM

## 2025-01-29 PROCEDURE — 71250 CT THORAX DX C-: CPT | Performed by: RADIOLOGY

## 2025-01-29 PROCEDURE — 71046 X-RAY EXAM CHEST 2 VIEWS: CPT | Performed by: RADIOLOGY

## 2025-01-29 PROCEDURE — 71250 CT THORAX DX C-: CPT

## 2025-01-29 PROCEDURE — 71046 X-RAY EXAM CHEST 2 VIEWS: CPT

## 2025-01-30 ENCOUNTER — APPOINTMENT (OUTPATIENT)
Dept: RADIOLOGY | Facility: HOSPITAL | Age: 85
End: 2025-01-30
Payer: MEDICARE

## 2025-01-30 LAB
25(OH)D3+25(OH)D2 SERPL-MCNC: 80 NG/ML (ref 30–100)
ALBUMIN SERPL-MCNC: 4.2 G/DL (ref 3.6–5.1)
ALP SERPL-CCNC: 55 U/L (ref 35–144)
ALT SERPL-CCNC: 19 U/L (ref 9–46)
ANION GAP SERPL CALCULATED.4IONS-SCNC: 8 MMOL/L (CALC) (ref 7–17)
AST SERPL-CCNC: 20 U/L (ref 10–35)
BASOPHILS # BLD AUTO: 59 CELLS/UL (ref 0–200)
BASOPHILS NFR BLD AUTO: 0.7 %
BILIRUB SERPL-MCNC: 0.3 MG/DL (ref 0.2–1.2)
BUN SERPL-MCNC: 29 MG/DL (ref 7–25)
CALCIUM SERPL-MCNC: 9.6 MG/DL (ref 8.6–10.3)
CHLORIDE SERPL-SCNC: 105 MMOL/L (ref 98–110)
CHOLEST SERPL-MCNC: 132 MG/DL
CHOLEST/HDLC SERPL: 2.2 (CALC)
CO2 SERPL-SCNC: 25 MMOL/L (ref 20–32)
CREAT SERPL-MCNC: 0.87 MG/DL (ref 0.7–1.22)
EGFRCR SERPLBLD CKD-EPI 2021: 84 ML/MIN/1.73M2
EOSINOPHIL # BLD AUTO: 193 CELLS/UL (ref 15–500)
EOSINOPHIL NFR BLD AUTO: 2.3 %
ERYTHROCYTE [DISTWIDTH] IN BLOOD BY AUTOMATED COUNT: 12.1 % (ref 11–15)
GLUCOSE SERPL-MCNC: 94 MG/DL (ref 65–139)
HCT VFR BLD AUTO: 41.1 % (ref 38.5–50)
HDLC SERPL-MCNC: 60 MG/DL
HGB BLD-MCNC: 13.3 G/DL (ref 13.2–17.1)
LDLC SERPL CALC-MCNC: 58 MG/DL (CALC)
LYMPHOCYTES # BLD AUTO: 2478 CELLS/UL (ref 850–3900)
LYMPHOCYTES NFR BLD AUTO: 29.5 %
MCH RBC QN AUTO: 30.6 PG (ref 27–33)
MCHC RBC AUTO-ENTMCNC: 32.4 G/DL (ref 32–36)
MCV RBC AUTO: 94.5 FL (ref 80–100)
MONOCYTES # BLD AUTO: 680 CELLS/UL (ref 200–950)
MONOCYTES NFR BLD AUTO: 8.1 %
NEUTROPHILS # BLD AUTO: 4990 CELLS/UL (ref 1500–7800)
NEUTROPHILS NFR BLD AUTO: 59.4 %
NONHDLC SERPL-MCNC: 72 MG/DL (CALC)
PLATELET # BLD AUTO: 315 THOUSAND/UL (ref 140–400)
PMV BLD REES-ECKER: 9.8 FL (ref 7.5–12.5)
POTASSIUM SERPL-SCNC: 4.2 MMOL/L (ref 3.5–5.3)
PROT SERPL-MCNC: 6.7 G/DL (ref 6.1–8.1)
RBC # BLD AUTO: 4.35 MILLION/UL (ref 4.2–5.8)
SODIUM SERPL-SCNC: 138 MMOL/L (ref 135–146)
TRIGL SERPL-MCNC: 59 MG/DL
WBC # BLD AUTO: 8.4 THOUSAND/UL (ref 3.8–10.8)

## 2025-02-04 DIAGNOSIS — R91.8 MULTIPLE LUNG NODULES ON CT: Primary | ICD-10-CM

## 2025-02-04 NOTE — PROGRESS NOTES
Subjective   Patient ID: Shiva Hardy is a 84 y.o. male.    PROCEDURE NOTE:    PREOPERATIVE DIAGNOSIS:  Postprocedural bulbous urethral stricture     POSTOPERATIVE DIAGNOSIS:  Same    OPERATION:  Flexible Cystourethroscopy    SURGEON:  Bertram Monsalve    ANESTHESIA:  2%  lidocaine jelly    COMPLICATIONS:  None    EBL: Minimal    SPECIMEN:  Voided urine was not collected and submitted for cytology.    DISPOSITION:  The patient was discharged home after the procedure, per routine.    INDICATIONS: :  Mr. Hardy is a 84 y.o. patient with a history of Postprocedural bulbous urethral stricture  who presents today for Cystoscopy.     The indications, risks and benefits of this procedure were discussed with the patient, consent was obtained prior to the procedure, and to the best of my judgement the patient seemed to understand and agree to the procedure.    PROCEDURE:  The patient  was brought into the procedure suite and informed consent was reviewed and confirmed. Vital signs were obtained prior to the procedure: There were no vitals taken for this visit..  The patient was escorted onto the stretcher, placed supine, prepped with betadine and draped in the usual standard surgical fashion.  Intraurethral 2% viscous lidocaine jelly was used for local analgesia.  A 16 [place]ian flexible cystourethroscope was inserted into the urethra.   The penile urethra was normal.  Scar tissues at bulbar to membranous urethra, consistent with the prior bulbous urethra  stricture s/p visual urethrotomy.  Upon entering the bladder the entire bladder was surveyed in a 360 degree fashion.  The left and right ureteral orifices were in normal orthotopic position effluxing clear yellow urine, bilaterally.   There was no evidence of any bladder lesions, foreign objects, stones or evidence of any mucosal changes. The cystoscope was then retroflexed.  The bladder neck was then further examined without any evidence of lesions. The scope was then  removed and in an antegrade fashion, the urethra and bladder were again resurveyed with no evidence of additional lesions.  The cystoscope was then fully removed.   The patient tolerated the procedure well.  Vitals were stable after the procedure.  The patient was able to void and was discharged home.  Verbal and written Post procedure instructions were reviewed with the patient.    IMPRESSION:  Scar tissues at bulbar urethra, consistent with the prior stricture. Slightly dilated by cystoscope. Non specifinc erythema which was biopsied in the past. No new findings.    PLAN:  Cystoscopy in 3 months.    Scribe Attestation   By signing my name below, I, Bertram Monsalve, Scribjaelyn attestation that this documentation has been prepared under the direction and in the presence of Irwin Kelsey MD.

## 2025-02-05 ENCOUNTER — APPOINTMENT (OUTPATIENT)
Dept: UROLOGY | Facility: CLINIC | Age: 85
End: 2025-02-05
Payer: MEDICARE

## 2025-02-05 DIAGNOSIS — Z85.51 HISTORY OF BLADDER CANCER: Primary | ICD-10-CM

## 2025-02-05 DIAGNOSIS — N99.112 POSTPROCEDURAL MEMBRANOUS URETHRAL STRICTURE: ICD-10-CM

## 2025-02-05 LAB
POC APPEARANCE, URINE: CLEAR
POC BILIRUBIN, URINE: NEGATIVE
POC BLOOD, URINE: NEGATIVE
POC COLOR, URINE: ABNORMAL
POC GLUCOSE, URINE: NEGATIVE MG/DL
POC KETONES, URINE: NEGATIVE MG/DL
POC LEUKOCYTES, URINE: NEGATIVE
POC NITRITE,URINE: NEGATIVE
POC PH, URINE: 5.5 PH
POC PROTEIN, URINE: ABNORMAL MG/DL
POC SPECIFIC GRAVITY, URINE: >=1.03
POC UROBILINOGEN, URINE: 0.2 EU/DL

## 2025-02-05 PROCEDURE — 99213 OFFICE O/P EST LOW 20 MIN: CPT | Performed by: STUDENT IN AN ORGANIZED HEALTH CARE EDUCATION/TRAINING PROGRAM

## 2025-02-05 PROCEDURE — 52000 CYSTOURETHROSCOPY: CPT | Performed by: STUDENT IN AN ORGANIZED HEALTH CARE EDUCATION/TRAINING PROGRAM

## 2025-02-05 NOTE — PROGRESS NOTES
Subjective   Patient ID: Shiva Hardy is a 84 y.o. male  presenting following cystoscopy and visual urethrotomy 10/04/2024.    HPI  84 y.o. male presents s/p cystoscopy with excision of tissue 7/2/24. Final pathology showed largely denuded urothelial mucosa with reactive atypia, chronic inflammation, and granulation tissue reaction with focal hemorrhage presenting following cystoscopy and visual urethrotomy 10/04/2024.     He presents for cystoscopy today in office.    Surgical Pathology Exam collected on 10/4/2024:  FINAL DIAGNOSIS   A.  Urinary bladder, left lateral wall, biopsy:  --Fragments of mostly-denuded urothelial mucosa with inflammation and focal atypia (see note).     Note: Rare detached fragment of urothelium with atypia of uncertain significance is noted.  Cytokeratins (CAM5.2, AE 1/3) highlight scant urothelium.  Deeper tissue levels are evaluated and are not further contributory.        B.  Urinary bladder, posterior wall, biopsy:  --Fragments of mostly-denuded urothelial mucosa with inflammation and reactive changes (see note).     Note: Deeper tissue levels are evaluated and are not further contributory.       Review of Systems  A complete review of systems was performed. All systems are noted to be negative unless indicated in the history of present illness, impression, active problem list, or past histories.    Objective   Physical Exam    Assessment/Plan   84 y.o. male presents s/p cystoscopy with excision of tissue 7/2/24. Final pathology showed largely denuded urothelial mucosa with reactive atypia, chronic inflammation, and granulation tissue reaction with focal hemorrhage presenting following cystoscopy and visual urethrotomy 10/04/2024. He presents today for cystoscopy in office.     The patient underwent a cystoscopy today in office. He tolerated the procedure well without incidence. The details are above. The results show scar tissues at bulbar urethra, consistent with the prior  stricture. Mildly dilated. Non specifinc erythema which was biopsied int he past. No new finding    PLAN:  Cystoscopy in 3 months..     There are no diagnoses linked to this encounter.      Scribe Attestation  By signing my name below, ISita Scribe attest that this documentation has been prepared under the direction and in the presence of Irwin Kelsey MD.

## 2025-02-05 NOTE — PROGRESS NOTES
Subjective   Patient ID: Shiva Hardy is a 84 y.o. male  presenting following cystoscopy and visual urethrotomy 10/04/2024.    HPI  84 y.o. male presents s/p cystoscopy with excision of tissue 7/2/24. Final pathology showed largely denuded urothelial mucosa with reactive atypia, chronic inflammation, and granulation tissue reaction with focal hemorrhage presenting following cystoscopy and visual urethrotomy 10/04/2024.     He presents for cystoscopy today in office.    Surgical Pathology Exam collected on 10/4/2024:  FINAL DIAGNOSIS   A.  Urinary bladder, left lateral wall, biopsy:  --Fragments of mostly-denuded urothelial mucosa with inflammation and focal atypia (see note).     Note: Rare detached fragment of urothelium with atypia of uncertain significance is noted.  Cytokeratins (CAM5.2, AE 1/3) highlight scant urothelium.  Deeper tissue levels are evaluated and are not further contributory.        B.  Urinary bladder, posterior wall, biopsy:  --Fragments of mostly-denuded urothelial mucosa with inflammation and reactive changes (see note).     Note: Deeper tissue levels are evaluated and are not further contributory.       Review of Systems  A complete review of systems was performed. All systems are noted to be negative unless indicated in the history of present illness, impression, active problem list, or past histories.    Objective   Physical Exam    Assessment/Plan   84 y.o. male presents s/p cystoscopy with excision of tissue 7/2/24. Final pathology showed largely denuded urothelial mucosa with reactive atypia, chronic inflammation, and granulation tissue reaction with focal hemorrhage presenting following cystoscopy and visual urethrotomy 10/04/2024. He presents today for cystoscopy in office.     The patient underwent a cystoscopy today in office. He tolerated the procedure well without incidence. The details are above. The results show scar tissues at bulbar urethra, consistent with the prior  stricture. Mildly dilated. Non specifinc erythema which was biopsied int he past. No new finding    PLAN:  Cystoscopy in 3 months..    Diagnoses and all orders for this visit:  History of bladder cancer  Postprocedural membranous urethral stricture        Scribe Attestation  By signing my name below, I, Irwin Kelsey MD, Scribe attest that this documentation has been prepared under the direction and in the presence of Irwin Kelsey MD.

## 2025-02-06 ENCOUNTER — APPOINTMENT (OUTPATIENT)
Dept: SLEEP MEDICINE | Facility: CLINIC | Age: 85
End: 2025-02-06
Payer: MEDICARE

## 2025-02-12 NOTE — PROGRESS NOTES
"[facility]  Followup Visit Note        HISTORY OF PRESENT ILLNESS   Shiva Hardy is a 84 y.o. male who presents to a [facility] for followup.       Current History    On today's visit, the patient reports doing well on CPAP. Feels he sleeps better with CPAP machine.    No issues with pressure.  Does have dry mouth sometimes.  He likes the N30i airtouch mask.    Has a cuff but doesn't take BP at home.    Previous visit 11/07/2024  OBSTRUCTIVE SLEEP APNEA  -Benefiting from CPAP  -try n30i airtouch (cloth that shouldn't cause hair to tear out)  -Tolerating, pressure, humidity well    HYPERTENSION  BP Readings from Last 3 Encounters:   11/07/24 151/85   10/08/24 150/70   10/04/24 120/76      -Elevated last 2 appointments - recommend follow up with PCP    Follow up 3 months      PAP Adherence:            Sleep Scales:  ESS: 4     REVIEW OF SYSTEMS    All other systems negative      PHYSICAL EXAM     VITAL SIGNS: /80   Pulse 72   Ht 1.803 m (5' 11\")   Wt 78.9 kg (174 lb)   SpO2 97%   BMI 24.27 kg/m²      PREVIOUS WEIGHTS:  Wt Readings from Last 3 Encounters:   02/13/25 78.9 kg (174 lb)   01/16/25 84.4 kg (186 lb)   12/17/24 82.6 kg (182 lb)       Constitutional: Alert and oriented, cooperative, no obvious distress   HEENT: Non icteric or anemic, EOM WNL bilaterally   Neck: Supple, no JVD, no goiter, no adenopathy, no rigidity  Extremities: No clubbing, no LL edema   Neuromuscular: Cranial nerves grossly intact, no focal deficits     RESULTS/DATA     No results found for: \"IRON\", \"TRANSFERRIN\", \"IRONSAT\", \"TIBC\", \"FERRITIN\"      ASSESSMENT/PLAN     Mr. Hardy is a 84 y.o. male and returns in followup for the following issues:    OBSTRUCTIVE SLEEP APNEA  -Benefiting from CPAP  -Good compliance, YVETTE well controlled per download  -supplies ordered  -PC to 6-12 cm H2O  -humidity increased to 5  -Tolerating n30i mask well    HYPERTENSION  BP Readings " from Last 3 Encounters:   02/13/25 151/80   01/16/25 138/80   12/17/24 144/73      -recommend he take BP at home - he does have cuff    Follow up 1 year

## 2025-02-13 ENCOUNTER — OFFICE VISIT (OUTPATIENT)
Dept: SLEEP MEDICINE | Facility: CLINIC | Age: 85
End: 2025-02-13
Payer: MEDICARE

## 2025-02-13 VITALS
WEIGHT: 174 LBS | HEIGHT: 71 IN | OXYGEN SATURATION: 97 % | SYSTOLIC BLOOD PRESSURE: 151 MMHG | DIASTOLIC BLOOD PRESSURE: 80 MMHG | HEART RATE: 72 BPM | BODY MASS INDEX: 24.36 KG/M2

## 2025-02-13 DIAGNOSIS — G47.33 OSA ON CPAP: Primary | ICD-10-CM

## 2025-02-13 DIAGNOSIS — I10 ESSENTIAL HYPERTENSION: ICD-10-CM

## 2025-02-13 PROCEDURE — 3079F DIAST BP 80-89 MM HG: CPT | Performed by: PHYSICIAN ASSISTANT

## 2025-02-13 PROCEDURE — 1126F AMNT PAIN NOTED NONE PRSNT: CPT | Performed by: PHYSICIAN ASSISTANT

## 2025-02-13 PROCEDURE — G2211 COMPLEX E/M VISIT ADD ON: HCPCS | Performed by: PHYSICIAN ASSISTANT

## 2025-02-13 PROCEDURE — 99214 OFFICE O/P EST MOD 30 MIN: CPT | Performed by: PHYSICIAN ASSISTANT

## 2025-02-13 PROCEDURE — 1160F RVW MEDS BY RX/DR IN RCRD: CPT | Performed by: PHYSICIAN ASSISTANT

## 2025-02-13 PROCEDURE — 3077F SYST BP >= 140 MM HG: CPT | Performed by: PHYSICIAN ASSISTANT

## 2025-02-13 PROCEDURE — 1159F MED LIST DOCD IN RCRD: CPT | Performed by: PHYSICIAN ASSISTANT

## 2025-02-13 ASSESSMENT — SLEEP AND FATIGUE QUESTIONNAIRES
HOW LIKELY ARE YOU TO NOD OFF OR FALL ASLEEP WHILE SITTING QUIETLY AFTER LUNCH WITHOUT ALCOHOL: WOULD NEVER DOZE
HOW LIKELY ARE YOU TO NOD OFF OR FALL ASLEEP WHILE SITTING AND READING: WOULD NEVER DOZE
HOW LIKELY ARE YOU TO NOD OFF OR FALL ASLEEP WHILE WATCHING TV: SLIGHT CHANCE OF DOZING
HOW LIKELY ARE YOU TO NOD OFF OR FALL ASLEEP WHILE LYING DOWN TO REST IN THE AFTERNOON WHEN CIRCUMSTANCES PERMIT: HIGH CHANCE OF DOZING
HOW LIKELY ARE YOU TO NOD OFF OR FALL ASLEEP IN A CAR, WHILE STOPPED FOR A FEW MINUTES IN TRAFFIC: WOULD NEVER DOZE
ESS-CHAD TOTAL SCORE: 4
HOW LIKELY ARE YOU TO NOD OFF OR FALL ASLEEP WHILE SITTING AND TALKING TO SOMEONE: WOULD NEVER DOZE
SITING INACTIVE IN A PUBLIC PLACE LIKE A CLASS ROOM OR A MOVIE THEATER: WOULD NEVER DOZE
HOW LIKELY ARE YOU TO NOD OFF OR FALL ASLEEP WHEN YOU ARE A PASSENGER IN A CAR FOR AN HOUR WITHOUT A BREAK: WOULD NEVER DOZE

## 2025-02-13 ASSESSMENT — LIFESTYLE VARIABLES: HOW MANY STANDARD DRINKS CONTAINING ALCOHOL DO YOU HAVE ON A TYPICAL DAY: PATIENT DOES NOT DRINK

## 2025-02-13 ASSESSMENT — PAIN SCALES - GENERAL: PAINLEVEL_OUTOF10: 0-NO PAIN

## 2025-02-13 NOTE — PATIENT INSTRUCTIONS
Great seeing you today,    I increased your pressure settings and humidity setting. We will order supplies for your CPAP.    Let us know if you have any issues with pressure, humidity, or mask.    Follow up 1 year     Doroteo Sheets PA-C    IMPORTANT PHONE NUMBERS     [phone] ([id number])  [facility] [employer] [facility] ([place]): [phone]  For clinical questions and refilling prescriptions: [phone]  [employer] [employer] (For [place]): P: [phone]

## 2025-02-24 ENCOUNTER — APPOINTMENT (OUTPATIENT)
Dept: PRIMARY CARE | Facility: CLINIC | Age: 85
End: 2025-02-24
Payer: MEDICARE

## 2025-02-24 VITALS
WEIGHT: 174 LBS | DIASTOLIC BLOOD PRESSURE: 80 MMHG | SYSTOLIC BLOOD PRESSURE: 140 MMHG | OXYGEN SATURATION: 96 % | HEART RATE: 88 BPM | BODY MASS INDEX: 24.27 KG/M2

## 2025-02-24 DIAGNOSIS — R09.89 WEAK ARTERIAL PULSE: ICD-10-CM

## 2025-02-24 DIAGNOSIS — F17.200 CURRENT SMOKER: ICD-10-CM

## 2025-02-24 DIAGNOSIS — R20.2 NUMBNESS AND TINGLING: Primary | ICD-10-CM

## 2025-02-24 DIAGNOSIS — R20.0 NUMBNESS AND TINGLING: Primary | ICD-10-CM

## 2025-02-24 PROCEDURE — 1159F MED LIST DOCD IN RCRD: CPT

## 2025-02-24 PROCEDURE — 3079F DIAST BP 80-89 MM HG: CPT

## 2025-02-24 PROCEDURE — 3077F SYST BP >= 140 MM HG: CPT

## 2025-02-24 PROCEDURE — 99214 OFFICE O/P EST MOD 30 MIN: CPT

## 2025-02-24 ASSESSMENT — PATIENT HEALTH QUESTIONNAIRE - PHQ9
2. FEELING DOWN, DEPRESSED OR HOPELESS: NOT AT ALL
1. LITTLE INTEREST OR PLEASURE IN DOING THINGS: NOT AT ALL
SUM OF ALL RESPONSES TO PHQ9 QUESTIONS 1 AND 2: 0

## 2025-02-24 NOTE — PROGRESS NOTES
Subjective   Patient ID: Shiva Hardy is a 84 y.o. male who presents for No chief complaint on file..    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    Physical Exam  General: Alert and oriented. Appears well-nourished and in no acute distress.  Eyes: PERRLA. EOMI.  Head/neck: Normocephalic. Supple.  Lymphatics: No cervical lymphadenopathy.  Respiratory/Thorax: Clear to auscultation bilaterally. No wheezing.   Cardiovascular: Regular rate and rhythm. No murmurs.  Gastrointestinal: Soft, nontender, nondistended. +BS   Musculoskeletal: ROM intact. No joint swelling. Normal strength   Extremities: Warm and well perfused. No peripheral edema.  Neurological: No gross neurologic deficits.   Psychological: Appropriate mood and affect.   Skin: No visible rashes or lesions.    Assessment/Plan   {Assess/PlanSmartLinks:71269}        strength   Extremities feet are cold, weak pulses . No peripheral edema.  Neurological: Good motor strength in the upper and lower extremity, positive for knee reflexes.  Negative Spurling test, negative for tenderness over the spinal process.   Tinel and Phalen test was negative.  Patient has decreased sensitivity on the dorsal part of the feet and plantar part of the feet.  Decreased sensitivity on the hands.   Skin: No visible rashes or lesions.    Assessment/Plan   He is 84 years old man with past medical history of hypertension, dyslipidemia, COPD, ascending aorta dilation, prostate cancer who presented today to the office for numbness and tingling.      Numbness and tingling       Sock and gloves neuropathy, plan for labs and EMG to evaluate further:  -     CHI with Reflex to DIALLO; Future  -     Vitamin B12; Future  -     Tsh With Reflex To Free T4 If Abnormal; Future  -     EMG & nerve conduction; Future  -Will follow-up with results and make recommendations accordingly     Weak arterial pulse  Current smoker concerning for PAD  -     Vascular US lower extremity arterial duplex bilateral with TARA; Future    Hypertension  Blood pressure today was 140/80 mmHg,   Would be great to have around 130/80.   Please continue taking your amlodipine 5 mg.   Will follow in the next visit      Dyslipidemia  -Continue with Lipitor 20 mg tablet daily    Prostate cancer s/p TURP  Continue following with Dr. Werner every 3 months    COPD  His symptoms are stable with his inhaler  No recent exacerbation or hospitalization  Will follow-up in the next visit    Follow-up in 3 months for Medicare wellness visit or sooner if needed.      I discussed my plan with my attending, Dr. Junior.     Jodi Rouse. MD  Family medicine resident  PGY3

## 2025-02-28 LAB
ANA PAT SER IF-IMP: ABNORMAL
ANA SER QL IF: POSITIVE
ANA TITR SER IF: ABNORMAL TITER
CENTROMERE B AB SER-ACNC: ABNORMAL AI
DSDNA AB SER-ACNC: <1 IU/ML
ENA JO1 AB SER IA-ACNC: ABNORMAL AI
ENA RNP AB SER-ACNC: ABNORMAL AI
ENA SCL70 AB SER IA-ACNC: ABNORMAL AI
ENA SM AB SER IA-ACNC: ABNORMAL AI
ENA SM+RNP AB SER IA-ACNC: ABNORMAL AI
ENA SS-A AB SER IA-ACNC: ABNORMAL AI
ENA SS-B AB SER IA-ACNC: ABNORMAL AI
LABORATORY COMMENT REPORT: ABNORMAL
NUCLEOSOME AB SER IA-ACNC: ABNORMAL AI
RIBOSOMAL P AB SER-ACNC: ABNORMAL AI
TSH SERPL-ACNC: 2.4 MIU/L (ref 0.4–4.5)
VIT B12 SERPL-MCNC: 598 PG/ML (ref 200–1100)

## 2025-03-17 DIAGNOSIS — G62.9 POLYNEUROPATHY: Primary | ICD-10-CM

## 2025-03-18 ENCOUNTER — OFFICE VISIT (OUTPATIENT)
Dept: PAIN MEDICINE | Facility: HOSPITAL | Age: 85
End: 2025-03-18
Payer: MEDICARE

## 2025-03-18 VITALS
HEART RATE: 72 BPM | HEIGHT: 71 IN | SYSTOLIC BLOOD PRESSURE: 119 MMHG | OXYGEN SATURATION: 95 % | BODY MASS INDEX: 23.8 KG/M2 | TEMPERATURE: 97.3 F | RESPIRATION RATE: 17 BRPM | WEIGHT: 170 LBS | DIASTOLIC BLOOD PRESSURE: 88 MMHG

## 2025-03-18 DIAGNOSIS — M47.816 SPONDYLOSIS WITHOUT MYELOPATHY OR RADICULOPATHY, LUMBAR REGION: ICD-10-CM

## 2025-03-18 DIAGNOSIS — Z79.899 MEDICATION MANAGEMENT: Primary | ICD-10-CM

## 2025-03-18 DIAGNOSIS — C67.9 MALIGNANT NEOPLASM OF URINARY BLADDER, UNSPECIFIED SITE (MULTI): ICD-10-CM

## 2025-03-18 PROCEDURE — 99213 OFFICE O/P EST LOW 20 MIN: CPT | Performed by: ANESTHESIOLOGY

## 2025-03-18 PROCEDURE — 3074F SYST BP LT 130 MM HG: CPT | Performed by: ANESTHESIOLOGY

## 2025-03-18 PROCEDURE — 1125F AMNT PAIN NOTED PAIN PRSNT: CPT | Performed by: ANESTHESIOLOGY

## 2025-03-18 PROCEDURE — 1160F RVW MEDS BY RX/DR IN RCRD: CPT | Performed by: ANESTHESIOLOGY

## 2025-03-18 PROCEDURE — 3079F DIAST BP 80-89 MM HG: CPT | Performed by: ANESTHESIOLOGY

## 2025-03-18 PROCEDURE — 1159F MED LIST DOCD IN RCRD: CPT | Performed by: ANESTHESIOLOGY

## 2025-03-18 RX ORDER — CYCLOBENZAPRINE HCL 5 MG
5 TABLET ORAL 2 TIMES DAILY
Qty: 60 TABLET | Refills: 1 | Status: SHIPPED | OUTPATIENT
Start: 2025-03-18 | End: 2025-04-17

## 2025-03-18 RX ORDER — TRAMADOL HYDROCHLORIDE 50 MG/1
50 TABLET ORAL 2 TIMES DAILY PRN
Qty: 60 TABLET | Refills: 2 | Status: SHIPPED | OUTPATIENT
Start: 2025-03-18

## 2025-03-18 ASSESSMENT — PAIN SCALES - GENERAL: PAINLEVEL_OUTOF10: 4

## 2025-03-18 NOTE — PROGRESS NOTES
Patient presents today with a chief complaint of pain to his upper back that he rates 5/10.   Patient currently takes Tramadol for his pain and presented with a bottle containing 17 pills.  Patient is currently able to perform his activities of daily living independently.      Date of the last Controlled Substance Agreement: 06/14/2024       Last urine drug screening date/ordered today: 03/18/25- swab     Results of last screen:       Last opioid risk screening date/ordered today: 05/17/2024      Pain Scale Screening:   Pain Assessment and Documentation Tool (PADT)   Date of Assessment: 03/18/2025  Analgesia:   Patient reports her pain level on average during the past week is 8 on a 0 - 10 scale.   Patient reports that her pain level at its worst during the past week was 10 on a 0 -10 scale.   60% of pain has been relieved during the past week per patient   Patient states that the amount of pain relief she is now obtaining from her current pain reliever(s) is enough to make a real difference in her life.   Query to clinician: Is the patient's pain relief clinically significant?   Activities of Daily Living:   Physical functioning: unchanged  Family relationships: unchanged  Social relationships: unchanged  Mood: unchanged  Sleep patterns: worse  Overall functioning: unchanged  Adverse Events: No, Shiva Hardy is not experiencing side effects from current pain reliever.  Patients overall severity of side effect:none  Specific Analgesic Plan: Continue present regimen.

## 2025-03-18 NOTE — PROGRESS NOTES
Subjective   Patient ID: Shiva Hardy is a 84 y.o. male who is here today for medication management.  He has a history of bladder cancer and also has chronic low back pain for the past 4 years.  He also has a history of prostate cancer and chronic lumbosacral pain syndrome.  He has been moderating his pain by taking tramadol 50 mg tablets 2 times a day.  His OARRS report was reviewed which shows his active cumulative morphine equivalent of 19.3.  He has 17 tablets of tramadol remaining.  He states he hurt his back while shoveling snow this last month and still has chronic myofascial pain involving his lumbar spine plus is severe facet arthrosis.  He states he gets some 5060% pain relief when taking his meds reticulocyte regularly    HPI  Lumbar spondylosis; history of bladder cancer; history of prostate cancer; chronic low back pain for the past 4 years; COPD; continuous use of medication for pain control  Review of Systems  New onset of severe low back pain after shoveling snow this last month in spite of his advanced age.  He denies bladder or bowel dysfunction  Objective   Physical Exam  Gen. appearance:  Patient's alert and oriented ×3 ;cooperative mild to moderate distress  Heart: Regular rate and rhythm  Lungs: Clear to auscultation  Abdomen: Soft nontender  Neuro: Cranial nerves II -XII intact; DTR: +2 over 4 biceps triceps brachial radialis patellar and Achilles  Spinal exam: Positive paraspinal tenderness noted bilaterally L4 5 L5-S1 with flexion extension and rotation/no bony abnormalities  Musculoskeletal:  Upper and lower extremity strength was 4/5 bilaterally  :  deferred  Skin: Warm and dry      Assessment/Plan   Diagnoses and all orders for this visit:  Medication management  -     QUEST MISCELLANEOUS TEST (ROOM TEMPERATURE)  -     traMADol (Ultram) 50 mg tablet; Take 1 tablet (50 mg) by mouth 2 times a day as needed for severe pain (7 - 10).  -     cyclobenzaprine (Flexeril) 5 mg tablet; Take 1  tablet (5 mg) by mouth 2 times a day.  -     Follow Up In Pain Medicine; Future  Spondylosis without myelopathy or radiculopathy, lumbar region  -     traMADol (Ultram) 50 mg tablet; Take 1 tablet (50 mg) by mouth 2 times a day as needed for severe pain (7 - 10).  -     cyclobenzaprine (Flexeril) 5 mg tablet; Take 1 tablet (5 mg) by mouth 2 times a day.  -     Follow Up In Pain Medicine; Future  Malignant neoplasm of urinary bladder, unspecified site (Multi)  -     traMADol (Ultram) 50 mg tablet; Take 1 tablet (50 mg) by mouth 2 times a day as needed for severe pain (7 - 10).  Risk and benefits of continue use of opioids was discussed with him.  He was e-prescribed tramadol 50 mg tablets #60 with 2 refills.  I also added cyclobenzaprine 5 mg 1 p.o. every 12 hours as needed for muscle spasm.  He was told to take his medication as directed keep secure location at all times.  A follow-up visit scheduled on 6/17/2025.

## 2025-03-26 LAB
6MAM SAL QL SCN: NEGATIVE NG/ML
BENZODIAZ SAL QL SCN: NEGATIVE NG/ML
BUPRENORPHINE SAL QL SCN: NEGATIVE NG/ML
FENTANYL SAL QL SCN: NEGATIVE NG/ML
OPIATES SAL QL SCN: NEGATIVE NG/ML
TAPENTADOL SAL QL SCN: NEGATIVE NG/ML
TRAMADOL SAL CFM-MCNC: >500 NG/ML
TRAMADOL SAL QL SCN: POSITIVE NG/ML

## 2025-04-24 ENCOUNTER — APPOINTMENT (OUTPATIENT)
Dept: PRIMARY CARE | Facility: CLINIC | Age: 85
End: 2025-04-24
Payer: MEDICARE

## 2025-04-30 NOTE — PROGRESS NOTES
Subjective   Patient ID: Shiva Hardy is a 84 y.o. male who presents for No chief complaint on file..  Today he is accompanied by alone.     HPI  Overall patient is doing well.   Immunization: Td done in 2019,  influenza vaccine up-to-date  Shingrix due PCV13 done in 2015, PPSV23 had 2 doses last one was in 2016  PVC 20   Colon Cancer Screening: No family history, last colonoscopy was in 2017 and was with a clearance of 5 years.   Diet: healthy food   Exercise: no   Tobacco: half pack/ day for the past 60 years   EtOH: Rarely Socially        Numbness and tingling.  Numbness and tingling is on and off for 2-3 years. Shakes his hands it helps a little and did not progress since the beginning.   Started on the lower extremities and went up to the upper extremities.   No hx of trauma, no injury.   Did not have chemiotherapy or radiation in the past.   Patient had a recent workup for neuropathy but EMG and SPEP has not done yet.        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 Lopez     HTN  His blood pressure today is 118/78 mmHg  She is taking amlodipine 5 mg tablet daily.  No side effect reported  She denied shortness of breath, cough, lower leg swelling, chest pain, change in vision, lightheadedness.      DLP  Patient is taking atorvastatin 20 mg tablet daily  No myalgia reported.      Prostate cancer s/p TURP  He complicated with bladder cancer and ureter stricture for which he does cystoscopy every 3 months.     Has urinary incontinence but is not something new.   Patient is seeing Dr. Koenig     COPD  Patient is taking Duoneb for exacerbation, not  very often, couple of weeks ago was the last one.   Takes Symbicort and DuoNeb.   No hospitalization  in the last year for COPD  Not sure when was the last time he had PFT.   Patient had  a respiratory infection with cough, sputum , loss his voice in the last 2 weeks. His wife is sick.  He is feeling better except for his voice and he was asking what he can  do for that.     YVETTE  Patient has been diagnosed in 2/2025 with obstructive sleep apnea.   CPAP was recommended    Medications Ordered Prior to Encounter[1]     Allergies[2]    Immunization History   Administered Date(s) Administered    Flu vaccine, trivalent, preservative free, HIGH-DOSE, age 65y+ (Fluzone) 10/13/2022    Moderna COVID-19 vaccine, 12 years and older (50mcg/0.5mL)(Spikevax) 10/28/2024    Moderna SARS-CoV-2 Vaccination 02/01/2021, 03/01/2021, 10/27/2021, 06/16/2022, 01/05/2023    Pneumococcal conjugate vaccine, 13-valent (PREVNAR 13) 11/03/2015    Pneumococcal polysaccharide vaccine, 23-valent, age 2 years and older (PNEUMOVAX 23) 12/16/2016    Td vaccine, age 7 years and older (TDVAX) 07/25/2019         Review of Systems  All pertinent positive symptoms are included in the history of present illness.  All other systems have been reviewed and are negative and noncontributory to this patient's current ailments.     Objective   There were no vitals taken for this visit.  BSA: There is no height or weight on file to calculate BSA.  No visits with results within 1 Month(s) from this visit.   Latest known visit with results is:   Office Visit on 03/18/2025   Component Date Value Ref Range Status    Benzodiazepines, Oral Fluid 03/18/2025 NEGATIVE  <0.50 ng/mL Final    Comment: See Note 1  See Note 2      Buprenorphine, Oral Fluid 03/18/2025 NEGATIVE  <0.10 ng/mL Final    Comment: See Note 1  See Note 2      Fentanyl, Oral Fluid 03/18/2025 NEGATIVE  <0.10 ng/mL Final    Comment: See Note 1  See Note 2      Heroin Metabolite, Oral Fluid 03/18/2025 NEGATIVE  <1.0 ng/mL Final    Comment: See Note 1  See Note 2      Opiates, Oral Fluid 03/18/2025 NEGATIVE  <2.5 ng/mL Final    Comment: See Note 1  See Note 2      Tapentadol, Oral Fluid 03/18/2025 NEGATIVE  <5.0 ng/mL Final    Comment: See Note 1  See Note 2      Tramadol, Oral Fluid 03/18/2025 POSITIVE (A)  <5.0 ng/mL Final    See Note 1    Tramadol, Oral Fluid  03/18/2025 >500.0 (H)  <5.0 ng/mL Final    Comment: See Note 1  See Note 2     Note 1     This test was developed and its analytical performance   characteristics have been determined by [facility]. It has not been cleared or approved by the  FDA. This assay has been validated pursuant to the CLIA   regulations and is used for clinical purposes.     Note 2  For additional information, please refer to:  http://education.[employer]/faq/HJL395  (This link is being provided for informational/  educational purposes only.)     This drug testing is for medical treatment only.  Analysis was performed as non-forensic testing and  these results should be used only by healthcare  providers to render diagnosis or treatment, or to  monitor progress of medical conditions.     For assistance with interpreting these drug results,  please contact a [facility]  Specialist: [phone] ([phone]), M-F,  8am-6pm EST.         Physical Exam  CONSTITUTIONAL - well nourished, well developed, looks like stated age, in no acute distress, not ill-appearing, and not tired appearing  SKIN - normal skin color and pigmentation, normal skin turgor without rash, lesions, or nodules visualized  HEAD - no trauma, normocephalic  ENT - TM's intact, no injection, no signs of infection, uvula midline, normal tongue movement and throat normal, no exudate, nasal passage without discharge and patent  NECK - supple without rigidity, no neck mass was observed, no thyromegaly or thyroid nodules  CHEST - clear to auscultation, no wheezing, no crackles and no rales, good effort  CARDIAC - regular rate and regular rhythm, no skipped beats, no murmur  ABDOMEN - no organomegaly, soft, nontender, nondistended, normal bowel sounds, no guarding/rebound/rigidity  EXTREMITIES - no edema, no deformities  NEUROLOGICAL - normal gait, normal balance, normal motor, no ataxia  PSYCHIATRIC - alert, pleasant and cordial,  age-appropriate      Assessment/Plan   He is 84 years old man ascending aorta dilation, hypertension, COPD, nicotine dependence, BPH, obstructive sleep apnea who came today to the office [facility].    Health maintenance  Need for pneumococcal vaccine  -     Pneumococcal conjugate vaccine, 20-valent (PREVNAR 20)  -Patient is up-to-date with labs  -Advised to be active.  -  Hyperlipidemia  - Patient is up-to-date with labs, lipid panel  - Continue following up with atorvastatin.  - Refill was sent in today to the pharmacy    Polyneuropathy  - Not sure what the causes are.  He had some labs done but we are pending for SPEP and EMG  - Discussed with the patient about the risk and benefit of gabapentin.   For now he can wait and see if the symptoms get more persistent and we can do gabapentin.     Hypertension  - Blood pressure today is at goal without having side effect  - Continue with amlodipine as prescribed by cardiology      BPH with TURP  - Follow-up with Dr. Kelsey    COPD  Lung nodule  Patient had a recent infection but is mild for patient.   - Patient was advised to follow-up if hoarseness is not getting better within a month.   - If hoarseness is still persistent we will consider ENT referral.   - Patient had order for repeating CT scan for lung nodules but have not done yet      Obstructive sleep apnea  - Stable continue wearing CPAP    Follow-up in 1 month if hoarseness is still persistent or in 6-month chronic management    I discussed my plan with my attending, Dr. Sandi Rouse. MD  Family medicine resident  PGY3         [1]   Current Outpatient Medications on File Prior to Visit   Medication Sig Dispense Refill    acetaminophen (Tylenol) 500 mg tablet Take 1 tablet (500 mg) by mouth every 6 hours if needed for mild pain (1 - 3).      albuterol (Ventolin HFA) 90 mcg/actuation inhaler Inhale 2 puffs every 4 hours if needed for wheezing or shortness of breath. 8 g 0    albuterol 2.5 mg /3 mL (0.083  %) nebulizer solution Take 3 mL (2.5 mg) by nebulization every 6 hours if needed for wheezing or shortness of breath. 75 mL 0    amLODIPine (Norvasc) 5 mg tablet Take 1 tablet (5 mg) by mouth once daily in the morning. Do not take until you follow up with cardiology, Dr. Cloud.      aspirin 81 mg EC tablet Take 1 tablet (81 mg) by mouth once daily in the morning.      atorvastatin (Lipitor) 20 mg tablet Take 1 tablet (20 mg) by mouth once daily at bedtime. 90 tablet 0    budesonide-formoteroL (Symbicort) 160-4.5 mcg/actuation inhaler Inhale 2 puffs 2 times a day. Rinse mouth with water after use to reduce aftertaste and incidence of candidiasis. Do not swallow. 10.2 g 5    ergocalciferol (Vitamin D-2) 1.25 MG (17281 UT) capsule Take 1 capsule (1,250 mcg) by mouth 1 (one) time per week. 90 capsule 0    ipratropium-albuteroL (Duo-Neb) 0.5-2.5 mg/3 mL nebulizer solution Take 3 mL by nebulization every 6 hours if needed for shortness of breath. 360 mL 5    montelukast (Singulair) 10 mg tablet Take 1 tablet (10 mg) by mouth once daily at bedtime. (Patient not taking: Reported on 1/16/2025) 30 tablet 5    multivitamin tablet Take 1 tablet by mouth once daily.      traMADol (Ultram) 50 mg tablet Take 1 tablet (50 mg) by mouth 2 times a day as needed for severe pain (7 - 10). 60 tablet 2     No current facility-administered medications on file prior to visit.   [2]   Allergies  Allergen Reactions    Percocet [Oxycodone-Acetaminophen] Nausea Only

## 2025-05-01 ENCOUNTER — APPOINTMENT (OUTPATIENT)
Facility: CLINIC | Age: 85
End: 2025-05-01
Payer: MEDICARE

## 2025-05-01 VITALS
DIASTOLIC BLOOD PRESSURE: 78 MMHG | HEIGHT: 70 IN | OXYGEN SATURATION: 93 % | WEIGHT: 176 LBS | SYSTOLIC BLOOD PRESSURE: 118 MMHG | BODY MASS INDEX: 25.2 KG/M2 | HEART RATE: 85 BPM

## 2025-05-01 DIAGNOSIS — J43.9 PULMONARY EMPHYSEMA, UNSPECIFIED EMPHYSEMA TYPE (MULTI): ICD-10-CM

## 2025-05-01 DIAGNOSIS — R91.1 LUNG NODULE: ICD-10-CM

## 2025-05-01 DIAGNOSIS — Z90.79 S/P TURP: ICD-10-CM

## 2025-05-01 DIAGNOSIS — Z23 NEED FOR PNEUMOCOCCAL VACCINE: ICD-10-CM

## 2025-05-01 DIAGNOSIS — G47.33 OSA ON CPAP: ICD-10-CM

## 2025-05-01 DIAGNOSIS — I77.810 ASCENDING AORTA DILATION: ICD-10-CM

## 2025-05-01 DIAGNOSIS — E78.5 HYPERLIPIDEMIA, UNSPECIFIED HYPERLIPIDEMIA TYPE: ICD-10-CM

## 2025-05-01 DIAGNOSIS — Z00.00 MEDICARE ANNUAL WELLNESS VISIT, SUBSEQUENT: Primary | ICD-10-CM

## 2025-05-01 RX ORDER — CYCLOBENZAPRINE HCL 10 MG
10 TABLET ORAL 3 TIMES DAILY PRN
COMMUNITY

## 2025-05-01 RX ORDER — ATORVASTATIN CALCIUM 20 MG/1
20 TABLET, FILM COATED ORAL NIGHTLY
Qty: 90 TABLET | Refills: 1 | Status: SHIPPED | OUTPATIENT
Start: 2025-05-01

## 2025-05-01 ASSESSMENT — ACTIVITIES OF DAILY LIVING (ADL)
TAKING_MEDICATION: INDEPENDENT
DOING_HOUSEWORK: INDEPENDENT
BATHING: INDEPENDENT
DRESSING: INDEPENDENT
GROCERY_SHOPPING: INDEPENDENT
MANAGING_FINANCES: INDEPENDENT

## 2025-05-01 ASSESSMENT — ENCOUNTER SYMPTOMS
LOSS OF SENSATION IN FEET: 0
OCCASIONAL FEELINGS OF UNSTEADINESS: 0
DEPRESSION: 0

## 2025-05-05 ENCOUNTER — APPOINTMENT (OUTPATIENT)
Dept: UROLOGY | Facility: CLINIC | Age: 85
End: 2025-05-05
Payer: MEDICARE

## 2025-05-05 DIAGNOSIS — Z85.51 HISTORY OF BLADDER CANCER: ICD-10-CM

## 2025-05-05 DIAGNOSIS — N99.112 POSTPROCEDURAL MEMBRANOUS URETHRAL STRICTURE: Primary | ICD-10-CM

## 2025-05-05 LAB
POC APPEARANCE, URINE: CLEAR
POC BILIRUBIN, URINE: NEGATIVE
POC BLOOD, URINE: ABNORMAL
POC COLOR, URINE: YELLOW
POC GLUCOSE, URINE: NEGATIVE MG/DL
POC KETONES, URINE: NEGATIVE MG/DL
POC LEUKOCYTES, URINE: NEGATIVE
POC NITRITE,URINE: NEGATIVE
POC PH, URINE: 6 PH
POC PROTEIN, URINE: ABNORMAL MG/DL
POC SPECIFIC GRAVITY, URINE: >=1.03
POC UROBILINOGEN, URINE: 0.2 EU/DL

## 2025-05-05 PROCEDURE — 99213 OFFICE O/P EST LOW 20 MIN: CPT | Performed by: STUDENT IN AN ORGANIZED HEALTH CARE EDUCATION/TRAINING PROGRAM

## 2025-05-05 PROCEDURE — 52000 CYSTOURETHROSCOPY: CPT | Performed by: STUDENT IN AN ORGANIZED HEALTH CARE EDUCATION/TRAINING PROGRAM

## 2025-05-05 NOTE — PROGRESS NOTES
Subjective   Patient ID: Shiva Hardy is a 84 y.o. male who presents for No chief complaint on file..    HPI     Patient ID: Shiva Hardy is a 84 y.o. male  presenting following cystoscopy and visual urethrotomy 10/04/2024.     HPI     82 y/o M s/p cystoscopy with excision of tissue 7/2/24. Final pathology showed largely denuded urothelial mucosa with reactive atypia, chronic inflammation, and granulation tissue reaction with focal hemorrhage presenting following cystoscopy and visual urethrotomy 10/04/2024.     Patient successfully passed his TOV. Filled 120 ml and patient voided successfully.   His flow is better but does note some pain. He did not dribbling. No worsening incontinence than before.      Pathology not available right now.     h/o bladder cancer s/p reTURBT 10/6/23, s/p BCG.      He tolerated procedure well. No major issues after.      FINAL DIAGNOSIS   A. RIGHT BLADDER WALL, BIOPSY:  -- LARGELY DENUDED UROTHELIAL MUCOSA WITH REACTIVE ATYPIA, CHRONIC INFLAMMATION, AND GRANULATION TISSUE REACTION WITH FOCAL HEMORRHAGE.     Review of Systems  A complete review of systems was performed. All systems are noted to be negative unless indicated in the history of present illness, impression, active problem list, or past histories.     Objective   There were no vitals taken for this visit.    Physical Exam    Assessment/Plan   Diagnoses and all orders for this visit:  Postprocedural membranous urethral stricture  History of bladder cancer  -     Cystoscopy; Future    82 y/o M s/p cystoscopy with excision of tissue 7/2/24. Final pathology showed largely denuded urothelial mucosa with reactive atypia, chronic inflammation, and granulation tissue reaction with focal hemorrhage presenting following cystoscopy and visual urethrotomy 10/04/2024.     Patient successfully passed his TOV. Filled 120 ml and patient voided successfully.   His flow is better but does note some pain. He did not dribbling. No  worsening incontinence than before.      Pathology not available for bladder biopsy.     h/o bladder cancer s/p reTURBT 10/6/23, s/p BCG.      Discussed that he had a  bladder neck stricture,we  will wait for now and  repeat cystoscopy. Should it reoccur, we might send him for reconstructive urology for something  more extensive. For now we will just wait.  Will call patient should pathology show something concerns.     Urinary symptoms have not improved. Still experiences symptoms of incontinence at times.      Plan:  Repeat cystoscopy in 3 months     Scribe Attestation  By signing my name below, ISita, Scribe   attest that this documentation has been prepared under the direction and in the presence of Irwin Kelsey MD.    This note has been transcribed using a medical scribe and there is a possibility of unintentional typing misprints.

## 2025-05-05 NOTE — PROGRESS NOTES
PROCEDURE NOTE:    PREOPERATIVE DIAGNOSIS:  Urinary Bladder Cancer    POSTOPERATIVE DIAGNOSIS:  Same    OPERATION:  Flexible Cystourethroscopy      SURGEON:  Irwin Kelsey MD    ANESTHESIA:  2%  lidocaine jelly    COMPLICATIONS:  None    EBL: Minimal    SPECIMEN:  Voided urine was not collected and submitted for cytology.    DISPOSITION:  The patient was discharged home after the procedure, per routine.    INDICATIONS: :  Mr. Hardy is a 84 y.o. patient with a history of bladder cancer who presents today for Cystoscopy.     The indications, risks and benefits of this procedure were discussed with the patient, consent was obtained prior to the procedure, and to the best of my judgement the patient seemed to understand and agree to the procedure.    PROCEDURE:  The patient  was brought into the procedure suite and informed consent was reviewed and confirmed. Vital signs were obtained prior to the procedure: There were no vitals taken for this visit..  The patient was escorted onto the stretcher, placed supine, prepped with betadine and draped in the usual standard surgical fashion.  Intraurethral 2% viscous lidocaine jelly was used for local analgesia.  A 16 [place] flexible cystourethroscope was inserted into the urethra.   The penile urethra was abnormal with urethral stricture. 50% narrowing, cystoscope forced through it.  The prostate urethra was surgically missing.  Upon entering the bladder the entire bladder was surveyed in a 360 degree fashion.  The left and right ureteral orifices were in normal orthotopic position effluxing clear yellow urine, bilaterally.   There was no evidence of any bladder lesions, foreign objects, stones or evidence of any mucosal changes. The cystoscope was then retroflexed.  The bladder neck was then further examined without any evidence of lesions. The scope was then removed and in an antegrade fashion, the urethra and bladder were again resurveyed with no evidence of  additional lesions.  The cystoscope was then fully removed.   The patient tolerated the procedure well.  Vitals were stable after the procedure.  The patient was able to void and was discharged home.  Verbal and written Post procedure instructions were reviewed with the patient.    IMPRESSION:  Urethral stricture. Dilation of urethra by forcing cystoscope in. No changes suggestive of bladder cancer.     PLAN:  Cystoscopy in 3 months.     Scribe Attestation  By signing my name below, ISita, Scribe   attest that this documentation has been prepared under the direction and in the presence of Irwin Kelsey MD.    This note has been transcribed using a medical scribe and there is a possibility of unintentional typing misprints.

## 2025-05-06 ENCOUNTER — TELEPHONE (OUTPATIENT)
Facility: CLINIC | Age: 85
End: 2025-05-06
Payer: MEDICARE

## 2025-05-06 NOTE — TELEPHONE ENCOUNTER
Patient is still experiencing hoarseness and is losing his voice wants to know if you can place referral so he can start the process of that doctor

## 2025-05-09 DIAGNOSIS — R49.0 HOARSENESS: Primary | ICD-10-CM

## 2025-05-21 ENCOUNTER — TELEPHONE (OUTPATIENT)
Facility: CLINIC | Age: 85
End: 2025-05-21
Payer: MEDICARE

## 2025-05-21 NOTE — TELEPHONE ENCOUNTER
Patient wants to see if you can resend the medication   Atrorvastatin 20 mg to [facility] in [place]

## 2025-05-22 DIAGNOSIS — E78.5 HYPERLIPIDEMIA, UNSPECIFIED HYPERLIPIDEMIA TYPE: ICD-10-CM

## 2025-05-22 RX ORDER — ATORVASTATIN CALCIUM 20 MG/1
20 TABLET, FILM COATED ORAL NIGHTLY
Qty: 90 TABLET | Refills: 1 | Status: SHIPPED | OUTPATIENT
Start: 2025-05-22

## 2025-06-02 ENCOUNTER — APPOINTMENT (OUTPATIENT)
Dept: OTOLARYNGOLOGY | Facility: CLINIC | Age: 85
End: 2025-06-02
Payer: MEDICARE

## 2025-06-12 ENCOUNTER — APPOINTMENT (OUTPATIENT)
Dept: OTOLARYNGOLOGY | Facility: CLINIC | Age: 85
End: 2025-06-12
Payer: MEDICARE

## 2025-06-12 VITALS — WEIGHT: 176 LBS | BODY MASS INDEX: 25.2 KG/M2 | HEIGHT: 70 IN

## 2025-06-12 DIAGNOSIS — R49.0 CHRONIC HOARSENESS: ICD-10-CM

## 2025-06-12 DIAGNOSIS — D38.0 NEOPLASM OF UNCERTAIN BEHAVIOR OF VOCAL CORD: Primary | ICD-10-CM

## 2025-06-12 PROCEDURE — 1160F RVW MEDS BY RX/DR IN RCRD: CPT | Performed by: OTOLARYNGOLOGY

## 2025-06-12 PROCEDURE — 1159F MED LIST DOCD IN RCRD: CPT | Performed by: OTOLARYNGOLOGY

## 2025-06-12 PROCEDURE — 31575 DIAGNOSTIC LARYNGOSCOPY: CPT | Performed by: OTOLARYNGOLOGY

## 2025-06-12 PROCEDURE — 99204 OFFICE O/P NEW MOD 45 MIN: CPT | Performed by: OTOLARYNGOLOGY

## 2025-06-12 ASSESSMENT — ENCOUNTER SYMPTOMS
EYE ITCHING: 1
COUGH: 1
FATIGUE: 1
WHEEZING: 1
NUMBNESS: 1
SHORTNESS OF BREATH: 1
TROUBLE SWALLOWING: 1

## 2025-06-12 NOTE — PROGRESS NOTES
Subjective   Patient ID: Shiva Hardy is a 84 y.o. male  HPI  Patient is complaining of a chronic history of hoarseness.  His hoarseness worsened approximately 2 months ago after he had an episode of laryngitis.  His voice has improved partially.  He has no hemoptysis but he is complaining of mild dysphagia.  He has no fevers or chills or nausea or vomiting.  He also has a chronic history of hearing loss.    Review of Systems   Constitutional:  Positive for fatigue.   HENT:  Positive for congestion, hearing loss and trouble swallowing.         Scratchy throat, itchy ears, itchy palate, itchy nose, loss of taste   Eyes:  Positive for itching.        Watery eyes   Respiratory:  Positive for cough (productive), shortness of breath and wheezing.    Neurological:  Positive for numbness.       Objective   Physical Exam  The following elements of a brief ear nose and throat exam were performed: External ear canals and tympanic membranes, external nose and nasal passages, oral cavity, palpation of the neck, percussion of the face, palpation of the thyroid.    There is clinical evidence of hearing loss noted during conversation and he is using hearing aids.  There is mild hoarseness noted.  Indirect mirror laryngoscopy is not possible due to strong gag reflex.  The remainder of his exam was within normal limits.  Fiberoptic laryngoscopy was offered in light of the hoarseness.  The nasal cavity and nasopharynx and hypopharynx were noted to be clear.  There was a 2 to 3 mm exophytic lesion noted on the anterior one third of the vocal cord on the right side.    Laryngoscopy    Date/Time: 6/12/2025 3:42 PM    Performed by: Kerrie Forde MD  Authorized by: Kerrie Forde MD    Consent:     Consent obtained:  Verbal  Procedure details:     Meds administered: Lidocaine and Afrin.    Instrument: flexible fiberoptic nasal endoscope    Comments:      After discussing the risks, limitations, potential benefits, and alternatives,  the patient agreed to the procedure, and consent was given.  After application of topical Afrin with Lidocaine 2% to both nostrils, a fiberoptic endoscope was passed through the patent nostril  Findings/Impression:  As noted in exam paragraph  The patient tolerated the procedure well.      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  The patient was scheduled for CT scan of the neck with contrast.  He was also referred [facility] [place] [facility] for further evaluation and consideration of biopsy.

## 2025-06-17 ENCOUNTER — OFFICE VISIT (OUTPATIENT)
Dept: PAIN MEDICINE | Facility: HOSPITAL | Age: 85
End: 2025-06-17
Payer: MEDICARE

## 2025-06-17 VITALS
HEIGHT: 71 IN | WEIGHT: 185 LBS | DIASTOLIC BLOOD PRESSURE: 77 MMHG | BODY MASS INDEX: 25.9 KG/M2 | TEMPERATURE: 97 F | SYSTOLIC BLOOD PRESSURE: 158 MMHG | RESPIRATION RATE: 19 BRPM | OXYGEN SATURATION: 94 % | HEART RATE: 75 BPM

## 2025-06-17 DIAGNOSIS — Z79.899 MEDICATION MANAGEMENT: Primary | ICD-10-CM

## 2025-06-17 DIAGNOSIS — D17.1 LIPOMA OF BACK: ICD-10-CM

## 2025-06-17 DIAGNOSIS — M54.42 CHRONIC MIDLINE LOW BACK PAIN WITH BILATERAL SCIATICA: ICD-10-CM

## 2025-06-17 DIAGNOSIS — M54.41 CHRONIC MIDLINE LOW BACK PAIN WITH BILATERAL SCIATICA: ICD-10-CM

## 2025-06-17 DIAGNOSIS — G89.29 CHRONIC MIDLINE LOW BACK PAIN WITH BILATERAL SCIATICA: ICD-10-CM

## 2025-06-17 DIAGNOSIS — M47.816 SPONDYLOSIS WITHOUT MYELOPATHY OR RADICULOPATHY, LUMBAR REGION: ICD-10-CM

## 2025-06-17 DIAGNOSIS — M47.814 SPONDYLOSIS, THORACIC: ICD-10-CM

## 2025-06-17 DIAGNOSIS — C67.9 MALIGNANT NEOPLASM OF URINARY BLADDER, UNSPECIFIED SITE (MULTI): ICD-10-CM

## 2025-06-17 PROCEDURE — 99213 OFFICE O/P EST LOW 20 MIN: CPT | Performed by: ANESTHESIOLOGY

## 2025-06-17 PROCEDURE — 1159F MED LIST DOCD IN RCRD: CPT | Performed by: ANESTHESIOLOGY

## 2025-06-17 PROCEDURE — 3077F SYST BP >= 140 MM HG: CPT | Performed by: ANESTHESIOLOGY

## 2025-06-17 PROCEDURE — 3078F DIAST BP <80 MM HG: CPT | Performed by: ANESTHESIOLOGY

## 2025-06-17 PROCEDURE — 1160F RVW MEDS BY RX/DR IN RCRD: CPT | Performed by: ANESTHESIOLOGY

## 2025-06-17 PROCEDURE — 1125F AMNT PAIN NOTED PAIN PRSNT: CPT | Performed by: ANESTHESIOLOGY

## 2025-06-17 RX ORDER — NALOXONE HYDROCHLORIDE 4 MG/.1ML
1 SPRAY NASAL AS NEEDED
Qty: 2 EACH | Refills: 0 | Status: SHIPPED | OUTPATIENT
Start: 2025-06-17

## 2025-06-17 RX ORDER — CYCLOBENZAPRINE HCL 5 MG
1 TABLET ORAL
COMMUNITY
Start: 2025-04-24

## 2025-06-17 RX ORDER — TRAMADOL HYDROCHLORIDE 50 MG/1
50 TABLET, FILM COATED ORAL EVERY 8 HOURS PRN
Qty: 90 TABLET | Refills: 1 | Status: SHIPPED | OUTPATIENT
Start: 2025-06-17 | End: 2025-06-19 | Stop reason: SDUPTHER

## 2025-06-17 ASSESSMENT — COLUMBIA-SUICIDE SEVERITY RATING SCALE - C-SSRS
2. HAVE YOU ACTUALLY HAD ANY THOUGHTS OF KILLING YOURSELF?: NO
1. IN THE PAST MONTH, HAVE YOU WISHED YOU WERE DEAD OR WISHED YOU COULD GO TO SLEEP AND NOT WAKE UP?: NO
6. HAVE YOU EVER DONE ANYTHING, STARTED TO DO ANYTHING, OR PREPARED TO DO ANYTHING TO END YOUR LIFE?: NO

## 2025-06-17 ASSESSMENT — PATIENT HEALTH QUESTIONNAIRE - PHQ9
SUM OF ALL RESPONSES TO PHQ9 QUESTIONS 1 AND 2: 0
2. FEELING DOWN, DEPRESSED OR HOPELESS: NOT AT ALL
1. LITTLE INTEREST OR PLEASURE IN DOING THINGS: NOT AT ALL

## 2025-06-17 ASSESSMENT — PAIN SCALES - GENERAL: PAINLEVEL_OUTOF10: 6

## 2025-06-17 NOTE — H&P
History Of Present Illness  Shiva Hardy is a 84 y.o. male presenting with a chief complaint of chronic low back pain secondary failed lumbar laminectomy syndrome.  Patient has been on opioids for years and was weaned off of Vicodin and placed on tramadol 50 mg tablet 2 times a day.  Controlled substance agreement was signed today.     Past Medical History  He has a past medical history of Acute maxillary sinusitis, unspecified (12/19/2019), Acute recurrent maxillary sinusitis (12/28/2016), Acute upper respiratory infection, unspecified (03/13/2017), Acute upper respiratory infection, unspecified (12/30/2020), Arthritis, Bilateral inguinal hernia, without obstruction or gangrene, not specified as recurrent (03/04/2014), BPH (benign prostatic hyperplasia), Chest pain, unspecified (12/16/2016), Chronic obstructive pulmonary disease with (acute) exacerbation (Multi) (12/19/2019), Chronic pain disorder, Edema, unspecified (05/27/2021), Hearing aid worn, HL (hearing loss), Hyperlipidemia, Hypertension, Impacted cerumen, right ear (01/06/2017), Low back pain, unspecified (07/25/2019), Personal history of malignant neoplasm of prostate, Personal history of malignant neoplasm, unspecified, Personal history of other diseases of the digestive system (05/27/2021), Personal history of other diseases of the respiratory system (02/22/2017), Personal history of other endocrine, nutritional and metabolic disease (04/20/2017), Prostate cancer (Multi), Sleep apnea, Unspecified malignant neoplasm of skin of unspecified part of face (12/16/2016), and Unspecified symptoms and signs involving the genitourinary system (12/23/2021).    Surgical History  He has a past surgical history that includes Hernia repair (11/03/2015); Other surgical history (01/25/2021); Other surgical history (01/25/2021); Umbilical hernia repair (12/16/2016); Other surgical history (12/16/2016); Other surgical history (05/08/2019); Cataract extraction  (10/01/2014); Prostate surgery (10/01/2014); and Cystoscopy (07/02/2024).     Social History  He reports that he has been smoking cigarettes. He started smoking about 67 years ago. He has a 67.5 pack-year smoking history. He has been exposed to tobacco smoke. He has never used smokeless tobacco. He reports that he does not currently use alcohol. He reports that he does not currently use drugs.    Family History  Family History[1]     Allergies  Percocet [oxycodone-acetaminophen]    Review of Systems  Unremarkable except for chief complaint  Physical Exam  Gen. appearance:  Patient's alert and oriented ×3 ;cooperative mild to moderate distress  Heart: Regular rate and rhythm  Lungs: Clear to auscultation  Abdomen: Soft nontender  Neuro: Cranial nerves II -XII intact; DTR: +2 over 4 biceps triceps brachial radialis patellar and Achilles  Spinal exam: Positive paraspinal tenderness noted bilaterally L4 5 L5-S1 with flexion extension and rotation/no bony abnormalities  Musculoskeletal:  Upper and lower extremity strength was 4/5 bilaterally  :  deferred  Skin: Warm and dry    Last Recorded Vitals  /77   Pulse 75   Temp 36.1 °C (97 °F) (Temporal)   Resp 19   Wt 83.9 kg (185 lb)   SpO2 94%     ASSESSMENT:  1.Post lumbar laminectomy syndrome  2.  Malignant neoplasm of bladder  3. lumbar spondylosis   4.   medication management  5.  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  Controlled substance agreement was signed today to increase it to 3 times a day.  Patient was also given a referral for physical therapy which she will start doing 1 time a week for 6 weeks.  He states he had excellent results from the pain which is paraspinal in nature along his thoracic lumbar spine.  Follow-up visit scheduled in 2 months.      Kingston Wang DO         [1]   Family History  Problem Relation Name Age of Onset     Arthritis Mother      Hypertension Mother      Vision loss Mother      Arthritis Father      Other (CARDIAC DISORDER) Father      Prostate cancer Father      Colon cancer Brother      Arthritis Brother      Hypertension Brother      Liver cancer Brother      Lung cancer Brother      Vision loss Brother      Cancer Other MULTIPLE FAMILY MEMBERS

## 2025-06-17 NOTE — PROGRESS NOTES
Patient presents today with a chief complaint of pain to his lower back that sometimes goes up the left side of his back.  Patient rates his pain today 6/10.  Patient currently takes Tramadol for his pain and presented with a bottle containing 15 pills.  Patients last dose was 6/17/2025 AM.  Patient is currently able to perform his activities of daily living independently.    Date of the last Controlled Substance Agreement: 06/14/2024    Last urine drug screening date/ordered today: 06/17/25 - oral swab  Results of last screen:       Opioid Risk Screening:   THE OPIOID RISK TOOL (ORT)                                                                      Female                     Male     1. Family History of Substance Abuse:                              Alcohol                                                   [1]=                           [3]  =0     Illicit Drugs                                             [2] =                          [3]   =0    Prescription Drugs                                [4]=                           [4]   =0    2. Personal History of Substance Abuse:     Alcohol                                                    [3] =                          [3] =0     Illegal Drugs                                           [4] =                           [4]  =0     Prescription Drugs                                [5]=                            [5]   =0    3. Age (If between 16 to 45):               [1]=                           [1]   =0    4. History of Preadolescent Sexual Abuse                                                                  [3]=                            [0]   =0    5. Psychological Disease:    ADD, OCD, Bipolar, Schizophrenia    [2]=                            [2]   =0    Depression                                          [1]=                             [1]   =0      TOTAL Score =  0     Last opioid risk screening date/ordered today: 06/17/2025  Patient's total score is  0    Reference :  Low Score = 0 to 3  Moderate Score = 4 to 7  High Score = =8       Pain Scale Screening:   Pain Assessment and Documentation Tool (PADT)   Date of Assessment: 06/17/2025  Analgesia:   Patient reports her pain level on average during the past week is 8on a 0 - 10 scale.   Patient reports that her pain level at its worst during the past week was 9 on a 0 -10 scale.   -----------------------  50% of pain has been relieved during the past week per patient   Patient states that the amount of pain relief she is now obtaining from her current pain reliever(s) is enough to make a real difference in her life.   Query to clinician: Is the patient's pain relief clinically significant?   Activities of Daily Living:   Physical functioning: unchanged  Family relationships: unchanged  Social relationships: unchanged  Mood: unchanged  Sleep patterns: unchanged  Overall functioning: unchanged  Adverse Events: No Shiva Hardy is not experiencing side effects from current pain reliever.  Patients overall severity of side effect:none  Specific Analgesic Plan: Continue present regimen. 06/14/2024

## 2025-06-19 ENCOUNTER — TELEPHONE (OUTPATIENT)
Dept: PAIN MEDICINE | Facility: HOSPITAL | Age: 85
End: 2025-06-19
Payer: MEDICARE

## 2025-06-19 DIAGNOSIS — M47.814 SPONDYLOSIS, THORACIC: ICD-10-CM

## 2025-06-19 DIAGNOSIS — M47.816 SPONDYLOSIS WITHOUT MYELOPATHY OR RADICULOPATHY, LUMBAR REGION: ICD-10-CM

## 2025-06-19 DIAGNOSIS — Z79.899 MEDICATION MANAGEMENT: ICD-10-CM

## 2025-06-19 DIAGNOSIS — C67.9 MALIGNANT NEOPLASM OF URINARY BLADDER, UNSPECIFIED SITE (MULTI): ICD-10-CM

## 2025-06-19 RX ORDER — TRAMADOL HYDROCHLORIDE 50 MG/1
50 TABLET, FILM COATED ORAL EVERY 8 HOURS PRN
Qty: 90 TABLET | Refills: 1 | Status: SHIPPED | OUTPATIENT
Start: 2025-06-19

## 2025-06-23 ENCOUNTER — HOSPITAL ENCOUNTER (OUTPATIENT)
Dept: RADIOLOGY | Facility: HOSPITAL | Age: 85
Discharge: HOME | End: 2025-06-23
Payer: MEDICARE

## 2025-06-23 DIAGNOSIS — D38.0 NEOPLASM OF UNCERTAIN BEHAVIOR OF VOCAL CORD: ICD-10-CM

## 2025-06-23 LAB
BUN SERPL-MCNC: 23 MG/DL (ref 6–23)
CREAT SERPL-MCNC: 0.89 MG/DL (ref 0.5–1.3)
EGFRCR SERPLBLD CKD-EPI 2021: 83 ML/MIN/1.73M*2

## 2025-06-23 PROCEDURE — 82565 ASSAY OF CREATININE: CPT | Performed by: OTOLARYNGOLOGY

## 2025-06-23 PROCEDURE — 70491 CT SOFT TISSUE NECK W/DYE: CPT

## 2025-06-23 PROCEDURE — 2550000001 HC RX 255 CONTRASTS: Performed by: OTOLARYNGOLOGY

## 2025-06-23 PROCEDURE — 70491 CT SOFT TISSUE NECK W/DYE: CPT | Performed by: RADIOLOGY

## 2025-06-23 PROCEDURE — 84520 ASSAY OF UREA NITROGEN: CPT | Performed by: OTOLARYNGOLOGY

## 2025-06-23 RX ADMIN — IOHEXOL 75 ML: 350 INJECTION, SOLUTION INTRAVENOUS at 14:11

## 2025-06-25 LAB — QUEST FLEXITEST1 RESULTS:: NORMAL

## 2025-06-27 ENCOUNTER — TELEPHONE (OUTPATIENT)
Dept: OTOLARYNGOLOGY | Facility: CLINIC | Age: 85
End: 2025-06-27
Payer: MEDICARE

## 2025-06-30 DIAGNOSIS — E55.9 VITAMIN D DEFICIENCY: ICD-10-CM

## 2025-06-30 NOTE — TELEPHONE ENCOUNTER
Patient needs refill Vitamin D-2 1.25mg (50,000) takes one per week  Pharmacy [facility] [place]

## 2025-07-02 ENCOUNTER — OFFICE VISIT (OUTPATIENT)
Dept: OTOLARYNGOLOGY | Facility: HOSPITAL | Age: 85
End: 2025-07-02
Payer: MEDICARE

## 2025-07-02 ENCOUNTER — TELEPHONE (OUTPATIENT)
Dept: OTOLARYNGOLOGY | Facility: CLINIC | Age: 85
End: 2025-07-02

## 2025-07-02 VITALS — HEIGHT: 71 IN | BODY MASS INDEX: 24.08 KG/M2 | TEMPERATURE: 97.3 F | WEIGHT: 172 LBS

## 2025-07-02 DIAGNOSIS — F17.210 SMOKING GREATER THAN 40 PACK YEARS: ICD-10-CM

## 2025-07-02 DIAGNOSIS — R49.0 DYSPHONIA: Primary | ICD-10-CM

## 2025-07-02 DIAGNOSIS — D38.0 NEOPLASM OF UNCERTAIN BEHAVIOR OF VOCAL CORD: ICD-10-CM

## 2025-07-02 DIAGNOSIS — R49.0 DYSPHONIA: ICD-10-CM

## 2025-07-02 PROCEDURE — 1159F MED LIST DOCD IN RCRD: CPT | Performed by: OTOLARYNGOLOGY

## 2025-07-02 PROCEDURE — 1125F AMNT PAIN NOTED PAIN PRSNT: CPT | Performed by: OTOLARYNGOLOGY

## 2025-07-02 PROCEDURE — 99215 OFFICE O/P EST HI 40 MIN: CPT | Mod: 25 | Performed by: OTOLARYNGOLOGY

## 2025-07-02 PROCEDURE — 31575 DIAGNOSTIC LARYNGOSCOPY: CPT | Performed by: OTOLARYNGOLOGY

## 2025-07-02 PROCEDURE — 99205 OFFICE O/P NEW HI 60 MIN: CPT | Performed by: OTOLARYNGOLOGY

## 2025-07-02 PROCEDURE — 1160F RVW MEDS BY RX/DR IN RCRD: CPT | Performed by: OTOLARYNGOLOGY

## 2025-07-02 RX ORDER — IBUPROFEN 200 MG
1 TABLET ORAL EVERY 24 HOURS
Qty: 30 PATCH | Refills: 0 | Status: SHIPPED | OUTPATIENT
Start: 2025-07-02 | End: 2025-08-01

## 2025-07-02 ASSESSMENT — PAIN SCALES - GENERAL: PAINLEVEL_OUTOF10: 8

## 2025-07-02 NOTE — TELEPHONE ENCOUNTER
Mr. Hardy saw Dr. Bender today as we were hoping she might be able to address his lesion endoscopically.  Unfortunately, she felt it was not amenable to that approach.  She ordered a MRI of the neck and asked that one of the head and neck physicians see him and schedule him for the OR for a DL & biopsy.    I called and spoke with Bre, his daughter tonight.  She was able to get him scheduled for his MRI tomorrow.    I offered her the apts that I had for next week with Elba Zavala and Nigel, with my plan to bring him to the OR on 7/21/25 for that endoscopy with biopsy.    She appreciated the call and we scheduled Mr. Hardy to see Dr. Manning on 7/9/25.  I did go ahead and submitted the surgery reservation tonight so that [facility] can reach out about pretesting and he gets on the OR schedule.    Bre has my contact information should questions come up between now and Wednesday next week.

## 2025-07-02 NOTE — PROGRESS NOTES
Patient: Shiva Hardy   MRN: [id number] YOB: 1940   Sex: male Age: 84 y.o.  Date of Service: 2025       ASSESSMENT AND PLAN  I discussed the findings with Shiva Hardy and have recommended the followin. 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 CT neck with possible cartilage erosion but it is difficult to assess. Discussed with Dr. Guerin and recommended for MRI. We had a long discussion about his suspected diagnosis and his likely course of treatment. At this time, this looks more advanced then I could remove endoscopically, so his treatment options are most likely a total laryngectomy vs definitive chemoradiation. We will first need tissue for a diagnosis.  - I will discuss with my head and neck colleagues regarding scheduling a DL with biopsy  - MRI ordered  - Referrals to medical oncology and radiation oncology placed    2. Current smoker, he has plans to quit  - Nicotine patches ordered  - Fum information provided      CHIEF COMPLAINT  Chief Complaint   Patient presents with    Follow-up     Vocal cord evaluation.        HISTORY OF PRESENT ILLNESS  Shiva Hardy is a 84 y.o. male referred by Kerrie Aleman MD for evaluation of dysphonia.     25  He reports voice issues for about 2-3 months, lost his voice for about 5 weeks and then got it back but is now starting to lose it again. Denies pain. Denies dysphagia and dyspnea.    PMH: COPD, prostate and bladder cancer  SH: 60+ pack year current smoker, he is cutting back    ADDITIONAL HISTORY  Past Medical History  He has a past medical history of Acute maxillary sinusitis, unspecified (2019), Acute recurrent maxillary sinusitis (2016), Acute upper respiratory infection, unspecified (2017), Acute upper respiratory infection, unspecified (2020), Arthritis, Bilateral inguinal hernia, without obstruction or gangrene, not specified as recurrent (2014),  BPH (benign prostatic hyperplasia), Chest pain, unspecified (12/16/2016), Chronic obstructive pulmonary disease with (acute) exacerbation (Multi) (12/19/2019), Chronic pain disorder, Edema, unspecified (05/27/2021), Hearing aid worn, HL (hearing loss), Hyperlipidemia, Hypertension, Impacted cerumen, right ear (01/06/2017), Low back pain, unspecified (07/25/2019), Personal history of malignant neoplasm of prostate, Personal history of malignant neoplasm, unspecified, Personal history of other diseases of the digestive system (05/27/2021), Personal history of other diseases of the respiratory system (02/22/2017), Personal history of other endocrine, nutritional and metabolic disease (04/20/2017), Prostate cancer (Multi), Sleep apnea, Unspecified malignant neoplasm of skin of unspecified part of face (12/16/2016), and Unspecified symptoms and signs involving the genitourinary system (12/23/2021). Surgical History  He has a past surgical history that includes Hernia repair (11/03/2015); Other surgical history (01/25/2021); Other surgical history (01/25/2021); Umbilical hernia repair (12/16/2016); Other surgical history (12/16/2016); Other surgical history (05/08/2019); Cataract extraction (10/01/2014); Prostate surgery (10/01/2014); and Cystoscopy (07/02/2024).   Social History  He reports that he has been smoking cigarettes. He started smoking about 67 years ago. He has a 67.5 pack-year smoking history. He has been exposed to tobacco smoke. He has never used smokeless tobacco. He reports that he does not currently use alcohol. He reports that he does not currently use drugs. Allergies  Percocet [oxycodone-acetaminophen]     Family History  Family History[1]     REVIEW OF SYSTEMS  All 10 systems were reviewed and negative except for above.      PHYSICAL EXAM  ENT Physical Exam   GENERAL: Well-nourished and developed, alert and appropriate, no distress, voice [id number]  RESPIRATORY: Breathing quietly, no stridor  HEAD:  "Normocephalic atraumatic  FACE: Symmetric, no masses or lesions  EYES:  Pupils reactive, sclera clear, external ocular muscles intact, no nystagmus.    EARS:  Pinnae normal. External auditory canals clear and tympanic membranes intact.  NOSE:  No anterior lesions, masses or polyps.  ORAL CAVITY/OROPHARYNX:  Buccal mucosa is moist without lesions or masses, tongue midline and palate elevates symmetrically. Tongue mobility intact.  NECK:  Soft. There is no lymphadenopathy or thyromegaly.    NEUROLOGIC:  Cranial nerves II-XII grossly intact.       Last Recorded Vitals  Temperature 36.3 °C (97.3 °F), temperature source Temporal, height 1.803 m (5' 11\"), weight 78 kg (172 lb).    RESULTS    Patient Reported Outcome Measures  N/A    Laboratory, Radiology, and Pathology  I personally reviewed the following results, with the following interpretation:   N/A      PROCEDURES  Flexible Fiberoptic Laryngoscopy     Patient failed a mirror exam due to limitations of equipment and the need for laryngoscopy to assess laryngeal anatomy and function    PREOPERATIVE DIAGNOSIS: dysphonia    POSTOPERATIVE DIAGNOSIS: Same    PROCEDURE: Transnasal videolaryngoscopy    ANESTHESIA:  Topical    COMPLICATIONS:  None    SPECIMENS:  None    PROCEDURE IN DETAIL:  The patient was brought into the endoscopy suite, placed in the upright position.  The nasal cavity was topically decongested anesthetized.  The distal chip video laryngoscope was passed through the nasal cavity.  The nasal cavity and nasopharynx were within normal limits except noted below. The following findings on laryngoscopy were noted:     Tongue Base: no masses or lesions   Vocal Fold Mobility               Right VF: immobile               Left VF: mobile   TVF Appearance               Edema/Erythema: n/a               Lesions/vibratory margin irregularities: exophytic mass along the right vocal fold, obscuring anterior glottic inlet but posterior glottis is patent   Muscle " Tension Patterns:  lateral   Other Findings: discoordinated breathing patterns consistent with COPD, mild mucus throughout    The patient tolerated the procedure well.        ----------------------------------------------------------------------  Betsy Bender MD, MAEd    [facility]  Department of Otolaryngology - Head and Neck Surgery  [facility]    The total time I spent in care of this patient today (excluding time spent on other billable services) is as follows:    Time Spent  Prep time on day of patient encounter: 10 minutes  Time spent directly with patient, family or caregiver: 30 minutes  Additional Time Spent on Patient Care Activities: 10 minutes  Documentation Time: 10 minutes  Other Time Spent: 0 minutes  Total: 60 minutes               [1]   Family History  Problem Relation Name Age of Onset    Arthritis Mother      Hypertension Mother      Vision loss Mother      Arthritis Father      Other (CARDIAC DISORDER) Father      Prostate cancer Father      Colon cancer Brother      Arthritis Brother      Hypertension Brother      Liver cancer Brother      Lung cancer Brother      Vision loss Brother      Cancer Other MULTIPLE FAMILY MEMBERS

## 2025-07-03 ENCOUNTER — HOSPITAL ENCOUNTER (OUTPATIENT)
Dept: RADIOLOGY | Facility: HOSPITAL | Age: 85
Discharge: HOME | End: 2025-07-03
Payer: MEDICARE

## 2025-07-03 DIAGNOSIS — D38.0 NEOPLASM OF UNCERTAIN BEHAVIOR OF VOCAL CORD: ICD-10-CM

## 2025-07-03 PROCEDURE — A9575 INJ GADOTERATE MEGLUMI 0.1ML: HCPCS | Mod: JW | Performed by: OTOLARYNGOLOGY

## 2025-07-03 PROCEDURE — 2550000001 HC RX 255 CONTRASTS: Mod: JW | Performed by: OTOLARYNGOLOGY

## 2025-07-03 PROCEDURE — 70543 MRI ORBT/FAC/NCK W/O &W/DYE: CPT

## 2025-07-03 PROCEDURE — 70543 MRI ORBT/FAC/NCK W/O &W/DYE: CPT | Performed by: RADIOLOGY

## 2025-07-03 RX ORDER — GADOTERATE MEGLUMINE 376.9 MG/ML
16 INJECTION INTRAVENOUS
Status: COMPLETED | OUTPATIENT
Start: 2025-07-03 | End: 2025-07-03

## 2025-07-03 RX ADMIN — GADOTERATE MEGLUMINE 16 ML: 376.9 INJECTION INTRAVENOUS at 14:17

## 2025-07-07 RX ORDER — ERGOCALCIFEROL 1.25 MG/1
1.25 CAPSULE ORAL
Qty: 90 CAPSULE | Refills: 0 | OUTPATIENT
Start: 2025-07-13

## 2025-07-09 ENCOUNTER — APPOINTMENT (OUTPATIENT)
Dept: OTOLARYNGOLOGY | Facility: CLINIC | Age: 85
End: 2025-07-09
Payer: MEDICARE

## 2025-07-09 VITALS — WEIGHT: 172 LBS | HEIGHT: 71 IN | BODY MASS INDEX: 24.08 KG/M2

## 2025-07-09 DIAGNOSIS — R49.0 DYSPHONIA: Primary | ICD-10-CM

## 2025-07-09 DIAGNOSIS — F17.210 SMOKING GREATER THAN 40 PACK YEARS: ICD-10-CM

## 2025-07-09 PROCEDURE — 99213 OFFICE O/P EST LOW 20 MIN: CPT | Performed by: OTOLARYNGOLOGY

## 2025-07-09 PROCEDURE — 1160F RVW MEDS BY RX/DR IN RCRD: CPT | Performed by: OTOLARYNGOLOGY

## 2025-07-09 PROCEDURE — 1159F MED LIST DOCD IN RCRD: CPT | Performed by: OTOLARYNGOLOGY

## 2025-07-09 PROCEDURE — 31575 DIAGNOSTIC LARYNGOSCOPY: CPT | Performed by: OTOLARYNGOLOGY

## 2025-07-09 RX ORDER — NICOTINE 4 MG/1
1 INHALANT RESPIRATORY (INHALATION) AS NEEDED
Qty: 42 EACH | Refills: 1 | Status: SHIPPED | OUTPATIENT
Start: 2025-07-09 | End: 2025-08-08

## 2025-07-09 NOTE — PROGRESS NOTES
Mr. Shiva Hardy is a very pleasant 84-year-old gentleman who has had nearly a 2-year history of dysphonia.  The patient's daughter who is a radiology tech convince the patient to see Dr. Montgomery for evaluation of this lesion.  Dr. Montgomery's evaluation was concerning for a laryngeal neoplasm and he was referred to Dr. Betsy Bender for evaluation.  Dr. Bender was concerned that the lesion was transglottic in nature and referred him to me for further evaluation.  Patient denies any dysphagia, odynophagia or respiratory compromise.    On physical examination patient is a frail-appearing gentleman in no distress.  Palpation of his neck reveals no palpable adenopathy.  His oral cavity and oropharynx are free of any mucosal abnormalities.    Flexible fiberoptic scope was performed with inspection of the entire upper aerodigestive tract.  The entirety of mucosal surfaces were normal except for what was clearly a malignant lesion involving the anterior two thirds of his right true vocal cord and extending across the anterior commissure to the left cord.  The the inferior extent of the lesion could not be determined on scope exam.  Also the left cord appeared to be mobile but the right cord was limited in mobility.    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  He will undergo an operative panendoscopy with biopsy to confirm a squamous cell carcinoma diagnosis.  He will then be presented at tumor board for discussion of multidisciplinary care.

## 2025-07-11 DIAGNOSIS — F17.210 SMOKING GREATER THAN 40 PACK YEARS: ICD-10-CM

## 2025-07-14 ENCOUNTER — TELEPHONE (OUTPATIENT)
Dept: OTOLARYNGOLOGY | Facility: HOSPITAL | Age: 85
End: 2025-07-14
Payer: MEDICARE

## 2025-07-14 RX ORDER — NICOTINE 10 MG/ML
SPRAY, METERED NASAL
Qty: 40 ML | Refills: 1 | OUTPATIENT
Start: 2025-07-14

## 2025-07-14 NOTE — TELEPHONE ENCOUNTER
----- Message from Yadi BAUTISTA sent at 7/14/2025  3:13 PM EDT -----  Regarding: Prescription  Edouard Garcia from [facility] called [phone] because an escript was sent over Nicotrol cartridges and these are no longer available. What is available is the nasal spray. Please return pharmacy call.    Thank you

## 2025-07-14 NOTE — TELEPHONE ENCOUNTER
Dr. Manning is out of town this week.  I did message him that the nicotine inhaler is no longer available.      Not sure he's willing to convert to the nasal spray [id number] Mr. Hardy use the nicotine patches.  I'm awaiting his response, but for now the nicotine nasal spray request will be denied.

## 2025-07-14 NOTE — TELEPHONE ENCOUNTER
That prescription to change the nicotine to nasal spray was electronically declined this morning.      I called back and informed them of this.    Again Dr. Manning is out of town this week.  I did message him about this but have not heard back and might not until he returns next week.

## 2025-07-15 ENCOUNTER — PRE-ADMISSION TESTING (OUTPATIENT)
Dept: PREADMISSION TESTING | Facility: HOSPITAL | Age: 85
End: 2025-07-15
Payer: MEDICARE

## 2025-07-15 ENCOUNTER — TELEPHONE (OUTPATIENT)
Dept: OTOLARYNGOLOGY | Facility: HOSPITAL | Age: 85
End: 2025-07-15

## 2025-07-15 VITALS
TEMPERATURE: 98 F | BODY MASS INDEX: 24.58 KG/M2 | OXYGEN SATURATION: 94 % | HEIGHT: 70 IN | HEART RATE: 50 BPM | WEIGHT: 171.7 LBS | DIASTOLIC BLOOD PRESSURE: 72 MMHG | SYSTOLIC BLOOD PRESSURE: 138 MMHG

## 2025-07-15 DIAGNOSIS — F17.210 SMOKING GREATER THAN 40 PACK YEARS: ICD-10-CM

## 2025-07-15 DIAGNOSIS — D38.0 NEOPLASM OF UNCERTAIN BEHAVIOR OF VOCAL CORD: ICD-10-CM

## 2025-07-15 DIAGNOSIS — R49.0 DYSPHONIA: ICD-10-CM

## 2025-07-15 LAB
ANION GAP SERPL CALC-SCNC: 12 MMOL/L (ref 10–20)
BUN SERPL-MCNC: 23 MG/DL (ref 6–23)
CALCIUM SERPL-MCNC: 9.9 MG/DL (ref 8.6–10.6)
CHLORIDE SERPL-SCNC: 103 MMOL/L (ref 98–107)
CO2 SERPL-SCNC: 27 MMOL/L (ref 21–32)
CREAT SERPL-MCNC: 0.68 MG/DL (ref 0.5–1.3)
EGFRCR SERPLBLD CKD-EPI 2021: >90 ML/MIN/1.73M*2
ERYTHROCYTE [DISTWIDTH] IN BLOOD BY AUTOMATED COUNT: 12.6 % (ref 11.5–14.5)
GLUCOSE SERPL-MCNC: 99 MG/DL (ref 74–99)
HCT VFR BLD AUTO: 41 % (ref 41–52)
HGB BLD-MCNC: 13.4 G/DL (ref 13.5–17.5)
MCH RBC QN AUTO: 31.7 PG (ref 26–34)
MCHC RBC AUTO-ENTMCNC: 32.7 G/DL (ref 32–36)
MCV RBC AUTO: 97 FL (ref 80–100)
NRBC BLD-RTO: 0 /100 WBCS (ref 0–0)
PLATELET # BLD AUTO: 332 X10*3/UL (ref 150–450)
POTASSIUM SERPL-SCNC: 4.6 MMOL/L (ref 3.5–5.3)
RBC # BLD AUTO: 4.23 X10*6/UL (ref 4.5–5.9)
SODIUM SERPL-SCNC: 137 MMOL/L (ref 136–145)
WBC # BLD AUTO: 8.8 X10*3/UL (ref 4.4–11.3)

## 2025-07-15 PROCEDURE — 82374 ASSAY BLOOD CARBON DIOXIDE: CPT

## 2025-07-15 PROCEDURE — 99205 OFFICE O/P NEW HI 60 MIN: CPT | Performed by: NURSE PRACTITIONER

## 2025-07-15 PROCEDURE — 36415 COLL VENOUS BLD VENIPUNCTURE: CPT

## 2025-07-15 PROCEDURE — 85027 COMPLETE CBC AUTOMATED: CPT

## 2025-07-15 PROCEDURE — 99406 BEHAV CHNG SMOKING 3-10 MIN: CPT | Performed by: NURSE PRACTITIONER

## 2025-07-15 ASSESSMENT — ENCOUNTER SYMPTOMS
NEUROLOGICAL NEGATIVE: 1
MYALGIAS: 1
CONSTITUTIONAL NEGATIVE: 1
ARTHRALGIAS: 1
CARDIOVASCULAR NEGATIVE: 1
SHORTNESS OF BREATH: 1
ENDOCRINE NEGATIVE: 1
VOICE CHANGE: 1
NECK NEGATIVE: 1
EYES NEGATIVE: 1
GASTROINTESTINAL NEGATIVE: 1

## 2025-07-15 ASSESSMENT — DUKE ACTIVITY SCORE INDEX (DASI)
CAN YOU DO LIGHT WORK AROUND THE HOUSE LIKE DUSTING OR WASHING DISHES: YES
CAN YOU PARTICIPATE IN MODERATE RECREATIONAL ACTIVITIES LIKE GOLF, BOWLING, DANCING, DOUBLES TENNIS OR THROWING A BASEBALL OR FOOTBALL: NO
CAN YOU CLIMB A FLIGHT OF STAIRS OR WALK UP A HILL: NO
CAN YOU DO MODERATE WORK AROUND THE HOUSE LIKE VACUUMING, SWEEPING FLOORS OR CARRYING GROCERIES: NO
DASI METS SCORE: 3.6
CAN YOU DO HEAVY WORK AROUND THE HOUSE LIKE SCRUBBING FLOORS OR LIFTING AND MOVING HEAVY FURNITURE: NO
CAN YOU TAKE CARE OF YOURSELF (EAT, DRESS, BATHE, OR USE TOILET): YES
CAN YOU PARTICIPATE IN STRENOUS SPORTS LIKE SWIMMING, SINGLES TENNIS, FOOTBALL, BASKETBALL, OR SKIING: NO
CAN YOU WALK A BLOCK OR TWO ON LEVEL GROUND: NO
CAN YOU DO YARD WORK LIKE RAKING LEAVES, WEEDING OR PUSHING A MOWER: NO
CAN YOU HAVE SEXUAL RELATIONS: NO
TOTAL_SCORE: 7.2
CAN YOU RUN A SHORT DISTANCE: NO
CAN YOU WALK INDOORS, SUCH AS AROUND YOUR HOUSE: YES

## 2025-07-15 ASSESSMENT — LIFESTYLE VARIABLES: SMOKING_STATUS: SMOKER

## 2025-07-15 NOTE — H&P (VIEW-ONLY)
CPM/PAT Evaluation       Name: Shiva Hardy (Shiva Hardy)  /Age: 1940/84 y.o.     Visit Type:   In-Person       Chief Complaint: perioperative evaluation      HPI  The patient is an 84 year old male with complaints of dysphonia. 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 He is referred today by Khalif Manning for perioperative evaluation in anticipation of laryngoscopy with biopsy, bronchoscopy, esophagoscopy on 25.    Medical History[1]    Surgical History[2]    Patient Sexual activity questions deferred to the physician.    Family History[3]    Allergies[4]    Prior to Admission medications    Medication Sig Start Date End Date Taking? Authorizing Provider   acetaminophen (Tylenol) 500 mg tablet Take 1 tablet (500 mg) by mouth every 6 hours if needed for mild pain (1 - 3).    Historical Provider, MD   albuterol (Ventolin HFA) 90 mcg/actuation inhaler Inhale 2 puffs every 4 hours if needed for wheezing or shortness of breath. 24  CHILANGO Mendes-CNP   albuterol 2.5 mg /3 mL (0.083 %) nebulizer solution Take 3 mL (2.5 mg) by nebulization every 6 hours if needed for wheezing or shortness of breath. 24  LEONA Mendes   amLODIPine (Norvasc) 5 mg tablet Take 1 tablet (5 mg) by mouth once daily in the morning. Do not take until you follow up with cardiology, Dr. Cloud. 23   Brooke Chilel MD   aspirin 81 mg EC tablet Take 1 tablet (81 mg) by mouth once daily in the morning.    Historical Provider, MD   atorvastatin (Lipitor) 20 mg tablet Take 1 tablet (20 mg) by mouth once daily at bedtime. 25   Jen Junior,    budesonide-formoteroL (Symbicort) 160-4.5 mcg/actuation inhaler Inhale 2 puffs 2 times a day. Rinse mouth with water after use to reduce aftertaste and incidence of candidiasis. Do not swallow. 24  Manish Sparks,  APRN-CNP   cyclobenzaprine (Flexeril) 10 mg tablet Take 1 tablet (10 mg) by mouth 3 times a day as needed for muscle spasms.    Historical Provider, MD   cyclobenzaprine (Flexeril) 5 mg tablet Take 1 tablet (5 mg) by mouth every 12 hours. 4/24/25   Historical Provider, MD   ergocalciferol (Vitamin D-2) 1.25 MG (65532 UT) capsule Take 1 capsule (1,250 mcg) by mouth 1 (one) time per week. 5/7/24   Adria Galeano MD   ipratropium-albuteroL (Duo-Neb) 0.5-2.5 mg/3 mL nebulizer solution Take 3 mL by nebulization every 6 hours if needed for shortness of breath. 4/24/24   LEONA Faulkner   montelukast (Singulair) 10 mg tablet Take 1 tablet (10 mg) by mouth once daily at bedtime. 7/16/24 6/17/25  Adria Galeano MD   multivitamin tablet Take 1 tablet by mouth once daily.    Historical Provider, MD   naloxone (Narcan) 4 mg/0.1 mL nasal spray Administer 1 spray (4 mg) into affected nostril(s) if needed for opioid reversal. May repeat every 2-3 minutes if needed, alternating nostrils, until medical assistance becomes available. 6/17/25   Kingston Wang,    nicotine (Nicoderm CQ) 21 mg/24 hr patch Place 1 patch over 24 hours on the skin once every 24 hours. 7/2/25 8/1/25  Betsy Bender MD   nicotine (Nicotrol) 10 mg inhaler Inhale 1 puff if needed for smoking cessation. 7/9/25 8/8/25  Khalif Manning MD   traMADol (Ultram) 50 mg tablet Take 1 tablet (50 mg) by mouth every 8 hours if needed for severe pain (7 - 10). 6/19/25   Arlet Soares, APRN-CNP, DNP        PAT ROS:   Constitutional:   neg    Neuro/Psych:    neuropathy  neg    Eyes:   neg     use of corrective lenses (readers)  Ears:    hearing loss   hearing aides  Nose:   neg    Mouth:   neg    Throat:   neg     voice change  Neck:   neg    Cardio:   neg     peripheral edema (intermittent)  Respiratory:    shortness of breath  Endocrine:   neg    GI:   neg    :    Intermittent incontinence  Musculoskeletal:    Chronic low back pain   arthralgias    "myalgias  Hematologic:   neg    Skin:  neg        Physical Exam  Vitals reviewed.   Constitutional:       Appearance: Normal appearance.   HENT:      Head: Normocephalic and atraumatic.      Nose: Nose normal.      Mouth/Throat:      Mouth: Mucous membranes are moist.      Pharynx: Oropharynx is clear.     Eyes:      Extraocular Movements: Extraocular movements intact.      Pupils: Pupils are equal, round, and reactive to light.       Cardiovascular:      Rate and Rhythm: Normal rate and regular rhythm.      Pulses: Normal pulses.      Heart sounds: Normal heart sounds.   Pulmonary:      Effort: Pulmonary effort is normal.      Breath sounds: Normal breath sounds.     Musculoskeletal:         General: Normal range of motion.      Cervical back: Normal range of motion.     Skin:     General: Skin is warm and dry.     Neurological:      General: No focal deficit present.      Mental Status: He is alert and oriented to person, place, and time.      Gait: Gait abnormal (uses cane).     Psychiatric:         Mood and Affect: Mood normal.         Behavior: Behavior normal.          PAT AIRWAY:   Airway:     Mallampati::  III    TM distance::  >3 FB    Neck ROM::  Full  normal          Visit Vitals  /72   Pulse 50   Temp 36.7 °C (98 °F)   Ht 1.778 m (5' 10\")   Wt 77.9 kg (171 lb 11.2 oz)   SpO2 94%   BMI 24.64 kg/m²   Smoking Status Every Day   BSA 1.96 m²       DASI Risk Score      Flowsheet Row Pre-Admission Testing from 7/15/2025 in [facility] Pre-Admission Testing from 10/3/2024 in [facility]   Can you take care of yourself (eat, dress, bathe, or use toilet)?  2.75 filed at 07/15/2025 1038 2.75 filed at 10/03/2024 1546   Can you walk indoors, such as around your house? 1.75 filed at 07/15/2025 1038 1.75 filed at 10/03/2024 1546   Can you walk a block or two on level ground?  0 filed at 07/15/2025 1038 2.75 filed at 10/03/2024 1546   Can you climb a flight of stairs or walk up a hill? " 0 filed at 07/15/2025 1038 0 filed at 10/03/2024 1546   Can you run a short distance? 0 filed at 07/15/2025 1038 0 filed at 10/03/2024 1546   Can you do light work around the house like dusting or washing dishes? 2.7 filed at 07/15/2025 1038 2.7 filed at 10/03/2024 1546   Can you do moderate work around the house like vacuuming, sweeping floors or carrying groceries? 0 filed at 07/15/2025 1038 3.5 filed at 10/03/2024 1546   Can you do heavy work around the house like scrubbing floors or lifting and moving heavy furniture?  0 filed at 07/15/2025 1038 0 filed at 10/03/2024 1546   Can you do yard work like raking leaves, weeding or pushing a mower? 0 filed at 07/15/2025 1038 4.5 filed at 10/03/2024 1546   Can you have sexual relations? 0 filed at 07/15/2025 1038 --   Can you participate in moderate recreational activities like golf, bowling, dancing, doubles tennis or throwing a baseball or football? 0 filed at 07/15/2025 1038 0 filed at 10/03/2024 1546   Can you participate in strenous sports like swimming, singles tennis, football, basketball, or skiing? 0 filed at 07/15/2025 1038 0 filed at 10/03/2024 1546   DASI SCORE 7.2 filed at 07/15/2025 1038 --   METS Score (Will be calculated only when all the questions are answered) 3.6 filed at 07/15/2025 1038 --     Caprini DVT Assessment      Flowsheet Row Pre-Admission Testing from 7/15/2025 in [facility] Pre-Admission Testing from 6/24/2024 in [facility]   DVT Score (IF A SCORE IS NOT CALCULATING, MUST SELECT A BMI TO COMPLETE) 8 filed at 07/15/2025 1125 11 filed at 06/24/2024 1146   Medical Factors Present cancer, chemotherapy, or previous malignancy, COPD filed at 07/15/2025 1125 --   Surgical Factors Minor surgery planned filed at 07/15/2025 1125 --   BMI (BMI MUST BE CHOSEN) 30 or less filed at 07/15/2025 1125 30 or less filed at 06/24/2024 1146   RETIRED: Current Status -- COPD, Major surgery planned, including arthroscopic and  laproscopic (1-2 hours), Present cancer or chemotherapy filed at 06/24/2024 1146   RETIRED: History -- COPD, Prior major surgery filed at 06/24/2024 1146   RETIRED: Age -- Over 75 years filed at 06/24/2024 1146     Modified Frailty Index      Flowsheet Row Pre-Admission Testing from 7/15/2025 in [facility]   Non-independent functional status (problems with dressing, bathing, personal grooming, or cooking) 0.0909 filed at 07/15/2025 1125   History of diabetes mellitus  0 filed at 07/15/2025 1125   History of COPD 0.0909 filed at 07/15/2025 1125   History of CHF No filed at 07/15/2025 1125   History of MI 0 filed at 07/15/2025 1125   History of Percutaneous Coronary Intervention, Cardiac Surgery, or Angina No filed at 07/15/2025 1125   Hypertension requiring the use of medication  0.0909 filed at 07/15/2025 1125   Peripheral vascular disease 0 filed at 07/15/2025 1125   Impaired sensorium (cognitive impairement or loss, clouding, or delirium) 0 filed at 07/15/2025 1125   TIA or CVA withouy residual deficit 0 filed at 07/15/2025 1125   Cerebrovascular accident with deficit 0 filed at 07/15/2025 1125   Modified Frailty Index Calculator .2727 filed at 07/15/2025 1125     [id number] Stroke Risk Points         N/A 0 to 9 Points:      Last Change: N/A          The [id number] risk score (Lip GH, et al. 2009. © 2010 [employer]) quantifies the risk of stroke for a patient with atrial fibrillation. For patients without atrial fibrillation or under the age of 18 this score appears as N/A. Higher score values generally indicate higher risk of stroke.        This score is not applicable to this patient. Components are not calculated.          Revised Cardiac Risk Index      Flowsheet Row Pre-Admission Testing from 6/24/2024 in [facility]   High-Risk Surgery (Intraperitoneal, Intrathoracic,Suprainguinal vascular) 0 filed at 06/24/2024 1147   History of ischemic heart  disease (History of MI, History of positive exercuse test, Current chest paint considered due to myocardial ischemia, Use of nitrate therapy, ECG with pathological Q Waves) 0 filed at 06/24/2024 1147   History of congestive heart failure (pulmonary edemia, bilateral rales or S3 gallop, Paroxysmal nocturnal dyspnea, CXR showing pulmonary vascular redistribution) 0 filed at 06/24/2024 1147   History of cerebrovascular disease (Prior TIA or stroke) 0 filed at 06/24/2024 1147   Pre-operative insulin treatment 0 filed at 06/24/2024 1147   Pre-operative creatinine>2 mg/dl 0 filed at 06/24/2024 1147   Revised Cardiac Risk Calculator 0 filed at 06/24/2024 1147     Apfel Simplified Score      Flowsheet Row Pre-Admission Testing from 7/15/2025 in [facility]   Smoking status 0 filed at 07/15/2025 1125   History of motion sickness or PONV  0 filed at 07/15/2025 1125   Use of postoperative opioids 1 filed at 07/15/2025 1125   Gender - Female 0=No filed at 07/15/2025 1125   Apfel Simplified Score Calculator 1 filed at 07/15/2025 1125     Risk Analysis Index Results This Encounter    No data found in the last 10 encounters.       Stop Bang Score      Flowsheet Row Pre-Admission Testing from 7/15/2025 in [facility] Admission (Discharged) from 10/4/2024 in [facility] OR with Irwin Kelsey MD   Do you snore loudly? 1 filed at 07/15/2025 1038 1 filed at 10/04/2024 1225   Do you often feel tired or fatigued after your sleep? 1 filed at 07/15/2025 1038 1 filed at 10/04/2024 1225   Has anyone ever observed you stop breathing in your sleep? 0 filed at 07/15/2025 1038 0 filed at 10/04/2024 1225   Do you have or are you being treated for high blood pressure? 1 filed at 07/15/2025 1038 1 filed at 10/04/2024 1225   Recent BMI (Calculated) 24 filed at 07/15/2025 1038 24.8 filed at 10/04/2024 1225   Is BMI greater than 35 kg/m2? 0=No filed at 07/15/2025 1038 0=No filed at 10/04/2024 1221    Age older than 50 years old? 1=Yes filed at 07/15/2025 1038 1=Yes filed at 10/04/2024 1225   Is your neck circumference greater than 17 inches (Male) or 16 inches (Female)? 0 filed at 07/15/2025 1038 0 filed at 10/04/2024 1225   Gender - Male 1=Yes filed at 07/15/2025 1038 1=Yes filed at 10/04/2024 1225   STOP-BANG Total Score 5 filed at 07/15/2025 1038 5 filed at 10/04/2024 1225     Prodigy: High Risk  Total Score: 27              Prodigy Age Score      Prodigy Gender Score     Prodigy Previous Opioid Use Score           ARISCAT Score for Postoperative Pulmonary Complications      Flowsheet Row Pre-Admission Testing from 7/15/2025 in [facility]   Age Calculated Score 16 filed at 07/15/2025 1125   Preoperative SpO2 8 filed at 07/15/2025 1125   Respiratory infection in the last month Either upper or lower (i.e., URI, bronchitis, pneumonia), with fever and antibiotic treatment 0 filed at 07/15/2025 1125   Preoperative anemia (Hgb less than 10 g/dl) 0 filed at 07/15/2025 1125   Surgical incision  0 filed at 07/15/2025 1125   Duration of surgery  0 filed at 07/15/2025 1125   Emergency Procedure  0 filed at 07/15/2025 1125   ARISCAT Total Score  24 filed at 07/15/2025 1125     Jeri Perioperative Risk for Myocardial Infarction or Cardiac Arrest (MARILEE)    No data to display      Recent Results (from the past 2 weeks)   Basic Metabolic Panel    Collection Time: 07/15/25 11:08 AM   Result Value Ref Range    Glucose 99 74 - 99 mg/dL    Sodium 137 136 - 145 mmol/L    Potassium 4.6 3.5 - 5.3 mmol/L    Chloride 103 98 - 107 mmol/L    Bicarbonate 27 21 - 32 mmol/L    Anion Gap 12 10 - 20 mmol/L    Urea Nitrogen 23 6 - 23 mg/dL    Creatinine 0.68 0.50 - 1.30 mg/dL    eGFR >90 >60 mL/min/1.73m*2    Calcium 9.9 8.6 - 10.6 mg/dL   CBC    Collection Time: 07/15/25 11:08 AM   Result Value Ref Range    WBC 8.8 4.4 - 11.3 x10*3/uL    nRBC 0.0 0.0 - 0.0 /100 WBCs    RBC 4.23 (L) 4.50 - 5.90 x10*6/uL    Hemoglobin 13.4  (L) 13.5 - 17.5 g/dL    Hematocrit 41.0 41.0 - 52.0 %    MCV 97 80 - 100 fL    MCH 31.7 26.0 - 34.0 pg    MCHC 32.7 32.0 - 36.0 g/dL    RDW 12.6 11.5 - 14.5 %    Platelets 332 150 - 450 x10*3/uL        Diagnostic Results    Echo 6/28/24 (media tab)  Conclusions:  Technically good study. Normal left ventricular size and function. Estimated LVEF 60-65%. Mild left atrial enlargement. Mild aortic insufficiency. Moderate mitral regurgitation. Mild tricuspid regurgitation. LVH and abnormal left ventricular relaxation. No pulmonary hypertension.Normal estimated CVP.     EKG 7/9/24  NSR  Low voltage QRS  Nonspecific ST and T wave abnormality    Carotid Ultrasound 6/28/23  Right Internal Carotid Stenosis: 20-49%  Right Vertebral Flow: Antegrade  Left internal carotid stenosis: 20-49%    Stress 12/17/15  Summary  No chest discomfort or ischemic ECG changes with LexiScan (regadenoson) infusion  Normal left ventricular systolic function  No perfusion evidence of scar or focal ischemia    Assessment and Plan:     Anesthesia  The patient denies problems with anesthesia in the past such as PONV, prolonged sedation, awareness, dental damage, aspiration, cardiac arrest, difficult intubation, or unexpected hospital admissions.     Airway  No documented or reported history of airway difficulty.     Neurology  The patient has no neurological diagnoses or significant findings on chart review, clinical presentation, and evaluation. No grossly apparent neurological perioperative risk.     The patient is at increased risk for postoperative delirium secondary to age 65 or older. The patient is at increased risk for perioperative stroke secondary to hypertension , increased age, hyperlipidemia. Handouts for preoperative brain exercises given to patient.    HEENT  #Hearing loss  -wears hearing aids  #Dysphonia  -concern for neoplasm of the right vocal cord scheduled for surgery with Dr. Manning    Cardiovascular  #HTN  -on amlodipine  (continue)  #HLD  -on aspirin and statin (continue)  #Ascending aortic dilation  -CT chest 1/29 shows stable ectasia of the ascending aorta up to 4.1 cm.  -followed by cardiology  #Valvular disease  -mild TR, mod MR, mild TR, mild AI by last echo 6/28/24  #Carotid artery stenosis/PVD  -bilateral 20-49% ICA stenosis stable by last duplex study    METS  The patient's functional capacity is greater than 4 METS.  RCRI  The patient meets 0 RCRI criteria and therefor has a 3.9% risk of major adverse cardiac complications.  MARILEE score which indicates a 1.6% risk of intraoperative or 30-day postoperative MACE (major adverse cardiac event).    Cardiology Evaluation  The patient follows with cardiology, Dr. Lopez. Patient was last seen one week ago. Office note requested.    He is scheduled for a low risk procedure. Therefore, no further cardiac evaluation is indicated.    Pulmonary  #YVETTE  -uses CPAP  #COPD  -managed on inhalers, singulair (continue). Denies any recent URIs, exacerbations or hospitalizations   -Follows with sleep medicine STEWART Muñiz 2/13/25    The patient is at increased risk of perioperative pulmonary complications secondary to neck surgery, chronic obstructive lung disease, ongoing tobacco abuse, YVETTE, advanced age greater than 60. Smoking cessation recommended. Patient has decreased and plans to quit by procedure.    Recommend prioritizing non opioid analgesic techniques (regional and local anesthesia, nonsteroidal medications, etc) before the administration of opioids and close monitoring for hypoventilation after surgery due to YVETTE/supsected YVETTE. If intravenous narcotics are needed beyond the immediate valarie-operative period, the patient may benefit from continuous pulse oximetry to monitor for hypoxic events till baseline Sp02 is normal on room air and  a respiratory therapy evaluation.    STOP BANG 5, which places patient at high risk for having YVETTE.  ARISCAT 24, low, 1.6% risk of in-hospital  postoperative pulmonary complications  PRODIGY 29, high risk of respiratory depression episode.     Patient given PI sheet for preoperative deep breathing exercises.  Encourage  incentive spirometry in the postoperative period as deemed necessary.    Endocrine  No diagnoses or significant findings on chart review or clinical presentation and evaluation.    Gastrointestinal  No diagnoses or significant findings on chart review or clinical presentation and evaluation.    Eat 10- 0,  self-perceived oropharyngeal dysphagia scale (0-40)     Genitourinary  #BPH/Prostate Cancer  #Bladder cancer  -s/p reTURBT 10/6/23, s/p BCG.   -s/p cystoscopy with excision of tissue 7/2/24. Final pathology showed largely denuded urothelial mucosa with reactive atypia, chronic inflammation, and granulation tissue reaction with focal hemorrhage   -cystoscopy and visual urethrotomy 10/04/2024.   -Followed by Dr. Kelsey, LOV 5/5/25    Renal  No renal diagnoses or significant findings on chart review or clinical presentation and evaluation. The patient has specific risk factors associated with increased risk of perioperative renal complications related to age greater than 55, male gender, hypertension, COPD. Preventative measures include preoperative hydration.    Hematology  No diagnoses or significant findings on chart review or clinical presentation and evaluation.    Caprini score 8, high risk of perioperative VTE.     Patient instructed to ambulate as soon as possible postoperatively to decrease thromboembolic risk. Initiate mechanical DVT prophylaxis as soon as possible and initiate chemical prophylaxis when deemed safe from a bleeding standpoint post surgery.     Transfusion Evaluation  T&S not obtained. Low likelihood for perioperative transfusion of blood or blood products.    Musculoskeletal  #OA  -uses cane    ID  No diagnoses or significant findings on chart review or clinical presentation and evaluation.    -Preoperative  medication instructions were provided and reviewed with the patient.  Any additional testing or evaluation was explained to the patient.  NPO Instructions were discussed, and the patient's questions were answered prior to conclusion of this encounter. Patient verbalized understanding of preoperative instructions. After Visit Summary given.      Labs ordered and/or reviewed  CBC and BMP    No further work up indicated.                      [1]   Past Medical History:  Diagnosis Date    Acute maxillary sinusitis, unspecified 12/19/2019    Acute maxillary sinusitis    Acute recurrent maxillary sinusitis 12/28/2016    Acute recurrent maxillary sinusitis    Acute upper respiratory infection, unspecified 03/13/2017    Acute URI    Acute upper respiratory infection, unspecified 12/30/2020    Viral URI with cough    Arthritis     Bilateral inguinal hernia, without obstruction or gangrene, not specified as recurrent 03/04/2014    Bilateral inguinal hernia    Bladder cancer (Multi)     BPH (benign prostatic hyperplasia)     Chest pain, unspecified 12/16/2016    Intermittent chest pain    Chronic obstructive pulmonary disease with (acute) exacerbation (Multi) 12/19/2019    Acute exacerbation of chronic obstructive pulmonary disease (COPD)    Chronic pain disorder     back pain- chronic    Edema, unspecified 05/27/2021    Parotid swelling    Hearing aid worn     HL (hearing loss)     Hyperlipidemia     Hypertension     Impacted cerumen, right ear 01/06/2017    Excessive cerumen in right ear canal    Low back pain, unspecified 07/25/2019    Acute exacerbation of chronic low back pain    Peripheral neuropathy     Personal history of malignant neoplasm of prostate     History of malignant neoplasm of prostate    Personal history of malignant neoplasm, unspecified     Personal history of malignant neoplasm    Personal history of other diseases of the digestive system 05/27/2021    History of parotitis    Personal history of other  diseases of the respiratory system 02/22/2017    History of pulmonary emphysema    Personal history of other endocrine, nutritional and metabolic disease 04/20/2017    History of hyperkalemia    Prostate cancer (Multi)     Sleep apnea     uses CPAP    Unspecified malignant neoplasm of skin of unspecified part of face 12/16/2016    Skin cancer of face    Unspecified symptoms and signs involving the genitourinary system 12/23/2021    UTI symptoms   [2]   Past Surgical History:  Procedure Laterality Date    CATARACT EXTRACTION  10/01/2014    Cataract Surgery    CYSTOSCOPY  07/02/2024    with biopsy    HERNIA REPAIR  11/03/2015    Hernia Repair    OTHER SURGICAL HISTORY  01/25/2021    Spinal surgery    OTHER SURGICAL HISTORY  01/25/2021    Lower back surgery    OTHER SURGICAL HISTORY  12/16/2016    Destruction Of Malignant Lesion Face    OTHER SURGICAL HISTORY  05/08/2019    Skin biopsy    PROSTATE SURGERY  10/01/2014    Prostate Surgery    UMBILICAL HERNIA REPAIR  12/16/2016    Umbilical Hernia Repair   [3]   Family History  Problem Relation Name Age of Onset    Arthritis Mother      Hypertension Mother      Vision loss Mother      Arthritis Father      Other (CARDIAC DISORDER) Father      Prostate cancer Father      Colon cancer Brother      Arthritis Brother      Hypertension Brother      Liver cancer Brother      Lung cancer Brother      Vision loss Brother      Cancer Other MULTIPLE FAMILY MEMBERS    [4]   Allergies  Allergen Reactions    Percocet [Oxycodone-Acetaminophen] Nausea Only

## 2025-07-15 NOTE — PREPROCEDURE INSTRUCTIONS
Fasting Guidelines    Why must I stop eating and drinking near surgery time?  With sedation, food or liquid in your stomach can enter your lungs causing serious complications  Increases nausea and vomiting    When do I need to stop eating and drinking before my surgery?  Do not eat any food or drink any liquids after midnight the night before your surgery/procedure.  You may have sips of water to take medications.    Additional Instructions:     We have sent a prescription for Hibiclens soap and Peridex mouth wash to your preferred pharmacy.  If you have not already, Please  your prescription and start using as directed before surgery.  Follow the instruction sheet provided to you at your CPM/PAT appointment.    Avoid herbal supplements, multivitamins and NSAIDS (non-steroidal anti-inflammatory drugs) such as Advil, Aleve, Ibuprofen, Naproxen, Excedrin, Meloxicam or Celebrex for at least 7 days prior to surgery. May take Tylenol as needed.    Avoid tobacco and alcohol products for 24 hours prior to surgery.    CONTACT SURGEON'S OFFICE IF YOU DEVELOP:  * Fever = 100.4 F   * New respiratory symptoms (e.g. cough, shortness of breath, respiratory distress, sore throat)  * Recent loss of taste or smell  *Flu like symptoms such as headache, fatigue or gastrointestinal symptoms  * You develop any open sores, shingles, burning or painful urination   AND/OR:  * You no longer wish to have the surgery.  * Any other personal circumstances change that may lead to the need to cancel or defer this surgery.  *You were admitted to any hospital within one week of your planned procedure.    Seven/Six Days before Surgery:  Review your medication instructions, stop indicated medications    Day of Surgery:  Review your medication instructions, take indicated medications  Wear comfortable loose fitting clothing  Do not use moisturizers, creams, lotions or perfume  All jewelry and valuables should be left at home    Yumiko  Adilson, CNP  [facility]  [phone]  [phone]  Email-[email or web address]           Preoperative Brain Exercises    What are brain exercises?  A brain exercise is any activity that engages your thinking (cognitive) skills.    What types of activities are considered brain exercises?  Jigsaw puzzles, crossword puzzles, word jumble, memory games, word search, and many more.  Many can be found free online or on your phone via a mobile juan.    Why should I do brain exercises before my surgery?  More recent research has shown brain exercise before surgery can lower the risk of postoperative delirium (confusion) which can be especially important for older adults.  Patients who did brain exercises for 5 to 10 hours the days before surgery, cut their risk of postoperative delirium in half up to 1 week after surgery.         [facility]    Preoperative Deep Breathing Exercises    Why it is important to do deep breathing exercises before my surgery?  Deep breathing exercises strengthen your breathing muscles.  This helps you to recover after your surgery and decreases the chance of breathing complications.      How are the deep breathing exercises done?  Sit straight with your back supported.  Breathe in deeply and slowly through your nose. Your lower rib cage should expand and your abdomen may move forward.  Hold that breath for 3 to 5 seconds.  Breathe out through pursed lips, slowly and completely.  Rest and repeat 10 times every hour while awake.  Rest longer if you become dizzy or lightheaded.         Patient and Family Education             Ways You Can Help Prevent Blood Clots             This handout explains some simple things you can do to help prevent blood clots.      Blood clots are blockages that can form in the body's veins. When a blood clot forms in your deep veins, it may be called a deep vein thrombosis, or DVT for short. Blood  clots can happen in any part of the body where blood flows, but they are most common in the arms and legs. If a piece of a blood clot breaks free and travels to the lungs, it is called a pulmonary embolus (PE). A PE can be a very serious problem.         Being in the hospital or having surgery can raise your chances of getting a blood clot because you may not be well enough to move around as much as you normally do.         Ways you can help prevent blood clots in the hospital         Wearing SCDs. SCDs stands for Sequential Compression Devices.   SCDs are special sleeves that wrap around your legs  They attach to a pump that fills them with air to gently squeeze your legs every few minutes.   This helps return the blood in your legs to your heart.   SCDs should only be taken off when walking or bathing.   SCDs may not be comfortable, but they can help save your life.               Wearing compression stockings - if your doctor orders them. These special snug fitting stockings gently squeeze your legs to help blood flow.       Walking. Walking helps move the blood in your legs.   If your doctor says it is ok, try walking the halls at least   5 times a day. Ask us to help you get up, so you don't fall.      Taking any blood thinning medicines your doctor orders.        Page 1 of 2     [facility]; 3/23   Ways you can help prevent blood clots at home       Wearing compression stockings - if your doctor orders them. ? Walking - to help move the blood in your legs.       Taking any blood thinning medicines your doctor orders.      Signs of a blood clot or PE      Tell your doctor or nurse know right away if you have of the problems listed below.    If you are at home, seek medical care right away. Call 911 for chest pain or problems breathing.               Signs of a blood clot (DVT) - such as pain,  swelling, redness or warmth in your arm or leg      Signs of a pulmonary embolism (PE) - such as chest      pain or feeling short of breath

## 2025-07-15 NOTE — TELEPHONE ENCOUNTER
Bre called and left me a message at 2:13 p.m. asking that I call her about apts her dad has scheduled that she can't make.    I reviewed the chart and called her back.      It appears that the apt information her dad's friend gave her was completely wrong.  I did review the upcoming apts with medical and radiation oncology and reconfirmed the apt with Dr. Manning with her.    She was appreciative as the apts I gave her apparently worked for her.    She did ask about having some treatments or labs done closer to home.  She stated today they had do to the 1.5 hour drive to [place] just for blood work and then the 1.5 hour drive back home.    I suggested she talk with Elba Martell and Joe to see if treatments can be done closer to home.  I did tell her that blood work certainly can be [place]    She appreciated the information.    She also wondered about her FMLA forms.  She apparently has them in place, completed by her dad's urologist, and wondered if they needed to be re-done in light of his head and neck cancer situation.  I stated she would have to check with HR and the [employer] who oversees the FMLA and disability program at  since she is a  employee.    However, if forms needed to be re-done, medical or radiation oncology can complete them since they are initiating the treatment plan.

## 2025-07-15 NOTE — CPM/PAT H&P
CPM/PAT Evaluation       Name: Shiva Hardy (Shiva Hardy)  /Age: 1940/84 y.o.     Visit Type:   In-Person       Chief Complaint: perioperative evaluation      HPI  The patient is an 84 year old male with complaints of dysphonia. 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 He is referred today by Khalif Manning for perioperative evaluation in anticipation of laryngoscopy with biopsy, bronchoscopy, esophagoscopy on 25.    Medical History[1]    Surgical History[2]    Patient Sexual activity questions deferred to the physician.    Family History[3]    Allergies[4]    Prior to Admission medications    Medication Sig Start Date End Date Taking? Authorizing Provider   acetaminophen (Tylenol) 500 mg tablet Take 1 tablet (500 mg) by mouth every 6 hours if needed for mild pain (1 - 3).    Historical Provider, MD   albuterol (Ventolin HFA) 90 mcg/actuation inhaler Inhale 2 puffs every 4 hours if needed for wheezing or shortness of breath. 24  CHILANGO Mendes-CNP   albuterol 2.5 mg /3 mL (0.083 %) nebulizer solution Take 3 mL (2.5 mg) by nebulization every 6 hours if needed for wheezing or shortness of breath. 24  LEONA Mendes   amLODIPine (Norvasc) 5 mg tablet Take 1 tablet (5 mg) by mouth once daily in the morning. Do not take until you follow up with cardiology, Dr. Cloud. 23   Brooke Chilel MD   aspirin 81 mg EC tablet Take 1 tablet (81 mg) by mouth once daily in the morning.    Historical Provider, MD   atorvastatin (Lipitor) 20 mg tablet Take 1 tablet (20 mg) by mouth once daily at bedtime. 25   Jen Junior,    budesonide-formoteroL (Symbicort) 160-4.5 mcg/actuation inhaler Inhale 2 puffs 2 times a day. Rinse mouth with water after use to reduce aftertaste and incidence of candidiasis. Do not swallow. 24  Manish Sparks,  APRN-CNP   cyclobenzaprine (Flexeril) 10 mg tablet Take 1 tablet (10 mg) by mouth 3 times a day as needed for muscle spasms.    Historical Provider, MD   cyclobenzaprine (Flexeril) 5 mg tablet Take 1 tablet (5 mg) by mouth every 12 hours. 4/24/25   Historical Provider, MD   ergocalciferol (Vitamin D-2) 1.25 MG (41271 UT) capsule Take 1 capsule (1,250 mcg) by mouth 1 (one) time per week. 5/7/24   Adria Galeano MD   ipratropium-albuteroL (Duo-Neb) 0.5-2.5 mg/3 mL nebulizer solution Take 3 mL by nebulization every 6 hours if needed for shortness of breath. 4/24/24   LEONA Faulkner   montelukast (Singulair) 10 mg tablet Take 1 tablet (10 mg) by mouth once daily at bedtime. 7/16/24 6/17/25  Adria Galeano MD   multivitamin tablet Take 1 tablet by mouth once daily.    Historical Provider, MD   naloxone (Narcan) 4 mg/0.1 mL nasal spray Administer 1 spray (4 mg) into affected nostril(s) if needed for opioid reversal. May repeat every 2-3 minutes if needed, alternating nostrils, until medical assistance becomes available. 6/17/25   Kingston Wang,    nicotine (Nicoderm CQ) 21 mg/24 hr patch Place 1 patch over 24 hours on the skin once every 24 hours. 7/2/25 8/1/25  Betsy Bender MD   nicotine (Nicotrol) 10 mg inhaler Inhale 1 puff if needed for smoking cessation. 7/9/25 8/8/25  Khalif Manning MD   traMADol (Ultram) 50 mg tablet Take 1 tablet (50 mg) by mouth every 8 hours if needed for severe pain (7 - 10). 6/19/25   Arlet Soares, APRN-CNP, DNP        PAT ROS:   Constitutional:   neg    Neuro/Psych:    neuropathy  neg    Eyes:   neg     use of corrective lenses (readers)  Ears:    hearing loss   hearing aides  Nose:   neg    Mouth:   neg    Throat:   neg     voice change  Neck:   neg    Cardio:   neg     peripheral edema (intermittent)  Respiratory:    shortness of breath  Endocrine:   neg    GI:   neg    :    Intermittent incontinence  Musculoskeletal:    Chronic low back pain   arthralgias    "myalgias  Hematologic:   neg    Skin:  neg        Physical Exam  Vitals reviewed.   Constitutional:       Appearance: Normal appearance.   HENT:      Head: Normocephalic and atraumatic.      Nose: Nose normal.      Mouth/Throat:      Mouth: Mucous membranes are moist.      Pharynx: Oropharynx is clear.     Eyes:      Extraocular Movements: Extraocular movements intact.      Pupils: Pupils are equal, round, and reactive to light.       Cardiovascular:      Rate and Rhythm: Normal rate and regular rhythm.      Pulses: Normal pulses.      Heart sounds: Normal heart sounds.   Pulmonary:      Effort: Pulmonary effort is normal.      Breath sounds: Normal breath sounds.     Musculoskeletal:         General: Normal range of motion.      Cervical back: Normal range of motion.     Skin:     General: Skin is warm and dry.     Neurological:      General: No focal deficit present.      Mental Status: He is alert and oriented to person, place, and time.      Gait: Gait abnormal (uses cane).     Psychiatric:         Mood and Affect: Mood normal.         Behavior: Behavior normal.          PAT AIRWAY:   Airway:     Mallampati::  III    TM distance::  >3 FB    Neck ROM::  Full  normal          Visit Vitals  /72   Pulse 50   Temp 36.7 °C (98 °F)   Ht 1.778 m (5' 10\")   Wt 77.9 kg (171 lb 11.2 oz)   SpO2 94%   BMI 24.64 kg/m²   Smoking Status Every Day   BSA 1.96 m²       DASI Risk Score      Flowsheet Row Pre-Admission Testing from 7/15/2025 in [facility] Pre-Admission Testing from 10/3/2024 in [facility]   Can you take care of yourself (eat, dress, bathe, or use toilet)?  2.75 filed at 07/15/2025 1038 2.75 filed at 10/03/2024 1546   Can you walk indoors, such as around your house? 1.75 filed at 07/15/2025 1038 1.75 filed at 10/03/2024 1546   Can you walk a block or two on level ground?  0 filed at 07/15/2025 1038 2.75 filed at 10/03/2024 1546   Can you climb a flight of stairs or walk up a hill? " 0 filed at 07/15/2025 1038 0 filed at 10/03/2024 1546   Can you run a short distance? 0 filed at 07/15/2025 1038 0 filed at 10/03/2024 1546   Can you do light work around the house like dusting or washing dishes? 2.7 filed at 07/15/2025 1038 2.7 filed at 10/03/2024 1546   Can you do moderate work around the house like vacuuming, sweeping floors or carrying groceries? 0 filed at 07/15/2025 1038 3.5 filed at 10/03/2024 1546   Can you do heavy work around the house like scrubbing floors or lifting and moving heavy furniture?  0 filed at 07/15/2025 1038 0 filed at 10/03/2024 1546   Can you do yard work like raking leaves, weeding or pushing a mower? 0 filed at 07/15/2025 1038 4.5 filed at 10/03/2024 1546   Can you have sexual relations? 0 filed at 07/15/2025 1038 --   Can you participate in moderate recreational activities like golf, bowling, dancing, doubles tennis or throwing a baseball or football? 0 filed at 07/15/2025 1038 0 filed at 10/03/2024 1546   Can you participate in strenous sports like swimming, singles tennis, football, basketball, or skiing? 0 filed at 07/15/2025 1038 0 filed at 10/03/2024 1546   DASI SCORE 7.2 filed at 07/15/2025 1038 --   METS Score (Will be calculated only when all the questions are answered) 3.6 filed at 07/15/2025 1038 --     Caprini DVT Assessment      Flowsheet Row Pre-Admission Testing from 7/15/2025 in [facility] Pre-Admission Testing from 6/24/2024 in [facility]   DVT Score (IF A SCORE IS NOT CALCULATING, MUST SELECT A BMI TO COMPLETE) 8 filed at 07/15/2025 1125 11 filed at 06/24/2024 1146   Medical Factors Present cancer, chemotherapy, or previous malignancy, COPD filed at 07/15/2025 1125 --   Surgical Factors Minor surgery planned filed at 07/15/2025 1125 --   BMI (BMI MUST BE CHOSEN) 30 or less filed at 07/15/2025 1125 30 or less filed at 06/24/2024 1146   RETIRED: Current Status -- COPD, Major surgery planned, including arthroscopic and  laproscopic (1-2 hours), Present cancer or chemotherapy filed at 06/24/2024 1146   RETIRED: History -- COPD, Prior major surgery filed at 06/24/2024 1146   RETIRED: Age -- Over 75 years filed at 06/24/2024 1146     Modified Frailty Index      Flowsheet Row Pre-Admission Testing from 7/15/2025 in [facility]   Non-independent functional status (problems with dressing, bathing, personal grooming, or cooking) 0.0909 filed at 07/15/2025 1125   History of diabetes mellitus  0 filed at 07/15/2025 1125   History of COPD 0.0909 filed at 07/15/2025 1125   History of CHF No filed at 07/15/2025 1125   History of MI 0 filed at 07/15/2025 1125   History of Percutaneous Coronary Intervention, Cardiac Surgery, or Angina No filed at 07/15/2025 1125   Hypertension requiring the use of medication  0.0909 filed at 07/15/2025 1125   Peripheral vascular disease 0 filed at 07/15/2025 1125   Impaired sensorium (cognitive impairement or loss, clouding, or delirium) 0 filed at 07/15/2025 1125   TIA or CVA withouy residual deficit 0 filed at 07/15/2025 1125   Cerebrovascular accident with deficit 0 filed at 07/15/2025 1125   Modified Frailty Index Calculator .2727 filed at 07/15/2025 1125     [id number] Stroke Risk Points         N/A 0 to 9 Points:      Last Change: N/A          The [id number] risk score (Lip GH, et al. 2009. © 2010 American [employer]) quantifies the risk of stroke for a patient with atrial fibrillation. For patients without atrial fibrillation or under the age of 18 this score appears as N/A. Higher score values generally indicate higher risk of stroke.        This score is not applicable to this patient. Components are not calculated.          Revised Cardiac Risk Index      Flowsheet Row Pre-Admission Testing from 6/24/2024 in [facility]   High-Risk Surgery (Intraperitoneal, Intrathoracic,Suprainguinal vascular) 0 filed at 06/24/2024 1147   History of ischemic heart  disease (History of MI, History of positive exercuse test, Current chest paint considered due to myocardial ischemia, Use of nitrate therapy, ECG with pathological Q Waves) 0 filed at 06/24/2024 1147   History of congestive heart failure (pulmonary edemia, bilateral rales or S3 gallop, Paroxysmal nocturnal dyspnea, CXR showing pulmonary vascular redistribution) 0 filed at 06/24/2024 1147   History of cerebrovascular disease (Prior TIA or stroke) 0 filed at 06/24/2024 1147   Pre-operative insulin treatment 0 filed at 06/24/2024 1147   Pre-operative creatinine>2 mg/dl 0 filed at 06/24/2024 1147   Revised Cardiac Risk Calculator 0 filed at 06/24/2024 1147     Apfel Simplified Score      Flowsheet Row Pre-Admission Testing from 7/15/2025 in [facility]   Smoking status 0 filed at 07/15/2025 1125   History of motion sickness or PONV  0 filed at 07/15/2025 1125   Use of postoperative opioids 1 filed at 07/15/2025 1125   Gender - Female 0=No filed at 07/15/2025 1125   Apfel Simplified Score Calculator 1 filed at 07/15/2025 1125     Risk Analysis Index Results This Encounter    No data found in the last 10 encounters.       Stop Bang Score      Flowsheet Row Pre-Admission Testing from 7/15/2025 in [facility] Admission (Discharged) from 10/4/2024 in [facility] OR with Irwin Kelsey MD   Do you snore loudly? 1 filed at 07/15/2025 1038 1 filed at 10/04/2024 1225   Do you often feel tired or fatigued after your sleep? 1 filed at 07/15/2025 1038 1 filed at 10/04/2024 1225   Has anyone ever observed you stop breathing in your sleep? 0 filed at 07/15/2025 1038 0 filed at 10/04/2024 1225   Do you have or are you being treated for high blood pressure? 1 filed at 07/15/2025 1038 1 filed at 10/04/2024 1225   Recent BMI (Calculated) 24 filed at 07/15/2025 1038 24.8 filed at 10/04/2024 1225   Is BMI greater than 35 kg/m2? 0=No filed at 07/15/2025 1038 0=No filed at 10/04/2024 1224    Age older than 50 years old? 1=Yes filed at 07/15/2025 1038 1=Yes filed at 10/04/2024 1225   Is your neck circumference greater than 17 inches (Male) or 16 inches (Female)? 0 filed at 07/15/2025 1038 0 filed at 10/04/2024 1225   Gender - Male 1=Yes filed at 07/15/2025 1038 1=Yes filed at 10/04/2024 1225   STOP-BANG Total Score 5 filed at 07/15/2025 1038 5 filed at 10/04/2024 1225     Prodigy: High Risk  Total Score: 27              Prodigy Age Score      Prodigy Gender Score     Prodigy Previous Opioid Use Score           ARISCAT Score for Postoperative Pulmonary Complications      Flowsheet Row Pre-Admission Testing from 7/15/2025 in [facility]   Age Calculated Score 16 filed at 07/15/2025 1125   Preoperative SpO2 8 filed at 07/15/2025 1125   Respiratory infection in the last month Either upper or lower (i.e., URI, bronchitis, pneumonia), with fever and antibiotic treatment 0 filed at 07/15/2025 1125   Preoperative anemia (Hgb less than 10 g/dl) 0 filed at 07/15/2025 1125   Surgical incision  0 filed at 07/15/2025 1125   Duration of surgery  0 filed at 07/15/2025 1125   Emergency Procedure  0 filed at 07/15/2025 1125   ARISCAT Total Score  24 filed at 07/15/2025 1125     Jeri Perioperative Risk for Myocardial Infarction or Cardiac Arrest (MARILEE)    No data to display      Recent Results (from the past 2 weeks)   Basic Metabolic Panel    Collection Time: 07/15/25 11:08 AM   Result Value Ref Range    Glucose 99 74 - 99 mg/dL    Sodium 137 136 - 145 mmol/L    Potassium 4.6 3.5 - 5.3 mmol/L    Chloride 103 98 - 107 mmol/L    Bicarbonate 27 21 - 32 mmol/L    Anion Gap 12 10 - 20 mmol/L    Urea Nitrogen 23 6 - 23 mg/dL    Creatinine 0.68 0.50 - 1.30 mg/dL    eGFR >90 >60 mL/min/1.73m*2    Calcium 9.9 8.6 - 10.6 mg/dL   CBC    Collection Time: 07/15/25 11:08 AM   Result Value Ref Range    WBC 8.8 4.4 - 11.3 x10*3/uL    nRBC 0.0 0.0 - 0.0 /100 WBCs    RBC 4.23 (L) 4.50 - 5.90 x10*6/uL    Hemoglobin 13.4  (L) 13.5 - 17.5 g/dL    Hematocrit 41.0 41.0 - 52.0 %    MCV 97 80 - 100 fL    MCH 31.7 26.0 - 34.0 pg    MCHC 32.7 32.0 - 36.0 g/dL    RDW 12.6 11.5 - 14.5 %    Platelets 332 150 - 450 x10*3/uL        Diagnostic Results    Echo 6/28/24 (media tab)  Conclusions:  Technically good study. Normal left ventricular size and function. Estimated LVEF 60-65%. Mild left atrial enlargement. Mild aortic insufficiency. Moderate mitral regurgitation. Mild tricuspid regurgitation. LVH and abnormal left ventricular relaxation. No pulmonary hypertension.Normal estimated CVP.     EKG 7/9/24  NSR  Low voltage QRS  Nonspecific ST and T wave abnormality    Carotid Ultrasound 6/28/23  Right Internal Carotid Stenosis: 20-49%  Right Vertebral Flow: Antegrade  Left internal carotid stenosis: 20-49%    Stress 12/17/15  Summary  No chest discomfort or ischemic ECG changes with LexiScan (regadenoson) infusion  Normal left ventricular systolic function  No perfusion evidence of scar or focal ischemia    Assessment and Plan:     Anesthesia  The patient denies problems with anesthesia in the past such as PONV, prolonged sedation, awareness, dental damage, aspiration, cardiac arrest, difficult intubation, or unexpected hospital admissions.     Airway  No documented or reported history of airway difficulty.     Neurology  The patient has no neurological diagnoses or significant findings on chart review, clinical presentation, and evaluation. No grossly apparent neurological perioperative risk.     The patient is at increased risk for postoperative delirium secondary to age 65 or older. The patient is at increased risk for perioperative stroke secondary to hypertension , increased age, hyperlipidemia. Handouts for preoperative brain exercises given to patient.    HEENT  #Hearing loss  -wears hearing aids  #Dysphonia  -concern for neoplasm of the right vocal cord scheduled for surgery with Dr. Manning    Cardiovascular  #HTN  -on amlodipine  (continue)  #HLD  -on aspirin and statin (continue)  #Ascending aortic dilation  -CT chest 1/29 shows stable ectasia of the ascending aorta up to 4.1 cm.  -followed by cardiology  #Valvular disease  -mild TR, mod MR, mild TR, mild AI by last echo 6/28/24  #Carotid artery stenosis/PVD  -bilateral 20-49% ICA stenosis stable by last duplex study    METS  The patient's functional capacity is greater than 4 METS.  RCRI  The patient meets 0 RCRI criteria and therefor has a 3.9% risk of major adverse cardiac complications.  MARILEE score which indicates a 1.6% risk of intraoperative or 30-day postoperative MACE (major adverse cardiac event).    Cardiology Evaluation  The patient follows with cardiology, Dr. Lopez. Patient was last seen one week ago. Office note requested.    He is scheduled for a low risk procedure. Therefore, no further cardiac evaluation is indicated.    Pulmonary  #YVETTE  -uses CPAP  #COPD  -managed on inhalers, singulair (continue). Denies any recent URIs, exacerbations or hospitalizations   -Follows with sleep medicine STEWART Muñiz 2/13/25    The patient is at increased risk of perioperative pulmonary complications secondary to neck surgery, chronic obstructive lung disease, ongoing tobacco abuse, YVETTE, advanced age greater than 60. Smoking cessation recommended. Patient has decreased and plans to quit by procedure.    Recommend prioritizing non opioid analgesic techniques (regional and local anesthesia, nonsteroidal medications, etc) before the administration of opioids and close monitoring for hypoventilation after surgery due to YVETTE/supsected YVETTE. If intravenous narcotics are needed beyond the immediate valarie-operative period, the patient may benefit from continuous pulse oximetry to monitor for hypoxic events till baseline Sp02 is normal on room air and  a respiratory therapy evaluation.    STOP BANG 5, which places patient at high risk for having YVETTE.  ARISCAT 24, low, 1.6% risk of in-hospital  postoperative pulmonary complications  PRODIGY 29, high risk of respiratory depression episode.     Patient given PI sheet for preoperative deep breathing exercises.  Encourage  incentive spirometry in the postoperative period as deemed necessary.    Endocrine  No diagnoses or significant findings on chart review or clinical presentation and evaluation.    Gastrointestinal  No diagnoses or significant findings on chart review or clinical presentation and evaluation.    Eat 10- 0,  self-perceived oropharyngeal dysphagia scale (0-40)     Genitourinary  #BPH/Prostate Cancer  #Bladder cancer  -s/p reTURBT 10/6/23, s/p BCG.   -s/p cystoscopy with excision of tissue 7/2/24. Final pathology showed largely denuded urothelial mucosa with reactive atypia, chronic inflammation, and granulation tissue reaction with focal hemorrhage   -cystoscopy and visual urethrotomy 10/04/2024.   -Followed by Dr. Kelsey, LOV 5/5/25    Renal  No renal diagnoses or significant findings on chart review or clinical presentation and evaluation. The patient has specific risk factors associated with increased risk of perioperative renal complications related to age greater than 55, male gender, hypertension, COPD. Preventative measures include preoperative hydration.    Hematology  No diagnoses or significant findings on chart review or clinical presentation and evaluation.    Caprini score 8, high risk of perioperative VTE.     Patient instructed to ambulate as soon as possible postoperatively to decrease thromboembolic risk. Initiate mechanical DVT prophylaxis as soon as possible and initiate chemical prophylaxis when deemed safe from a bleeding standpoint post surgery.     Transfusion Evaluation  T&S not obtained. Low likelihood for perioperative transfusion of blood or blood products.    Musculoskeletal  #OA  -uses cane    ID  No diagnoses or significant findings on chart review or clinical presentation and evaluation.    -Preoperative  medication instructions were provided and reviewed with the patient.  Any additional testing or evaluation was explained to the patient.  NPO Instructions were discussed, and the patient's questions were answered prior to conclusion of this encounter. Patient verbalized understanding of preoperative instructions. After Visit Summary given.      Labs ordered and/or reviewed  CBC and BMP    No further work up indicated.                      [1]   Past Medical History:  Diagnosis Date    Acute maxillary sinusitis, unspecified 12/19/2019    Acute maxillary sinusitis    Acute recurrent maxillary sinusitis 12/28/2016    Acute recurrent maxillary sinusitis    Acute upper respiratory infection, unspecified 03/13/2017    Acute URI    Acute upper respiratory infection, unspecified 12/30/2020    Viral URI with cough    Arthritis     Bilateral inguinal hernia, without obstruction or gangrene, not specified as recurrent 03/04/2014    Bilateral inguinal hernia    Bladder cancer (Multi)     BPH (benign prostatic hyperplasia)     Chest pain, unspecified 12/16/2016    Intermittent chest pain    Chronic obstructive pulmonary disease with (acute) exacerbation (Multi) 12/19/2019    Acute exacerbation of chronic obstructive pulmonary disease (COPD)    Chronic pain disorder     back pain- chronic    Edema, unspecified 05/27/2021    Parotid swelling    Hearing aid worn     HL (hearing loss)     Hyperlipidemia     Hypertension     Impacted cerumen, right ear 01/06/2017    Excessive cerumen in right ear canal    Low back pain, unspecified 07/25/2019    Acute exacerbation of chronic low back pain    Peripheral neuropathy     Personal history of malignant neoplasm of prostate     History of malignant neoplasm of prostate    Personal history of malignant neoplasm, unspecified     Personal history of malignant neoplasm    Personal history of other diseases of the digestive system 05/27/2021    History of parotitis    Personal history of other  diseases of the respiratory system 02/22/2017    History of pulmonary emphysema    Personal history of other endocrine, nutritional and metabolic disease 04/20/2017    History of hyperkalemia    Prostate cancer (Multi)     Sleep apnea     uses CPAP    Unspecified malignant neoplasm of skin of unspecified part of face 12/16/2016    Skin cancer of face    Unspecified symptoms and signs involving the genitourinary system 12/23/2021    UTI symptoms   [2]   Past Surgical History:  Procedure Laterality Date    CATARACT EXTRACTION  10/01/2014    Cataract Surgery    CYSTOSCOPY  07/02/2024    with biopsy    HERNIA REPAIR  11/03/2015    Hernia Repair    OTHER SURGICAL HISTORY  01/25/2021    Spinal surgery    OTHER SURGICAL HISTORY  01/25/2021    Lower back surgery    OTHER SURGICAL HISTORY  12/16/2016    Destruction Of Malignant Lesion Face    OTHER SURGICAL HISTORY  05/08/2019    Skin biopsy    PROSTATE SURGERY  10/01/2014    Prostate Surgery    UMBILICAL HERNIA REPAIR  12/16/2016    Umbilical Hernia Repair   [3]   Family History  Problem Relation Name Age of Onset    Arthritis Mother      Hypertension Mother      Vision loss Mother      Arthritis Father      Other (CARDIAC DISORDER) Father      Prostate cancer Father      Colon cancer Brother      Arthritis Brother      Hypertension Brother      Liver cancer Brother      Lung cancer Brother      Vision loss Brother      Cancer Other MULTIPLE FAMILY MEMBERS    [4]   Allergies  Allergen Reactions    Percocet [Oxycodone-Acetaminophen] Nausea Only

## 2025-07-17 ENCOUNTER — HOSPITAL ENCOUNTER (OUTPATIENT)
Dept: RADIOLOGY | Facility: HOSPITAL | Age: 85
Discharge: HOME | End: 2025-07-17
Payer: MEDICARE

## 2025-07-17 ENCOUNTER — APPOINTMENT (OUTPATIENT)
Dept: PRIMARY CARE | Facility: CLINIC | Age: 85
End: 2025-07-17
Payer: MEDICARE

## 2025-07-17 VITALS
HEIGHT: 70 IN | OXYGEN SATURATION: 99 % | BODY MASS INDEX: 24.34 KG/M2 | SYSTOLIC BLOOD PRESSURE: 138 MMHG | HEART RATE: 68 BPM | WEIGHT: 170 LBS | DIASTOLIC BLOOD PRESSURE: 76 MMHG

## 2025-07-17 DIAGNOSIS — R20.2 NUMBNESS AND TINGLING: ICD-10-CM
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
## 2025-07-17 DIAGNOSIS — E78.5 HYPERLIPIDEMIA, UNSPECIFIED HYPERLIPIDEMIA TYPE: ICD-10-CM

## 2025-07-17 DIAGNOSIS — M25.562 ACUTE PAIN OF LEFT KNEE: ICD-10-CM

## 2025-07-17 DIAGNOSIS — R20.0 NUMBNESS AND TINGLING: ICD-10-CM

## 2025-07-17 DIAGNOSIS — Z90.79 S/P TURP: ICD-10-CM

## 2025-07-17 DIAGNOSIS — M25.562 ACUTE PAIN OF LEFT KNEE: Primary | ICD-10-CM

## 2025-07-17 DIAGNOSIS — I10 ESSENTIAL HYPERTENSION: ICD-10-CM

## 2025-07-17 DIAGNOSIS — G47.33 OSA ON CPAP: ICD-10-CM

## 2025-07-17 DIAGNOSIS — F17.211 CIGARETTE NICOTINE DEPENDENCE IN REMISSION: ICD-10-CM

## 2025-07-17 DIAGNOSIS — H91.91 DECREASED HEARING OF RIGHT EAR: ICD-10-CM

## 2025-07-17 PROCEDURE — 73562 X-RAY EXAM OF KNEE 3: CPT | Mod: LT

## 2025-07-17 PROCEDURE — 73562 X-RAY EXAM OF KNEE 3: CPT | Mod: LEFT SIDE | Performed by: RADIOLOGY

## 2025-07-17 NOTE — PROGRESS NOTES
"Subjective   Shiva Hardy is a 84 y.o. male with a past medical history of hypertension, dyslipidemia, COPD, ascending aorta dilation, prostate cancer who presents for Follow-up (- recent fall/- diagnosed recently with cancer).    HPI  Laryngeal Malignancy  - Pt scheduled for laryngoscopy with biopsy of laryngeal lesion with Dr. Manning on 7/21/25  - Pt quit smoking 2 days ago  - Currently using nicotine patches but states that he had nightmares last night    Numbness/Tingling  - Pt was last seen in February with complaints of numbness and tingling  - CHI was positive, however further antibody testing was negative  - Pt was recommended to complete an EMG which he has yet to complete     Hypertension  - BP today is 138/76  - Pt is currently taking his amlodipine 5 mg    Hyperlipidemia  - Pt currently taking Lipitor 20 mg  - Previous lipid panel in January was within normal limits    YVETTE  - Pt continues to wear CPAP but admits to taking it off in the middle of the night occasionally due to it being uncomfortable    Recent Fall  - Pt fell down a crawl space approximately 2 weeks ago  - Injured his left knee  - Did not hit his head or lose consciousness  - Interested in an Xray on the knee today  - Denies instability   - Admits to pain and swelling   - He is currently wearing a knee brace and ambulates with a cane at baseline    Decreased Hearing  - Pt wears bilateral hearing aids  - Mentions that his right ear is worse lately    ROS:  All pertinent positive symptoms are included in the history of present illness.  All other systems have been reviewed and are negative and noncontributory to this patient's current ailments.    Objective     /76   Pulse 68   Ht 1.778 m (5' 10\")   Wt 77.1 kg (170 lb)   SpO2 99%   BMI 24.39 kg/m²   CONSTITUTIONAL - well nourished, well developed, looks like stated age, in no acute distress, not ill-appearing, and not tired appearing. Ambulates with cane  SKIN - normal skin " color and pigmentation, normal skin turgor without rash, lesions, or nodules visualized  HEAD - no trauma, normocephalic  EYES - PERRL and EOMI with normal external exam  ENT - TM's intact, no injection, no signs of infection, uvula midline, normal tongue movement and throat normal, no exudate, nasal passage without discharge and patent. +Dysphonia  NECK - no nodes, supple without rigidity, no neck mass was observed, no thyromegaly or thyroid nodules  CHEST - clear to auscultation, no wheezing, no crackles and no rales  CARDIAC - regular rate and rhythm, no murmurs or skipped beats  EXTREMITIES - Pt wearing left knee brace. +edema over upper medial aspect of left knee. No erythema or ecchymosis. Negative anterior drawer, negative Lachman, negative Sparkle.   PSYCHIATRIC - appropriate mood and behavior, no difficulty with language  IMMUNOLOGIC - no lymphadenopathy    Assessment/Plan   Laryngeal Malignancy  - Laryngoscopy with biopsy scheduled for 7/21/25  - Continue to follow with ENT    Left Knee Pain  - Xray imaging ordered today  - If imaging shows worsening arthritis, can discuss antiinflammatories and injections at a future visit    Decreased Hearing  - Physical examination of ear and TM was normal  - Discussed getting a new hearing aid    Hypertension  - /76 today. Well controlled  - Continue amlodipine 5 mg  - Continue to follow with cardiology    Hyperlipidemia  - Lipid panel within normal limits this past January  - Continue atorvastatin (Lipitor) 20 mg    YVETTE  - Continue CPAP    Nicotine Dependence  - Pt quit smoking 2 days ago  - Continue patches  - Discussed using patches during the day and to try avoiding patches at night to see if he is still having nightmares    Numbness/Tingling  - CHI positive  - Pt would like to complete EMG at a later date since he is managing his new laryngeal cancer diagnosis at this time    Prostate Cancer S/P TURP  - Continue to follow with urology      Follow up in 3  wisam Cash DO

## 2025-07-20 ENCOUNTER — ANESTHESIA EVENT (OUTPATIENT)
Dept: OPERATING ROOM | Facility: HOSPITAL | Age: 85
End: 2025-07-20
Payer: MEDICARE

## 2025-07-21 ENCOUNTER — HOSPITAL ENCOUNTER (OUTPATIENT)
Facility: HOSPITAL | Age: 85
Setting detail: OUTPATIENT SURGERY
Discharge: HOME | End: 2025-07-21
Attending: OTOLARYNGOLOGY | Admitting: OTOLARYNGOLOGY
Payer: MEDICARE

## 2025-07-21 ENCOUNTER — TELEPHONE (OUTPATIENT)
Dept: OTOLARYNGOLOGY | Facility: HOSPITAL | Age: 85
End: 2025-07-21

## 2025-07-21 ENCOUNTER — TELEPHONE (OUTPATIENT)
Dept: HEMATOLOGY/ONCOLOGY | Facility: HOSPITAL | Age: 85
End: 2025-07-21

## 2025-07-21 ENCOUNTER — PATIENT OUTREACH (OUTPATIENT)
Dept: HEMATOLOGY/ONCOLOGY | Facility: HOSPITAL | Age: 85
End: 2025-07-21

## 2025-07-21 ENCOUNTER — ANESTHESIA (OUTPATIENT)
Dept: OPERATING ROOM | Facility: HOSPITAL | Age: 85
End: 2025-07-21
Payer: MEDICARE

## 2025-07-21 ENCOUNTER — TELEPHONE (OUTPATIENT)
Dept: RADIATION ONCOLOGY | Facility: HOSPITAL | Age: 85
End: 2025-07-21

## 2025-07-21 VITALS
RESPIRATION RATE: 16 BRPM | OXYGEN SATURATION: 93 % | SYSTOLIC BLOOD PRESSURE: 136 MMHG | TEMPERATURE: 97 F | WEIGHT: 159.46 LBS | DIASTOLIC BLOOD PRESSURE: 75 MMHG | HEIGHT: 70 IN | BODY MASS INDEX: 22.83 KG/M2 | HEART RATE: 72 BPM

## 2025-07-21 DIAGNOSIS — D38.0 NEOPLASM OF UNCERTAIN BEHAVIOR OF VOCAL CORD: Primary | ICD-10-CM

## 2025-07-21 DIAGNOSIS — F17.210 SMOKING GREATER THAN 40 PACK YEARS: ICD-10-CM

## 2025-07-21 DIAGNOSIS — R49.0 DYSPHONIA: ICD-10-CM

## 2025-07-21 DIAGNOSIS — D38.0 NEOPLASM OF UNCERTAIN BEHAVIOR OF VOCAL CORD: ICD-10-CM

## 2025-07-21 PROBLEM — Z98.890 PONV (POSTOPERATIVE NAUSEA AND VOMITING): Status: ACTIVE | Noted: 2025-07-21

## 2025-07-21 PROBLEM — R11.2 PONV (POSTOPERATIVE NAUSEA AND VOMITING): Status: ACTIVE | Noted: 2025-07-21

## 2025-07-21 PROCEDURE — 2500000004 HC RX 250 GENERAL PHARMACY W/ HCPCS (ALT 636 FOR OP/ED): Mod: JZ,TB | Performed by: ANESTHESIOLOGY

## 2025-07-21 PROCEDURE — 43200 ESOPHAGOSCOPY FLEXIBLE BRUSH: CPT | Performed by: OTOLARYNGOLOGY

## 2025-07-21 PROCEDURE — 3700000002 HC GENERAL ANESTHESIA TIME - EACH INCREMENTAL 1 MINUTE: Performed by: OTOLARYNGOLOGY

## 2025-07-21 PROCEDURE — 7100000010 HC PHASE TWO TIME - EACH INCREMENTAL 1 MINUTE: Performed by: OTOLARYNGOLOGY

## 2025-07-21 PROCEDURE — 88341 IMHCHEM/IMCYTCHM EA ADD ANTB: CPT | Mod: TC,SUR | Performed by: OTOLARYNGOLOGY

## 2025-07-21 PROCEDURE — 99100 ANES PT EXTEME AGE<1 YR&>70: CPT | Performed by: ANESTHESIOLOGY

## 2025-07-21 PROCEDURE — 7100000001 HC RECOVERY ROOM TIME - INITIAL BASE CHARGE: Performed by: OTOLARYNGOLOGY

## 2025-07-21 PROCEDURE — 2500000004 HC RX 250 GENERAL PHARMACY W/ HCPCS (ALT 636 FOR OP/ED)

## 2025-07-21 PROCEDURE — 7100000002 HC RECOVERY ROOM TIME - EACH INCREMENTAL 1 MINUTE: Performed by: OTOLARYNGOLOGY

## 2025-07-21 PROCEDURE — 31535 LARYNGOSCOPY W/BIOPSY: CPT | Performed by: OTOLARYNGOLOGY

## 2025-07-21 PROCEDURE — A31535 PR LARYNGOSCOPY,DIRCT,OP,BIOPSY

## 2025-07-21 PROCEDURE — 3600000008 HC OR TIME - EACH INCREMENTAL 1 MINUTE - PROCEDURE LEVEL THREE: Performed by: OTOLARYNGOLOGY

## 2025-07-21 PROCEDURE — 7100000009 HC PHASE TWO TIME - INITIAL BASE CHARGE: Performed by: OTOLARYNGOLOGY

## 2025-07-21 PROCEDURE — 3700000001 HC GENERAL ANESTHESIA TIME - INITIAL BASE CHARGE: Performed by: OTOLARYNGOLOGY

## 2025-07-21 PROCEDURE — A31535 PR LARYNGOSCOPY,DIRCT,OP,BIOPSY: Performed by: ANESTHESIOLOGY

## 2025-07-21 PROCEDURE — 2500000005 HC RX 250 GENERAL PHARMACY W/O HCPCS: Performed by: OTOLARYNGOLOGY

## 2025-07-21 PROCEDURE — 3600000003 HC OR TIME - INITIAL BASE CHARGE - PROCEDURE LEVEL THREE: Performed by: OTOLARYNGOLOGY

## 2025-07-21 PROCEDURE — 2500000002 HC RX 250 W HCPCS SELF ADMINISTERED DRUGS (ALT 637 FOR MEDICARE OP, ALT 636 FOR OP/ED): Performed by: ANESTHESIOLOGY

## 2025-07-21 RX ORDER — ESMOLOL HYDROCHLORIDE 10 MG/ML
INJECTION INTRAVENOUS AS NEEDED
Status: DISCONTINUED | OUTPATIENT
Start: 2025-07-21 | End: 2025-07-21

## 2025-07-21 RX ORDER — SODIUM CHLORIDE, SODIUM LACTATE, POTASSIUM CHLORIDE, CALCIUM CHLORIDE 600; 310; 30; 20 MG/100ML; MG/100ML; MG/100ML; MG/100ML
50 INJECTION, SOLUTION INTRAVENOUS CONTINUOUS
Status: DISCONTINUED | OUTPATIENT
Start: 2025-07-21 | End: 2025-07-21 | Stop reason: HOSPADM

## 2025-07-21 RX ORDER — HYDROMORPHONE HYDROCHLORIDE 0.2 MG/ML
0.2 INJECTION INTRAMUSCULAR; INTRAVENOUS; SUBCUTANEOUS EVERY 5 MIN PRN
Status: DISCONTINUED | OUTPATIENT
Start: 2025-07-21 | End: 2025-07-21 | Stop reason: HOSPADM

## 2025-07-21 RX ORDER — CEFAZOLIN 1 G/1
INJECTION, POWDER, FOR SOLUTION INTRAVENOUS AS NEEDED
Status: DISCONTINUED | OUTPATIENT
Start: 2025-07-21 | End: 2025-07-21

## 2025-07-21 RX ORDER — PROCHLORPERAZINE EDISYLATE 5 MG/ML
5 INJECTION INTRAMUSCULAR; INTRAVENOUS ONCE AS NEEDED
Status: DISCONTINUED | OUTPATIENT
Start: 2025-07-21 | End: 2025-07-21 | Stop reason: HOSPADM

## 2025-07-21 RX ORDER — LIDOCAINE HCL/PF 100 MG/5ML
SYRINGE (ML) INTRAVENOUS AS NEEDED
Status: DISCONTINUED | OUTPATIENT
Start: 2025-07-21 | End: 2025-07-21

## 2025-07-21 RX ORDER — ROCURONIUM BROMIDE 10 MG/ML
INJECTION, SOLUTION INTRAVENOUS AS NEEDED
Status: DISCONTINUED | OUTPATIENT
Start: 2025-07-21 | End: 2025-07-21

## 2025-07-21 RX ORDER — OXYCODONE HYDROCHLORIDE 5 MG/1
5 TABLET ORAL EVERY 4 HOURS PRN
Status: DISCONTINUED | OUTPATIENT
Start: 2025-07-21 | End: 2025-07-21 | Stop reason: HOSPADM

## 2025-07-21 RX ORDER — ONDANSETRON HYDROCHLORIDE 2 MG/ML
INJECTION, SOLUTION INTRAVENOUS AS NEEDED
Status: DISCONTINUED | OUTPATIENT
Start: 2025-07-21 | End: 2025-07-21

## 2025-07-21 RX ORDER — ACETAMINOPHEN 325 MG/1
650 TABLET ORAL EVERY 4 HOURS PRN
Status: DISCONTINUED | OUTPATIENT
Start: 2025-07-21 | End: 2025-07-21 | Stop reason: HOSPADM

## 2025-07-21 RX ORDER — OXYMETAZOLINE HCL 0.05 %
SPRAY, NON-AEROSOL (ML) NASAL AS NEEDED
Status: DISCONTINUED | OUTPATIENT
Start: 2025-07-21 | End: 2025-07-21 | Stop reason: HOSPADM

## 2025-07-21 RX ORDER — WATER 1 ML/ML
INJECTION IRRIGATION AS NEEDED
Status: DISCONTINUED | OUTPATIENT
Start: 2025-07-21 | End: 2025-07-21 | Stop reason: HOSPADM

## 2025-07-21 RX ORDER — LIDOCAINE HYDROCHLORIDE 10 MG/ML
0.1 INJECTION, SOLUTION INFILTRATION; PERINEURAL ONCE
Status: DISCONTINUED | OUTPATIENT
Start: 2025-07-21 | End: 2025-07-21 | Stop reason: HOSPADM

## 2025-07-21 RX ORDER — ONDANSETRON HYDROCHLORIDE 2 MG/ML
4 INJECTION, SOLUTION INTRAVENOUS ONCE AS NEEDED
Status: DISCONTINUED | OUTPATIENT
Start: 2025-07-21 | End: 2025-07-21 | Stop reason: HOSPADM

## 2025-07-21 RX ORDER — APREPITANT 40 MG/1
CAPSULE ORAL AS NEEDED
Status: DISCONTINUED | OUTPATIENT
Start: 2025-07-21 | End: 2025-07-21

## 2025-07-21 RX ORDER — MIDAZOLAM HYDROCHLORIDE 2 MG/2ML
INJECTION, SOLUTION INTRAMUSCULAR; INTRAVENOUS AS NEEDED
Status: DISCONTINUED | OUTPATIENT
Start: 2025-07-21 | End: 2025-07-21

## 2025-07-21 RX ORDER — PROPOFOL 10 MG/ML
INJECTION, EMULSION INTRAVENOUS AS NEEDED
Status: DISCONTINUED | OUTPATIENT
Start: 2025-07-21 | End: 2025-07-21

## 2025-07-21 RX ADMIN — CEFAZOLIN 2 G: 1 INJECTION, POWDER, FOR SOLUTION INTRAMUSCULAR; INTRAVENOUS at 07:32

## 2025-07-21 RX ADMIN — LIDOCAINE HYDROCHLORIDE 80 MG: 20 INJECTION INTRAVENOUS at 07:31

## 2025-07-21 RX ADMIN — DEXAMETHASONE SODIUM PHOSPHATE 10 MG: 4 INJECTION INTRA-ARTICULAR; INTRALESIONAL; INTRAMUSCULAR; INTRAVENOUS; SOFT TISSUE at 07:40

## 2025-07-21 RX ADMIN — HYDROMORPHONE HYDROCHLORIDE 0.5 MG: 1 INJECTION, SOLUTION INTRAMUSCULAR; INTRAVENOUS; SUBCUTANEOUS at 08:46

## 2025-07-21 RX ADMIN — PROPOFOL 50 MG: 10 INJECTION, EMULSION INTRAVENOUS at 07:33

## 2025-07-21 RX ADMIN — ESMOLOL HYDROCHLORIDE 100 MG: 10 INJECTION, SOLUTION INTRAVENOUS at 07:31

## 2025-07-21 RX ADMIN — PROPOFOL 50 MG: 10 INJECTION, EMULSION INTRAVENOUS at 07:53

## 2025-07-21 RX ADMIN — ROCURONIUM BROMIDE 50 MG: 10 INJECTION INTRAVENOUS at 07:32

## 2025-07-21 RX ADMIN — ONDANSETRON 4 MG: 2 INJECTION INTRAMUSCULAR; INTRAVENOUS at 07:40

## 2025-07-21 RX ADMIN — APREPITANT 40 MG: 40 CAPSULE ORAL at 06:50

## 2025-07-21 RX ADMIN — SODIUM CHLORIDE, SODIUM LACTATE, POTASSIUM CHLORIDE, AND CALCIUM CHLORIDE: 600; 310; 30; 20 INJECTION, SOLUTION INTRAVENOUS at 07:25

## 2025-07-21 RX ADMIN — MIDAZOLAM HYDROCHLORIDE 2 MG: 2 INJECTION, SOLUTION INTRAMUSCULAR; INTRAVENOUS at 07:25

## 2025-07-21 RX ADMIN — PROPOFOL 150 MG: 10 INJECTION, EMULSION INTRAVENOUS at 07:31

## 2025-07-21 ASSESSMENT — PAIN SCALES - GENERAL
PAINLEVEL_OUTOF10: 5 - MODERATE PAIN
PAINLEVEL_OUTOF10: 5 - MODERATE PAIN
PAINLEVEL_OUTOF10: 7
PAINLEVEL_OUTOF10: 5 - MODERATE PAIN
PAINLEVEL_OUTOF10: 5 - MODERATE PAIN
PAINLEVEL_OUTOF10: 7
PAIN_LEVEL: 1
PAINLEVEL_OUTOF10: 7
PAINLEVEL_OUTOF10: 0 - NO PAIN
PAINLEVEL_OUTOF10: 0 - NO PAIN

## 2025-07-21 ASSESSMENT — PAIN DESCRIPTION - DESCRIPTORS: DESCRIPTORS: SHARP;ACHING

## 2025-07-21 ASSESSMENT — PAIN - FUNCTIONAL ASSESSMENT
PAIN_FUNCTIONAL_ASSESSMENT: 0-10

## 2025-07-21 ASSESSMENT — PAIN DESCRIPTION - LOCATION: LOCATION: THROAT

## 2025-07-21 NOTE — OP NOTE
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]     * Smoking greater than 40 pack years [F17.210]     * Dysphonia [R49.0]     Procedures  LARYNGOSCOPY W/ BIOPSY  89693 - [place] LARYNGOSCOPY DIRECT OPERATIVE W/BIOPSY    ESOPHAGOSCOPY  55840 - [place] ESOPHAGOSCOPY FLEXIBLE TRANSORAL DIAGNOSTIC      Surgeons      * Khalif Manning - Primary    Resident/Fellow/Other Assistant:  Surgeons and Role:     * Romeo Weber MD - Resident - Assisting     * Michael Malone DO - Resident - Assisting    Staff:   Circulator: [employer]  Jax Person: Juan    Anesthesia Staff: Anesthesiologist: Kendy Tidwell MD  C-AA: JARRETT Degroot    Procedure Summary  Anesthesia: General  ASA: III  Estimated Blood Loss: 1mL  Intra-op Medications:   Administrations occurring from 0700 to 0820 on 25:   Medication Name Total Dose   surgical lubricant gel 1 Application   sterile water irrigation solution 500 mL   ceFAZolin (Ancef) vial 1 g 2 g   dexAMETHasone (Decadron) 4 mg/mL IV Syringe 2 mL 10 mg   esmolol (Brevibloc) 10 mcg/mL  IV bolus 100 mg   LR bolus Cannot be calculated   lidocaine (cardiac) injection 2% prefilled syringe 80 mg   midazolam PF (Versed) injection 1 mg/mL 2 mg   ondansetron (Zofran) 2 mg/mL injection 4 mg   propofol (Diprivan) injection 10 mg/mL 250 mg   rocuronium (ZeMuron) 50 mg/5 mL injection 50 mg              Anesthesia Record               Intraprocedure I/O Totals       None           Specimen:   ID Type Source Tests Collected by Time   1 : ANTERIOR GLOTTIC LESION Tissue SOFT TISSUE BIOPSY SURGICAL PATHOLOGY EXAM Khalif Manning MD  7/21/2025 0757                 Drains and/or Catheters: * None in log *    Tourniquet Times: None        Implants: None    Findings: Lesion extending from epiglottic petiole extending inferiorly, involving R > L vocal cords, anterior commissure, with extension to anterior vestibule, there was extension just beneath the cords but does not extend to the subglottis. Esophagoscopy revealing diffuse esophagitis. Small subcentimeter base of tongue mucocele    Indications: Shiva Hardy is an 84 y.o. 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     The patient was seen in the preoperative area. The risks, benefits, complications, treatment options, non-operative alternatives, expected recovery and outcomes were discussed with the patient. The possibilities of reaction to medication, pulmonary aspiration, injury to surrounding structures, bleeding, recurrent infection, the need for additional procedures, failure to diagnose a condition, and creating a complication requiring transfusion or operation were discussed with the patient. The patient concurred with the proposed plan, giving informed consent.  The site of surgery was properly noted/marked if necessary per policy. The patient has been actively warmed in preoperative area. Preoperative antibiotics have been ordered and given within 1 hours of incision. Venous thrombosis prophylaxis are not indicated.    Procedure Details:   Indications:   Patient is a 84 year old male with vocal cord mass    Procedure in Detail:   Patient was seen and evaluated in the pre-operative area. Informed consent was obtained as described above. Patient was then taken to the operative room by the anesthesia team. General   anesthesia was induced, and patient was orotracheally intubated without issue. Patient was   then turned 90 degrees towards the ENT team. Time out was performed by   Nigel.    First, Flexible esophagoscopy was performed with the scope inserted to the level of the pylorus and retroflexed. On careful removal of the scope all sites of the gastric and esophageal mucosa were evaluated and there were not any concerning lesions noted. Only esophagitis changes were noted. Next, direct laryngoscopy was performed using the Dedo laryngoscope. All sites of the upper aerodigestive tract were visualized including the uvula, soft palate, tonsils, vallecula, epiglottis, base of tongue, arytenoids, false and true vocal cords, post-cricoid region and pyriform sinuses. There was noted to be a lesion of the involving the epiglottic petiole, involving the anterior commisure of the vocal cords, R>L, with extension beneath the cords but not the subglottis and the anterior ventricle as described in the findings. Multiple biopsies were obtained from this lesion and submitted for permanent papthology . At this point the procedure was terminated. Patient was turned back over to the anesthesia team and was awakened, extubated and transported to the PACU in stable condition.    Dr. Manning was present for the critical portions of the procedure.   Evidence of Infection: No   Complications:  None; patient tolerated the procedure well.    Disposition: PACU - hemodynamically stable.  Condition: stable               Michael Malone DO PGY-1  ENT    Attending Attestation:     Khalif Manning  Phone Number: [phone]

## 2025-07-21 NOTE — TELEPHONE ENCOUNTER
Bre called and left me a message at 9:15 a.m. stating her dad had his apt today and per Dr. Manning, he doesn't need the scheduled postop apt on 8/1/25 with Dr. Manning.    She's calling to cancel that 8/1/25 apt with Dr. Manning.  Apt cancelled.    I called her back and confirmed for her that I got her message and cancelled the apt.  She was appreciative.

## 2025-07-21 NOTE — DISCHARGE INSTRUCTIONS
Surgery Discharge Instructions    Patient Name: Shiva Hardy  YOB: 1940  Surgeon: Khalif Manning MD    Below is information about what procedure(s) you had performed today and what to expect afterward, plus information about prescriptions that have been provided. Please read in its entirety and call your surgeon if you have any questions. There are multiple phone numbers at the bottom of these instructions that you can use to contact our offices.       Surgical procedure(s) performed:   Triple endoscopy with biopsy    Wound care:   Internal Wound Care: You have no external incisions to maintain, as everything was done internally  You may experience some numbness of the lips or tongue as part of the procedure. This is almost always temporary and is related to the scope used to visualize your throat.   You may notice some blood in your saliva over the next day or so. This is likely from the biopsy site and should be reported if if does not subside with time.    Pain control:     Acetaminophen (Tylenol) and/or Ibuprofen (Advil, Motrin) can be prescribed but are most commonly purchased over the counter. Pay attention to dosing of individual tablets. Do not exceed 4000 mg of acetaminophen daily or 3000 mg of ibuprofen daily.     Some of your medications may be prescribed by a resident and/or fellow who assisted with the case. You may see their names on the prescriptions.    Activity after discharge:   When you go home, you can usually return to your regular activities.    Diet:   You may resume your normal preoperative diet. Carefully advance as tolerated, as the effects of anesthesia can cause nausea, vomiting, etc. Once you are tolerating liquids, you may advance to more solid foods as tolerated.     Surgical pathology:  Frozen biopsies were performed today under anesthesia, but permanent pathology usually takes several weeks. Your surgeon will either call you with the results of permanent path  or discuss with you in the next follow-up visit. You may see the results in MyChart before you hear from your surgeon.    Expected Post-Anesthesia symptoms:  Do not make any important decisions in the next 24 hours.   It is advised that you have someone with you for the next 24 hours while you recover from the effects of anesthesia.   You may have neck pain and throat pain. These symptoms are often temporary and may be due to irritation from the breathing tube that's inserted during surgery.  You may have some swallowing difficulties due to pain for several days.  You may also have some changes in your voice    If you have the following symptoms, reach out to your doctor:  Significant difficulty with breathing  Bleeding from the mouth  Severe nausea or vomiting  Inability to take any liquids by mouth  High fevers (>100.8)  Any other questions or concerns    Follow-up:  You will need to be seen back in clinic in 2 weeks. Your surgeon's office will call you to schedule this appointment if it is not already made. If you do not have a follow-up visit schedule in the next three business days, call the number below and ask to be scheduled for a post-operative visit.     Important Phone Numbers  Dr. Khalif Manning: Office number during business hours: [phone]  Evenings/Weekends Emergency: call [phone] and ask for the ENT resident on call

## 2025-07-21 NOTE — ANESTHESIA POSTPROCEDURE EVALUATION
Patient: Shiva Hardy    Procedure Summary       Date: 07/21/25 Room / Location: [facility] OR 04 / 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    Anesthesia Post Evaluation    Patient location during evaluation: PACU  Patient participation: complete - patient participated  Level of consciousness: awake  Pain score: 1  Pain management: adequate  Airway patency: patent  Cardiovascular status: acceptable  Respiratory status: acceptable, airway suctioned and face mask  Hydration status: acceptable  Postoperative Nausea and Vomiting: none        No notable events documented.

## 2025-07-21 NOTE — TELEPHONE ENCOUNTER
Called Mr Hardy, his partner Elif, answered. Reminded them of his appointment tomorrow for Radiation and HemOnc. Elif wrote time and location for both places down. She stated that his daughter is aware of the appointments as well. No questions at this time.

## 2025-07-21 NOTE — ANESTHESIA PROCEDURE NOTES
Airway  Date/Time: 7/21/2025 7:36 AM  Reason: elective    Airway not difficult    Staffing  Performed: JARRETT   Authorized by: Kendy Tidwell MD    Performed by: JARRETT Degroot  Patient location during procedure: OR    Patient Condition  Indications for airway management: anesthesia  Patient position: sniffing  MILS maintained throughout  Sedation level: deep     Final Airway Details   Preoxygenated: yes  Final airway type: endotracheal airway  Successful airway: ETT  Cuffed: yes   Successful intubation technique: video laryngoscopy  Adjuncts used in placement: intubating stylet  Endotracheal tube insertion site: oral  Blade: Dot  Blade size: #3  ETT size (mm): 6.5  Cormack-Lehane Classification: grade I - full view of glottis  Placement verified by: chest auscultation and capnometry   Measured from: lips  ETT to lips (cm): 22  Number of attempts at approach: 1    Additional Comments  Airway placement atraumatic

## 2025-07-21 NOTE — ANESTHESIA PREPROCEDURE EVALUATION
Patient: Shiva Hardy    Procedure Information       Anesthesia Start Date/Time: 07/21/25 0725    Procedures:       LARYNGOSCOPY W/ BIOPSY - direct larngoscopy with biopsy      BRONCHOSCOPY      ESOPHAGOSCOPY    Location: [facility] OR 04 / [facility] OR    Surgeons: Khalif Manning MD            Relevant Problems   Anesthesia   (+) PONV (postoperative nausea and vomiting)      Cardiac   (+) Aortic aneurysm   (+) Essential hypertension   (+) Hyperlipidemia      Pulmonary   (+) COPD (chronic obstructive pulmonary disease) (Multi)   (+) Centrilobular emphysema (Multi)   (+) Multiple lung nodules on CT   (+) Paraseptal emphysema (Multi)      Neuro   (+) Thoracic neuritis      HEENT  Gottic tumor.   (+) Bilateral hearing loss   (+) Seasonal allergies       Clinical information reviewed:    Allergies  Meds               NPO Detail:  NPO/Void Status  Carbohydrate Drink Given Prior to Surgery? : N  Date of Last Solid: 07/20/25  Time of Last Solid: 1900  Last Intake Type: Food  Time of Last Void: 0500         Physical Exam    Airway  Mallampati: III     Cardiovascular    Dental    Pulmonary    Abdominal            Anesthesia Plan    History of general anesthesia?: yes  History of complications of general anesthesia?: no    ASA 3     general     intravenous induction   Anesthetic plan and risks discussed with patient.    Plan discussed with CAA.

## 2025-07-21 NOTE — INTERVAL H&P NOTE
H&P reviewed. The patient was examined and there are no changes to the H&P.    Plan: panendoscopy with biopsy

## 2025-07-21 NOTE — PROGRESS NOTES
Patient ID: Shiva Hardy is a 84 y.o. 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 Khalif Burtc: Dr. Tresa Martell       SITES OF DISEASE  Larynx      MOLECULAR GENOMICS        PRIOR THERAPIES        CURRENT THERAPY      CURRENT ONCOLOGICAL PROBLEMS       HISTORY OF PRESENT ILLNESS  Mr. 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    He presented to Dr. 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     He underwent panendoscopy with biopsy on 7/21 and is seen for initial medical oncology consultation.     PAST MEDICAL HISTORY  HTN, DLD, YVETTE on CPAP, COPD, chronic back pain, ascending aortic dilation, peripheral neuropathy  non muscle invasive bladder cancer dx 9/2023 s/p TURBT s/p BCG  Prostate cancer in 2008 treated with surgery and radiation    SURGICAL HISTORY  07/21/2025: Panendoscopy with biopsy     SOCIAL HISTORY  Quit smoking 1 week ago, using nicotine patches now. 65 years x 1 PPD history. No alcohol use. No other substance use.   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 Now likes to garden and feed birds.      FAMILY HISTORY  2 brothers and son with some kind of throat or neck cancer     CURRENT MEDS REVIEWED       ALLERGIES REVIEWED        SUBJECTIVE:  Patient has lost 10 pounds over the last few months with some dietary changes. He denies fatigue, fevers, chills, nausea, vomiting, diarrhea, chills, drenching night sweats, peripheral edema,  chest pain, shortness of breath. He endorses peripheral neuropathy but denies dropping objects. Has been wearing hearing aids for several years.     A 13 point review of systems was performed, with significant findings documented above in subjective history.    OBJECTIVE:  Vitals:    07/22/25 1443   BP: 134/70   Pulse: 62   Resp: 20   Temp: 36 °C (96.8 °F)   SpO2: 97%      Body surface area is 1.95 meters squared.     Wt Readings from Last 5 Encounters:   07/22/25 78.2 kg (172 lb 6.4 oz)   07/22/25 79.1 kg (174 lb 4.8 oz)   07/21/25 72.3 kg (159 lb 7.3 oz)   07/17/25 77.1 kg (170 lb)   07/15/25 77.9 kg (171 lb 11.2 oz)     ECOG Score: 2- Ambulatory and  capable of all selfcare; unable to carry out work activities.  Up and about > 50% of waking hrs.    Physical Exam  Constitutional:       General: He is not in acute distress.     Appearance: He is not ill-appearing.   HENT:      Head: Normocephalic and atraumatic.     Cardiovascular:      Rate and Rhythm: Normal rate and regular rhythm.   Pulmonary:      Effort: Pulmonary effort is normal. No respiratory distress.      Breath sounds: Normal breath sounds.   Abdominal:      General: Abdomen is flat.      Palpations: Abdomen is soft.      Tenderness: There is no abdominal tenderness.     Musculoskeletal:      Right lower leg: No edema.      Left lower leg: No edema.   Lymphadenopathy:      Cervical: No cervical adenopathy.     Skin:     General: Skin is warm and dry.     Neurological:      Mental Status: He is oriented to person, place, and time.           Diagnostic Results   Results:  Labs:  Lab Results   Component Value Date    WBC 8.8 07/15/2025    HGB 13.4 (L) 07/15/2025    HCT 41.0 07/15/2025    MCV 97 07/15/2025     07/15/2025      Lab Results   Component Value Date    NEUTROABS 14.16 (H) 11/09/2023        Lab Results   Component Value Date    GLUCOSE 99 07/15/2025    CALCIUM 9.9 07/15/2025     07/15/2025    K 4.6 07/15/2025    CO2 27 07/15/2025      07/15/2025    BUN 23 07/15/2025    CREATININE 0.68 07/15/2025     Lab Results   Component Value Date    ALT 19 01/29/2025    AST 20 01/29/2025    ALKPHOS 55 01/29/2025    BILITOT 0.3 01/29/2025      Lab Results   Component Value Date    TSH 2.40 02/25/2025     MR Neck 7/3/25  IMPRESSION:  1. T2 hyperintense, enhancing mass along the right true vocal cord  compressing but likely not infiltrating into the left vocal cord.  Findings are not typical for a squamous cell carcinoma. Differential  considerations include a minor salivary gland tumor such as a benign  mixed tumor or chondrosarcoma given proximity to the thyroid  cartilage. Recommend tissue sampling.  2. No pathologic lymphadenopathy by imaging criteria.      CT Soft Tissue 6/23/25  IMPRESSION:  The vocal cords are adducted which limits evaluation. The paraglottic  and pre epiglottic fat appear preserved.      There is a 0.5 cm nodule along the lingual surface of the epiglottis  on the right within the vallecula.      No cervical lymphadenopathy by size criteria.      Limited intracranial images demonstrate asymmetric CSF over the right  convexity compared to the left which may represent asymmetric volume  loss, a hygroma or chronic subdural. Consider CT of the head to  further evaluate if not already performed.      Assessment/Plan     No matching staging information was found for the patient.  Mr. 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   - biopsy with Dr. Manning 7/21/25, path pending  - seeing Dr. Martell for radiation planning  - will discuss in tumor board to consider possibility of adding targeted therapy Cetuximab. Consented and taught today to prevent second trip given geoburden. Plan to follow at   [place] primarily    This patient was seen and discussed with Dr. Diaz.  Raquel Ron  Internal Medicine PGY-1

## 2025-07-21 NOTE — TELEPHONE ENCOUNTER
Called pt to remind of appointment on 722/2025 at 12:30. Pt's phone went to voicemail left number if needs to reschedule.

## 2025-07-22 ENCOUNTER — NUTRITION (OUTPATIENT)
Dept: HEMATOLOGY/ONCOLOGY | Facility: HOSPITAL | Age: 85
End: 2025-07-22

## 2025-07-22 ENCOUNTER — OFFICE VISIT (OUTPATIENT)
Dept: HEMATOLOGY/ONCOLOGY | Facility: HOSPITAL | Age: 85
End: 2025-07-22
Payer: MEDICARE

## 2025-07-22 ENCOUNTER — HOSPITAL ENCOUNTER (OUTPATIENT)
Dept: RADIATION ONCOLOGY | Facility: HOSPITAL | Age: 85
Setting detail: RADIATION/ONCOLOGY SERIES
Discharge: HOME | End: 2025-07-22
Payer: MEDICARE

## 2025-07-22 VITALS
BODY MASS INDEX: 25.53 KG/M2 | HEART RATE: 62 BPM | RESPIRATION RATE: 20 BRPM | TEMPERATURE: 96.8 F | WEIGHT: 172.4 LBS | SYSTOLIC BLOOD PRESSURE: 134 MMHG | DIASTOLIC BLOOD PRESSURE: 70 MMHG | OXYGEN SATURATION: 97 % | HEIGHT: 69 IN

## 2025-07-22 VITALS
TEMPERATURE: 96.8 F | RESPIRATION RATE: 18 BRPM | HEART RATE: 64 BPM | OXYGEN SATURATION: 95 % | DIASTOLIC BLOOD PRESSURE: 73 MMHG | SYSTOLIC BLOOD PRESSURE: 118 MMHG | BODY MASS INDEX: 25.01 KG/M2 | WEIGHT: 174.3 LBS

## 2025-07-22 DIAGNOSIS — C32.9 LARYNX CARCINOMA: ICD-10-CM

## 2025-07-22 DIAGNOSIS — D38.0 NEOPLASM OF UNCERTAIN BEHAVIOR OF VOCAL CORD: Primary | ICD-10-CM

## 2025-07-22 DIAGNOSIS — D09.9 SQUAMOUS CELL CARCINOMA IN SITU: Primary | ICD-10-CM

## 2025-07-22 PROCEDURE — 2500000004 HC RX 250 GENERAL PHARMACY W/ HCPCS (ALT 636 FOR OP/ED): Mod: JW

## 2025-07-22 PROCEDURE — 31575 DIAGNOSTIC LARYNGOSCOPY: CPT | Performed by: RADIOLOGY

## 2025-07-22 PROCEDURE — 2500000005 HC RX 250 GENERAL PHARMACY W/O HCPCS

## 2025-07-22 PROCEDURE — 99205 OFFICE O/P NEW HI 60 MIN: CPT | Performed by: STUDENT IN AN ORGANIZED HEALTH CARE EDUCATION/TRAINING PROGRAM

## 2025-07-22 PROCEDURE — G2211 COMPLEX E/M VISIT ADD ON: HCPCS | Performed by: STUDENT IN AN ORGANIZED HEALTH CARE EDUCATION/TRAINING PROGRAM

## 2025-07-22 PROCEDURE — 99215 OFFICE O/P EST HI 40 MIN: CPT | Performed by: STUDENT IN AN ORGANIZED HEALTH CARE EDUCATION/TRAINING PROGRAM

## 2025-07-22 PROCEDURE — 99215 OFFICE O/P EST HI 40 MIN: CPT | Mod: GC,25 | Performed by: RADIOLOGY

## 2025-07-22 PROCEDURE — 99205 OFFICE O/P NEW HI 60 MIN: CPT | Performed by: RADIOLOGY

## 2025-07-22 PROCEDURE — 3075F SYST BP GE 130 - 139MM HG: CPT | Performed by: STUDENT IN AN ORGANIZED HEALTH CARE EDUCATION/TRAINING PROGRAM

## 2025-07-22 PROCEDURE — 1126F AMNT PAIN NOTED NONE PRSNT: CPT | Performed by: STUDENT IN AN ORGANIZED HEALTH CARE EDUCATION/TRAINING PROGRAM

## 2025-07-22 PROCEDURE — 3078F DIAST BP <80 MM HG: CPT | Performed by: STUDENT IN AN ORGANIZED HEALTH CARE EDUCATION/TRAINING PROGRAM

## 2025-07-22 RX ORDER — LIDOCAINE HYDROCHLORIDE 20 MG/ML
2 INJECTION, SOLUTION EPIDURAL; INFILTRATION; INTRACAUDAL; PERINEURAL ONCE
Status: COMPLETED | OUTPATIENT
Start: 2025-07-22 | End: 2025-07-22

## 2025-07-22 RX ORDER — LIDOCAINE HYDROCHLORIDE 20 MG/ML
INJECTION, SOLUTION EPIDURAL; INFILTRATION; INTRACAUDAL; PERINEURAL
Status: COMPLETED
Start: 2025-07-22 | End: 2025-07-22

## 2025-07-22 RX ADMIN — LIDOCAINE HYDROCHLORIDE 40 MG: 20 INJECTION, SOLUTION EPIDURAL; INFILTRATION; INTRACAUDAL; PERINEURAL at 13:34

## 2025-07-22 RX ADMIN — PHENYLEPHRINE HYDROCHLORIDE 2 SPRAY: 0.5 SPRAY NASAL at 13:35

## 2025-07-22 ASSESSMENT — ENCOUNTER SYMPTOMS
OCCASIONAL FEELINGS OF UNSTEADINESS: 0
HEMATOLOGIC/LYMPHATIC NEGATIVE: 1
ENDOCRINE NEGATIVE: 1
GASTROINTESTINAL NEGATIVE: 1
FATIGUE: 1
EYES NEGATIVE: 1
RESPIRATORY NEGATIVE: 1
CARDIOVASCULAR NEGATIVE: 1
PSYCHIATRIC NEGATIVE: 1
FREQUENCY: 1
DEPRESSION: 0
VOICE CHANGE: 1
LOSS OF SENSATION IN FEET: 0

## 2025-07-22 ASSESSMENT — PAIN SCALES - GENERAL
PAINLEVEL_OUTOF10: 0-NO PAIN
PAINLEVEL_OUTOF10: 0-NO PAIN

## 2025-07-22 NOTE — PROGRESS NOTES
Radiation Oncology Nursing Note    Prior Radiotherapy:  Yes, describe: Shirin Tompkins [place] in 2008  No radiation treatments to show. (Treatments may have been administered in another system.)     Current Systemic Treatment:  No     Presence of Pacemaker or ICD:  No    History of Autoimmune or Connective Tissue Disorders:  No    Pain: The patient's current pain level was assessed.  They report currently having a pain of 1 out of 10.  They feel their pain is under control without the use of pain medications.    Review of Systems:  Review of Systems   Constitutional:  Positive for fatigue.        Tired yesterday from the surgery     HENT:   Positive for voice change.         Biopsy yesterday 7-21-25     Eyes: Negative.    Respiratory: Negative.     Cardiovascular: Negative.    Gastrointestinal: Negative.    Endocrine: Negative.    Genitourinary:  Positive for frequency.         From previous treatments     Musculoskeletal:  Positive for gait problem.        L knee injury from ladder fall     Skin: Negative.    Neurological:  Positive for gait problem.        L knee injury walks with cane     Hematological: Negative.    Psychiatric/Behavioral: Negative.      Learner: family, patient, and significant other  Educated on: I gave the patient the following patient education materials:    What to expect - external beam radiation  Radiation step by step  Radiation Therapy Masks  Neck and oral radiation side effects  Dry mouth or thick saliva diet ideas  Skin care during radiation  After completion of RT to the head   Readiness: acceptance and eager  Preferred learning method: preferred: reading and observing  Method used: demonstration and handout  Response: demonstrated understanding and needs reinforcement  Barriers: Hearing problems  Preferred language: English  Resources given: New patient packet  Dietitian following during this visit and LIWS following at additional appointment later today

## 2025-07-22 NOTE — PROGRESS NOTES
"NUTRITION Assessment NOTE    Nutrition Assessment     Reason for Visit:  Shiva Hardy is a 84 y.o. 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   Pt had biopsy 7/21. Will be presented to tumor board 7/25.     Hx of non muscle invasive bladder Ca s/p TURBT s/ BCG    Saw patient during initial radiation oncology visit. Potentially concurrent chemoradiation is the plan. Pt would like RT at [place] but may come back to [facility] for visits.       Problem List[1] Medical History[2]    Nutrition Significant labs:  Lab Results   Component Value Date/Time    GLUCOSE 99 07/15/2025 1108    GLUCOSE 94 01/29/2025 0000     07/15/2025 1108     01/29/2025 0000    K 4.6 07/15/2025 1108    K 4.2 01/29/2025 0000     07/15/2025 1108     01/29/2025 0000    CO2 27 07/15/2025 1108    CO2 25 01/29/2025 0000    ANIONGAP 12 07/15/2025 1108    ANIONGAP 8 01/29/2025 0000    BUN 23 07/15/2025 1108    BUN 29 (H) 01/29/2025 0000    CREATININE 0.68 07/15/2025 1108    CREATININE 0.87 01/29/2025 0000    EGFR >90 07/15/2025 1108    EGFR 84 01/29/2025 0000    CALCIUM 9.9 07/15/2025 1108    CALCIUM 9.6 01/29/2025 0000    ALBUMIN 4.2 01/29/2025 0000    ALKPHOS 55 01/29/2025 0000    PROT 6.7 01/29/2025 0000    AST 20 01/29/2025 0000    BILITOT 0.3 01/29/2025 0000    ALT 19 01/29/2025 0000     Lab Results   Component Value Date/Time    VITD25 80 01/29/2025 0000         Anthropometrics:  Height: 177.8 cm (5' 10\")   Weight: 79.1 kg (174 lb 6.1 oz)   BMI (Calculated): 25.02    IBW/kg (Dietitian Calculated): 75 kg            Weight History:   Daily Weight  07/22/25 : 79.1 kg (174 lb 4.8 oz)  07/23/25 : 79.1 kg (174 lb 6.1 oz)  07/22/25 : 78.2 kg (172 lb 6.4 oz)  07/21/25 : 72.3 kg (159 lb 7.3 oz)  07/17/25 : 77.1 kg (170 lb)  07/15/25 : 77.9 kg (171 lb 11.2 oz)  07/09/25 : 78 kg (172 lb)  07/02/25 : 78 kg (172 lb)  06/17/25 : 83.9 kg (185 lb)  06/12/25 : 79.8 kg (176 lb)    Weight Change %:  Weight History / % Weight " Change: weight history indicates weight fluctuations. Pt reports #. Weight down about 4# from UBW.    Nutrition History:  Food and Nutrient History  Food and Nutrient History: Pt states no issues with chewing or swallowing or throat pain. No restrictions to oral intake. Only symptom is laryngitis. Not a big water drinker d/t frequent urination r/t hx or prostate Ca. #. Pt has a couple of loose teeth.  Energy Intake: Good > 75 %    Food Intake  Meal 1: Bagel w/ cream cheese lately. Used to enjoy eggs, homefries w/ craven or sausage, toast  Meal 2: sandwich  Meal 3: Home cooked meal of meat, potato, vegetable or pizza  Snacks: likes hard candy  Fluid Intake: Drinks a pot of coffee, not a big water drinker              Current Medications[3]     Nutrition Focused Physical Exam Findings:    Subcutaneous Fat Loss  Orbital Fat Pads: Well nourished (slightly bulging fat pads)  Buccal Fat Pads: Well nourished (full, rounded cheeks)  Triceps: Well nourished (ample fat tissue)    Muscle Wasting  Temporalis: Well nourished (well-defined muscle)  Pectoralis (Clavicular Region): Well nourished (clavicle not visible)  Quadriceps: Well nourished (well developed, well rounded)         Edema  Edema: none    Estimated Needs:  Weight Used for Equation Calculations: 79.1 kg (174 lb 6.1 oz)    Estimated Energy Needs  Total Energy Estimated Needs in 24 hours (kCal): 2375 kCal  Energy Estimated Needs per kg Body Weight in 24 hours (kCal/kg): 30 kCal/kg  Method for Estimating Needs: kcal/kg ABW  Estimated Protein Needs  Total Protein Estimated Needs in 24 Hours (g): 110 g  Protein Estimated Needs per kg Body Weight in 24 Hours (g/kg): 1.4 g/kg  Method for Estimating 24 Hour Protein Needs: g/kg ABW  Estimated Fluid Needs  Total Fluid Estimated Needs in 24 Hours (mL): 2375 mL  Total Fluid Estimated Needs in 24 hours (mL/kg): 30 mL/kg  Method for Estimating 24 Hour Fluid Needs: ml/kcal             Nutrition Diagnosis    Malnutrition Diagnosis  Patient has Malnutrition Diagnosis: No    Nutrition Diagnosis  Patient has Nutrition Diagnosis: Yes  Diagnosis Status (1): New  Nutrition Diagnosis 1: Predicted inadequate energy intake  Related to (1): r/t pathophysiology of disease and treatment  As Evidenced by (1): anticipated nutrition impact symptoms affecting oral intake and weight       Nutrition Interventions/Recommendations   Nutrition Prescription: Individualized Nutrition Prescription Provided for : Oral nutrition     Recommendations: Individualized Nutrition Prescription Provided for : adequate kcals and protein    Nutrition Interventions:   Food and Nutrient Delivery: Food and Nutrition Delivery  Meals & Snacks: General Healthful Diet  Goals: regular meals/snacks     Coordination of Care:       Nutrition Education:   Nutrition Education Content: Nutrition Education Content: Content related nutrition education  Goals: Reviewed with patient and wife that RD will be available to assist with potential NIS once treatment plan is established. Patient has been maintaining good nutrition status at this time   Handouts provided/ reviewed: none at this time                 Nutrition Monitoring and Evaluation   Food and Nutrient Intake  Monitoring and Evaluation Plan: Energy intake, Fluid intake, Protein intake  Energy Intake: Estimated energy intake  Criteria: >75% need, diet recall  Fluid Intake: Estimated fluid intake  Criteria: maintain adequate hydration  Estimated protein intake: Estimated protein intake  Criteria: >75% needs, diet recall    Anthropometric measurements  Monitoring and Evaluation Plan: Weight  Body Weight: Measured body weight  Criteria: Maintain weight                   Follow Up: Planned follow up visit: 1 month or sooner when treatment begins     Time Spent  Prep time on day of patient encounter: 5 minutes  Time spent directly with patient, family or caregiver: 15 minutes  Additional Time Spent on Patient Care  Activities: 0 minutes  Documentation Time: 15 minutes  Other Time Spent: 0 minutes  Total: 35 minutes             [1]   Patient Active Problem List  Diagnosis    Ascending aorta dilation    Chronic constipation    Nicotine dependence, cigarettes, uncomplicated    COPD (chronic obstructive pulmonary disease) (Multi)    Bilateral hearing loss    Current smoker    Essential hypertension    Hematuria    History of colonic polyps    History of prostate cancer    Hyperlipidemia    Internal hemorrhoid    Irregular heartbeat    Left knee pain    Left ventricular hypertrophy    Lipoma of back    Bronchitis    Mucoid impaction of bronchi    Lung nodule    Multiple lung nodules on CT    Muscle spasm of back    Myofascial pain    YVETTE on CPAP    Overweight (BMI 25.0-29.9)    Centrilobular emphysema (Multi)    Paraseptal emphysema (Multi)    Prediabetes    Ringing in ear    Scapulothoracic bursitis    Seasonal allergies    Diverticulitis, colon    Sessile colonic polyp    Spondylosis, thoracic    Thoracic spondylosis    Chronic back pain    Chronic midline thoracic back pain    Thoracic disc herniation    Thoracic neuritis    Umbilical hernia    Absence of bladder continence    Urinary leakage    Weakness    Vitamin D deficiency    Squamous cell carcinoma in situ    Medicare annual wellness visit, subsequent    Aortic aneurysm    Lesion of bladder    S/P TURP    Urinary retention    Malignant neoplasm of urinary bladder (Multi)    Spondylosis without myelopathy or radiculopathy, lumbar region    Postprocedural bulbous urethral stricture    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    Smoking greater than 40 pack years    Dysphonia    PONV (postoperative nausea and vomiting)   [2]   Past Medical History:  Diagnosis Date    Acute maxillary sinusitis, unspecified 12/19/2019    Acute maxillary sinusitis    Acute recurrent maxillary sinusitis 12/28/2016    Acute recurrent maxillary sinusitis    Acute upper respiratory infection,  unspecified 03/13/2017    Acute URI    Acute upper respiratory infection, unspecified 12/30/2020    Viral URI with cough    Arthritis     Bilateral inguinal hernia, without obstruction or gangrene, not specified as recurrent 03/04/2014    Bilateral inguinal hernia    Bladder cancer (Multi)     BPH (benign prostatic hyperplasia)     Chest pain, unspecified 12/16/2016    Intermittent chest pain    Chronic obstructive pulmonary disease with (acute) exacerbation (Multi) 12/19/2019    Acute exacerbation of chronic obstructive pulmonary disease (COPD)    Chronic pain disorder     back pain- chronic    Edema, unspecified 05/27/2021    Parotid swelling    Hearing aid worn     HL (hearing loss)     Hyperlipidemia     Hypertension     Impacted cerumen, right ear 01/06/2017    Excessive cerumen in right ear canal    Low back pain, unspecified 07/25/2019    Acute exacerbation of chronic low back pain    Peripheral neuropathy     Personal history of malignant neoplasm of prostate     History of malignant neoplasm of prostate    Personal history of malignant neoplasm, unspecified     Personal history of malignant neoplasm    Personal history of other diseases of the digestive system 05/27/2021    History of parotitis    Personal history of other diseases of the respiratory system 02/22/2017    History of pulmonary emphysema    Personal history of other endocrine, nutritional and metabolic disease 04/20/2017    History of hyperkalemia    Prostate cancer (Multi)     Sleep apnea     uses CPAP    Unspecified malignant neoplasm of skin of unspecified part of face 12/16/2016    Skin cancer of face    Unspecified symptoms and signs involving the genitourinary system 12/23/2021    UTI symptoms   [3]   Current Outpatient Medications:     acetaminophen (Tylenol) 500 mg tablet, Take 1 tablet (500 mg) by mouth every 6 hours if needed for mild pain (1 - 3)., Disp: , Rfl:     albuterol (Ventolin HFA) 90 mcg/actuation inhaler, Inhale 2 puffs  every 4 hours if needed for wheezing or shortness of breath., Disp: 8 g, Rfl: 0    amLODIPine (Norvasc) 5 mg tablet, Take 1 tablet (5 mg) by mouth once daily in the morning. Do not take until you follow up with cardiology, Dr. Cloud., Disp: , Rfl:     aspirin 81 mg EC tablet, Take 1 tablet (81 mg) by mouth once daily in the morning., Disp: , Rfl:     atorvastatin (Lipitor) 20 mg tablet, Take 1 tablet (20 mg) by mouth once daily at bedtime., Disp: 90 tablet, Rfl: 1    budesonide-formoteroL (Symbicort) 160-4.5 mcg/actuation inhaler, Inhale 2 puffs 2 times a day. Rinse mouth with water after use to reduce aftertaste and incidence of candidiasis. Do not swallow., Disp: 10.2 g, Rfl: 5    cyclobenzaprine (Flexeril) 10 mg tablet, Take 1 tablet (10 mg) by mouth 3 times a day as needed for muscle spasms., Disp: , Rfl:     cyclobenzaprine (Flexeril) 5 mg tablet, Take 1 tablet (5 mg) by mouth every 12 hours., Disp: , Rfl:     ergocalciferol (Vitamin D-2) 1.25 MG (62495 UT) capsule, Take 1 capsule (1,250 mcg) by mouth 1 (one) time per week., Disp: 90 capsule, Rfl: 0    ipratropium-albuteroL (Duo-Neb) 0.5-2.5 mg/3 mL nebulizer solution, Take 3 mL by nebulization every 6 hours if needed for shortness of breath., Disp: 360 mL, Rfl: 5    multivitamin tablet, Take 1 tablet by mouth once daily., Disp: , Rfl:     traMADol (Ultram) 50 mg tablet, Take 1 tablet (50 mg) by mouth every 8 hours if needed for severe pain (7 - 10)., Disp: 90 tablet, Rfl: 1

## 2025-07-22 NOTE — PROGRESS NOTES
Patient is here with family for a new patient visit. Pt consented and signed for cetuximab depending on tumor board on Friday. Chemo education and chemo bag given to patient. Height verified. All questions were answered. Phone number given in case patient or family has any other questions.   Education Documentation  Support Systems, taught by Kathy Mckinney RN at 7/22/2025  4:03 PM.  Learner: Patient  Readiness: Acceptance  Method: Explanation  Response: Verbalizes Understanding    Financial Assistance Information, taught by Kathy Mckinney RN at 7/22/2025  4:03 PM.  Learner: Patient  Readiness: Acceptance  Method: Explanation  Response: Verbalizes Understanding    General Medication Information, taught by Kathy Mckinney RN at 7/22/2025  4:03 PM.  Learner: Patient  Readiness: Acceptance  Method: Explanation  Response: Verbalizes Understanding    Supportive Medications, taught by Kathy Mckinney RN at 7/22/2025  4:03 PM.  Learner: Patient  Readiness: Acceptance  Method: Explanation  Response: Verbalizes Understanding    When and How to Contact Clinic, taught by Kathy Mckinney RN at 7/22/2025  4:03 PM.  Learner: Patient  Readiness: Acceptance  Method: Explanation  Response: Verbalizes Understanding    Oriented to Facility, taught by Kathy Mckinney RN at 7/22/2025  4:03 PM.  Learner: Patient  Readiness: Acceptance  Method: Explanation  Response: Verbalizes Understanding    Education Comments  No comments found.

## 2025-07-23 ENCOUNTER — HOSPITAL ENCOUNTER (OUTPATIENT)
Dept: RADIOLOGY | Facility: HOSPITAL | Age: 85
Discharge: HOME | End: 2025-07-23
Payer: MEDICARE

## 2025-07-23 VITALS — BODY MASS INDEX: 24.97 KG/M2 | HEIGHT: 70 IN | WEIGHT: 174.38 LBS

## 2025-07-23 DIAGNOSIS — D38.0 NEOPLASM OF UNCERTAIN BEHAVIOR OF VOCAL CORD: ICD-10-CM

## 2025-07-23 LAB
LAB AP ASR DISCLAIMER: NORMAL
LABORATORY COMMENT REPORT: NORMAL
PATH REPORT.FINAL DX SPEC: NORMAL
PATH REPORT.GROSS SPEC: NORMAL
PATH REPORT.RELEVANT HX SPEC: NORMAL
PATH REPORT.TOTAL CANCER: NORMAL

## 2025-07-23 PROCEDURE — A9552 F18 FDG: HCPCS | Performed by: STUDENT IN AN ORGANIZED HEALTH CARE EDUCATION/TRAINING PROGRAM

## 2025-07-23 PROCEDURE — 78815 PET IMAGE W/CT SKULL-THIGH: CPT | Mod: PI

## 2025-07-23 PROCEDURE — 78815 PET IMAGE W/CT SKULL-THIGH: CPT | Mod: PET TUMOR INIT TX STRAT | Performed by: INTERNAL MEDICINE

## 2025-07-23 PROCEDURE — 3430000001 HC RX 343 DIAGNOSTIC RADIOPHARMACEUTICALS: Performed by: STUDENT IN AN ORGANIZED HEALTH CARE EDUCATION/TRAINING PROGRAM

## 2025-07-23 RX ORDER — FLUDEOXYGLUCOSE F 18 200 MCI/ML
13.7 INJECTION, SOLUTION INTRAVENOUS
Status: COMPLETED | OUTPATIENT
Start: 2025-07-23 | End: 2025-07-23

## 2025-07-23 RX ADMIN — FLUDEOXYGLUCOSE F 18 13.7 MILLICURIE: 200 INJECTION, SOLUTION INTRAVENOUS at 09:01

## 2025-07-25 ENCOUNTER — HOSPITAL ENCOUNTER (OUTPATIENT)
Dept: RADIOLOGY | Facility: EXTERNAL LOCATION | Age: 85
Discharge: HOME | End: 2025-07-25

## 2025-07-25 ENCOUNTER — EVALUATION (OUTPATIENT)
Dept: SPEECH THERAPY | Facility: HOSPITAL | Age: 85
End: 2025-07-25
Payer: MEDICARE

## 2025-07-25 ENCOUNTER — HOSPITAL ENCOUNTER (OUTPATIENT)
Dept: RADIATION ONCOLOGY | Facility: HOSPITAL | Age: 85
Setting detail: RADIATION/ONCOLOGY SERIES
Discharge: HOME | End: 2025-07-25
Payer: MEDICARE

## 2025-07-25 DIAGNOSIS — C32.9 MALIGNANT NEOPLASM OF LARYNX, UNSPECIFIED: ICD-10-CM

## 2025-07-25 DIAGNOSIS — C32.9 MALIGNANT NEOPLASM OF LARYNX, UNSPECIFIED: Primary | ICD-10-CM

## 2025-07-25 DIAGNOSIS — R13.13 PHARYNGEAL DYSPHAGIA: ICD-10-CM

## 2025-07-25 DIAGNOSIS — R13.10 DIFFICULTY IN SWALLOWING: Primary | ICD-10-CM

## 2025-07-25 PROCEDURE — 92612 ENDOSCOPY SWALLOW (FEES) VID: CPT | Mod: GN

## 2025-07-25 PROCEDURE — 92526 ORAL FUNCTION THERAPY: CPT | Mod: GN

## 2025-07-25 NOTE — PROCEDURES
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 Today session to determine base line swallow function and establish prophylactic exercise regimen for the speech and swallow muscles prior to starting XRT.     Diet Recommendations:  Regular solids and thin liquids     Additional Recommendations:  Complete oral care frequently throughout the day.   Began prophylactic exercise program to prevent post radiation effects on the speech and swallow mechanisms.   Modify diet texture as toxicities dictate per MD and SLP.  Notify SLP of any changes with speech or swallow function.  Follow up with SLP mid radiation.    Impression:  Oropharyngeal swallow mechanism within functional limits. Age appropriate trace penetration with thin liquids without aspiration. No further episodes of penetration or aspiration across all other consistencies. Post swallow adequate clearance of all boluses through the pharynx.     Airway Protection:  Rosenbek's Penetration Aspiration Scale  Thin Liquids: 2. PENETRATION that CLEARS - contrast enter airway, above vocal cords, no residue     Puree: 1. NO ASPIRATION & NO PENETRATION - no aspiration, contrast does not enter airway  Soft Solids: 1. NO ASPIRATION & NO PENETRATION - no aspiration, contrast does not enter airway  Solids: 1. NO ASPIRATION & NO PENETRATION - no aspiration, contrast does not enter airway    Compensatory Strategies: n/a     FEES:  FEES Verbal Consent: Fiberoptic Endoscopic Evaluation of Swallowing exam was completed once informed verbal consent was obtained- Yes  Scoped Passed: R naris   Following Application Of: Surgilube  Patient Tolerated Procedure: Yes   A flexible endoscopic  examination of swallowing (FEES) was completed in clinic today to visualize the pharyngeal-laryngeal anatomy, secretion management, swallowing function, and effectiveness of compensatory strategies while consuming foods and liquids of various volumes and textures. Surgical lube was utilized to lubricate the endoscope prior to insertion.  All boluses were self-administered or a by clinician. Natural and single swallowing conditions were provided with the instructions. All liquid and solids trials were dyed white and green with clear water given between trials to improve quality of images.         Anatomical Observations for Non-Swallow Tasks:  -  Velopharyngeal port closure- Intact  -  Base of tongue retraction - Intact  -  Posterior pharyngeal wall constriction - Intact  -  Vocal chord movement - hypomobile or absent mvt on the right, left mobile   -  Arytenoid movement- hypomobile or absent mvt on the right , left mobile     - Secretion burden- No     Patterson Edema Scale:  Epiglottis: 0- Normal   Vallecula: 0- Normal   Pharyngoepiglottic folds:  1- Mild edema   Aryepiglottic folds:  1- Mild edema   Arytenoids:  1- Mild edema   Pyriform sinuses:  1- Mild edema   False Vocal folds:  1- Mild edema   True Vocal folds:  1- Mild edema               Functional Oral Intake Scale   FIOS level Comment    Level 1  Nothing by Mouth    Level 2 Tube dependent with minimal attempts of food and liquid.    Level 3 Tube dependent with consistent oral intake of food and liquid.    Level 4 Total oral diet of a single consistency.    Level 5 Total oral diet with multiple consistencies, but requires special preparations and compensations.    Level 6 Total oral diet with multiple consistencies without special preparations but specific food limitations.   X Level 7 Total oral diet with no restrictions.        Rehab Prognosis:    Established: 7/25/25  Status: Progressing    Duration: 12 months     Plan: Patient to follow up with SLP for  the following sessions to maintain function:  Mid way through radiation/ week 3 or 4. Check in FEES if needed  Within the first week after completing radiation-FEES  3 month after radiation MBSS  6 months- virtual  12 months-auto MBSS    Short Term Goals:  Pt will complete effortful swallows 10 reps, 3 times per day for 7 days a week; to strengthen pharyngeal muscles for improve bolus clearance through the pharynx.      Pt will compete hever maneuver 10 times,, 3x a day for 5 days, independent of cues to improve posterior pharyngeal wall contraction/strength necessary for bolus clearance through the pharynx.    Pt will complete EMST 5 reps, 5x a day for 5 days @ 30 cmH2o, independent of cues to strengthening movement of the hyolaryngeal complex necessary for airway protection during the swallow.     Pt will complete a series of neck stretches independent of cues, 5 times a day for 7 days a week; to reduce the effects of radiation on the muscles necessary for optimal head position for safe and efficient PO intake.      Long Term Goals:  Pt will demonstrate improved swallow function as measured by stable  score on the FIOS and EAT-10 by time of discharge.       Education/Treatment:   Instructed patient this date in:  - Results and recommendations from today's FEES exam discussed with patient in detail.     - Issued patient the EMST 150/The Breather RMT and instructed in calibration and daily use. All parts were thoroughly explained and clinician demonstrated how to determine starting point and weekly exercise program. Discussed importance of pausing in between each to allow the muscles to reset for optimal benefit. Written instructions provided as well.    - SLP reviewed the expected changes from radiation treatment to the mechanics of speech and swallowing function. Dental care, and oral hygiene in relation to aspiration discussed. As well as the additional prognostic indicators that can contribute to higher risks  of development of aspiration pneumonia. Side effects of xerostomia with thick saliva, reduced appetite, oral sores, pain with swallowing with diminished taste and smell alleviations discussed. Handouts provided for referral resources at home. A prophylactic exercise program with handout reviewed and demonstrated with the emphasis of initiating exercises to maintain strength, mobility and endurance of the speech and swallow mechanisms. Pt able to return demonstrate exercises with adequate skill and follow through.     Clinician modeled all techniques and accurate patient follow through confirmed.  Handouts emailed/provided to facilitate optimal home carryover

## 2025-07-28 ENCOUNTER — PATIENT OUTREACH (OUTPATIENT)
Dept: HEMATOLOGY/ONCOLOGY | Facility: HOSPITAL | Age: 85
End: 2025-07-28
Payer: MEDICARE

## 2025-07-28 ASSESSMENT — LIFESTYLE VARIABLES
HOW MANY STANDARD DRINKS CONTAINING ALCOHOL DO YOU HAVE ON A TYPICAL DAY: PATIENT DOES NOT DRINK
HOW OFTEN DO YOU HAVE A DRINK CONTAINING ALCOHOL: NEVER
AUDIT-C TOTAL SCORE: 0
HOW OFTEN DO YOU HAVE SIX OR MORE DRINKS ON ONE OCCASION: NEVER
SKIP TO QUESTIONS 9-10: 1

## 2025-07-29 ENCOUNTER — APPOINTMENT (OUTPATIENT)
Dept: HEMATOLOGY/ONCOLOGY | Facility: HOSPITAL | Age: 85
End: 2025-07-29
Payer: MEDICARE

## 2025-07-29 ENCOUNTER — APPOINTMENT (OUTPATIENT)
Dept: RADIATION ONCOLOGY | Facility: HOSPITAL | Age: 85
End: 2025-07-29
Payer: MEDICARE

## 2025-07-31 ENCOUNTER — TELEPHONE (OUTPATIENT)
Dept: HEMATOLOGY/ONCOLOGY | Facility: HOSPITAL | Age: 85
End: 2025-07-31
Payer: MEDICARE

## 2025-07-31 DIAGNOSIS — C32.9 MALIGNANT NEOPLASM OF LARYNX: Primary | ICD-10-CM

## 2025-07-31 RX ORDER — DOXYCYCLINE 100 MG/1
100 CAPSULE ORAL 2 TIMES DAILY
Qty: 56 CAPSULE | Refills: 3 | Status: SHIPPED | OUTPATIENT
Start: 2025-07-31

## 2025-07-31 RX ORDER — PROCHLORPERAZINE EDISYLATE 5 MG/ML
10 INJECTION INTRAMUSCULAR; INTRAVENOUS EVERY 6 HOURS PRN
OUTPATIENT
Start: 2025-08-12

## 2025-07-31 RX ORDER — PROCHLORPERAZINE MALEATE 10 MG
10 TABLET ORAL EVERY 6 HOURS PRN
OUTPATIENT
Start: 2025-08-12

## 2025-07-31 RX ORDER — PROCHLORPERAZINE MALEATE 10 MG
10 TABLET ORAL EVERY 6 HOURS PRN
OUTPATIENT
Start: 2025-08-19

## 2025-07-31 RX ORDER — CLINDAMYCIN PHOSPHATE 10 UG/ML
LOTION TOPICAL
Qty: 60 ML | Refills: 3 | Status: SHIPPED | OUTPATIENT
Start: 2025-07-31

## 2025-07-31 RX ORDER — EPINEPHRINE 0.3 MG/.3ML
0.3 INJECTION SUBCUTANEOUS EVERY 5 MIN PRN
OUTPATIENT
Start: 2025-08-12

## 2025-07-31 RX ORDER — HYDROCORTISONE 1 %
CREAM (GRAM) TOPICAL
Qty: 453.6 G | Refills: 3 | Status: SHIPPED | OUTPATIENT
Start: 2025-07-31

## 2025-07-31 RX ORDER — MAGNESIUM SULFATE HEPTAHYDRATE 40 MG/ML
4 INJECTION, SOLUTION INTRAVENOUS ONCE AS NEEDED
OUTPATIENT
Start: 2025-08-06

## 2025-07-31 RX ORDER — EPINEPHRINE 0.3 MG/.3ML
0.3 INJECTION SUBCUTANEOUS EVERY 5 MIN PRN
OUTPATIENT
Start: 2025-08-19

## 2025-07-31 RX ORDER — MAGNESIUM SULFATE HEPTAHYDRATE 40 MG/ML
2 INJECTION, SOLUTION INTRAVENOUS ONCE AS NEEDED
OUTPATIENT
Start: 2025-08-06

## 2025-07-31 RX ORDER — ALBUTEROL SULFATE 0.83 MG/ML
3 SOLUTION RESPIRATORY (INHALATION) AS NEEDED
OUTPATIENT
Start: 2025-08-12

## 2025-07-31 RX ORDER — FAMOTIDINE 10 MG/ML
20 INJECTION, SOLUTION INTRAVENOUS ONCE AS NEEDED
OUTPATIENT
Start: 2025-08-12

## 2025-07-31 RX ORDER — PROCHLORPERAZINE EDISYLATE 5 MG/ML
10 INJECTION INTRAMUSCULAR; INTRAVENOUS EVERY 6 HOURS PRN
OUTPATIENT
Start: 2025-08-19

## 2025-07-31 RX ORDER — DIPHENHYDRAMINE HYDROCHLORIDE 50 MG/ML
50 INJECTION, SOLUTION INTRAMUSCULAR; INTRAVENOUS AS NEEDED
OUTPATIENT
Start: 2025-08-12

## 2025-07-31 RX ORDER — MAGNESIUM SULFATE HEPTAHYDRATE 40 MG/ML
2 INJECTION, SOLUTION INTRAVENOUS ONCE AS NEEDED
OUTPATIENT
Start: 2025-08-12

## 2025-07-31 RX ORDER — DIPHENHYDRAMINE HYDROCHLORIDE 50 MG/ML
50 INJECTION, SOLUTION INTRAMUSCULAR; INTRAVENOUS AS NEEDED
OUTPATIENT
Start: 2025-08-19

## 2025-07-31 RX ORDER — ALBUTEROL SULFATE 0.83 MG/ML
3 SOLUTION RESPIRATORY (INHALATION) AS NEEDED
OUTPATIENT
Start: 2025-08-19

## 2025-07-31 RX ORDER — MAGNESIUM SULFATE HEPTAHYDRATE 40 MG/ML
4 INJECTION, SOLUTION INTRAVENOUS ONCE AS NEEDED
OUTPATIENT
Start: 2025-08-12

## 2025-07-31 RX ORDER — DIPHENHYDRAMINE HYDROCHLORIDE 50 MG/ML
50 INJECTION, SOLUTION INTRAMUSCULAR; INTRAVENOUS AS NEEDED
OUTPATIENT
Start: 2025-08-06

## 2025-07-31 RX ORDER — PROCHLORPERAZINE MALEATE 10 MG
10 TABLET ORAL EVERY 6 HOURS PRN
OUTPATIENT
Start: 2025-08-06

## 2025-07-31 RX ORDER — MAGNESIUM SULFATE HEPTAHYDRATE 40 MG/ML
2 INJECTION, SOLUTION INTRAVENOUS ONCE AS NEEDED
OUTPATIENT
Start: 2025-08-19

## 2025-07-31 RX ORDER — FAMOTIDINE 10 MG/ML
20 INJECTION, SOLUTION INTRAVENOUS ONCE AS NEEDED
OUTPATIENT
Start: 2025-08-19

## 2025-07-31 RX ORDER — MAGNESIUM SULFATE HEPTAHYDRATE 40 MG/ML
4 INJECTION, SOLUTION INTRAVENOUS ONCE AS NEEDED
OUTPATIENT
Start: 2025-08-19

## 2025-07-31 RX ORDER — LOPERAMIDE HYDROCHLORIDE 2 MG/1
CAPSULE ORAL
Qty: 100 CAPSULE | Refills: 2 | Status: SHIPPED | OUTPATIENT
Start: 2025-07-31

## 2025-07-31 RX ORDER — EPINEPHRINE 0.3 MG/.3ML
0.3 INJECTION SUBCUTANEOUS EVERY 5 MIN PRN
OUTPATIENT
Start: 2025-08-06

## 2025-07-31 RX ORDER — ALBUTEROL SULFATE 0.83 MG/ML
3 SOLUTION RESPIRATORY (INHALATION) AS NEEDED
OUTPATIENT
Start: 2025-08-06

## 2025-07-31 RX ORDER — PROCHLORPERAZINE EDISYLATE 5 MG/ML
10 INJECTION INTRAMUSCULAR; INTRAVENOUS EVERY 6 HOURS PRN
OUTPATIENT
Start: 2025-08-06

## 2025-07-31 RX ORDER — FAMOTIDINE 10 MG/ML
20 INJECTION, SOLUTION INTRAVENOUS ONCE AS NEEDED
OUTPATIENT
Start: 2025-08-06

## 2025-07-31 RX ORDER — PROCHLORPERAZINE MALEATE 10 MG
10 TABLET ORAL EVERY 6 HOURS PRN
Qty: 30 TABLET | Refills: 5 | Status: SHIPPED | OUTPATIENT
Start: 2025-07-31

## 2025-07-31 NOTE — TUMOR BOARD NOTE
[facility] HEAD AND NECK TUMOR BOARD NOTE:    Shiva Hardy Is a 84 y.o. male who was presented by Dr. Manning at [facility] Head & Neck Tumor Board on 8/1/25 which included representatives from all Head & Neck disciplines (Medical oncology/Radiation oncology/Otolaryngology/Radiology/Pathology).     Multi-disciplinary Team:  Head and Neck Surgeon/ENT: Dr. Manning  Radiation Oncologist: Dr. Martell  Medical Oncologist: N/A  SLP referral: Referral placed  Dental Clearance: Needs referral  Social Work: N/A    The [facility] Head and Neck Tumor Board considered available treatment options and made the following staging and recommendations:    Staging and Recommendations:    Site: right Laryngeal SCC  Stage: T3N0M0  Recommendation: Adjuvant Chemotherapy and Radiation    Clinical Trial Status:   N/A       -----------------------------------------------------------------------------------------------------------------------------------------------------------------------------------------------------------------------    History and Physical in Brief:  84 y.o. male who has been seen by Dr. Forde, Dr. Bender, and Dr. Manning for a 2-year history of dysphonia.     Physical Exam:  On physical examination patient is a frail-appearing gentleman in no distress.  Palpation of his neck reveals no palpable adenopathy.  His oral cavity and oropharynx are free of any mucosal abnormalities.     Flexible fiberoptic scope was performed with inspection of the entire upper aerodigestive tract.  The entirety of mucosal surfaces were normal except for what was clearly a malignant lesion involving the anterior two thirds of his right true vocal cord and extending across the anterior commissure to the left cord.  The the inferior extent of the lesion could not be determined on scope exam.  Also the left cord appeared to be mobile but the right cord was limited in mobility.    Imaging:  CT Neck with Contrast  (6/23/25):   IMPRESSION:  The vocal cords are adducted which limits evaluation. The paraglottic and pre epiglottic fat appear preserved.      There is a 0.5 cm nodule along the lingual surface of the epiglottis on the right within the vallecula.      No cervical lymphadenopathy by size criteria.      Limited intracranial images demonstrate asymmetric CSF over the right convexity compared to the left which may represent asymmetric volume loss, a hygroma or chronic subdural. Consider CT of the head to further evaluate if not already performed.    MRI neck 7/3/25  IMPRESSION:  1. T2 hyperintense, enhancing mass along the right true vocal cord compressing but likely not infiltrating into the left vocal cord. Findings are not typical for a squamous cell carcinoma. Differential considerations include a minor salivary gland tumor such as a benign mixed tumor or chondrosarcoma given proximity to the thyroid cartilage. Recommend tissue sampling.  2. No pathologic lymphadenopathy by imaging criteria.    PET/CT Head and Neck (7/23/25):   IMPRESSION:  Hypermetabolic soft tissue lesion along the anterior aspect of right vocal cord extending to the anterior commissure, corresponding to the lesion described on prior MR neck, with no hypermetabolic cervical lymphadenopathy, or distant metastases. Findings are highly concerning for underlying malignancy. Tissue diagnosis advised    Procedures to date:  7/21/25: Direct laryngoscopy with biopsies, and esophagoscopy with Dr. Manning.  - Findings: Lesion extending from epiglottic petiole extending inferiorly, involving R > L vocal cords, anterior commissure, with extension to anterior vestibule, there was extension just beneath the cords but does not extend to the subglottis. Esophagoscopy revealing diffuse esophagitis. Small subcentimeter base of tongue mucocele     Pertinent Pathology:  7/21/2025  FINAL DIAGNOSIS   Anterior glottic lesion, biopsy:  - Invasive poorly differentiated  squamous cell carcinoma.  See note.     Note: By immunostaining, the tumor cells are positive for p40 and CK5/6, weakly positive for AE1/AE3, and negative for CAM 5.2, supportive of the above diagnosis.  A spindle cell/sarcomatoid component cannot be excluded.     National site-specific guidelines were discussed with respect to the case.

## 2025-07-31 NOTE — TELEPHONE ENCOUNTER
Left message with daughter to call back. Wanted to let her know appointment was scheduled for 8/6 at 8 am and fidelia hallman will see him downstairs.

## 2025-07-31 NOTE — TELEPHONE ENCOUNTER
Left message for patient, letting him know is infusion is scheduled for wed 8/6 at 8 am and Shiela Dunn PA-C will see him downstairs in infusion. Will send him a [employer] message.

## 2025-08-01 ENCOUNTER — APPOINTMENT (OUTPATIENT)
Dept: OTOLARYNGOLOGY | Facility: HOSPITAL | Age: 85
End: 2025-08-01
Payer: MEDICARE

## 2025-08-01 ENCOUNTER — OFFICE VISIT (OUTPATIENT)
Dept: PAIN MEDICINE | Facility: HOSPITAL | Age: 85
End: 2025-08-01
Payer: MEDICARE

## 2025-08-01 ENCOUNTER — TELEPHONE (OUTPATIENT)
Dept: ADMISSION | Facility: HOSPITAL | Age: 85
End: 2025-08-01

## 2025-08-01 ENCOUNTER — TUMOR BOARD CONFERENCE (OUTPATIENT)
Dept: HEMATOLOGY/ONCOLOGY | Facility: HOSPITAL | Age: 85
End: 2025-08-01
Payer: MEDICARE

## 2025-08-01 ENCOUNTER — SOCIAL WORK (OUTPATIENT)
Dept: CASE MANAGEMENT | Facility: HOSPITAL | Age: 85
End: 2025-08-01

## 2025-08-01 VITALS
HEIGHT: 70 IN | WEIGHT: 171 LBS | SYSTOLIC BLOOD PRESSURE: 153 MMHG | BODY MASS INDEX: 24.48 KG/M2 | HEART RATE: 70 BPM | OXYGEN SATURATION: 96 % | TEMPERATURE: 97.1 F | DIASTOLIC BLOOD PRESSURE: 94 MMHG | RESPIRATION RATE: 18 BRPM

## 2025-08-01 DIAGNOSIS — R91.8 MULTIPLE LUNG NODULES ON CT: ICD-10-CM

## 2025-08-01 DIAGNOSIS — M54.41 CHRONIC BILATERAL LOW BACK PAIN WITH RIGHT-SIDED SCIATICA: ICD-10-CM

## 2025-08-01 DIAGNOSIS — G89.29 CHRONIC PAIN OF LEFT KNEE: Primary | ICD-10-CM

## 2025-08-01 DIAGNOSIS — M25.562 CHRONIC PAIN OF LEFT KNEE: Primary | ICD-10-CM

## 2025-08-01 DIAGNOSIS — C67.9 MALIGNANT NEOPLASM OF URINARY BLADDER, UNSPECIFIED SITE (MULTI): ICD-10-CM

## 2025-08-01 DIAGNOSIS — Z79.899 MEDICATION MANAGEMENT: ICD-10-CM

## 2025-08-01 DIAGNOSIS — M47.814 SPONDYLOSIS, THORACIC: ICD-10-CM

## 2025-08-01 DIAGNOSIS — M47.816 SPONDYLOSIS WITHOUT MYELOPATHY OR RADICULOPATHY, LUMBAR REGION: ICD-10-CM

## 2025-08-01 DIAGNOSIS — G89.29 CHRONIC BILATERAL LOW BACK PAIN WITH RIGHT-SIDED SCIATICA: ICD-10-CM

## 2025-08-01 DIAGNOSIS — G47.33 OSA ON CPAP: ICD-10-CM

## 2025-08-01 PROCEDURE — 99213 OFFICE O/P EST LOW 20 MIN: CPT | Performed by: ANESTHESIOLOGY

## 2025-08-01 PROCEDURE — 3077F SYST BP >= 140 MM HG: CPT | Performed by: ANESTHESIOLOGY

## 2025-08-01 PROCEDURE — 3080F DIAST BP >= 90 MM HG: CPT | Performed by: ANESTHESIOLOGY

## 2025-08-01 PROCEDURE — 1159F MED LIST DOCD IN RCRD: CPT | Performed by: ANESTHESIOLOGY

## 2025-08-01 PROCEDURE — 1160F RVW MEDS BY RX/DR IN RCRD: CPT | Performed by: ANESTHESIOLOGY

## 2025-08-01 PROCEDURE — 1125F AMNT PAIN NOTED PAIN PRSNT: CPT | Performed by: ANESTHESIOLOGY

## 2025-08-01 RX ORDER — TRAMADOL HYDROCHLORIDE 50 MG/1
50 TABLET, FILM COATED ORAL EVERY 8 HOURS PRN
Qty: 90 TABLET | Refills: 1 | Status: SHIPPED | OUTPATIENT
Start: 2025-08-21 | End: 2025-09-20

## 2025-08-01 ASSESSMENT — PAIN SCALES - GENERAL: PAINLEVEL_OUTOF10: 6

## 2025-08-01 NOTE — PROGRESS NOTES
Patient is here for back pain 6/10. He describes his pain as constant,throbbing, spasms, but denies any radiating. He is taking tramadol for pain, last dose 8/1/2025 at 1130 and that it improves his pain 50%. He has 66 pills remaining. HE states his ADLs are still unchanged.        Date of the last Controlled Substance Agreement: 06/17/2025       Last urine drug screening date/ordered today:  oral swab: 06/17/2025     Results of last screen:       Last opioid risk screening date/ordered today: 06/17/2025      Pain Scale Screening:   Pain Assessment and Documentation Tool (PADT)   Date of Assessment: 08/01/2025  Analgesia:   Patient reports her pain level on average during the past week is 6on a 0 - 10 scale.   Patient reports that her pain level at its worst during the past week was 8 on a 0 -10 scale.   50% of pain has been relieved during the past week per patient   Patient states that the amount of pain relief she is now obtaining from her current pain reliever(s) is enough to make a real difference in her life.   Query to clinician: Is the patient's pain relief clinically significant? yes  Activities of Daily Living:   Physical functioning: unchanged  Family relationships: unchanged  Social relationships: unchanged  Mood: unchanged  Sleep patterns: unchanged  Overall functioning: unchanged  Adverse Events: No, Shiva BAUTISTA MccallHardy is not experiencing side effects from current pain reliever.  Patients overall severity of side effect:***  Specific Analgesic Plan: Continue present regimen.

## 2025-08-01 NOTE — H&P
History Of Present Illness  Shiva Hardy is a 84 y.o. male presenting with a chief complaint of severe low back pain and left knee pain.  Patient has degenerative osteoarthritis of the lumbar facet joints as well as severe osteoarthritis of his left knee.  Patient was recently told that his lung cancer has metastasized and is now involving his neck.  He is currently seen oncology specialist at [facility] for treatment.  Patient's complaint today is severe left knee pain since he has fallen and has difficulty ambulating because of the pain.  He has documented osteoarthritis of the left knee and is requesting intra-articular knee injection with fluoroscopy and corticosteroids on August 19, 2025.     Past Medical History  He has a past medical history of Acute maxillary sinusitis, unspecified (12/19/2019), Acute recurrent maxillary sinusitis (12/28/2016), Acute upper respiratory infection, unspecified (03/13/2017), Acute upper respiratory infection, unspecified (12/30/2020), Arthritis, Bilateral inguinal hernia, without obstruction or gangrene, not specified as recurrent (03/04/2014), Bladder cancer (Multi), BPH (benign prostatic hyperplasia), Chest pain, unspecified (12/16/2016), Chronic obstructive pulmonary disease with (acute) exacerbation (Multi) (12/19/2019), Chronic pain disorder, Edema, unspecified (05/27/2021), Hearing aid worn, HL (hearing loss), Hyperlipidemia, Hypertension, Impacted cerumen, right ear (01/06/2017), Low back pain, unspecified (07/25/2019), Peripheral neuropathy, Personal history of malignant neoplasm of prostate, Personal history of malignant neoplasm, unspecified, Personal history of other diseases of the digestive system (05/27/2021), Personal history of other diseases of the respiratory system (02/22/2017), Personal history of other endocrine, nutritional and metabolic disease (04/20/2017), Prostate cancer (Multi), Sleep apnea, Unspecified malignant neoplasm of  skin of unspecified part of face (12/16/2016), Unspecified symptoms and signs involving the genitourinary system (12/23/2021), and Vocal cord cancer (Multi) (2025).    Surgical History  He has a past surgical history that includes Hernia repair (11/03/2015); Other surgical history (01/25/2021); Other surgical history (01/25/2021); Umbilical hernia repair (12/16/2016); Other surgical history (12/16/2016); Other surgical history (05/08/2019); Cataract extraction (10/01/2014); Prostate surgery (10/01/2014); and Cystoscopy (07/02/2024).     Social History  He reports that he has quit smoking. His smoking use included cigarettes. He started smoking about 67 years ago. He has a 67.6 pack-year smoking history. He has been exposed to tobacco smoke. He has never used smokeless tobacco. He reports that he does not currently use alcohol. He reports that he does not currently use drugs.    Family History  Family History[1]     Allergies  Patient has no known allergies.    Review of Systems  Unremarkable except for chief complaint  Physical Exam  Gen. appearance:  Patient's alert and oriented ×3 ;cooperative mild to moderate distress  Heart: Regular rate and rhythm  Lungs: Clear to auscultation  Abdomen: Soft nontender  Neuro: Cranial nerves II -XII intact; DTR: +2 over 4 biceps triceps brachial radialis patellar and Achilles  Spinal exam: Positive paraspinal tenderness noted bilaterally L4 5 L5-S1 with flexion extension and rotation/no bony abnormalities  Musculoskeletal:  Upper and lower extremity strength was 4/5 bilaterally  :  deferred  Skin: Warm and dry    Last Recorded Vitals  BP (!) 153/94   Pulse 70   Temp 36.2 °C (97.1 °F)   Resp 18   Wt 77.6 kg (171 lb)   SpO2 96%     ASSESSMENT:  1.  COPD  2.  Lung cancer with metastasis  3.  Severe degenerative osteoarthritis left knee  4.  Lumbar spondylosis  5.  Medication management    PLAN:  Left intra-articular knee injection has been scheduled for August 19, 2025 under  local anesthesia.  Refill of his tramadol was sent to his local pharmacy.  Patient was encouraged to continue his home exercise program as well as oncological treatment plan for his recurrent today throat cancer.      Kingston Wang DO         [1]   Family History  Problem Relation Name Age of Onset    Arthritis Mother      Hypertension Mother      Vision loss Mother      Arthritis Father      Other (CARDIAC DISORDER) Father      Prostate cancer Father      Colon cancer Brother      Arthritis Brother      Hypertension Brother      Liver cancer Brother      Lung cancer Brother      Vision loss Brother      Cancer Other MULTIPLE FAMILY MEMBERS

## 2025-08-01 NOTE — TELEPHONE ENCOUNTER
Daughter calls to clear up some confusion about scheduling.  Patient will start first infusion on Weds 8/6 but subsequent weeks will move to Tuesday (8/12, 8/19, 8/26) wants to make sure that is correct.  Also question about location, patient will have radiation at [place] location, if at all possible can he have future infusions and [facility] at [place] location?  Forwarding to team to clarify scheduling concerns

## 2025-08-01 NOTE — PROGRESS NOTES
Social Work Note    Referral Source: Rad Onc   Meeting Location: Phone  Person(s) Present: Bre (daughter)Naty LSW   Identified Needs: Transportation options   Impression and Plan: SW received referral from rad onc team that Shiva would be treated at [facility] and that his daughter, Bre would like to know transportation options. SW made call to [employer] program, no drivers are in [place]. Shiva does not have transportation benefits through insurance or supplemental plan. SW called Bre, introduced self and role. Shared that RTR is not an option, but there is a senior smith program called country neighbor through [facility] that may be of interest. Shared program details and phone number to call. Bre is to talk with her father about this and call to coordinate. SW encouraged her to call for any additional needs, she is agreeable. MARIE will continue to follow.   Interventions Provided: Referral to Community Resource

## 2025-08-04 NOTE — PROGRESS NOTES
Patient ID: Shiva Hardy is a 84 y.o. male  Primary Care Provider: Jen Quiros DO    DIAGNOSIS AND STAGING  Diagnosis & Staging: presumed squamous cell carcinoma of the larynx  Date of Diagnosis:     Providers:  ENT Surgeon: Dr. Khalif Burtc: Dr. Tresa Martell    [facility]: Dr. Babak Diaz     SITES OF DISEASE  Larynx      MOLECULAR GENOMICS     PRIOR THERAPIES     CURRENT THERAPY  8/6/25: Started Loading dose Cetuximab. Concurrent RT with weekly Cetux to start on 8/11/25.     CURRENT ONCOLOGICAL PROBLEMS     HISTORY OF PRESENT ILLNESS  Mr. Shiva Hardy is an 83 YO with PMH of HTN, DLD, YVETTE on CPAP, non muscle invasive bladder cancer s/p TURBT s/p BCG seen as initial consultation for presumed head and neck malignancy, glottis primary.    He presented to Dr. 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     He underwent panendoscopy with biopsy on 7/21 and is seen for initial medical oncology consultation.     PAST MEDICAL HISTORY  HTN, DLD, YVETTE on CPAP, COPD, chronic back pain, ascending aortic dilation, peripheral neuropathy  non muscle invasive bladder cancer dx 9/2023 s/p TURBT s/p BCG  Prostate cancer in 2008 treated with surgery and radiation    SURGICAL HISTORY  07/21/2025: Panendoscopy with biopsy     SOCIAL HISTORY  Quit smoking 1 week ago, using nicotine patches now. 65 years x 1 PPD history. No alcohol use. No other substance use.   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 Now likes to garden and feed birds.      FAMILY HISTORY  2 brothers and son with some kind of throat or neck cancer     CURRENT MEDS REVIEWED  Current Outpatient Medications   Medication Instructions    acetaminophen (TYLENOL) 500 mg, Every 6 hours PRN    albuterol  (Ventolin HFA) 90 mcg/actuation inhaler 2 puffs, inhalation, Every 4 hours PRN    amLODIPine (NORVASC) 5 mg, oral, Every morning, Do not take until you follow up with cardiology, Dr. Cloud.    aspirin 81 mg, Every morning    atorvastatin (LIPITOR) 20 mg, oral, Nightly    budesonide-formoteroL (Symbicort) 160-4.5 mcg/actuation inhaler 2 puffs, inhalation, 2 times daily RT, Rinse mouth with water after use to reduce aftertaste and incidence of candidiasis. Do not swallow.    clindamycin (Cleocin T) 1 % lotion Apply topically to affected area twice daily.    cyclobenzaprine (Flexeril) 5 mg tablet 1 tablet, Every 12 hours scheduled (0630,1830)    cyclobenzaprine (FLEXERIL) 10 mg, 3 times daily PRN    doxycycline (VIBRAMYCIN) 100 mg, oral, 2 times daily, For rash prevention. Take with at least 8 ounces (large glass) of water, do not lie down for 30 minutes after    ergocalciferol (VITAMIN D-2) 1,250 mcg, oral, Once Weekly    hydrocortisone 1 % cream Apply topically to face, hands, feet, neck, back, and chest once daily at bedtime for rash prevention.    ipratropium-albuteroL (Duo-Neb) 0.5-2.5 mg/3 mL nebulizer solution 3 mL, nebulization, Every 6 hours PRN    loperamide (Imodium) 2 mg capsule Take 2 capsules (4 mg) by mouth with the first episode of diarrhea and 1 capsule (2 mg) by mouth with any additional episodes. Maximum 8 capsules (16 mg) per day.    multivitamin tablet 1 tablet, Daily    prochlorperazine (COMPAZINE) 10 mg, oral, Every 6 hours PRN    [START ON 8/21/2025] traMADol (ULTRAM) 50 mg, oral, Every 8 hours PRN      ALLERGIES REVIEWED   RX Allergies[1]     Subjective   Patient presents for loading dose Cetuximab.    Patient reports feeling at his baseline. Energy and appetite are good.   He denies any odynophagia/dysphagia, He has gained some weight in the last week.   Hoarseness is his only symptom.   Endorse baseline hearing loss requiring hearing aids, baseline peripheral neuropathy in hands and feet.    He stopped smoking 1-2 weeks ago and is currently using Nicotine transdermal patch 21mg/day.     Review of Systems   Constitutional:  Negative for chills and fever.   HENT:   Positive for hearing loss. Negative for lump/mass, mouth sores, nosebleeds, sore throat, tinnitus and trouble swallowing.    Respiratory:  Negative for cough and shortness of breath.    Cardiovascular:  Negative for chest pain and leg swelling.   Gastrointestinal:  Positive for constipation. Negative for abdominal pain, diarrhea, nausea and vomiting.   Musculoskeletal:  Negative for arthralgias.   Skin:  Negative for rash.   Neurological:  Negative for headaches, light-headedness and numbness.     Objective   VS: /86   Pulse 76   Temp 36 °C (96.8 °F)   SpO2 99%    There is no height or weight on file to calculate BSA.   Wt Readings from Last 5 Encounters:   08/06/25 78.3 kg (172 lb 11.2 oz)   08/01/25 77.6 kg (171 lb)   07/22/25 79.1 kg (174 lb 4.8 oz)   07/23/25 79.1 kg (174 lb 6.1 oz)   07/22/25 78.2 kg (172 lb 6.4 oz)     ECOG Score: 2- Ambulatory and  capable of all selfcare; unable to carry out work activities.  Up and about > 50% of waking hrs.    Physical Exam  Constitutional:       General: He is not in acute distress.     Appearance: He is not ill-appearing.   HENT:      Head: Normocephalic and atraumatic.     Cardiovascular:      Rate and Rhythm: Normal rate and regular rhythm.   Pulmonary:      Effort: Pulmonary effort is normal. No respiratory distress.      Breath sounds: Normal breath sounds.   Abdominal:      General: Abdomen is flat.      Palpations: Abdomen is soft.      Tenderness: There is no abdominal tenderness.     Musculoskeletal:      Right lower leg: No edema.      Left lower leg: No edema.   Lymphadenopathy:      Cervical: No cervical adenopathy.     Skin:     General: Skin is warm and dry.     Neurological:      Mental Status: He is oriented to person, place, and time.       Diagnostic Results    Results:  Labs:  Lab Results   Component Value Date    WBC 8.8 08/06/2025    HGB 12.7 (L) 08/06/2025    HCT 37.7 (L) 08/06/2025    MCV 96 08/06/2025     08/06/2025      Lab Results   Component Value Date    NEUTROABS 5.64 (H) 08/06/2025      Lab Results   Component Value Date    GLUCOSE 111 (H) 08/06/2025    CALCIUM 9.5 08/06/2025     08/06/2025    K 4.0 08/06/2025    CO2 26 08/06/2025     08/06/2025    BUN 21 08/06/2025    CREATININE 0.79 08/06/2025    MG 1.83 08/06/2025     Lab Results   Component Value Date    ALT 24 08/06/2025    AST 22 08/06/2025    ALKPHOS 48 08/06/2025    BILITOT 0.7 08/06/2025      Lab Results   Component Value Date    TSH 2.40 02/25/2025     IMAGES  7/3/25 MR Neck   IMPRESSION:  1. T2 hyperintense, enhancing mass along the right true vocal cord compressing but likely not infiltrating into the left vocal cord. Findings are not typical for a squamous cell carcinoma. Differential considerations include a minor salivary gland tumor such as a benign mixed tumor or chondrosarcoma given proximity to the thyroid cartilage. Recommend tissue sampling.  2. No pathologic lymphadenopathy by imaging criteria.      6/23/25 CT Soft Tissue   IMPRESSION:  The vocal cords are adducted which limits evaluation. The paraglottic and pre epiglottic fat appear preserved.  There is a 0.5 cm nodule along the lingual surface of the epiglottis on the right within the vallecula.  No cervical lymphadenopathy by size criteria.  Limited intracranial images demonstrate asymmetric CSF over the right convexity compared to the left which may represent asymmetric volume loss, a hygroma or chronic subdural. Consider CT of the head to further evaluate if not already performed.      Assessment/Plan     No matching staging information was found for the patient.  Shiva Hardy is a 84 y.o. year old male patient with  PMH of HTN, DLD, YVETTE on CPAP, non muscle invasive bladder cancer s/p TURBT s/p BCG, 7/2/25  biopsy of anterior glottic lesion showed Invasive poorly differentiated squamous cell carcinoma. He started loading dose Cetuximab (400mg/m2) on 8/6/25 with concurrent RT to start with weekly maintenance dose Cetux (250mg/m2) on 8/11/25.      8/6/25: Patient is feeling at his baseline, has hoarseness but no odynophagia/dysphagia. Reviewed treatment related SEs and medications for management. Ok to start loading dose Cetux today.     # SCC of the larynx, probable T3 with supraglottic extension and fixed right vocal cord  - 7/21/25 biopsy with Dr. Manning showed invasive poorly differentiated squamous cell carcinoma in the glottic lesion.   - 8/6/25: Starting loading dose Cetuximab. RT to start on 8/11. Weekly hydration at [place] on Fridays            [1] No Known Allergies

## 2025-08-05 ENCOUNTER — APPOINTMENT (OUTPATIENT)
Dept: PAIN MEDICINE | Facility: HOSPITAL | Age: 85
End: 2025-08-05
Payer: MEDICARE

## 2025-08-05 ENCOUNTER — SOCIAL WORK (OUTPATIENT)
Dept: CASE MANAGEMENT | Facility: HOSPITAL | Age: 85
End: 2025-08-05
Payer: MEDICARE

## 2025-08-05 ENCOUNTER — HOSPITAL ENCOUNTER (OUTPATIENT)
Dept: RADIATION ONCOLOGY | Facility: HOSPITAL | Age: 85
Setting detail: RADIATION/ONCOLOGY SERIES
Discharge: HOME | End: 2025-08-05
Payer: MEDICARE

## 2025-08-05 PROCEDURE — 77300 RADIATION THERAPY DOSE PLAN: CPT | Performed by: RADIOLOGY

## 2025-08-05 PROCEDURE — 77301 RADIOTHERAPY DOSE PLAN IMRT: CPT | Performed by: RADIOLOGY

## 2025-08-05 PROCEDURE — 77338 DESIGN MLC DEVICE FOR IMRT: CPT | Performed by: RADIOLOGY

## 2025-08-05 PROCEDURE — 77293 RESPIRATOR MOTION MGMT SIMUL: CPT | Performed by: RADIOLOGY

## 2025-08-05 NOTE — PROGRESS NOTES
Social Work Note    Meeting Location: Phone  Person(s) Present: Bre (daughter), Naty LSW   Identified Needs: Transportation   Impression and Plan: SW made call to Shiva's daughter, Bre to check in and see if Shiva had any thoughts on the community resource for transportation. She shared that he is open to trying it, and worked on the application. She shared that Shiva would be driving in his application as they also needed his ID card attached to the application. She explained that the plan is to have transportation take him in and then she would take a half day to bring him back home. SW offered to refill any FMLA needed to cover this shift. She explained that she currently has one filled out from when he had bladder cancer, but will reach out if it is needed again. Provided SW phone number and encouraged her to call with any questions or concerns. MARIE communicated update with teams, will continue to follow.   Interventions Provided: Care Coordination

## 2025-08-06 ENCOUNTER — APPOINTMENT (OUTPATIENT)
Dept: UROLOGY | Facility: CLINIC | Age: 85
End: 2025-08-06
Payer: MEDICARE

## 2025-08-06 ENCOUNTER — INFUSION (OUTPATIENT)
Dept: HEMATOLOGY/ONCOLOGY | Facility: HOSPITAL | Age: 85
End: 2025-08-06
Payer: MEDICARE

## 2025-08-06 ENCOUNTER — OFFICE VISIT (OUTPATIENT)
Dept: HEMATOLOGY/ONCOLOGY | Facility: HOSPITAL | Age: 85
End: 2025-08-06
Payer: MEDICARE

## 2025-08-06 ENCOUNTER — LAB (OUTPATIENT)
Dept: LAB | Facility: HOSPITAL | Age: 85
End: 2025-08-06
Payer: MEDICARE

## 2025-08-06 VITALS
SYSTOLIC BLOOD PRESSURE: 143 MMHG | DIASTOLIC BLOOD PRESSURE: 86 MMHG | HEART RATE: 76 BPM | TEMPERATURE: 96.8 F | OXYGEN SATURATION: 99 %

## 2025-08-06 VITALS
SYSTOLIC BLOOD PRESSURE: 134 MMHG | TEMPERATURE: 97.2 F | HEART RATE: 72 BPM | OXYGEN SATURATION: 96 % | WEIGHT: 172.7 LBS | BODY MASS INDEX: 25.58 KG/M2 | DIASTOLIC BLOOD PRESSURE: 82 MMHG | HEIGHT: 69 IN | RESPIRATION RATE: 20 BRPM

## 2025-08-06 DIAGNOSIS — D38.0 NEOPLASM OF UNCERTAIN BEHAVIOR OF VOCAL CORD: ICD-10-CM

## 2025-08-06 DIAGNOSIS — C32.9 MALIGNANT NEOPLASM OF LARYNX: Primary | ICD-10-CM

## 2025-08-06 DIAGNOSIS — C32.9 MALIGNANT NEOPLASM OF LARYNX: ICD-10-CM

## 2025-08-06 DIAGNOSIS — R49.0 HOARSENESS: ICD-10-CM

## 2025-08-06 DIAGNOSIS — Z51.12 ENCOUNTER FOR ANTINEOPLASTIC IMMUNOTHERAPY: ICD-10-CM

## 2025-08-06 DIAGNOSIS — J44.9 CHRONIC OBSTRUCTIVE PULMONARY DISEASE, UNSPECIFIED COPD TYPE (MULTI): ICD-10-CM

## 2025-08-06 LAB
ALBUMIN SERPL BCP-MCNC: 4.1 G/DL (ref 3.4–5)
ALP SERPL-CCNC: 48 U/L (ref 33–136)
ALT SERPL W P-5'-P-CCNC: 24 U/L (ref 10–52)
ANION GAP SERPL CALC-SCNC: 11 MMOL/L (ref 10–20)
AST SERPL W P-5'-P-CCNC: 22 U/L (ref 9–39)
BASOPHILS # BLD AUTO: 0.05 X10*3/UL (ref 0–0.1)
BASOPHILS NFR BLD AUTO: 0.6 %
BILIRUB SERPL-MCNC: 0.7 MG/DL (ref 0–1.2)
BUN SERPL-MCNC: 21 MG/DL (ref 6–23)
CALCIUM SERPL-MCNC: 9.5 MG/DL (ref 8.6–10.3)
CHLORIDE SERPL-SCNC: 104 MMOL/L (ref 98–107)
CO2 SERPL-SCNC: 26 MMOL/L (ref 21–32)
CREAT SERPL-MCNC: 0.79 MG/DL (ref 0.5–1.3)
EGFRCR SERPLBLD CKD-EPI 2021: 87 ML/MIN/1.73M*2
EOSINOPHIL # BLD AUTO: 0.19 X10*3/UL (ref 0–0.4)
EOSINOPHIL NFR BLD AUTO: 2.2 %
ERYTHROCYTE [DISTWIDTH] IN BLOOD BY AUTOMATED COUNT: 12.6 % (ref 11.5–14.5)
GLUCOSE SERPL-MCNC: 111 MG/DL (ref 74–99)
HBV CORE AB SER QL: NONREACTIVE
HBV SURFACE AB SER-ACNC: <3.1 MIU/ML
HBV SURFACE AG SERPL QL IA: NONREACTIVE
HCT VFR BLD AUTO: 37.7 % (ref 41–52)
HGB BLD-MCNC: 12.7 G/DL (ref 13.5–17.5)
IMM GRANULOCYTES # BLD AUTO: 0.02 X10*3/UL (ref 0–0.5)
IMM GRANULOCYTES NFR BLD AUTO: 0.2 % (ref 0–0.9)
LYMPHOCYTES # BLD AUTO: 2.07 X10*3/UL (ref 0.8–3)
LYMPHOCYTES NFR BLD AUTO: 23.6 %
MAGNESIUM SERPL-MCNC: 1.83 MG/DL (ref 1.6–2.4)
MCH RBC QN AUTO: 32.5 PG (ref 26–34)
MCHC RBC AUTO-ENTMCNC: 33.7 G/DL (ref 32–36)
MCV RBC AUTO: 96 FL (ref 80–100)
MONOCYTES # BLD AUTO: 0.81 X10*3/UL (ref 0.05–0.8)
MONOCYTES NFR BLD AUTO: 9.2 %
NEUTROPHILS # BLD AUTO: 5.64 X10*3/UL (ref 1.6–5.5)
NEUTROPHILS NFR BLD AUTO: 64.2 %
NRBC BLD-RTO: 0 /100 WBCS (ref 0–0)
PLATELET # BLD AUTO: 257 X10*3/UL (ref 150–450)
POTASSIUM SERPL-SCNC: 4 MMOL/L (ref 3.5–5.3)
PROT SERPL-MCNC: 6.9 G/DL (ref 6.4–8.2)
RBC # BLD AUTO: 3.91 X10*6/UL (ref 4.5–5.9)
SODIUM SERPL-SCNC: 137 MMOL/L (ref 136–145)
WBC # BLD AUTO: 8.8 X10*3/UL (ref 4.4–11.3)

## 2025-08-06 PROCEDURE — 87340 HEPATITIS B SURFACE AG IA: CPT

## 2025-08-06 PROCEDURE — 99215 OFFICE O/P EST HI 40 MIN: CPT | Performed by: STUDENT IN AN ORGANIZED HEALTH CARE EDUCATION/TRAINING PROGRAM

## 2025-08-06 PROCEDURE — 1159F MED LIST DOCD IN RCRD: CPT | Performed by: STUDENT IN AN ORGANIZED HEALTH CARE EDUCATION/TRAINING PROGRAM

## 2025-08-06 PROCEDURE — 96413 CHEMO IV INFUSION 1 HR: CPT

## 2025-08-06 PROCEDURE — 3079F DIAST BP 80-89 MM HG: CPT | Performed by: STUDENT IN AN ORGANIZED HEALTH CARE EDUCATION/TRAINING PROGRAM

## 2025-08-06 PROCEDURE — 1160F RVW MEDS BY RX/DR IN RCRD: CPT | Performed by: STUDENT IN AN ORGANIZED HEALTH CARE EDUCATION/TRAINING PROGRAM

## 2025-08-06 PROCEDURE — 85025 COMPLETE CBC W/AUTO DIFF WBC: CPT

## 2025-08-06 PROCEDURE — 84075 ASSAY ALKALINE PHOSPHATASE: CPT

## 2025-08-06 PROCEDURE — 3077F SYST BP >= 140 MM HG: CPT | Performed by: STUDENT IN AN ORGANIZED HEALTH CARE EDUCATION/TRAINING PROGRAM

## 2025-08-06 PROCEDURE — 86704 HEP B CORE ANTIBODY TOTAL: CPT

## 2025-08-06 PROCEDURE — 36415 COLL VENOUS BLD VENIPUNCTURE: CPT

## 2025-08-06 PROCEDURE — 83735 ASSAY OF MAGNESIUM: CPT

## 2025-08-06 PROCEDURE — 96374 THER/PROPH/DIAG INJ IV PUSH: CPT | Mod: INF

## 2025-08-06 PROCEDURE — G2211 COMPLEX E/M VISIT ADD ON: HCPCS | Performed by: STUDENT IN AN ORGANIZED HEALTH CARE EDUCATION/TRAINING PROGRAM

## 2025-08-06 PROCEDURE — 86706 HEP B SURFACE ANTIBODY: CPT

## 2025-08-06 PROCEDURE — 96415 CHEMO IV INFUSION ADDL HR: CPT

## 2025-08-06 PROCEDURE — 2500000004 HC RX 250 GENERAL PHARMACY W/ HCPCS (ALT 636 FOR OP/ED): Performed by: STUDENT IN AN ORGANIZED HEALTH CARE EDUCATION/TRAINING PROGRAM

## 2025-08-06 RX ORDER — EPINEPHRINE 0.3 MG/.3ML
0.3 INJECTION SUBCUTANEOUS EVERY 5 MIN PRN
Status: DISCONTINUED | OUTPATIENT
Start: 2025-08-06 | End: 2025-08-06 | Stop reason: HOSPADM

## 2025-08-06 RX ORDER — PROCHLORPERAZINE EDISYLATE 5 MG/ML
10 INJECTION INTRAMUSCULAR; INTRAVENOUS EVERY 6 HOURS PRN
Status: DISCONTINUED | OUTPATIENT
Start: 2025-08-06 | End: 2025-08-06 | Stop reason: HOSPADM

## 2025-08-06 RX ORDER — MAGNESIUM SULFATE HEPTAHYDRATE 40 MG/ML
2 INJECTION, SOLUTION INTRAVENOUS ONCE AS NEEDED
Status: DISCONTINUED | OUTPATIENT
Start: 2025-08-06 | End: 2025-08-06 | Stop reason: HOSPADM

## 2025-08-06 RX ORDER — PROCHLORPERAZINE MALEATE 10 MG
10 TABLET ORAL EVERY 6 HOURS PRN
Status: DISCONTINUED | OUTPATIENT
Start: 2025-08-06 | End: 2025-08-06 | Stop reason: HOSPADM

## 2025-08-06 RX ORDER — HYDROCORTISONE 1 %
CREAM (GRAM) TOPICAL
Qty: 453.6 G | Refills: 3 | Status: SHIPPED | OUTPATIENT
Start: 2025-08-06

## 2025-08-06 RX ORDER — FAMOTIDINE 10 MG/ML
20 INJECTION, SOLUTION INTRAVENOUS ONCE AS NEEDED
Status: DISCONTINUED | OUTPATIENT
Start: 2025-08-06 | End: 2025-08-06 | Stop reason: HOSPADM

## 2025-08-06 RX ORDER — ALBUTEROL SULFATE 0.83 MG/ML
3 SOLUTION RESPIRATORY (INHALATION) AS NEEDED
Status: DISCONTINUED | OUTPATIENT
Start: 2025-08-06 | End: 2025-08-06 | Stop reason: HOSPADM

## 2025-08-06 RX ORDER — DIPHENHYDRAMINE HYDROCHLORIDE 50 MG/ML
50 INJECTION, SOLUTION INTRAMUSCULAR; INTRAVENOUS AS NEEDED
Status: DISCONTINUED | OUTPATIENT
Start: 2025-08-06 | End: 2025-08-06 | Stop reason: HOSPADM

## 2025-08-06 RX ORDER — MAGNESIUM SULFATE HEPTAHYDRATE 40 MG/ML
4 INJECTION, SOLUTION INTRAVENOUS ONCE AS NEEDED
Status: DISCONTINUED | OUTPATIENT
Start: 2025-08-06 | End: 2025-08-06 | Stop reason: HOSPADM

## 2025-08-06 RX ORDER — PROCHLORPERAZINE MALEATE 10 MG
10 TABLET ORAL EVERY 6 HOURS PRN
Qty: 30 TABLET | Refills: 5 | Status: SHIPPED | OUTPATIENT
Start: 2025-08-06

## 2025-08-06 RX ORDER — CLINDAMYCIN PHOSPHATE 10 UG/ML
LOTION TOPICAL
Qty: 60 ML | Refills: 3 | Status: SHIPPED | OUTPATIENT
Start: 2025-08-06

## 2025-08-06 RX ORDER — DOXYCYCLINE 100 MG/1
100 CAPSULE ORAL 2 TIMES DAILY
Qty: 56 CAPSULE | Refills: 3 | Status: SHIPPED | OUTPATIENT
Start: 2025-08-06

## 2025-08-06 RX ADMIN — CETUXIMAB 800 MG: 2 SOLUTION INTRAVENOUS at 09:27

## 2025-08-06 RX ADMIN — DIPHENHYDRAMINE HYDROCHLORIDE 50 MG: 50 INJECTION INTRAMUSCULAR; INTRAVENOUS at 09:00

## 2025-08-06 ASSESSMENT — ENCOUNTER SYMPTOMS
LIGHT-HEADEDNESS: 0
ARTHRALGIAS: 0
FEVER: 0
NAUSEA: 0
ABDOMINAL PAIN: 0
HEADACHES: 0
ABDOMINAL PAIN: 0
SHORTNESS OF BREATH: 0
CONSTIPATION: 1
CONSTIPATION: 1
ARTHRALGIAS: 0
DIARRHEA: 0
CHILLS: 0
LEG SWELLING: 0
NAUSEA: 0
FEVER: 0
COUGH: 0
NUMBNESS: 0
HEADACHES: 0
LIGHT-HEADEDNESS: 0
VOMITING: 0
SORE THROAT: 0
TROUBLE SWALLOWING: 0
LEG SWELLING: 0
TROUBLE SWALLOWING: 0
SORE THROAT: 0
COUGH: 0
CHILLS: 0
DIARRHEA: 0
SHORTNESS OF BREATH: 0
VOMITING: 0
NUMBNESS: 0

## 2025-08-06 NOTE — PROGRESS NOTES
[facility] Infusion Nursing Note  08/06/25    Shiva Hardy is a 84 y.o. year old male patient presenting to outpatient infusion for C1D1 of the following regimen:  Pt seen by provider Shaun at [facility].  Treatment Plans       Name Type Plan Dates Plan Provider         Active    Cetuximab with Concurrent Radiation, Weekly Oncology Treatment 7/31/2025 - Present Babak Diaz MD                  Since the last visit, he reports doing well. Overall, he states that energy level is energy level is good. Appetite has been unchanged. He reports no complaints.       Line type: PIV  Line removed/maintained prior to discharge: PIV    Administrations This Visit       cetuximab (Erbitux)  mg       Admin Date  08/06/2025 Action  New Bag Dose  800 mg Rate  200 mL/hr Route  intravenous Documented By  Es Roberson RN              diphenhydrAMINE (BENADryl) 50 mg in sodium chloride 0.9% 50 mL IV       Admin Date  08/06/2025 Action  New Bag Dose  50 mg Rate  204 mL/hr Route  intravenous Documented By  Es Roberson RN                  Hypersensitivity reaction noted: No  Patient tolerated treatment well. Pt observed for one hour post infusion. VSS. AVS and schedule reviewed. Pt discharged home in stable condition with daughter.    Follow-up Plan: Pt aware of next appointment on 8/13    ES ROBERSON RN

## 2025-08-06 NOTE — PATIENT INSTRUCTIONS
Nicotine Transdermal Patch  Begin with 21 mg/day for 6 weeks, followed by 14 mg/day for 2 weeks; finish with 7 mg/day for 2 weeks.

## 2025-08-08 ENCOUNTER — TELEPHONE (OUTPATIENT)
Dept: UROLOGY | Facility: CLINIC | Age: 85
End: 2025-08-08
Payer: MEDICARE

## 2025-08-08 ENCOUNTER — APPOINTMENT (OUTPATIENT)
Dept: HEMATOLOGY/ONCOLOGY | Facility: HOSPITAL | Age: 85
End: 2025-08-08
Payer: MEDICARE

## 2025-08-08 NOTE — TELEPHONE ENCOUNTER
tamerak with pt spouse that stated will call back to schedule appointment for cysto with Dr. Faith or Jairo due to Dr. Kelsey leaving [facility]

## 2025-08-11 ENCOUNTER — HOSPITAL ENCOUNTER (OUTPATIENT)
Dept: RADIATION ONCOLOGY | Facility: CLINIC | Age: 85
Setting detail: RADIATION/ONCOLOGY SERIES
Discharge: HOME | End: 2025-08-11
Payer: MEDICARE

## 2025-08-11 DIAGNOSIS — Z51.0 ENCOUNTER FOR ANTINEOPLASTIC RADIATION THERAPY: ICD-10-CM

## 2025-08-11 DIAGNOSIS — C32.0 MALIGNANT NEOPLASM OF GLOTTIS (MULTI): ICD-10-CM

## 2025-08-11 LAB
RAD ONC MSQ ACTUAL FRACTIONS DELIVERED: 1
RAD ONC MSQ ACTUAL SESSION DELIVERED DOSE: 200 CGRAY
RAD ONC MSQ ACTUAL TOTAL DOSE: 200 CGRAY
RAD ONC MSQ ELAPSED DAYS: 0
RAD ONC MSQ LAST DATE: NORMAL
RAD ONC MSQ PRESCRIBED FRACTIONAL DOSE: 200 CGRAY
RAD ONC MSQ PRESCRIBED NUMBER OF FRACTIONS: 35
RAD ONC MSQ PRESCRIBED TECHNIQUE: NORMAL
RAD ONC MSQ PRESCRIBED TOTAL DOSE: 7000 CGRAY
RAD ONC MSQ PRESCRIPTION PATTERN COMMENT: NORMAL
RAD ONC MSQ START DATE: NORMAL
RAD ONC MSQ TREATMENT COURSE NUMBER: 1
RAD ONC MSQ TREATMENT SITE: NORMAL

## 2025-08-11 PROCEDURE — 77386 HC INTENSITY-MODULATED RADIATION THERAPY (IMRT), COMPLEX: CPT | Performed by: STUDENT IN AN ORGANIZED HEALTH CARE EDUCATION/TRAINING PROGRAM

## 2025-08-11 RX ORDER — HEPARIN SODIUM,PORCINE/PF 10 UNIT/ML
50 SYRINGE (ML) INTRAVENOUS AS NEEDED
Status: CANCELLED | OUTPATIENT
Start: 2025-08-11

## 2025-08-11 RX ORDER — HEPARIN 100 UNIT/ML
500 SYRINGE INTRAVENOUS AS NEEDED
Status: CANCELLED | OUTPATIENT
Start: 2025-08-11

## 2025-08-12 ENCOUNTER — LAB (OUTPATIENT)
Dept: LAB | Facility: CLINIC | Age: 85
End: 2025-08-12
Payer: MEDICARE

## 2025-08-12 ENCOUNTER — HOSPITAL ENCOUNTER (OUTPATIENT)
Dept: RADIATION ONCOLOGY | Facility: CLINIC | Age: 85
Setting detail: RADIATION/ONCOLOGY SERIES
Discharge: HOME | End: 2025-08-12
Payer: MEDICARE

## 2025-08-12 ENCOUNTER — TELEPHONE (OUTPATIENT)
Dept: HEMATOLOGY/ONCOLOGY | Facility: CLINIC | Age: 85
End: 2025-08-12

## 2025-08-12 DIAGNOSIS — C32.9 MALIGNANT NEOPLASM OF LARYNX: ICD-10-CM

## 2025-08-12 DIAGNOSIS — C32.0 MALIGNANT NEOPLASM OF GLOTTIS (MULTI): ICD-10-CM

## 2025-08-12 DIAGNOSIS — Z51.0 ENCOUNTER FOR ANTINEOPLASTIC RADIATION THERAPY: ICD-10-CM

## 2025-08-12 LAB
ALBUMIN SERPL BCP-MCNC: 4.3 G/DL (ref 3.4–5)
ALP SERPL-CCNC: 55 U/L (ref 33–136)
ALT SERPL W P-5'-P-CCNC: 32 U/L (ref 10–52)
ANION GAP SERPL CALC-SCNC: 11 MMOL/L (ref 10–20)
AST SERPL W P-5'-P-CCNC: 27 U/L (ref 9–39)
BILIRUB SERPL-MCNC: 0.7 MG/DL (ref 0–1.2)
BUN SERPL-MCNC: 20 MG/DL (ref 6–23)
CALCIUM SERPL-MCNC: 9.7 MG/DL (ref 8.6–10.3)
CHLORIDE SERPL-SCNC: 103 MMOL/L (ref 98–107)
CO2 SERPL-SCNC: 26 MMOL/L (ref 21–32)
CREAT SERPL-MCNC: 0.72 MG/DL (ref 0.5–1.3)
EGFRCR SERPLBLD CKD-EPI 2021: 89 ML/MIN/1.73M*2
GLUCOSE SERPL-MCNC: 111 MG/DL (ref 74–99)
MAGNESIUM SERPL-MCNC: 1.9 MG/DL (ref 1.6–2.4)
POTASSIUM SERPL-SCNC: 4.4 MMOL/L (ref 3.5–5.3)
PROT SERPL-MCNC: 6.8 G/DL (ref 6.4–8.2)
RAD ONC MSQ ACTUAL FRACTIONS DELIVERED: 2
RAD ONC MSQ ACTUAL SESSION DELIVERED DOSE: 200 CGRAY
RAD ONC MSQ ACTUAL TOTAL DOSE: 400 CGRAY
RAD ONC MSQ ELAPSED DAYS: 1
RAD ONC MSQ LAST DATE: NORMAL
RAD ONC MSQ PRESCRIBED FRACTIONAL DOSE: 200 CGRAY
RAD ONC MSQ PRESCRIBED NUMBER OF FRACTIONS: 35
RAD ONC MSQ PRESCRIBED TECHNIQUE: NORMAL
RAD ONC MSQ PRESCRIBED TOTAL DOSE: 7000 CGRAY
RAD ONC MSQ PRESCRIPTION PATTERN COMMENT: NORMAL
RAD ONC MSQ START DATE: NORMAL
RAD ONC MSQ TREATMENT COURSE NUMBER: 1
RAD ONC MSQ TREATMENT SITE: NORMAL
SODIUM SERPL-SCNC: 136 MMOL/L (ref 136–145)

## 2025-08-12 PROCEDURE — 84075 ASSAY ALKALINE PHOSPHATASE: CPT

## 2025-08-12 PROCEDURE — 36415 COLL VENOUS BLD VENIPUNCTURE: CPT

## 2025-08-12 PROCEDURE — 77336 RADIATION PHYSICS CONSULT: CPT | Performed by: RADIOLOGY

## 2025-08-12 PROCEDURE — 83735 ASSAY OF MAGNESIUM: CPT

## 2025-08-12 PROCEDURE — 77386 HC INTENSITY-MODULATED RADIATION THERAPY (IMRT), COMPLEX: CPT | Performed by: RADIOLOGY

## 2025-08-13 ENCOUNTER — HOSPITAL ENCOUNTER (OUTPATIENT)
Dept: RADIATION ONCOLOGY | Facility: HOSPITAL | Age: 85
Setting detail: RADIATION/ONCOLOGY SERIES
Discharge: HOME | End: 2025-08-13
Payer: MEDICARE

## 2025-08-13 ENCOUNTER — NUTRITION (OUTPATIENT)
Dept: HEMATOLOGY/ONCOLOGY | Facility: HOSPITAL | Age: 85
End: 2025-08-13

## 2025-08-13 ENCOUNTER — INFUSION (OUTPATIENT)
Dept: HEMATOLOGY/ONCOLOGY | Facility: HOSPITAL | Age: 85
End: 2025-08-13
Payer: MEDICARE

## 2025-08-13 ENCOUNTER — OFFICE VISIT (OUTPATIENT)
Dept: HEMATOLOGY/ONCOLOGY | Facility: HOSPITAL | Age: 85
End: 2025-08-13
Payer: MEDICARE

## 2025-08-13 VITALS
RESPIRATION RATE: 18 BRPM | WEIGHT: 172.51 LBS | OXYGEN SATURATION: 95 % | TEMPERATURE: 96.6 F | HEART RATE: 73 BPM | BODY MASS INDEX: 25.58 KG/M2 | SYSTOLIC BLOOD PRESSURE: 117 MMHG | DIASTOLIC BLOOD PRESSURE: 77 MMHG

## 2025-08-13 VITALS
DIASTOLIC BLOOD PRESSURE: 63 MMHG | SYSTOLIC BLOOD PRESSURE: 107 MMHG | OXYGEN SATURATION: 98 % | HEART RATE: 67 BPM | RESPIRATION RATE: 16 BRPM | TEMPERATURE: 96.8 F

## 2025-08-13 DIAGNOSIS — Z51.12 ENCOUNTER FOR ANTINEOPLASTIC IMMUNOTHERAPY: ICD-10-CM

## 2025-08-13 DIAGNOSIS — C32.9 MALIGNANT NEOPLASM OF LARYNX: ICD-10-CM

## 2025-08-13 DIAGNOSIS — C32.9 MALIGNANT NEOPLASM OF LARYNX: Primary | ICD-10-CM

## 2025-08-13 DIAGNOSIS — C32.0 MALIGNANT NEOPLASM OF GLOTTIS (MULTI): ICD-10-CM

## 2025-08-13 DIAGNOSIS — K12.33 MUCOSITIS DUE TO RADIATION THERAPY: ICD-10-CM

## 2025-08-13 DIAGNOSIS — R49.0 HOARSENESS: ICD-10-CM

## 2025-08-13 DIAGNOSIS — M25.562 LEFT KNEE PAIN, UNSPECIFIED CHRONICITY: ICD-10-CM

## 2025-08-13 DIAGNOSIS — Z51.0 ENCOUNTER FOR ANTINEOPLASTIC RADIATION THERAPY: ICD-10-CM

## 2025-08-13 DIAGNOSIS — R68.84 JAW PAIN: ICD-10-CM

## 2025-08-13 LAB
RAD ONC MSQ ACTUAL FRACTIONS DELIVERED: 3
RAD ONC MSQ ACTUAL SESSION DELIVERED DOSE: 200 CGRAY
RAD ONC MSQ ACTUAL TOTAL DOSE: 600 CGRAY
RAD ONC MSQ ELAPSED DAYS: 2
RAD ONC MSQ LAST DATE: NORMAL
RAD ONC MSQ PRESCRIBED FRACTIONAL DOSE: 200 CGRAY
RAD ONC MSQ PRESCRIBED NUMBER OF FRACTIONS: 35
RAD ONC MSQ PRESCRIBED TECHNIQUE: NORMAL
RAD ONC MSQ PRESCRIBED TOTAL DOSE: 7000 CGRAY
RAD ONC MSQ PRESCRIPTION PATTERN COMMENT: NORMAL
RAD ONC MSQ START DATE: NORMAL
RAD ONC MSQ TREATMENT COURSE NUMBER: 1
RAD ONC MSQ TREATMENT SITE: NORMAL

## 2025-08-13 PROCEDURE — 1159F MED LIST DOCD IN RCRD: CPT | Performed by: STUDENT IN AN ORGANIZED HEALTH CARE EDUCATION/TRAINING PROGRAM

## 2025-08-13 PROCEDURE — 99215 OFFICE O/P EST HI 40 MIN: CPT | Performed by: STUDENT IN AN ORGANIZED HEALTH CARE EDUCATION/TRAINING PROGRAM

## 2025-08-13 PROCEDURE — 96413 CHEMO IV INFUSION 1 HR: CPT

## 2025-08-13 PROCEDURE — 1160F RVW MEDS BY RX/DR IN RCRD: CPT | Performed by: STUDENT IN AN ORGANIZED HEALTH CARE EDUCATION/TRAINING PROGRAM

## 2025-08-13 PROCEDURE — 96375 TX/PRO/DX INJ NEW DRUG ADDON: CPT | Mod: INF

## 2025-08-13 PROCEDURE — 2500000004 HC RX 250 GENERAL PHARMACY W/ HCPCS (ALT 636 FOR OP/ED): Performed by: STUDENT IN AN ORGANIZED HEALTH CARE EDUCATION/TRAINING PROGRAM

## 2025-08-13 PROCEDURE — G2211 COMPLEX E/M VISIT ADD ON: HCPCS | Performed by: STUDENT IN AN ORGANIZED HEALTH CARE EDUCATION/TRAINING PROGRAM

## 2025-08-13 PROCEDURE — 77386 HC INTENSITY-MODULATED RADIATION THERAPY (IMRT), COMPLEX: CPT | Performed by: RADIOLOGY

## 2025-08-13 RX ORDER — MAGNESIUM SULFATE HEPTAHYDRATE 40 MG/ML
2 INJECTION, SOLUTION INTRAVENOUS ONCE AS NEEDED
Status: DISCONTINUED | OUTPATIENT
Start: 2025-08-13 | End: 2025-08-13 | Stop reason: HOSPADM

## 2025-08-13 RX ORDER — FAMOTIDINE 10 MG/ML
20 INJECTION, SOLUTION INTRAVENOUS ONCE AS NEEDED
Status: DISCONTINUED | OUTPATIENT
Start: 2025-08-13 | End: 2025-08-13 | Stop reason: HOSPADM

## 2025-08-13 RX ORDER — MAGNESIUM SULFATE HEPTAHYDRATE 40 MG/ML
4 INJECTION, SOLUTION INTRAVENOUS ONCE AS NEEDED
Status: DISCONTINUED | OUTPATIENT
Start: 2025-08-13 | End: 2025-08-13 | Stop reason: HOSPADM

## 2025-08-13 RX ORDER — ALBUTEROL SULFATE 0.83 MG/ML
3 SOLUTION RESPIRATORY (INHALATION) AS NEEDED
Status: DISCONTINUED | OUTPATIENT
Start: 2025-08-13 | End: 2025-08-13 | Stop reason: HOSPADM

## 2025-08-13 RX ORDER — PROCHLORPERAZINE MALEATE 10 MG
10 TABLET ORAL EVERY 6 HOURS PRN
Status: DISCONTINUED | OUTPATIENT
Start: 2025-08-13 | End: 2025-08-13 | Stop reason: HOSPADM

## 2025-08-13 RX ORDER — PROCHLORPERAZINE EDISYLATE 5 MG/ML
10 INJECTION INTRAMUSCULAR; INTRAVENOUS EVERY 6 HOURS PRN
Status: DISCONTINUED | OUTPATIENT
Start: 2025-08-13 | End: 2025-08-13 | Stop reason: HOSPADM

## 2025-08-13 RX ORDER — HEPARIN SODIUM,PORCINE/PF 10 UNIT/ML
50 SYRINGE (ML) INTRAVENOUS AS NEEDED
OUTPATIENT
Start: 2025-08-13

## 2025-08-13 RX ORDER — DIPHENHYDRAMINE HYDROCHLORIDE 50 MG/ML
50 INJECTION, SOLUTION INTRAMUSCULAR; INTRAVENOUS AS NEEDED
Status: DISCONTINUED | OUTPATIENT
Start: 2025-08-13 | End: 2025-08-13 | Stop reason: HOSPADM

## 2025-08-13 RX ORDER — LIDOCAINE HYDROCHLORIDE 20 MG/ML
1.25 SOLUTION OROPHARYNGEAL EVERY 8 HOURS PRN
Qty: 100 ML | Refills: 0 | Status: SHIPPED | OUTPATIENT
Start: 2025-08-13 | End: 2025-08-23

## 2025-08-13 RX ORDER — HEPARIN 100 UNIT/ML
500 SYRINGE INTRAVENOUS AS NEEDED
OUTPATIENT
Start: 2025-08-13

## 2025-08-13 RX ORDER — EPINEPHRINE 0.3 MG/.3ML
0.3 INJECTION SUBCUTANEOUS EVERY 5 MIN PRN
Status: DISCONTINUED | OUTPATIENT
Start: 2025-08-13 | End: 2025-08-13 | Stop reason: HOSPADM

## 2025-08-13 RX ORDER — TRAMADOL HYDROCHLORIDE 50 MG/1
50 TABLET, FILM COATED ORAL ONCE AS NEEDED
OUTPATIENT
Start: 2025-08-13

## 2025-08-13 RX ADMIN — CETUXIMAB 500 MG: 2 SOLUTION INTRAVENOUS at 15:40

## 2025-08-13 RX ADMIN — DIPHENHYDRAMINE HYDROCHLORIDE 50 MG: 50 INJECTION, SOLUTION INTRAMUSCULAR; INTRAVENOUS at 15:10

## 2025-08-13 ASSESSMENT — PAIN SCALES - GENERAL: PAINLEVEL_OUTOF10: 0-NO PAIN

## 2025-08-14 ENCOUNTER — HOSPITAL ENCOUNTER (OUTPATIENT)
Dept: RADIATION ONCOLOGY | Facility: CLINIC | Age: 85
Setting detail: RADIATION/ONCOLOGY SERIES
Discharge: HOME | End: 2025-08-14
Payer: MEDICARE

## 2025-08-14 VITALS — BODY MASS INDEX: 25.55 KG/M2 | HEIGHT: 69 IN | WEIGHT: 172.51 LBS

## 2025-08-14 DIAGNOSIS — C32.0 MALIGNANT NEOPLASM OF GLOTTIS (MULTI): ICD-10-CM

## 2025-08-14 DIAGNOSIS — Z51.0 ENCOUNTER FOR ANTINEOPLASTIC RADIATION THERAPY: ICD-10-CM

## 2025-08-14 LAB
RAD ONC MSQ ACTUAL FRACTIONS DELIVERED: 4
RAD ONC MSQ ACTUAL SESSION DELIVERED DOSE: 200 CGRAY
RAD ONC MSQ ACTUAL TOTAL DOSE: 800 CGRAY
RAD ONC MSQ ELAPSED DAYS: 3
RAD ONC MSQ LAST DATE: NORMAL
RAD ONC MSQ PRESCRIBED FRACTIONAL DOSE: 200 CGRAY
RAD ONC MSQ PRESCRIBED NUMBER OF FRACTIONS: 35
RAD ONC MSQ PRESCRIBED TECHNIQUE: NORMAL
RAD ONC MSQ PRESCRIBED TOTAL DOSE: 7000 CGRAY
RAD ONC MSQ PRESCRIPTION PATTERN COMMENT: NORMAL
RAD ONC MSQ START DATE: NORMAL
RAD ONC MSQ TREATMENT COURSE NUMBER: 1
RAD ONC MSQ TREATMENT SITE: NORMAL

## 2025-08-14 PROCEDURE — 77386 HC INTENSITY-MODULATED RADIATION THERAPY (IMRT), COMPLEX: CPT | Performed by: RADIOLOGY

## 2025-08-15 ENCOUNTER — INFUSION (OUTPATIENT)
Dept: HEMATOLOGY/ONCOLOGY | Facility: CLINIC | Age: 85
End: 2025-08-15
Payer: MEDICARE

## 2025-08-15 ENCOUNTER — APPOINTMENT (OUTPATIENT)
Dept: HEMATOLOGY/ONCOLOGY | Facility: CLINIC | Age: 85
End: 2025-08-15
Payer: MEDICARE

## 2025-08-15 ENCOUNTER — HOSPITAL ENCOUNTER (OUTPATIENT)
Dept: RADIATION ONCOLOGY | Facility: CLINIC | Age: 85
Setting detail: RADIATION/ONCOLOGY SERIES
Discharge: HOME | End: 2025-08-15
Payer: MEDICARE

## 2025-08-15 VITALS
RESPIRATION RATE: 18 BRPM | SYSTOLIC BLOOD PRESSURE: 151 MMHG | HEART RATE: 80 BPM | OXYGEN SATURATION: 94 % | TEMPERATURE: 97 F | BODY MASS INDEX: 25.3 KG/M2 | WEIGHT: 170.64 LBS | DIASTOLIC BLOOD PRESSURE: 78 MMHG

## 2025-08-15 DIAGNOSIS — C32.9 MALIGNANT NEOPLASM OF LARYNX: ICD-10-CM

## 2025-08-15 DIAGNOSIS — Z51.0 ENCOUNTER FOR ANTINEOPLASTIC RADIATION THERAPY: ICD-10-CM

## 2025-08-15 DIAGNOSIS — C32.0 MALIGNANT NEOPLASM OF GLOTTIS (MULTI): ICD-10-CM

## 2025-08-15 LAB
RAD ONC MSQ ACTUAL FRACTIONS DELIVERED: 5
RAD ONC MSQ ACTUAL SESSION DELIVERED DOSE: 200 CGRAY
RAD ONC MSQ ACTUAL TOTAL DOSE: 1000 CGRAY
RAD ONC MSQ ELAPSED DAYS: 4
RAD ONC MSQ LAST DATE: NORMAL
RAD ONC MSQ PRESCRIBED FRACTIONAL DOSE: 200 CGRAY
RAD ONC MSQ PRESCRIBED NUMBER OF FRACTIONS: 35
RAD ONC MSQ PRESCRIBED TECHNIQUE: NORMAL
RAD ONC MSQ PRESCRIBED TOTAL DOSE: 7000 CGRAY
RAD ONC MSQ PRESCRIPTION PATTERN COMMENT: NORMAL
RAD ONC MSQ START DATE: NORMAL
RAD ONC MSQ TREATMENT COURSE NUMBER: 1
RAD ONC MSQ TREATMENT SITE: NORMAL

## 2025-08-15 PROCEDURE — 77386 HC INTENSITY-MODULATED RADIATION THERAPY (IMRT), COMPLEX: CPT | Performed by: RADIOLOGY

## 2025-08-15 PROCEDURE — 2500000004 HC RX 250 GENERAL PHARMACY W/ HCPCS (ALT 636 FOR OP/ED): Performed by: STUDENT IN AN ORGANIZED HEALTH CARE EDUCATION/TRAINING PROGRAM

## 2025-08-15 PROCEDURE — 96360 HYDRATION IV INFUSION INIT: CPT | Mod: INF

## 2025-08-15 RX ADMIN — SODIUM CHLORIDE 1000 ML: 0.9 INJECTION, SOLUTION INTRAVENOUS at 11:27

## 2025-08-15 ASSESSMENT — PAIN SCALES - GENERAL: PAINLEVEL_OUTOF10: 0-NO PAIN

## 2025-08-18 ENCOUNTER — HOSPITAL ENCOUNTER (OUTPATIENT)
Dept: RADIATION ONCOLOGY | Facility: CLINIC | Age: 85
Setting detail: RADIATION/ONCOLOGY SERIES
Discharge: HOME | End: 2025-08-18
Payer: MEDICARE

## 2025-08-18 DIAGNOSIS — Z51.0 ENCOUNTER FOR ANTINEOPLASTIC RADIATION THERAPY: ICD-10-CM

## 2025-08-18 DIAGNOSIS — C32.0 MALIGNANT NEOPLASM OF GLOTTIS (MULTI): ICD-10-CM

## 2025-08-18 LAB
RAD ONC MSQ ACTUAL FRACTIONS DELIVERED: 6
RAD ONC MSQ ACTUAL SESSION DELIVERED DOSE: 200 CGRAY
RAD ONC MSQ ACTUAL TOTAL DOSE: 1200 CGRAY
RAD ONC MSQ ELAPSED DAYS: 7
RAD ONC MSQ LAST DATE: NORMAL
RAD ONC MSQ PRESCRIBED FRACTIONAL DOSE: 200 CGRAY
RAD ONC MSQ PRESCRIBED NUMBER OF FRACTIONS: 35
RAD ONC MSQ PRESCRIBED TECHNIQUE: NORMAL
RAD ONC MSQ PRESCRIBED TOTAL DOSE: 7000 CGRAY
RAD ONC MSQ PRESCRIPTION PATTERN COMMENT: NORMAL
RAD ONC MSQ START DATE: NORMAL
RAD ONC MSQ TREATMENT COURSE NUMBER: 1
RAD ONC MSQ TREATMENT SITE: NORMAL

## 2025-08-18 PROCEDURE — 77386 HC INTENSITY-MODULATED RADIATION THERAPY (IMRT), COMPLEX: CPT | Performed by: RADIOLOGY

## 2025-08-18 ASSESSMENT — ENCOUNTER SYMPTOMS
HEADACHES: 0
SORE THROAT: 0
VOMITING: 0
LEG SWELLING: 0
SHORTNESS OF BREATH: 0
COUGH: 0
DIARRHEA: 0
ARTHRALGIAS: 0
CHILLS: 0
NAUSEA: 0
FEVER: 0
ABDOMINAL PAIN: 0
LIGHT-HEADEDNESS: 0
TROUBLE SWALLOWING: 0
CONSTIPATION: 1
NUMBNESS: 0

## 2025-08-19 ENCOUNTER — APPOINTMENT (OUTPATIENT)
Dept: PAIN MEDICINE | Facility: HOSPITAL | Age: 85
End: 2025-08-19
Payer: MEDICARE

## 2025-08-19 ENCOUNTER — HOSPITAL ENCOUNTER (OUTPATIENT)
Dept: RADIATION ONCOLOGY | Facility: CLINIC | Age: 85
Setting detail: RADIATION/ONCOLOGY SERIES
Discharge: HOME | End: 2025-08-19
Payer: MEDICARE

## 2025-08-19 ENCOUNTER — TELEMEDICINE (OUTPATIENT)
Dept: HEMATOLOGY/ONCOLOGY | Facility: HOSPITAL | Age: 85
End: 2025-08-19
Payer: MEDICARE

## 2025-08-19 DIAGNOSIS — R49.0 HOARSENESS: ICD-10-CM

## 2025-08-19 DIAGNOSIS — R68.84 JAW PAIN: ICD-10-CM

## 2025-08-19 DIAGNOSIS — M25.562 LEFT KNEE PAIN, UNSPECIFIED CHRONICITY: ICD-10-CM

## 2025-08-19 DIAGNOSIS — Z51.12 ENCOUNTER FOR ANTINEOPLASTIC IMMUNOTHERAPY: ICD-10-CM

## 2025-08-19 DIAGNOSIS — Z51.0 ENCOUNTER FOR ANTINEOPLASTIC RADIATION THERAPY: ICD-10-CM

## 2025-08-19 DIAGNOSIS — K12.33 MUCOSITIS DUE TO RADIATION THERAPY: ICD-10-CM

## 2025-08-19 DIAGNOSIS — C32.0 MALIGNANT NEOPLASM OF GLOTTIS (MULTI): ICD-10-CM

## 2025-08-19 DIAGNOSIS — C32.9 MALIGNANT NEOPLASM OF LARYNX: Primary | ICD-10-CM

## 2025-08-19 DIAGNOSIS — R19.7 DIARRHEA, UNSPECIFIED TYPE: ICD-10-CM

## 2025-08-19 LAB
RAD ONC MSQ ACTUAL FRACTIONS DELIVERED: 7
RAD ONC MSQ ACTUAL SESSION DELIVERED DOSE: 200 CGRAY
RAD ONC MSQ ACTUAL TOTAL DOSE: 1400 CGRAY
RAD ONC MSQ ELAPSED DAYS: 8
RAD ONC MSQ LAST DATE: NORMAL
RAD ONC MSQ PRESCRIBED FRACTIONAL DOSE: 200 CGRAY
RAD ONC MSQ PRESCRIBED NUMBER OF FRACTIONS: 35
RAD ONC MSQ PRESCRIBED TECHNIQUE: NORMAL
RAD ONC MSQ PRESCRIBED TOTAL DOSE: 7000 CGRAY
RAD ONC MSQ PRESCRIPTION PATTERN COMMENT: NORMAL
RAD ONC MSQ START DATE: NORMAL
RAD ONC MSQ TREATMENT COURSE NUMBER: 1
RAD ONC MSQ TREATMENT SITE: NORMAL

## 2025-08-19 PROCEDURE — 1160F RVW MEDS BY RX/DR IN RCRD: CPT | Performed by: STUDENT IN AN ORGANIZED HEALTH CARE EDUCATION/TRAINING PROGRAM

## 2025-08-19 PROCEDURE — 99214 OFFICE O/P EST MOD 30 MIN: CPT | Performed by: STUDENT IN AN ORGANIZED HEALTH CARE EDUCATION/TRAINING PROGRAM

## 2025-08-19 PROCEDURE — G2211 COMPLEX E/M VISIT ADD ON: HCPCS | Performed by: STUDENT IN AN ORGANIZED HEALTH CARE EDUCATION/TRAINING PROGRAM

## 2025-08-19 PROCEDURE — 77336 RADIATION PHYSICS CONSULT: CPT | Performed by: RADIOLOGY

## 2025-08-19 PROCEDURE — 1159F MED LIST DOCD IN RCRD: CPT | Performed by: STUDENT IN AN ORGANIZED HEALTH CARE EDUCATION/TRAINING PROGRAM

## 2025-08-19 PROCEDURE — 77386 HC INTENSITY-MODULATED RADIATION THERAPY (IMRT), COMPLEX: CPT | Performed by: RADIOLOGY

## 2025-08-20 ENCOUNTER — INFUSION (OUTPATIENT)
Dept: HEMATOLOGY/ONCOLOGY | Facility: HOSPITAL | Age: 85
End: 2025-08-20
Payer: MEDICARE

## 2025-08-20 ENCOUNTER — HOSPITAL ENCOUNTER (OUTPATIENT)
Dept: RADIATION ONCOLOGY | Facility: HOSPITAL | Age: 85
Setting detail: RADIATION/ONCOLOGY SERIES
Discharge: HOME | End: 2025-08-20
Payer: MEDICARE

## 2025-08-20 ENCOUNTER — LAB (OUTPATIENT)
Dept: LAB | Facility: HOSPITAL | Age: 85
End: 2025-08-20
Payer: MEDICARE

## 2025-08-20 ENCOUNTER — SOCIAL WORK (OUTPATIENT)
Dept: CASE MANAGEMENT | Facility: HOSPITAL | Age: 85
End: 2025-08-20
Payer: MEDICARE

## 2025-08-20 VITALS
HEART RATE: 67 BPM | BODY MASS INDEX: 25.52 KG/M2 | OXYGEN SATURATION: 94 % | DIASTOLIC BLOOD PRESSURE: 79 MMHG | RESPIRATION RATE: 18 BRPM | WEIGHT: 172.07 LBS | TEMPERATURE: 96.8 F | SYSTOLIC BLOOD PRESSURE: 129 MMHG

## 2025-08-20 DIAGNOSIS — C32.9 MALIGNANT NEOPLASM OF LARYNX: ICD-10-CM

## 2025-08-20 DIAGNOSIS — C32.0 MALIGNANT NEOPLASM OF GLOTTIS (MULTI): ICD-10-CM

## 2025-08-20 DIAGNOSIS — Z85.46 HISTORY OF PROSTATE CANCER: ICD-10-CM

## 2025-08-20 DIAGNOSIS — Z51.0 ENCOUNTER FOR ANTINEOPLASTIC RADIATION THERAPY: ICD-10-CM

## 2025-08-20 DIAGNOSIS — C32.9 MALIGNANT NEOPLASM OF LARYNX: Primary | ICD-10-CM

## 2025-08-20 LAB
ALBUMIN SERPL BCP-MCNC: 4.3 G/DL (ref 3.4–5)
ALP SERPL-CCNC: 48 U/L (ref 33–136)
ALT SERPL W P-5'-P-CCNC: 21 U/L (ref 10–52)
ANION GAP SERPL CALC-SCNC: 10 MMOL/L (ref 10–20)
AST SERPL W P-5'-P-CCNC: 21 U/L (ref 9–39)
BILIRUB SERPL-MCNC: 0.7 MG/DL (ref 0–1.2)
BUN SERPL-MCNC: 21 MG/DL (ref 6–23)
CALCIUM SERPL-MCNC: 9.8 MG/DL (ref 8.6–10.3)
CHLORIDE SERPL-SCNC: 104 MMOL/L (ref 98–107)
CO2 SERPL-SCNC: 27 MMOL/L (ref 21–32)
CREAT SERPL-MCNC: 0.76 MG/DL (ref 0.5–1.3)
EGFRCR SERPLBLD CKD-EPI 2021: 87 ML/MIN/1.73M*2
GLUCOSE SERPL-MCNC: 111 MG/DL (ref 74–99)
MAGNESIUM SERPL-MCNC: 1.98 MG/DL (ref 1.6–2.4)
POTASSIUM SERPL-SCNC: 4.1 MMOL/L (ref 3.5–5.3)
PROT SERPL-MCNC: 6.9 G/DL (ref 6.4–8.2)
RAD ONC MSQ ACTUAL FRACTIONS DELIVERED: 8
RAD ONC MSQ ACTUAL SESSION DELIVERED DOSE: 200 CGRAY
RAD ONC MSQ ACTUAL TOTAL DOSE: 1600 CGRAY
RAD ONC MSQ ELAPSED DAYS: 9
RAD ONC MSQ LAST DATE: NORMAL
RAD ONC MSQ PRESCRIBED FRACTIONAL DOSE: 200 CGRAY
RAD ONC MSQ PRESCRIBED NUMBER OF FRACTIONS: 35
RAD ONC MSQ PRESCRIBED TECHNIQUE: NORMAL
RAD ONC MSQ PRESCRIBED TOTAL DOSE: 7000 CGRAY
RAD ONC MSQ PRESCRIPTION PATTERN COMMENT: NORMAL
RAD ONC MSQ START DATE: NORMAL
RAD ONC MSQ TREATMENT COURSE NUMBER: 1
RAD ONC MSQ TREATMENT SITE: NORMAL
SODIUM SERPL-SCNC: 137 MMOL/L (ref 136–145)

## 2025-08-20 PROCEDURE — 96413 CHEMO IV INFUSION 1 HR: CPT

## 2025-08-20 PROCEDURE — 84075 ASSAY ALKALINE PHOSPHATASE: CPT

## 2025-08-20 PROCEDURE — 77386 HC INTENSITY-MODULATED RADIATION THERAPY (IMRT), COMPLEX: CPT | Performed by: RADIOLOGY

## 2025-08-20 PROCEDURE — 83735 ASSAY OF MAGNESIUM: CPT

## 2025-08-20 PROCEDURE — 36415 COLL VENOUS BLD VENIPUNCTURE: CPT

## 2025-08-20 PROCEDURE — 2500000004 HC RX 250 GENERAL PHARMACY W/ HCPCS (ALT 636 FOR OP/ED): Mod: JZ,TB | Performed by: STUDENT IN AN ORGANIZED HEALTH CARE EDUCATION/TRAINING PROGRAM

## 2025-08-20 PROCEDURE — 96375 TX/PRO/DX INJ NEW DRUG ADDON: CPT | Mod: INF

## 2025-08-20 RX ORDER — PROCHLORPERAZINE EDISYLATE 5 MG/ML
10 INJECTION INTRAMUSCULAR; INTRAVENOUS EVERY 6 HOURS PRN
Status: DISCONTINUED | OUTPATIENT
Start: 2025-08-20 | End: 2025-08-20 | Stop reason: HOSPADM

## 2025-08-20 RX ORDER — HEPARIN SODIUM,PORCINE/PF 10 UNIT/ML
50 SYRINGE (ML) INTRAVENOUS AS NEEDED
Status: CANCELLED | OUTPATIENT
Start: 2025-08-20

## 2025-08-20 RX ORDER — DIPHENHYDRAMINE HYDROCHLORIDE 50 MG/ML
50 INJECTION, SOLUTION INTRAMUSCULAR; INTRAVENOUS AS NEEDED
Status: DISCONTINUED | OUTPATIENT
Start: 2025-08-20 | End: 2025-08-20 | Stop reason: HOSPADM

## 2025-08-20 RX ORDER — PROCHLORPERAZINE MALEATE 10 MG
10 TABLET ORAL EVERY 6 HOURS PRN
Status: DISCONTINUED | OUTPATIENT
Start: 2025-08-20 | End: 2025-08-20 | Stop reason: HOSPADM

## 2025-08-20 RX ORDER — FAMOTIDINE 10 MG/ML
20 INJECTION, SOLUTION INTRAVENOUS ONCE AS NEEDED
Status: DISCONTINUED | OUTPATIENT
Start: 2025-08-20 | End: 2025-08-20 | Stop reason: HOSPADM

## 2025-08-20 RX ORDER — OXYCODONE HYDROCHLORIDE 5 MG/1
5 TABLET ORAL EVERY 4 HOURS PRN
Qty: 42 TABLET | Refills: 0 | Status: SHIPPED | OUTPATIENT
Start: 2025-08-20 | End: 2025-08-27

## 2025-08-20 RX ORDER — ALBUTEROL SULFATE 0.83 MG/ML
3 SOLUTION RESPIRATORY (INHALATION) AS NEEDED
Status: DISCONTINUED | OUTPATIENT
Start: 2025-08-20 | End: 2025-08-20 | Stop reason: HOSPADM

## 2025-08-20 RX ORDER — EPINEPHRINE 0.3 MG/.3ML
0.3 INJECTION SUBCUTANEOUS EVERY 5 MIN PRN
Status: DISCONTINUED | OUTPATIENT
Start: 2025-08-20 | End: 2025-08-20 | Stop reason: HOSPADM

## 2025-08-20 RX ORDER — NALOXONE HYDROCHLORIDE 4 MG/.1ML
4 SPRAY NASAL AS NEEDED
Qty: 2 EACH | Refills: 11 | Status: SHIPPED | OUTPATIENT
Start: 2025-08-20

## 2025-08-20 RX ORDER — HEPARIN 100 UNIT/ML
500 SYRINGE INTRAVENOUS AS NEEDED
Status: CANCELLED | OUTPATIENT
Start: 2025-08-20

## 2025-08-20 RX ADMIN — DIPHENHYDRAMINE HYDROCHLORIDE 50 MG: 50 INJECTION, SOLUTION INTRAMUSCULAR; INTRAVENOUS at 15:29

## 2025-08-20 RX ADMIN — CETUXIMAB 500 MG: 2 SOLUTION INTRAVENOUS at 16:00

## 2025-08-21 ENCOUNTER — HOSPITAL ENCOUNTER (OUTPATIENT)
Dept: RADIATION ONCOLOGY | Facility: CLINIC | Age: 85
Setting detail: RADIATION/ONCOLOGY SERIES
Discharge: HOME | End: 2025-08-21
Payer: MEDICARE

## 2025-08-21 DIAGNOSIS — C32.0 MALIGNANT NEOPLASM OF GLOTTIS (MULTI): ICD-10-CM

## 2025-08-21 DIAGNOSIS — Z51.0 ENCOUNTER FOR ANTINEOPLASTIC RADIATION THERAPY: ICD-10-CM

## 2025-08-21 LAB
HOLD SPECIMEN: NORMAL
RAD ONC MSQ ACTUAL FRACTIONS DELIVERED: 9
RAD ONC MSQ ACTUAL SESSION DELIVERED DOSE: 200 CGRAY
RAD ONC MSQ ACTUAL TOTAL DOSE: 1800 CGRAY
RAD ONC MSQ ELAPSED DAYS: 10
RAD ONC MSQ LAST DATE: NORMAL
RAD ONC MSQ PRESCRIBED FRACTIONAL DOSE: 200 CGRAY
RAD ONC MSQ PRESCRIBED NUMBER OF FRACTIONS: 35
RAD ONC MSQ PRESCRIBED TECHNIQUE: NORMAL
RAD ONC MSQ PRESCRIBED TOTAL DOSE: 7000 CGRAY
RAD ONC MSQ PRESCRIPTION PATTERN COMMENT: NORMAL
RAD ONC MSQ START DATE: NORMAL
RAD ONC MSQ TREATMENT COURSE NUMBER: 1
RAD ONC MSQ TREATMENT SITE: NORMAL

## 2025-08-21 PROCEDURE — 77386 HC INTENSITY-MODULATED RADIATION THERAPY (IMRT), COMPLEX: CPT | Performed by: RADIOLOGY

## 2025-08-22 ENCOUNTER — HOSPITAL ENCOUNTER (OUTPATIENT)
Dept: RADIATION ONCOLOGY | Facility: CLINIC | Age: 85
Setting detail: RADIATION/ONCOLOGY SERIES
Discharge: HOME | End: 2025-08-22
Payer: MEDICARE

## 2025-08-22 ENCOUNTER — APPOINTMENT (OUTPATIENT)
Dept: HEMATOLOGY/ONCOLOGY | Facility: CLINIC | Age: 85
End: 2025-08-22
Payer: MEDICARE

## 2025-08-22 ENCOUNTER — INFUSION (OUTPATIENT)
Dept: HEMATOLOGY/ONCOLOGY | Facility: CLINIC | Age: 85
End: 2025-08-22
Payer: MEDICARE

## 2025-08-22 VITALS
TEMPERATURE: 96.6 F | OXYGEN SATURATION: 93 % | SYSTOLIC BLOOD PRESSURE: 127 MMHG | WEIGHT: 174.27 LBS | DIASTOLIC BLOOD PRESSURE: 69 MMHG | RESPIRATION RATE: 18 BRPM | BODY MASS INDEX: 25.84 KG/M2 | HEART RATE: 73 BPM

## 2025-08-22 DIAGNOSIS — C32.0 MALIGNANT NEOPLASM OF GLOTTIS (MULTI): ICD-10-CM

## 2025-08-22 DIAGNOSIS — Z51.0 ENCOUNTER FOR ANTINEOPLASTIC RADIATION THERAPY: ICD-10-CM

## 2025-08-22 DIAGNOSIS — C32.9 MALIGNANT NEOPLASM OF LARYNX: ICD-10-CM

## 2025-08-22 LAB
RAD ONC MSQ ACTUAL FRACTIONS DELIVERED: 10
RAD ONC MSQ ACTUAL SESSION DELIVERED DOSE: 200 CGRAY
RAD ONC MSQ ACTUAL TOTAL DOSE: 2000 CGRAY
RAD ONC MSQ ELAPSED DAYS: 11
RAD ONC MSQ LAST DATE: NORMAL
RAD ONC MSQ PRESCRIBED FRACTIONAL DOSE: 200 CGRAY
RAD ONC MSQ PRESCRIBED NUMBER OF FRACTIONS: 35
RAD ONC MSQ PRESCRIBED TECHNIQUE: NORMAL
RAD ONC MSQ PRESCRIBED TOTAL DOSE: 7000 CGRAY
RAD ONC MSQ PRESCRIPTION PATTERN COMMENT: NORMAL
RAD ONC MSQ START DATE: NORMAL
RAD ONC MSQ TREATMENT COURSE NUMBER: 1
RAD ONC MSQ TREATMENT SITE: NORMAL

## 2025-08-22 PROCEDURE — 96360 HYDRATION IV INFUSION INIT: CPT | Mod: INF

## 2025-08-22 PROCEDURE — 2500000004 HC RX 250 GENERAL PHARMACY W/ HCPCS (ALT 636 FOR OP/ED): Performed by: STUDENT IN AN ORGANIZED HEALTH CARE EDUCATION/TRAINING PROGRAM

## 2025-08-22 PROCEDURE — 77386 HC INTENSITY-MODULATED RADIATION THERAPY (IMRT), COMPLEX: CPT | Performed by: RADIOLOGY

## 2025-08-22 RX ORDER — HEPARIN SODIUM,PORCINE/PF 10 UNIT/ML
50 SYRINGE (ML) INTRAVENOUS AS NEEDED
OUTPATIENT
Start: 2025-08-22

## 2025-08-22 RX ORDER — HEPARIN 100 UNIT/ML
500 SYRINGE INTRAVENOUS AS NEEDED
OUTPATIENT
Start: 2025-08-22

## 2025-08-22 RX ADMIN — SODIUM CHLORIDE 1000 ML: 0.9 INJECTION, SOLUTION INTRAVENOUS at 11:10

## 2025-08-22 ASSESSMENT — PAIN SCALES - GENERAL: PAINLEVEL_OUTOF10: 0-NO PAIN

## 2025-08-25 ENCOUNTER — LAB (OUTPATIENT)
Dept: LAB | Facility: CLINIC | Age: 85
End: 2025-08-25
Payer: MEDICARE

## 2025-08-25 ENCOUNTER — HOSPITAL ENCOUNTER (OUTPATIENT)
Dept: RADIATION ONCOLOGY | Facility: CLINIC | Age: 85
Setting detail: RADIATION/ONCOLOGY SERIES
Discharge: HOME | End: 2025-08-25
Payer: MEDICARE

## 2025-08-25 DIAGNOSIS — Z51.0 ENCOUNTER FOR ANTINEOPLASTIC RADIATION THERAPY: ICD-10-CM

## 2025-08-25 DIAGNOSIS — C32.0 MALIGNANT NEOPLASM OF GLOTTIS (MULTI): ICD-10-CM

## 2025-08-25 DIAGNOSIS — C32.9 MALIGNANT NEOPLASM OF LARYNX: ICD-10-CM

## 2025-08-25 LAB
ALBUMIN SERPL BCP-MCNC: 4.4 G/DL (ref 3.4–5)
ALP SERPL-CCNC: 56 U/L (ref 33–136)
ALT SERPL W P-5'-P-CCNC: 21 U/L (ref 10–52)
ANION GAP SERPL CALC-SCNC: 11 MMOL/L (ref 10–20)
AST SERPL W P-5'-P-CCNC: 23 U/L (ref 9–39)
BILIRUB SERPL-MCNC: 0.5 MG/DL (ref 0–1.2)
BUN SERPL-MCNC: 18 MG/DL (ref 6–23)
CALCIUM SERPL-MCNC: 9.6 MG/DL (ref 8.6–10.3)
CHLORIDE SERPL-SCNC: 104 MMOL/L (ref 98–107)
CO2 SERPL-SCNC: 28 MMOL/L (ref 21–32)
CREAT SERPL-MCNC: 0.76 MG/DL (ref 0.5–1.3)
EGFRCR SERPLBLD CKD-EPI 2021: 87 ML/MIN/1.73M*2
GLUCOSE SERPL-MCNC: 105 MG/DL (ref 74–99)
MAGNESIUM SERPL-MCNC: 1.85 MG/DL (ref 1.6–2.4)
POTASSIUM SERPL-SCNC: 3.9 MMOL/L (ref 3.5–5.3)
PROT SERPL-MCNC: 6.8 G/DL (ref 6.4–8.2)
RAD ONC MSQ ACTUAL FRACTIONS DELIVERED: 11
RAD ONC MSQ ACTUAL SESSION DELIVERED DOSE: 200 CGRAY
RAD ONC MSQ ACTUAL TOTAL DOSE: 2200 CGRAY
RAD ONC MSQ ELAPSED DAYS: 14
RAD ONC MSQ LAST DATE: NORMAL
RAD ONC MSQ PRESCRIBED FRACTIONAL DOSE: 200 CGRAY
RAD ONC MSQ PRESCRIBED NUMBER OF FRACTIONS: 35
RAD ONC MSQ PRESCRIBED TECHNIQUE: NORMAL
RAD ONC MSQ PRESCRIBED TOTAL DOSE: 7000 CGRAY
RAD ONC MSQ PRESCRIPTION PATTERN COMMENT: NORMAL
RAD ONC MSQ START DATE: NORMAL
RAD ONC MSQ TREATMENT COURSE NUMBER: 1
RAD ONC MSQ TREATMENT SITE: NORMAL
SODIUM SERPL-SCNC: 139 MMOL/L (ref 136–145)

## 2025-08-25 PROCEDURE — 84075 ASSAY ALKALINE PHOSPHATASE: CPT

## 2025-08-25 PROCEDURE — 77386 HC INTENSITY-MODULATED RADIATION THERAPY (IMRT), COMPLEX: CPT | Performed by: RADIOLOGY

## 2025-08-25 PROCEDURE — 83735 ASSAY OF MAGNESIUM: CPT

## 2025-08-25 PROCEDURE — 36415 COLL VENOUS BLD VENIPUNCTURE: CPT

## 2025-08-25 ASSESSMENT — ENCOUNTER SYMPTOMS
ARTHRALGIAS: 0
NAUSEA: 0
ABDOMINAL PAIN: 0
VOMITING: 0
LEG SWELLING: 0
SHORTNESS OF BREATH: 0
FEVER: 0
HEADACHES: 0
DIARRHEA: 0
TROUBLE SWALLOWING: 0
CONSTIPATION: 1
NUMBNESS: 0
COUGH: 0
SORE THROAT: 0
CHILLS: 0
LIGHT-HEADEDNESS: 0

## 2025-08-26 ENCOUNTER — HOSPITAL ENCOUNTER (OUTPATIENT)
Dept: RADIATION ONCOLOGY | Facility: HOSPITAL | Age: 85
Discharge: HOME | End: 2025-08-26
Payer: MEDICARE

## 2025-08-26 ENCOUNTER — HOSPITAL ENCOUNTER (OUTPATIENT)
Dept: RADIATION ONCOLOGY | Facility: HOSPITAL | Age: 85
Setting detail: RADIATION/ONCOLOGY SERIES
Discharge: HOME | End: 2025-08-26
Payer: MEDICARE

## 2025-08-26 ENCOUNTER — APPOINTMENT (OUTPATIENT)
Dept: RADIATION ONCOLOGY | Facility: CLINIC | Age: 85
End: 2025-08-26
Payer: MEDICARE

## 2025-08-26 ENCOUNTER — OFFICE VISIT (OUTPATIENT)
Dept: HEMATOLOGY/ONCOLOGY | Facility: HOSPITAL | Age: 85
End: 2025-08-26
Payer: MEDICARE

## 2025-08-26 ENCOUNTER — NUTRITION (OUTPATIENT)
Dept: HEMATOLOGY/ONCOLOGY | Facility: HOSPITAL | Age: 85
End: 2025-08-26

## 2025-08-26 ENCOUNTER — INFUSION (OUTPATIENT)
Dept: HEMATOLOGY/ONCOLOGY | Facility: HOSPITAL | Age: 85
End: 2025-08-26
Payer: MEDICARE

## 2025-08-26 VITALS
RESPIRATION RATE: 16 BRPM | BODY MASS INDEX: 25.64 KG/M2 | TEMPERATURE: 97.3 F | DIASTOLIC BLOOD PRESSURE: 89 MMHG | HEART RATE: 88 BPM | OXYGEN SATURATION: 97 % | SYSTOLIC BLOOD PRESSURE: 153 MMHG | WEIGHT: 172.9 LBS

## 2025-08-26 VITALS
DIASTOLIC BLOOD PRESSURE: 71 MMHG | RESPIRATION RATE: 18 BRPM | HEART RATE: 86 BPM | OXYGEN SATURATION: 94 % | SYSTOLIC BLOOD PRESSURE: 114 MMHG | TEMPERATURE: 96.6 F

## 2025-08-26 VITALS
BODY MASS INDEX: 25.73 KG/M2 | TEMPERATURE: 96.6 F | WEIGHT: 173.5 LBS | OXYGEN SATURATION: 94 % | DIASTOLIC BLOOD PRESSURE: 71 MMHG | HEART RATE: 86 BPM | SYSTOLIC BLOOD PRESSURE: 114 MMHG | RESPIRATION RATE: 18 BRPM

## 2025-08-26 DIAGNOSIS — C32.9 MALIGNANT NEOPLASM OF LARYNX: Primary | ICD-10-CM

## 2025-08-26 DIAGNOSIS — R49.0 HOARSENESS: ICD-10-CM

## 2025-08-26 DIAGNOSIS — Z51.0 ENCOUNTER FOR ANTINEOPLASTIC RADIATION THERAPY: ICD-10-CM

## 2025-08-26 DIAGNOSIS — R68.84 JAW PAIN: ICD-10-CM

## 2025-08-26 DIAGNOSIS — F17.210 CIGARETTE NICOTINE DEPENDENCE WITHOUT COMPLICATION: ICD-10-CM

## 2025-08-26 DIAGNOSIS — C32.0 MALIGNANT NEOPLASM OF GLOTTIS (MULTI): ICD-10-CM

## 2025-08-26 DIAGNOSIS — C32.9 MALIGNANT NEOPLASM OF LARYNX: ICD-10-CM

## 2025-08-26 DIAGNOSIS — Z51.12 ENCOUNTER FOR ANTINEOPLASTIC IMMUNOTHERAPY: ICD-10-CM

## 2025-08-26 DIAGNOSIS — K12.33 MUCOSITIS DUE TO RADIATION THERAPY: ICD-10-CM

## 2025-08-26 LAB
RAD ONC MSQ ACTUAL FRACTIONS DELIVERED: 12
RAD ONC MSQ ACTUAL SESSION DELIVERED DOSE: 200 CGRAY
RAD ONC MSQ ACTUAL TOTAL DOSE: 2400 CGRAY
RAD ONC MSQ ELAPSED DAYS: 15
RAD ONC MSQ LAST DATE: NORMAL
RAD ONC MSQ PRESCRIBED FRACTIONAL DOSE: 200 CGRAY
RAD ONC MSQ PRESCRIBED NUMBER OF FRACTIONS: 35
RAD ONC MSQ PRESCRIBED TECHNIQUE: NORMAL
RAD ONC MSQ PRESCRIBED TOTAL DOSE: 7000 CGRAY
RAD ONC MSQ PRESCRIPTION PATTERN COMMENT: NORMAL
RAD ONC MSQ START DATE: NORMAL
RAD ONC MSQ TREATMENT COURSE NUMBER: 1
RAD ONC MSQ TREATMENT SITE: NORMAL

## 2025-08-26 PROCEDURE — 96413 CHEMO IV INFUSION 1 HR: CPT

## 2025-08-26 PROCEDURE — 2500000004 HC RX 250 GENERAL PHARMACY W/ HCPCS (ALT 636 FOR OP/ED): Mod: JZ,TB | Performed by: STUDENT IN AN ORGANIZED HEALTH CARE EDUCATION/TRAINING PROGRAM

## 2025-08-26 PROCEDURE — 99215 OFFICE O/P EST HI 40 MIN: CPT | Performed by: STUDENT IN AN ORGANIZED HEALTH CARE EDUCATION/TRAINING PROGRAM

## 2025-08-26 PROCEDURE — 1160F RVW MEDS BY RX/DR IN RCRD: CPT | Performed by: STUDENT IN AN ORGANIZED HEALTH CARE EDUCATION/TRAINING PROGRAM

## 2025-08-26 PROCEDURE — 3074F SYST BP LT 130 MM HG: CPT | Performed by: STUDENT IN AN ORGANIZED HEALTH CARE EDUCATION/TRAINING PROGRAM

## 2025-08-26 PROCEDURE — 99215 OFFICE O/P EST HI 40 MIN: CPT | Mod: 25 | Performed by: STUDENT IN AN ORGANIZED HEALTH CARE EDUCATION/TRAINING PROGRAM

## 2025-08-26 PROCEDURE — 3078F DIAST BP <80 MM HG: CPT | Performed by: STUDENT IN AN ORGANIZED HEALTH CARE EDUCATION/TRAINING PROGRAM

## 2025-08-26 PROCEDURE — 77386 HC INTENSITY-MODULATED RADIATION THERAPY (IMRT), COMPLEX: CPT | Performed by: RADIOLOGY

## 2025-08-26 PROCEDURE — G2211 COMPLEX E/M VISIT ADD ON: HCPCS | Performed by: STUDENT IN AN ORGANIZED HEALTH CARE EDUCATION/TRAINING PROGRAM

## 2025-08-26 PROCEDURE — 1159F MED LIST DOCD IN RCRD: CPT | Performed by: STUDENT IN AN ORGANIZED HEALTH CARE EDUCATION/TRAINING PROGRAM

## 2025-08-26 RX ORDER — FAMOTIDINE 10 MG/ML
20 INJECTION, SOLUTION INTRAVENOUS ONCE AS NEEDED
OUTPATIENT
Start: 2025-09-02

## 2025-08-26 RX ORDER — IBUPROFEN 200 MG
1 TABLET ORAL EVERY 24 HOURS
Qty: 14 PATCH | Refills: 0 | Status: SHIPPED | OUTPATIENT
Start: 2025-08-26 | End: 2025-09-09

## 2025-08-26 RX ORDER — EPINEPHRINE 0.3 MG/.3ML
0.3 INJECTION SUBCUTANEOUS EVERY 5 MIN PRN
Status: DISCONTINUED | OUTPATIENT
Start: 2025-08-26 | End: 2025-08-26 | Stop reason: HOSPADM

## 2025-08-26 RX ORDER — MAGNESIUM SULFATE HEPTAHYDRATE 40 MG/ML
2 INJECTION, SOLUTION INTRAVENOUS ONCE AS NEEDED
OUTPATIENT
Start: 2025-09-02

## 2025-08-26 RX ORDER — FAMOTIDINE 10 MG/ML
20 INJECTION, SOLUTION INTRAVENOUS ONCE AS NEEDED
Status: DISCONTINUED | OUTPATIENT
Start: 2025-08-26 | End: 2025-08-26 | Stop reason: HOSPADM

## 2025-08-26 RX ORDER — EPINEPHRINE 0.3 MG/.3ML
0.3 INJECTION SUBCUTANEOUS EVERY 5 MIN PRN
OUTPATIENT
Start: 2025-09-02

## 2025-08-26 RX ORDER — PROCHLORPERAZINE MALEATE 10 MG
10 TABLET ORAL EVERY 6 HOURS PRN
OUTPATIENT
Start: 2025-09-02

## 2025-08-26 RX ORDER — HEPARIN SODIUM,PORCINE/PF 10 UNIT/ML
50 SYRINGE (ML) INTRAVENOUS AS NEEDED
Status: CANCELLED | OUTPATIENT
Start: 2025-08-26

## 2025-08-26 RX ORDER — ALBUTEROL SULFATE 0.83 MG/ML
3 SOLUTION RESPIRATORY (INHALATION) AS NEEDED
Status: CANCELLED | OUTPATIENT
Start: 2025-08-26

## 2025-08-26 RX ORDER — DIPHENHYDRAMINE HYDROCHLORIDE 50 MG/ML
50 INJECTION, SOLUTION INTRAMUSCULAR; INTRAVENOUS AS NEEDED
Status: CANCELLED | OUTPATIENT
Start: 2025-08-26

## 2025-08-26 RX ORDER — PROCHLORPERAZINE MALEATE 10 MG
10 TABLET ORAL EVERY 6 HOURS PRN
Status: CANCELLED | OUTPATIENT
Start: 2025-08-26

## 2025-08-26 RX ORDER — ALBUTEROL SULFATE 0.83 MG/ML
3 SOLUTION RESPIRATORY (INHALATION) AS NEEDED
Status: DISCONTINUED | OUTPATIENT
Start: 2025-08-26 | End: 2025-08-26 | Stop reason: HOSPADM

## 2025-08-26 RX ORDER — PROCHLORPERAZINE MALEATE 10 MG
10 TABLET ORAL EVERY 6 HOURS PRN
Status: DISCONTINUED | OUTPATIENT
Start: 2025-08-26 | End: 2025-08-26 | Stop reason: HOSPADM

## 2025-08-26 RX ORDER — PROCHLORPERAZINE EDISYLATE 5 MG/ML
10 INJECTION INTRAMUSCULAR; INTRAVENOUS EVERY 6 HOURS PRN
Status: CANCELLED | OUTPATIENT
Start: 2025-08-26

## 2025-08-26 RX ORDER — PROCHLORPERAZINE EDISYLATE 5 MG/ML
10 INJECTION INTRAMUSCULAR; INTRAVENOUS EVERY 6 HOURS PRN
OUTPATIENT
Start: 2025-09-02

## 2025-08-26 RX ORDER — MAGNESIUM SULFATE HEPTAHYDRATE 40 MG/ML
4 INJECTION, SOLUTION INTRAVENOUS ONCE AS NEEDED
Status: CANCELLED | OUTPATIENT
Start: 2025-08-26

## 2025-08-26 RX ORDER — HEPARIN 100 UNIT/ML
500 SYRINGE INTRAVENOUS AS NEEDED
Status: CANCELLED | OUTPATIENT
Start: 2025-08-26

## 2025-08-26 RX ORDER — PROCHLORPERAZINE EDISYLATE 5 MG/ML
10 INJECTION INTRAMUSCULAR; INTRAVENOUS EVERY 6 HOURS PRN
Status: DISCONTINUED | OUTPATIENT
Start: 2025-08-26 | End: 2025-08-26 | Stop reason: HOSPADM

## 2025-08-26 RX ORDER — DIPHENHYDRAMINE HYDROCHLORIDE 50 MG/ML
50 INJECTION, SOLUTION INTRAMUSCULAR; INTRAVENOUS AS NEEDED
Status: DISCONTINUED | OUTPATIENT
Start: 2025-08-26 | End: 2025-08-26 | Stop reason: HOSPADM

## 2025-08-26 RX ORDER — EPINEPHRINE 0.3 MG/.3ML
0.3 INJECTION SUBCUTANEOUS EVERY 5 MIN PRN
Status: CANCELLED | OUTPATIENT
Start: 2025-08-26

## 2025-08-26 RX ORDER — ALBUTEROL SULFATE 0.83 MG/ML
3 SOLUTION RESPIRATORY (INHALATION) AS NEEDED
OUTPATIENT
Start: 2025-09-02

## 2025-08-26 RX ORDER — DIPHENHYDRAMINE HYDROCHLORIDE 50 MG/ML
50 INJECTION, SOLUTION INTRAMUSCULAR; INTRAVENOUS AS NEEDED
OUTPATIENT
Start: 2025-09-02

## 2025-08-26 RX ORDER — FAMOTIDINE 10 MG/ML
20 INJECTION, SOLUTION INTRAVENOUS ONCE AS NEEDED
Status: CANCELLED | OUTPATIENT
Start: 2025-08-26

## 2025-08-26 RX ORDER — MAGNESIUM SULFATE HEPTAHYDRATE 40 MG/ML
4 INJECTION, SOLUTION INTRAVENOUS ONCE AS NEEDED
OUTPATIENT
Start: 2025-09-02

## 2025-08-26 RX ORDER — MAGNESIUM SULFATE HEPTAHYDRATE 40 MG/ML
2 INJECTION, SOLUTION INTRAVENOUS ONCE AS NEEDED
Status: CANCELLED | OUTPATIENT
Start: 2025-08-26

## 2025-08-26 RX ADMIN — CETUXIMAB 500 MG: 2 SOLUTION INTRAVENOUS at 09:22

## 2025-08-26 RX ADMIN — DIPHENHYDRAMINE HYDROCHLORIDE 50 MG: 50 INJECTION, SOLUTION INTRAMUSCULAR; INTRAVENOUS at 08:48

## 2025-08-27 ENCOUNTER — HOSPITAL ENCOUNTER (OUTPATIENT)
Dept: RADIATION ONCOLOGY | Facility: CLINIC | Age: 85
Setting detail: RADIATION/ONCOLOGY SERIES
Discharge: HOME | End: 2025-08-27
Payer: MEDICARE

## 2025-08-27 ENCOUNTER — APPOINTMENT (OUTPATIENT)
Dept: RADIATION ONCOLOGY | Facility: HOSPITAL | Age: 85
End: 2025-08-27
Payer: MEDICARE

## 2025-08-27 ENCOUNTER — APPOINTMENT (OUTPATIENT)
Dept: HEMATOLOGY/ONCOLOGY | Facility: HOSPITAL | Age: 85
End: 2025-08-27
Payer: MEDICARE

## 2025-08-27 VITALS — HEIGHT: 69 IN | BODY MASS INDEX: 25.7 KG/M2 | WEIGHT: 173.5 LBS

## 2025-08-27 DIAGNOSIS — C32.0 MALIGNANT NEOPLASM OF GLOTTIS (MULTI): ICD-10-CM

## 2025-08-27 DIAGNOSIS — Z51.0 ENCOUNTER FOR ANTINEOPLASTIC RADIATION THERAPY: ICD-10-CM

## 2025-08-27 LAB
RAD ONC MSQ ACTUAL FRACTIONS DELIVERED: 13
RAD ONC MSQ ACTUAL SESSION DELIVERED DOSE: 200 CGRAY
RAD ONC MSQ ACTUAL TOTAL DOSE: 2600 CGRAY
RAD ONC MSQ ELAPSED DAYS: 16
RAD ONC MSQ LAST DATE: NORMAL
RAD ONC MSQ PRESCRIBED FRACTIONAL DOSE: 200 CGRAY
RAD ONC MSQ PRESCRIBED NUMBER OF FRACTIONS: 35
RAD ONC MSQ PRESCRIBED TECHNIQUE: NORMAL
RAD ONC MSQ PRESCRIBED TOTAL DOSE: 7000 CGRAY
RAD ONC MSQ PRESCRIPTION PATTERN COMMENT: NORMAL
RAD ONC MSQ START DATE: NORMAL
RAD ONC MSQ TREATMENT COURSE NUMBER: 1
RAD ONC MSQ TREATMENT SITE: NORMAL

## 2025-08-27 PROCEDURE — 77386 HC INTENSITY-MODULATED RADIATION THERAPY (IMRT), COMPLEX: CPT | Performed by: RADIOLOGY

## 2025-08-28 ENCOUNTER — HOSPITAL ENCOUNTER (OUTPATIENT)
Dept: RADIATION ONCOLOGY | Facility: CLINIC | Age: 85
Setting detail: RADIATION/ONCOLOGY SERIES
Discharge: HOME | End: 2025-08-28
Payer: MEDICARE

## 2025-08-28 DIAGNOSIS — C32.0 MALIGNANT NEOPLASM OF GLOTTIS (MULTI): ICD-10-CM

## 2025-08-28 DIAGNOSIS — Z51.0 ENCOUNTER FOR ANTINEOPLASTIC RADIATION THERAPY: ICD-10-CM

## 2025-08-28 LAB
RAD ONC MSQ ACTUAL FRACTIONS DELIVERED: 14
RAD ONC MSQ ACTUAL SESSION DELIVERED DOSE: 200 CGRAY
RAD ONC MSQ ACTUAL TOTAL DOSE: 2800 CGRAY
RAD ONC MSQ ELAPSED DAYS: 17
RAD ONC MSQ LAST DATE: NORMAL
RAD ONC MSQ PRESCRIBED FRACTIONAL DOSE: 200 CGRAY
RAD ONC MSQ PRESCRIBED NUMBER OF FRACTIONS: 35
RAD ONC MSQ PRESCRIBED TECHNIQUE: NORMAL
RAD ONC MSQ PRESCRIBED TOTAL DOSE: 7000 CGRAY
RAD ONC MSQ PRESCRIPTION PATTERN COMMENT: NORMAL
RAD ONC MSQ START DATE: NORMAL
RAD ONC MSQ TREATMENT COURSE NUMBER: 1
RAD ONC MSQ TREATMENT SITE: NORMAL

## 2025-08-28 PROCEDURE — 77386 HC INTENSITY-MODULATED RADIATION THERAPY (IMRT), COMPLEX: CPT | Performed by: RADIOLOGY

## 2025-08-29 ENCOUNTER — INFUSION (OUTPATIENT)
Dept: HEMATOLOGY/ONCOLOGY | Facility: CLINIC | Age: 85
End: 2025-08-29
Payer: MEDICARE

## 2025-08-29 ENCOUNTER — HOSPITAL ENCOUNTER (OUTPATIENT)
Dept: RADIATION ONCOLOGY | Facility: CLINIC | Age: 85
Setting detail: RADIATION/ONCOLOGY SERIES
Discharge: HOME | End: 2025-08-29
Payer: MEDICARE

## 2025-08-29 ENCOUNTER — APPOINTMENT (OUTPATIENT)
Dept: OTOLARYNGOLOGY | Facility: CLINIC | Age: 85
End: 2025-08-29
Payer: MEDICARE

## 2025-08-29 ENCOUNTER — APPOINTMENT (OUTPATIENT)
Dept: HEMATOLOGY/ONCOLOGY | Facility: CLINIC | Age: 85
End: 2025-08-29
Payer: MEDICARE

## 2025-08-29 VITALS
RESPIRATION RATE: 18 BRPM | BODY MASS INDEX: 25.48 KG/M2 | WEIGHT: 171.85 LBS | OXYGEN SATURATION: 93 % | DIASTOLIC BLOOD PRESSURE: 67 MMHG | HEART RATE: 78 BPM | SYSTOLIC BLOOD PRESSURE: 112 MMHG | TEMPERATURE: 96.6 F

## 2025-08-29 DIAGNOSIS — Z51.0 ENCOUNTER FOR ANTINEOPLASTIC RADIATION THERAPY: ICD-10-CM

## 2025-08-29 DIAGNOSIS — C32.0 MALIGNANT NEOPLASM OF GLOTTIS (MULTI): ICD-10-CM

## 2025-08-29 DIAGNOSIS — C32.9 MALIGNANT NEOPLASM OF LARYNX: ICD-10-CM

## 2025-08-29 LAB
RAD ONC MSQ ACTUAL FRACTIONS DELIVERED: 15
RAD ONC MSQ ACTUAL SESSION DELIVERED DOSE: 200 CGRAY
RAD ONC MSQ ACTUAL TOTAL DOSE: 3000 CGRAY
RAD ONC MSQ ELAPSED DAYS: 18
RAD ONC MSQ LAST DATE: NORMAL
RAD ONC MSQ PRESCRIBED FRACTIONAL DOSE: 200 CGRAY
RAD ONC MSQ PRESCRIBED NUMBER OF FRACTIONS: 35
RAD ONC MSQ PRESCRIBED TECHNIQUE: NORMAL
RAD ONC MSQ PRESCRIBED TOTAL DOSE: 7000 CGRAY
RAD ONC MSQ PRESCRIPTION PATTERN COMMENT: NORMAL
RAD ONC MSQ START DATE: NORMAL
RAD ONC MSQ TREATMENT COURSE NUMBER: 1
RAD ONC MSQ TREATMENT SITE: NORMAL

## 2025-08-29 PROCEDURE — 2500000004 HC RX 250 GENERAL PHARMACY W/ HCPCS (ALT 636 FOR OP/ED): Performed by: STUDENT IN AN ORGANIZED HEALTH CARE EDUCATION/TRAINING PROGRAM

## 2025-08-29 PROCEDURE — 77386 HC INTENSITY-MODULATED RADIATION THERAPY (IMRT), COMPLEX: CPT | Performed by: RADIOLOGY

## 2025-08-29 PROCEDURE — 96360 HYDRATION IV INFUSION INIT: CPT | Mod: INF

## 2025-08-29 RX ORDER — HEPARIN 100 UNIT/ML
500 SYRINGE INTRAVENOUS AS NEEDED
OUTPATIENT
Start: 2025-08-29

## 2025-08-29 RX ORDER — HEPARIN SODIUM,PORCINE/PF 10 UNIT/ML
50 SYRINGE (ML) INTRAVENOUS AS NEEDED
OUTPATIENT
Start: 2025-08-29

## 2025-08-29 RX ADMIN — SODIUM CHLORIDE 1000 ML: 0.9 INJECTION, SOLUTION INTRAVENOUS at 11:18

## 2025-08-29 ASSESSMENT — ENCOUNTER SYMPTOMS
NUMBNESS: 0
FEVER: 0
CONSTIPATION: 1
SORE THROAT: 0
LEG SWELLING: 0
TROUBLE SWALLOWING: 0
COUGH: 0
ARTHRALGIAS: 0
DIARRHEA: 0
NAUSEA: 0
VOMITING: 0
HEADACHES: 0
LIGHT-HEADEDNESS: 0
ABDOMINAL PAIN: 0
CHILLS: 0
SHORTNESS OF BREATH: 0

## 2025-08-29 ASSESSMENT — PAIN SCALES - GENERAL: PAINLEVEL_OUTOF10: 0-NO PAIN

## 2025-09-02 ENCOUNTER — LAB (OUTPATIENT)
Dept: LAB | Facility: CLINIC | Age: 85
End: 2025-09-02
Payer: MEDICARE

## 2025-09-02 ENCOUNTER — APPOINTMENT (OUTPATIENT)
Dept: HEMATOLOGY/ONCOLOGY | Facility: HOSPITAL | Age: 85
End: 2025-09-02
Payer: MEDICARE

## 2025-09-02 ENCOUNTER — HOSPITAL ENCOUNTER (OUTPATIENT)
Dept: RADIATION ONCOLOGY | Facility: CLINIC | Age: 85
Setting detail: RADIATION/ONCOLOGY SERIES
Discharge: HOME | End: 2025-09-02
Payer: MEDICARE

## 2025-09-02 DIAGNOSIS — Z51.0 ENCOUNTER FOR ANTINEOPLASTIC RADIATION THERAPY: ICD-10-CM

## 2025-09-02 DIAGNOSIS — C32.0 MALIGNANT NEOPLASM OF GLOTTIS (MULTI): ICD-10-CM

## 2025-09-02 DIAGNOSIS — C32.9 MALIGNANT NEOPLASM OF LARYNX: ICD-10-CM

## 2025-09-02 LAB
BASOPHILS # BLD AUTO: 0.05 X10*3/UL (ref 0–0.1)
BASOPHILS NFR BLD AUTO: 0.6 %
EOSINOPHIL # BLD AUTO: 0.24 X10*3/UL (ref 0–0.4)
EOSINOPHIL NFR BLD AUTO: 2.9 %
ERYTHROCYTE [DISTWIDTH] IN BLOOD BY AUTOMATED COUNT: 12.6 % (ref 11.5–14.5)
HCT VFR BLD AUTO: 38.3 % (ref 41–52)
HGB BLD-MCNC: 12.5 G/DL (ref 13.5–17.5)
IMM GRANULOCYTES # BLD AUTO: 0.03 X10*3/UL (ref 0–0.5)
IMM GRANULOCYTES NFR BLD AUTO: 0.4 % (ref 0–0.9)
LYMPHOCYTES # BLD AUTO: 1.3 X10*3/UL (ref 0.8–3)
LYMPHOCYTES NFR BLD AUTO: 15.6 %
MCH RBC QN AUTO: 31.4 PG (ref 26–34)
MCHC RBC AUTO-ENTMCNC: 32.6 G/DL (ref 32–36)
MCV RBC AUTO: 96 FL (ref 80–100)
MONOCYTES # BLD AUTO: 0.61 X10*3/UL (ref 0.05–0.8)
MONOCYTES NFR BLD AUTO: 7.3 %
NEUTROPHILS # BLD AUTO: 6.13 X10*3/UL (ref 1.6–5.5)
NEUTROPHILS NFR BLD AUTO: 73.2 %
NRBC BLD-RTO: 0 /100 WBCS (ref 0–0)
PLATELET # BLD AUTO: 245 X10*3/UL (ref 150–450)
RAD ONC MSQ ACTUAL FRACTIONS DELIVERED: 16
RAD ONC MSQ ACTUAL SESSION DELIVERED DOSE: 200 CGRAY
RAD ONC MSQ ACTUAL TOTAL DOSE: 3200 CGRAY
RAD ONC MSQ ELAPSED DAYS: 22
RAD ONC MSQ LAST DATE: NORMAL
RAD ONC MSQ PRESCRIBED FRACTIONAL DOSE: 200 CGRAY
RAD ONC MSQ PRESCRIBED NUMBER OF FRACTIONS: 35
RAD ONC MSQ PRESCRIBED TECHNIQUE: NORMAL
RAD ONC MSQ PRESCRIBED TOTAL DOSE: 7000 CGRAY
RAD ONC MSQ PRESCRIPTION PATTERN COMMENT: NORMAL
RAD ONC MSQ START DATE: NORMAL
RAD ONC MSQ TREATMENT COURSE NUMBER: 1
RAD ONC MSQ TREATMENT SITE: NORMAL
RBC # BLD AUTO: 3.98 X10*6/UL (ref 4.5–5.9)
WBC # BLD AUTO: 8.4 X10*3/UL (ref 4.4–11.3)

## 2025-09-02 PROCEDURE — 36415 COLL VENOUS BLD VENIPUNCTURE: CPT

## 2025-09-02 PROCEDURE — 77336 RADIATION PHYSICS CONSULT: CPT | Performed by: RADIOLOGY

## 2025-09-02 PROCEDURE — 77386 HC INTENSITY-MODULATED RADIATION THERAPY (IMRT), COMPLEX: CPT | Performed by: RADIOLOGY

## 2025-09-02 PROCEDURE — 85025 COMPLETE CBC W/AUTO DIFF WBC: CPT

## 2025-09-03 ENCOUNTER — HOSPITAL ENCOUNTER (OUTPATIENT)
Dept: RADIATION ONCOLOGY | Facility: HOSPITAL | Age: 85
Setting detail: RADIATION/ONCOLOGY SERIES
Discharge: HOME | End: 2025-09-03
Payer: MEDICARE

## 2025-09-03 ENCOUNTER — INFUSION (OUTPATIENT)
Dept: HEMATOLOGY/ONCOLOGY | Facility: HOSPITAL | Age: 85
End: 2025-09-03
Payer: MEDICARE

## 2025-09-03 ENCOUNTER — LAB (OUTPATIENT)
Dept: LAB | Facility: HOSPITAL | Age: 85
End: 2025-09-03
Payer: MEDICARE

## 2025-09-03 ENCOUNTER — OFFICE VISIT (OUTPATIENT)
Dept: HEMATOLOGY/ONCOLOGY | Facility: HOSPITAL | Age: 85
End: 2025-09-03
Payer: MEDICARE

## 2025-09-03 VITALS
TEMPERATURE: 97.7 F | HEART RATE: 77 BPM | RESPIRATION RATE: 20 BRPM | DIASTOLIC BLOOD PRESSURE: 77 MMHG | SYSTOLIC BLOOD PRESSURE: 142 MMHG | WEIGHT: 173.5 LBS | BODY MASS INDEX: 25.73 KG/M2 | OXYGEN SATURATION: 96 %

## 2025-09-03 VITALS
SYSTOLIC BLOOD PRESSURE: 128 MMHG | RESPIRATION RATE: 16 BRPM | TEMPERATURE: 96.3 F | DIASTOLIC BLOOD PRESSURE: 63 MMHG | HEART RATE: 78 BPM

## 2025-09-03 VITALS
HEART RATE: 74 BPM | OXYGEN SATURATION: 93 % | BODY MASS INDEX: 26.17 KG/M2 | WEIGHT: 176.48 LBS | SYSTOLIC BLOOD PRESSURE: 145 MMHG | RESPIRATION RATE: 18 BRPM | TEMPERATURE: 96.8 F | DIASTOLIC BLOOD PRESSURE: 81 MMHG

## 2025-09-03 DIAGNOSIS — Z51.12 ENCOUNTER FOR ANTINEOPLASTIC IMMUNOTHERAPY: ICD-10-CM

## 2025-09-03 DIAGNOSIS — L27.0 RASH, DRUG: ICD-10-CM

## 2025-09-03 DIAGNOSIS — K12.33 MUCOSITIS DUE TO RADIATION THERAPY: ICD-10-CM

## 2025-09-03 DIAGNOSIS — C32.9 MALIGNANT NEOPLASM OF LARYNX: Primary | ICD-10-CM

## 2025-09-03 DIAGNOSIS — C32.9 MALIGNANT NEOPLASM OF LARYNX: ICD-10-CM

## 2025-09-03 DIAGNOSIS — C32.0 MALIGNANT NEOPLASM OF GLOTTIS (MULTI): ICD-10-CM

## 2025-09-03 DIAGNOSIS — R49.0 HOARSENESS: ICD-10-CM

## 2025-09-03 DIAGNOSIS — F17.210 CIGARETTE NICOTINE DEPENDENCE WITHOUT COMPLICATION: ICD-10-CM

## 2025-09-03 DIAGNOSIS — M25.562 LEFT KNEE PAIN, UNSPECIFIED CHRONICITY: ICD-10-CM

## 2025-09-03 DIAGNOSIS — F17.290 OTHER TOBACCO PRODUCT NICOTINE DEPENDENCE, UNCOMPLICATED: ICD-10-CM

## 2025-09-03 DIAGNOSIS — Z51.0 ENCOUNTER FOR ANTINEOPLASTIC RADIATION THERAPY: ICD-10-CM

## 2025-09-03 LAB
ALBUMIN SERPL BCP-MCNC: 4.1 G/DL (ref 3.4–5)
ALP SERPL-CCNC: 49 U/L (ref 33–136)
ALT SERPL W P-5'-P-CCNC: 22 U/L (ref 10–52)
ANION GAP SERPL CALC-SCNC: 10 MMOL/L (ref 10–20)
AST SERPL W P-5'-P-CCNC: 21 U/L (ref 9–39)
BASOPHILS # BLD AUTO: 0.04 X10*3/UL (ref 0–0.1)
BASOPHILS NFR BLD AUTO: 0.6 %
BILIRUB SERPL-MCNC: 0.4 MG/DL (ref 0–1.2)
BUN SERPL-MCNC: 27 MG/DL (ref 6–23)
CALCIUM SERPL-MCNC: 9.5 MG/DL (ref 8.6–10.3)
CHLORIDE SERPL-SCNC: 107 MMOL/L (ref 98–107)
CO2 SERPL-SCNC: 27 MMOL/L (ref 21–32)
CREAT SERPL-MCNC: 0.79 MG/DL (ref 0.5–1.3)
EGFRCR SERPLBLD CKD-EPI 2021: 86 ML/MIN/1.73M*2
EOSINOPHIL # BLD AUTO: 0.18 X10*3/UL (ref 0–0.4)
EOSINOPHIL NFR BLD AUTO: 2.5 %
ERYTHROCYTE [DISTWIDTH] IN BLOOD BY AUTOMATED COUNT: 12.8 % (ref 11.5–14.5)
GLUCOSE SERPL-MCNC: 117 MG/DL (ref 74–99)
HCT VFR BLD AUTO: 38.8 % (ref 41–52)
HGB BLD-MCNC: 12.8 G/DL (ref 13.5–17.5)
IMM GRANULOCYTES # BLD AUTO: 0.03 X10*3/UL (ref 0–0.5)
IMM GRANULOCYTES NFR BLD AUTO: 0.4 % (ref 0–0.9)
LYMPHOCYTES # BLD AUTO: 0.94 X10*3/UL (ref 0.8–3)
LYMPHOCYTES NFR BLD AUTO: 13.1 %
MAGNESIUM SERPL-MCNC: 1.8 MG/DL (ref 1.6–2.4)
MCH RBC QN AUTO: 32.2 PG (ref 26–34)
MCHC RBC AUTO-ENTMCNC: 33 G/DL (ref 32–36)
MCV RBC AUTO: 98 FL (ref 80–100)
MONOCYTES # BLD AUTO: 0.42 X10*3/UL (ref 0.05–0.8)
MONOCYTES NFR BLD AUTO: 5.9 %
NEUTROPHILS # BLD AUTO: 5.54 X10*3/UL (ref 1.6–5.5)
NEUTROPHILS NFR BLD AUTO: 77.5 %
NRBC BLD-RTO: 0 /100 WBCS (ref 0–0)
PLATELET # BLD AUTO: 233 X10*3/UL (ref 150–450)
POTASSIUM SERPL-SCNC: 4.1 MMOL/L (ref 3.5–5.3)
PROT SERPL-MCNC: 6.5 G/DL (ref 6.4–8.2)
RAD ONC MSQ ACTUAL FRACTIONS DELIVERED: 17
RAD ONC MSQ ACTUAL SESSION DELIVERED DOSE: 200 CGRAY
RAD ONC MSQ ACTUAL TOTAL DOSE: 3400 CGRAY
RAD ONC MSQ ELAPSED DAYS: 23
RAD ONC MSQ LAST DATE: NORMAL
RAD ONC MSQ PRESCRIBED FRACTIONAL DOSE: 200 CGRAY
RAD ONC MSQ PRESCRIBED NUMBER OF FRACTIONS: 35
RAD ONC MSQ PRESCRIBED TECHNIQUE: NORMAL
RAD ONC MSQ PRESCRIBED TOTAL DOSE: 7000 CGRAY
RAD ONC MSQ PRESCRIPTION PATTERN COMMENT: NORMAL
RAD ONC MSQ START DATE: NORMAL
RAD ONC MSQ TREATMENT COURSE NUMBER: 1
RAD ONC MSQ TREATMENT SITE: NORMAL
RBC # BLD AUTO: 3.98 X10*6/UL (ref 4.5–5.9)
SODIUM SERPL-SCNC: 140 MMOL/L (ref 136–145)
WBC # BLD AUTO: 7.2 X10*3/UL (ref 4.4–11.3)

## 2025-09-03 PROCEDURE — 96413 CHEMO IV INFUSION 1 HR: CPT

## 2025-09-03 PROCEDURE — 1159F MED LIST DOCD IN RCRD: CPT | Performed by: STUDENT IN AN ORGANIZED HEALTH CARE EDUCATION/TRAINING PROGRAM

## 2025-09-03 PROCEDURE — 3078F DIAST BP <80 MM HG: CPT | Performed by: STUDENT IN AN ORGANIZED HEALTH CARE EDUCATION/TRAINING PROGRAM

## 2025-09-03 PROCEDURE — 99406 BEHAV CHNG SMOKING 3-10 MIN: CPT | Performed by: STUDENT IN AN ORGANIZED HEALTH CARE EDUCATION/TRAINING PROGRAM

## 2025-09-03 PROCEDURE — 1160F RVW MEDS BY RX/DR IN RCRD: CPT | Performed by: STUDENT IN AN ORGANIZED HEALTH CARE EDUCATION/TRAINING PROGRAM

## 2025-09-03 PROCEDURE — 99215 OFFICE O/P EST HI 40 MIN: CPT | Performed by: STUDENT IN AN ORGANIZED HEALTH CARE EDUCATION/TRAINING PROGRAM

## 2025-09-03 PROCEDURE — 96374 THER/PROPH/DIAG INJ IV PUSH: CPT | Mod: INF

## 2025-09-03 PROCEDURE — 83735 ASSAY OF MAGNESIUM: CPT

## 2025-09-03 PROCEDURE — 2500000004 HC RX 250 GENERAL PHARMACY W/ HCPCS (ALT 636 FOR OP/ED): Performed by: STUDENT IN AN ORGANIZED HEALTH CARE EDUCATION/TRAINING PROGRAM

## 2025-09-03 PROCEDURE — 36415 COLL VENOUS BLD VENIPUNCTURE: CPT

## 2025-09-03 PROCEDURE — 80053 COMPREHEN METABOLIC PANEL: CPT

## 2025-09-03 PROCEDURE — 1126F AMNT PAIN NOTED NONE PRSNT: CPT | Performed by: STUDENT IN AN ORGANIZED HEALTH CARE EDUCATION/TRAINING PROGRAM

## 2025-09-03 PROCEDURE — 85025 COMPLETE CBC W/AUTO DIFF WBC: CPT

## 2025-09-03 PROCEDURE — G2211 COMPLEX E/M VISIT ADD ON: HCPCS | Performed by: STUDENT IN AN ORGANIZED HEALTH CARE EDUCATION/TRAINING PROGRAM

## 2025-09-03 PROCEDURE — 3077F SYST BP >= 140 MM HG: CPT | Performed by: STUDENT IN AN ORGANIZED HEALTH CARE EDUCATION/TRAINING PROGRAM

## 2025-09-03 PROCEDURE — 77386 HC INTENSITY-MODULATED RADIATION THERAPY (IMRT), COMPLEX: CPT | Performed by: RADIOLOGY

## 2025-09-03 RX ORDER — EPINEPHRINE 0.3 MG/.3ML
0.3 INJECTION SUBCUTANEOUS EVERY 5 MIN PRN
Status: DISCONTINUED | OUTPATIENT
Start: 2025-09-03 | End: 2025-09-03 | Stop reason: HOSPADM

## 2025-09-03 RX ORDER — PROCHLORPERAZINE MALEATE 10 MG
10 TABLET ORAL EVERY 6 HOURS PRN
Status: DISCONTINUED | OUTPATIENT
Start: 2025-09-03 | End: 2025-09-03 | Stop reason: HOSPADM

## 2025-09-03 RX ORDER — PROCHLORPERAZINE EDISYLATE 5 MG/ML
10 INJECTION INTRAMUSCULAR; INTRAVENOUS EVERY 6 HOURS PRN
Status: DISCONTINUED | OUTPATIENT
Start: 2025-09-03 | End: 2025-09-03 | Stop reason: HOSPADM

## 2025-09-03 RX ORDER — FAMOTIDINE 10 MG/ML
20 INJECTION, SOLUTION INTRAVENOUS ONCE AS NEEDED
Status: DISCONTINUED | OUTPATIENT
Start: 2025-09-03 | End: 2025-09-03 | Stop reason: HOSPADM

## 2025-09-03 RX ORDER — MAGNESIUM SULFATE HEPTAHYDRATE 40 MG/ML
2 INJECTION, SOLUTION INTRAVENOUS ONCE AS NEEDED
Status: DISCONTINUED | OUTPATIENT
Start: 2025-09-03 | End: 2025-09-03 | Stop reason: HOSPADM

## 2025-09-03 RX ORDER — MAGNESIUM SULFATE HEPTAHYDRATE 40 MG/ML
4 INJECTION, SOLUTION INTRAVENOUS ONCE AS NEEDED
Status: DISCONTINUED | OUTPATIENT
Start: 2025-09-03 | End: 2025-09-03 | Stop reason: HOSPADM

## 2025-09-03 RX ORDER — ALBUTEROL SULFATE 0.83 MG/ML
3 SOLUTION RESPIRATORY (INHALATION) AS NEEDED
Status: DISCONTINUED | OUTPATIENT
Start: 2025-09-03 | End: 2025-09-03 | Stop reason: HOSPADM

## 2025-09-03 RX ORDER — DIPHENHYDRAMINE HYDROCHLORIDE 50 MG/ML
50 INJECTION, SOLUTION INTRAMUSCULAR; INTRAVENOUS AS NEEDED
Status: DISCONTINUED | OUTPATIENT
Start: 2025-09-03 | End: 2025-09-03 | Stop reason: HOSPADM

## 2025-09-03 RX ORDER — OXYCODONE HYDROCHLORIDE 5 MG/1
5 TABLET ORAL EVERY 4 HOURS PRN
Qty: 42 TABLET | Refills: 0 | Status: SHIPPED | OUTPATIENT
Start: 2025-09-03

## 2025-09-03 RX ADMIN — CETUXIMAB 500 MG: 2 SOLUTION INTRAVENOUS at 15:37

## 2025-09-03 RX ADMIN — DIPHENHYDRAMINE HYDROCHLORIDE 50 MG: 50 INJECTION, SOLUTION INTRAMUSCULAR; INTRAVENOUS at 15:18

## 2025-09-03 ASSESSMENT — PAIN SCALES - GENERAL
PAINLEVEL_OUTOF10: 0-NO PAIN
PAINLEVEL_OUTOF10: 0-NO PAIN

## 2025-09-04 ENCOUNTER — HOSPITAL ENCOUNTER (OUTPATIENT)
Dept: RADIATION ONCOLOGY | Facility: CLINIC | Age: 85
Setting detail: RADIATION/ONCOLOGY SERIES
Discharge: HOME | End: 2025-09-04
Payer: MEDICARE

## 2025-09-04 DIAGNOSIS — C32.0 MALIGNANT NEOPLASM OF GLOTTIS (MULTI): ICD-10-CM

## 2025-09-04 DIAGNOSIS — Z51.0 ENCOUNTER FOR ANTINEOPLASTIC RADIATION THERAPY: ICD-10-CM

## 2025-09-04 LAB
RAD ONC MSQ ACTUAL FRACTIONS DELIVERED: 18
RAD ONC MSQ ACTUAL SESSION DELIVERED DOSE: 200 CGRAY
RAD ONC MSQ ACTUAL TOTAL DOSE: 3600 CGRAY
RAD ONC MSQ ELAPSED DAYS: 24
RAD ONC MSQ LAST DATE: NORMAL
RAD ONC MSQ PRESCRIBED FRACTIONAL DOSE: 200 CGRAY
RAD ONC MSQ PRESCRIBED NUMBER OF FRACTIONS: 35
RAD ONC MSQ PRESCRIBED TECHNIQUE: NORMAL
RAD ONC MSQ PRESCRIBED TOTAL DOSE: 7000 CGRAY
RAD ONC MSQ PRESCRIPTION PATTERN COMMENT: NORMAL
RAD ONC MSQ START DATE: NORMAL
RAD ONC MSQ TREATMENT COURSE NUMBER: 1
RAD ONC MSQ TREATMENT SITE: NORMAL

## 2025-09-04 PROCEDURE — 77386 HC INTENSITY-MODULATED RADIATION THERAPY (IMRT), COMPLEX: CPT | Performed by: RADIOLOGY

## 2025-09-05 ENCOUNTER — INFUSION (OUTPATIENT)
Dept: HEMATOLOGY/ONCOLOGY | Facility: CLINIC | Age: 85
End: 2025-09-05
Payer: MEDICARE

## 2025-09-05 ENCOUNTER — APPOINTMENT (OUTPATIENT)
Dept: HEMATOLOGY/ONCOLOGY | Facility: CLINIC | Age: 85
End: 2025-09-05
Payer: MEDICARE

## 2025-09-05 ENCOUNTER — HOSPITAL ENCOUNTER (OUTPATIENT)
Dept: RADIATION ONCOLOGY | Facility: CLINIC | Age: 85
Setting detail: RADIATION/ONCOLOGY SERIES
Discharge: HOME | End: 2025-09-05
Payer: MEDICARE

## 2025-09-05 VITALS
TEMPERATURE: 96.1 F | SYSTOLIC BLOOD PRESSURE: 141 MMHG | BODY MASS INDEX: 25.83 KG/M2 | RESPIRATION RATE: 18 BRPM | HEART RATE: 78 BPM | DIASTOLIC BLOOD PRESSURE: 85 MMHG | WEIGHT: 174.16 LBS | OXYGEN SATURATION: 95 %

## 2025-09-05 DIAGNOSIS — Z51.0 ENCOUNTER FOR ANTINEOPLASTIC RADIATION THERAPY: ICD-10-CM

## 2025-09-05 DIAGNOSIS — C32.0 MALIGNANT NEOPLASM OF GLOTTIS (MULTI): ICD-10-CM

## 2025-09-05 DIAGNOSIS — C32.9 MALIGNANT NEOPLASM OF LARYNX: ICD-10-CM

## 2025-09-05 LAB
RAD ONC MSQ ACTUAL FRACTIONS DELIVERED: 19
RAD ONC MSQ ACTUAL SESSION DELIVERED DOSE: 200 CGRAY
RAD ONC MSQ ACTUAL TOTAL DOSE: 3800 CGRAY
RAD ONC MSQ ELAPSED DAYS: 25
RAD ONC MSQ LAST DATE: NORMAL
RAD ONC MSQ PRESCRIBED FRACTIONAL DOSE: 200 CGRAY
RAD ONC MSQ PRESCRIBED NUMBER OF FRACTIONS: 35
RAD ONC MSQ PRESCRIBED TECHNIQUE: NORMAL
RAD ONC MSQ PRESCRIBED TOTAL DOSE: 7000 CGRAY
RAD ONC MSQ PRESCRIPTION PATTERN COMMENT: NORMAL
RAD ONC MSQ START DATE: NORMAL
RAD ONC MSQ TREATMENT COURSE NUMBER: 1
RAD ONC MSQ TREATMENT SITE: NORMAL

## 2025-09-05 PROCEDURE — 96360 HYDRATION IV INFUSION INIT: CPT | Mod: INF

## 2025-09-05 PROCEDURE — 77386 HC INTENSITY-MODULATED RADIATION THERAPY (IMRT), COMPLEX: CPT | Performed by: RADIOLOGY

## 2025-09-05 PROCEDURE — 2500000004 HC RX 250 GENERAL PHARMACY W/ HCPCS (ALT 636 FOR OP/ED): Performed by: STUDENT IN AN ORGANIZED HEALTH CARE EDUCATION/TRAINING PROGRAM

## 2025-09-05 RX ADMIN — SODIUM CHLORIDE 1000 ML: 0.9 INJECTION, SOLUTION INTRAVENOUS at 11:24

## 2025-09-05 ASSESSMENT — PAIN SCALES - GENERAL: PAINLEVEL_OUTOF10: 8

## 2025-09-12 ENCOUNTER — APPOINTMENT (OUTPATIENT)
Dept: HEMATOLOGY/ONCOLOGY | Facility: CLINIC | Age: 85
End: 2025-09-12
Payer: MEDICARE

## 2025-09-19 ENCOUNTER — APPOINTMENT (OUTPATIENT)
Dept: HEMATOLOGY/ONCOLOGY | Facility: CLINIC | Age: 85
End: 2025-09-19
Payer: MEDICARE

## 2025-09-26 ENCOUNTER — APPOINTMENT (OUTPATIENT)
Dept: HEMATOLOGY/ONCOLOGY | Facility: CLINIC | Age: 85
End: 2025-09-26
Payer: MEDICARE

## 2026-02-19 ENCOUNTER — APPOINTMENT (OUTPATIENT)
Dept: SLEEP MEDICINE | Facility: CLINIC | Age: 86
End: 2026-02-19
Payer: MEDICARE

## (undated) DEVICE — TRAY, DRY PREP, PREMIUM

## (undated) DEVICE — SYRINGE, 60 CC, IRRIGATION, TOOMEY TIP

## (undated) DEVICE — ADAPTER, WATER BOTTLE, SMART CAP, 140/160/180 SERIES

## (undated) DEVICE — BOWL, BASIN, 32 OZ, STERILE

## (undated) DEVICE — HOLDER, CATHETER, LEGBAND, ADULT, 2 IN, LF

## (undated) DEVICE — BAG, DRAINAGE, ANTI-REFLUX CHAMBER, 2000ML

## (undated) DEVICE — GLOVE, SURGICAL, PROTEXIS PI , 7.5, PF, LF

## (undated) DEVICE — COLLECTION BAG, FLUID, NON-STERILE

## (undated) DEVICE — Device

## (undated) DEVICE — IRRIGATION SET, CYSTOSCOPY, TURP, Y, CONTINUOUS, 81 IN

## (undated) DEVICE — CATHETER, URETHRAL, FOLEY, 3 WAY, 30CC, 22FR

## (undated) DEVICE — PREP, SKIN, BETADINE, SOLUTION, 16 OZ

## (undated) DEVICE — COVER, CART, 45 X 27 X 48 IN, CLEAR

## (undated) DEVICE — BAG, DRAINAGE, SAFETY FLOW, OUTLET DEVICE

## (undated) DEVICE — CATHETER, URETHRAL, FOLEY, 2 WAY, 20 FR, 5 CC, SILICONE

## (undated) DEVICE — CATHETER, URETHRAL, FOLEY, 2 WAY, 18 FR, 5 CC, SILICONE

## (undated) DEVICE — DRESSING, NON-ADHERENT, TELFA, OUCHLESS, 3 X 8 IN, STERILE

## (undated) DEVICE — STOPCOCK, SAN ANTONIO, W/MODIFIED FITTING

## (undated) DEVICE — SYRINGE, LUER LOCK, 12ML

## (undated) DEVICE — VALVE, BIOPSY, BRONCHOSCOPE, DISPOSABLE, STERILE

## (undated) DEVICE — VALVE, DEFENDO, 5 PCS BUTTON SET, SINGLE USE

## (undated) DEVICE — LOOP, BIPOLAR CUTTING, 24/26 FR, F/URO, 0.35MM, STERILE

## (undated) DEVICE — TUBING, SUCTION, CONNECTING, NON-CONDUCTIVE, SURE GRIP CONNECTORS, 3/16 IN X 10 FT

## (undated) DEVICE — MANIFOLD, 4 PORT NEPTUNE STANDARD

## (undated) DEVICE — VALVE, SUCTION

## (undated) DEVICE — CATHETER, URETHRAL, FOLEY, 3 WAY, 20 FR, 30 CC, TEFLON

## (undated) DEVICE — MARKER, SKIN, DUAL TIP INK W/9 LABEL AND REMOVABLE TIME OUT SLEEVE

## (undated) DEVICE — TUBING, SUCTION, CONNECTING, STERILE 0.25 X 120 IN., LF

## (undated) DEVICE — TOWEL PACK, STERILE, 4/PACK, BLUE

## (undated) DEVICE — DRESSING, GAUZE, 16 PLY, 4 X 4 IN, STERILE

## (undated) DEVICE — PREP TRAY, SKIN, DRY, W/GLOVES

## (undated) DEVICE — REST, HEAD, BAGEL, 9 IN

## (undated) DEVICE — GLOVE, SURGICAL, PROTEXIS PI BLUE W/NEUTHERA, 7.5, PF, LF